# Patient Record
Sex: MALE | Race: WHITE | Employment: PART TIME
[De-identification: names, ages, dates, MRNs, and addresses within clinical notes are randomized per-mention and may not be internally consistent; named-entity substitution may affect disease eponyms.]

---

## 2017-01-09 RX ORDER — METOPROLOL SUCCINATE 50 MG/1
TABLET, EXTENDED RELEASE ORAL
Qty: 30 TABLET | Refills: 3 | Status: SHIPPED | OUTPATIENT
Start: 2017-01-09 | End: 2017-05-10 | Stop reason: SDUPTHER

## 2017-02-21 ENCOUNTER — OFFICE VISIT (OUTPATIENT)
Dept: FAMILY MEDICINE CLINIC | Facility: CLINIC | Age: 67
End: 2017-02-21

## 2017-02-21 VITALS
HEIGHT: 74 IN | SYSTOLIC BLOOD PRESSURE: 135 MMHG | DIASTOLIC BLOOD PRESSURE: 84 MMHG | HEART RATE: 57 BPM | BODY MASS INDEX: 31.32 KG/M2 | WEIGHT: 244 LBS

## 2017-02-21 DIAGNOSIS — I10 ESSENTIAL HYPERTENSION: Primary | ICD-10-CM

## 2017-02-21 DIAGNOSIS — Z00.00 WELL ADULT EXAM: ICD-10-CM

## 2017-02-21 DIAGNOSIS — E78.00 HYPERCHOLESTEREMIA: ICD-10-CM

## 2017-02-21 PROCEDURE — G8484 FLU IMMUNIZE NO ADMIN: HCPCS | Performed by: FAMILY MEDICINE

## 2017-02-21 PROCEDURE — G8419 CALC BMI OUT NRM PARAM NOF/U: HCPCS | Performed by: FAMILY MEDICINE

## 2017-02-21 PROCEDURE — 99213 OFFICE O/P EST LOW 20 MIN: CPT | Performed by: FAMILY MEDICINE

## 2017-02-21 PROCEDURE — G8427 DOCREV CUR MEDS BY ELIG CLIN: HCPCS | Performed by: FAMILY MEDICINE

## 2017-02-21 PROCEDURE — 1123F ACP DISCUSS/DSCN MKR DOCD: CPT | Performed by: FAMILY MEDICINE

## 2017-02-21 PROCEDURE — 1036F TOBACCO NON-USER: CPT | Performed by: FAMILY MEDICINE

## 2017-02-21 PROCEDURE — 4040F PNEUMOC VAC/ADMIN/RCVD: CPT | Performed by: FAMILY MEDICINE

## 2017-02-21 PROCEDURE — G8599 NO ASA/ANTIPLAT THER USE RNG: HCPCS | Performed by: FAMILY MEDICINE

## 2017-02-21 PROCEDURE — 3017F COLORECTAL CA SCREEN DOC REV: CPT | Performed by: FAMILY MEDICINE

## 2017-02-21 ASSESSMENT — ENCOUNTER SYMPTOMS
ABDOMINAL PAIN: 0
COUGH: 1
VOMITING: 0
SHORTNESS OF BREATH: 0

## 2017-02-22 ASSESSMENT — ENCOUNTER SYMPTOMS
TROUBLE SWALLOWING: 0
RHINORRHEA: 0
DIARRHEA: 0
WHEEZING: 0
BLOOD IN STOOL: 0
SORE THROAT: 0

## 2017-03-27 ENCOUNTER — HOSPITAL ENCOUNTER (OUTPATIENT)
Age: 67
Setting detail: SPECIMEN
Discharge: HOME | End: 2017-03-27
Payer: MEDICARE

## 2017-03-27 DIAGNOSIS — E78.00 HYPERCHOLESTEREMIA: ICD-10-CM

## 2017-03-27 DIAGNOSIS — I10 ESSENTIAL HYPERTENSION: ICD-10-CM

## 2017-03-27 DIAGNOSIS — Z00.00 WELL ADULT EXAM: ICD-10-CM

## 2017-03-27 LAB
ABSOLUTE EOS #: 0.1 K/UL (ref 0–0.4)
ABSOLUTE LYMPH #: 3.1 K/UL (ref 1–4.8)
ABSOLUTE MONO #: 0.5 K/UL (ref 0.1–1.2)
ALBUMIN SERPL-MCNC: 4 G/DL (ref 3.5–5.2)
ALBUMIN/GLOBULIN RATIO: 1.3 (ref 1–2.5)
ALP BLD-CCNC: 121 U/L (ref 40–129)
ALT SERPL-CCNC: 55 U/L (ref 5–41)
ANION GAP SERPL CALCULATED.3IONS-SCNC: 11 MMOL/L (ref 9–17)
AST SERPL-CCNC: 37 U/L
BASOPHILS # BLD: 0 % (ref 0–2)
BASOPHILS ABSOLUTE: 0 K/UL (ref 0–0.2)
BILIRUB SERPL-MCNC: 0.41 MG/DL (ref 0.3–1.2)
BILIRUBIN URINE: NEGATIVE
BUN BLDV-MCNC: 18 MG/DL (ref 8–23)
BUN/CREAT BLD: ABNORMAL (ref 9–20)
CALCIUM SERPL-MCNC: 8.8 MG/DL (ref 8.6–10.4)
CHLORIDE BLD-SCNC: 104 MMOL/L (ref 98–107)
CHOLESTEROL/HDL RATIO: 2.6
CHOLESTEROL: 128 MG/DL
CO2: 26 MMOL/L (ref 20–31)
COLOR: YELLOW
COMMENT UA: NORMAL
CREAT SERPL-MCNC: 1.08 MG/DL (ref 0.7–1.2)
DIFFERENTIAL TYPE: ABNORMAL
EOSINOPHILS RELATIVE PERCENT: 1 % (ref 1–4)
GFR AFRICAN AMERICAN: >60 ML/MIN
GFR NON-AFRICAN AMERICAN: >60 ML/MIN
GFR SERPL CREATININE-BSD FRML MDRD: ABNORMAL ML/MIN/{1.73_M2}
GFR SERPL CREATININE-BSD FRML MDRD: ABNORMAL ML/MIN/{1.73_M2}
GLUCOSE BLD-MCNC: 123 MG/DL (ref 70–99)
GLUCOSE URINE: NEGATIVE
HCT VFR BLD CALC: 42.3 % (ref 41–53)
HDLC SERPL-MCNC: 50 MG/DL
HEMOGLOBIN: 14.6 G/DL (ref 13.5–17.5)
KETONES, URINE: NEGATIVE
LDL CHOLESTEROL: 55 MG/DL (ref 0–130)
LEUKOCYTE ESTERASE, URINE: NEGATIVE
LYMPHOCYTES # BLD: 34 % (ref 24–44)
MCH RBC QN AUTO: 33.1 PG (ref 26–34)
MCHC RBC AUTO-ENTMCNC: 34.4 G/DL (ref 31–37)
MCV RBC AUTO: 96.2 FL (ref 80–100)
MONOCYTES # BLD: 6 % (ref 2–11)
NITRITE, URINE: NEGATIVE
PDW BLD-RTO: 14.5 % (ref 12.5–15.4)
PH UA: 6 (ref 5–8)
PLATELET # BLD: 135 K/UL (ref 140–450)
PLATELET ESTIMATE: ABNORMAL
PMV BLD AUTO: 7.9 FL (ref 6–12)
POTASSIUM SERPL-SCNC: 4.8 MMOL/L (ref 3.7–5.3)
PROSTATE SPECIFIC ANTIGEN: 1.61 UG/L
PROTEIN UA: NEGATIVE
RBC # BLD: 4.4 M/UL (ref 4.5–5.9)
RBC # BLD: ABNORMAL 10*6/UL
SEG NEUTROPHILS: 59 % (ref 36–66)
SEGMENTED NEUTROPHILS ABSOLUTE COUNT: 5.3 K/UL (ref 1.8–7.7)
SODIUM BLD-SCNC: 141 MMOL/L (ref 135–144)
SPECIFIC GRAVITY UA: 1.03 (ref 1–1.03)
TOTAL PROTEIN: 7.1 G/DL (ref 6.4–8.3)
TRIGL SERPL-MCNC: 113 MG/DL
TURBIDITY: CLEAR
URINE HGB: NEGATIVE
UROBILINOGEN, URINE: NORMAL
VLDLC SERPL CALC-MCNC: NORMAL MG/DL (ref 1–30)
WBC # BLD: 9 K/UL (ref 3.5–11)
WBC # BLD: ABNORMAL 10*3/UL

## 2017-05-01 RX ORDER — AMLODIPINE BESYLATE 5 MG/1
TABLET ORAL
Qty: 30 TABLET | Refills: 4 | Status: SHIPPED | OUTPATIENT
Start: 2017-05-01 | End: 2017-06-09 | Stop reason: SDUPTHER

## 2017-05-11 RX ORDER — METOPROLOL SUCCINATE 50 MG/1
TABLET, EXTENDED RELEASE ORAL
Qty: 30 TABLET | Refills: 5 | Status: SHIPPED | OUTPATIENT
Start: 2017-05-11 | End: 2017-06-09 | Stop reason: SDUPTHER

## 2017-06-09 ENCOUNTER — OFFICE VISIT (OUTPATIENT)
Dept: FAMILY MEDICINE CLINIC | Age: 67
End: 2017-06-09
Payer: MEDICARE

## 2017-06-09 VITALS
TEMPERATURE: 98 F | BODY MASS INDEX: 31.52 KG/M2 | OXYGEN SATURATION: 95 % | HEART RATE: 65 BPM | DIASTOLIC BLOOD PRESSURE: 64 MMHG | HEIGHT: 74 IN | SYSTOLIC BLOOD PRESSURE: 128 MMHG | WEIGHT: 245.6 LBS

## 2017-06-09 DIAGNOSIS — I10 ESSENTIAL HYPERTENSION: ICD-10-CM

## 2017-06-09 DIAGNOSIS — E78.00 HYPERCHOLESTEREMIA: ICD-10-CM

## 2017-06-09 PROCEDURE — 4040F PNEUMOC VAC/ADMIN/RCVD: CPT | Performed by: FAMILY MEDICINE

## 2017-06-09 PROCEDURE — 99213 OFFICE O/P EST LOW 20 MIN: CPT | Performed by: FAMILY MEDICINE

## 2017-06-09 PROCEDURE — 1036F TOBACCO NON-USER: CPT | Performed by: FAMILY MEDICINE

## 2017-06-09 PROCEDURE — 3017F COLORECTAL CA SCREEN DOC REV: CPT | Performed by: FAMILY MEDICINE

## 2017-06-09 PROCEDURE — G8419 CALC BMI OUT NRM PARAM NOF/U: HCPCS | Performed by: FAMILY MEDICINE

## 2017-06-09 PROCEDURE — 1123F ACP DISCUSS/DSCN MKR DOCD: CPT | Performed by: FAMILY MEDICINE

## 2017-06-09 PROCEDURE — G8427 DOCREV CUR MEDS BY ELIG CLIN: HCPCS | Performed by: FAMILY MEDICINE

## 2017-06-09 PROCEDURE — G8598 ASA/ANTIPLAT THER USED: HCPCS | Performed by: FAMILY MEDICINE

## 2017-06-09 RX ORDER — ATORVASTATIN CALCIUM 10 MG/1
10 TABLET, FILM COATED ORAL DAILY
Qty: 90 TABLET | Refills: 3
Start: 2017-06-09 | End: 2017-06-26 | Stop reason: SDUPTHER

## 2017-06-09 RX ORDER — METOPROLOL SUCCINATE 50 MG/1
50 TABLET, EXTENDED RELEASE ORAL DAILY
Qty: 90 TABLET | Refills: 3
Start: 2017-06-09 | End: 2017-11-13 | Stop reason: SDUPTHER

## 2017-06-09 RX ORDER — AMLODIPINE BESYLATE 5 MG/1
5 TABLET ORAL DAILY
Qty: 90 TABLET | Refills: 3
Start: 2017-06-09 | End: 2017-10-02 | Stop reason: SDUPTHER

## 2017-06-09 RX ORDER — OMEPRAZOLE 10 MG/1
10 CAPSULE, DELAYED RELEASE ORAL DAILY PRN
Qty: 90 CAPSULE | Refills: 3
Start: 2017-06-09 | End: 2022-02-07 | Stop reason: ALTCHOICE

## 2017-06-09 ASSESSMENT — ENCOUNTER SYMPTOMS
RHINORRHEA: 0
EYE ITCHING: 0
EYE REDNESS: 0
ABDOMINAL PAIN: 0
ABDOMINAL DISTENTION: 0
CHOKING: 0
COUGH: 0
STRIDOR: 0
BACK PAIN: 0
NAUSEA: 0
SHORTNESS OF BREATH: 0
VOMITING: 0
COLOR CHANGE: 0
WHEEZING: 0
BLOOD IN STOOL: 0
APNEA: 0
PHOTOPHOBIA: 0
EYE DISCHARGE: 0
EYE PAIN: 0
CHEST TIGHTNESS: 0
DIARRHEA: 0
CONSTIPATION: 0
SINUS PRESSURE: 0
SORE THROAT: 0

## 2017-06-09 ASSESSMENT — PATIENT HEALTH QUESTIONNAIRE - PHQ9
SUM OF ALL RESPONSES TO PHQ9 QUESTIONS 1 & 2: 0
2. FEELING DOWN, DEPRESSED OR HOPELESS: 0
SUM OF ALL RESPONSES TO PHQ QUESTIONS 1-9: 0
1. LITTLE INTEREST OR PLEASURE IN DOING THINGS: 0

## 2017-06-26 RX ORDER — ATORVASTATIN CALCIUM 10 MG/1
10 TABLET, FILM COATED ORAL DAILY
Qty: 90 TABLET | Refills: 1 | Status: SHIPPED | OUTPATIENT
Start: 2017-06-26 | End: 2017-12-29 | Stop reason: SDUPTHER

## 2017-10-02 RX ORDER — AMLODIPINE BESYLATE 5 MG/1
5 TABLET ORAL DAILY
Qty: 90 TABLET | Refills: 3 | Status: SHIPPED | OUTPATIENT
Start: 2017-10-02 | End: 2018-10-14 | Stop reason: SDUPTHER

## 2017-10-02 NOTE — TELEPHONE ENCOUNTER
Health Maintenance   Topic Date Due    Flu vaccine (1) 09/01/2017    Diabetes screen  03/22/2019    Lipid screen  03/27/2022    Colon cancer screen colonoscopy  01/19/2025    DTaP/Tdap/Td vaccine (2 - Td) 07/11/2026    Zostavax vaccine  Addressed    Pneumococcal low/med risk  Completed    AAA screen  Completed    Hepatitis C screen  Completed       No results found for: LABA1C          ( goal A1C is < 7)   No results found for: LABMICR  LDL Cholesterol (mg/dL)   Date Value   03/27/2017 55       (goal LDL is <100)   AST (U/L)   Date Value   03/27/2017 37     ALT (U/L)   Date Value   03/27/2017 55 (H)     BUN (mg/dL)   Date Value   03/27/2017 18     BP Readings from Last 3 Encounters:   06/09/17 128/64   02/21/17 135/84   10/18/16 120/75          (goal 120/80)    All Future Testing planned in [facility]  Lab Frequency Next Occurrence   Nasal cannula oxygen         Next Visit Date:  No future appointments.          Patient Active Problem List:     Cerebral infarction [facility])     Abnormal LFTs     Carotid arterial disease (HCC)     Hypercholesteremia     Colon polyps     Colon tumor     Colonic mass     Cholelithiasis with acute cholecystitis     HTN (hypertension)

## 2017-11-13 RX ORDER — METOPROLOL SUCCINATE 50 MG/1
50 TABLET, EXTENDED RELEASE ORAL DAILY
Qty: 90 TABLET | Refills: 3 | Status: SHIPPED | OUTPATIENT
Start: 2017-11-13 | End: 2018-11-24 | Stop reason: SDUPTHER

## 2017-11-13 NOTE — TELEPHONE ENCOUNTER
Next Visit Date:  No future appointments.     Health Maintenance   Topic Date Due    Flu vaccine (1) 09/01/2017    Diabetes screen  03/22/2019    Lipid screen  03/27/2022    Colon cancer screen colonoscopy  01/19/2025    DTaP/Tdap/Td vaccine (2 - Td) 07/11/2026    Zostavax vaccine  Addressed    Pneumococcal low/med risk  Completed    AAA screen  Completed    Hepatitis C screen  Completed       No results found for: LABA1C          ( goal A1C is < 7)   No results found for: LABMICR  LDL Cholesterol (mg/dL)   Date Value   03/27/2017 55       (goal LDL is <100)   AST (U/L)   Date Value   03/27/2017 37     ALT (U/L)   Date Value   03/27/2017 55 (H)     BUN (mg/dL)   Date Value   03/27/2017 18     BP Readings from Last 3 Encounters:   06/09/17 128/64   02/21/17 135/84   10/18/16 120/75          (goal 120/80)    All Future Testing planned in [facility]  Lab Frequency Next Occurrence   Nasal cannula oxygen                 Patient Active Problem List:     Cerebral infarction (HCC)     Abnormal LFTs     Carotid arterial disease (HCC)     Hypercholesteremia     Colon polyps     Colon tumor     Colonic mass     Cholelithiasis with acute cholecystitis     HTN (hypertension)

## 2017-12-29 RX ORDER — ATORVASTATIN CALCIUM 10 MG/1
TABLET, FILM COATED ORAL
Qty: 90 TABLET | Refills: 0 | Status: SHIPPED | OUTPATIENT
Start: 2017-12-29 | End: 2018-04-06 | Stop reason: SDUPTHER

## 2017-12-29 NOTE — TELEPHONE ENCOUNTER
Next Visit Date:  No future appointments.     Health Maintenance   Topic Date Due    Smoker: low dose lung CT screening  03/23/2016    Flu vaccine (1) 09/01/2017    Diabetes screen  03/22/2019    Lipid screen  03/27/2022    Colon cancer screen colonoscopy  01/19/2025    DTaP/Tdap/Td vaccine (2 - Td) 07/11/2026    Zostavax vaccine  Addressed    Pneumococcal low/med risk  Completed    AAA screen  Completed    Hepatitis C screen  Completed       No results found for: LABA1C          ( goal A1C is < 7)   No results found for: LABMICR  LDL Cholesterol (mg/dL)   Date Value   03/27/2017 55       (goal LDL is <100)   AST (U/L)   Date Value   03/27/2017 37     ALT (U/L)   Date Value   03/27/2017 55 (H)     BUN (mg/dL)   Date Value   03/27/2017 18     BP Readings from Last 3 Encounters:   06/09/17 128/64   02/21/17 135/84   10/18/16 120/75          (goal 120/80)    All Future Testing planned in [facility]  Lab Frequency Next Occurrence   Nasal cannula oxygen                 Patient Active Problem List:     Cerebral infarction (HCC)     Abnormal LFTs     Carotid arterial disease (HCC)     Hypercholesteremia     Colon polyps     Colon tumor     Colonic mass     Cholelithiasis with acute cholecystitis     HTN (hypertension)

## 2018-04-06 RX ORDER — ATORVASTATIN CALCIUM 10 MG/1
TABLET, FILM COATED ORAL
Qty: 90 TABLET | Refills: 3 | Status: SHIPPED | OUTPATIENT
Start: 2018-04-06 | End: 2019-04-16 | Stop reason: SDUPTHER

## 2018-06-25 ENCOUNTER — OFFICE VISIT (OUTPATIENT)
Dept: FAMILY MEDICINE CLINIC | Age: 68
End: 2018-06-25
Payer: MEDICARE

## 2018-06-25 VITALS
HEIGHT: 74 IN | HEART RATE: 52 BPM | WEIGHT: 229.2 LBS | OXYGEN SATURATION: 98 % | DIASTOLIC BLOOD PRESSURE: 64 MMHG | SYSTOLIC BLOOD PRESSURE: 120 MMHG | BODY MASS INDEX: 29.41 KG/M2

## 2018-06-25 DIAGNOSIS — I71.40 ABDOMINAL AORTIC ANEURYSM (AAA) WITHOUT RUPTURE: ICD-10-CM

## 2018-06-25 DIAGNOSIS — Z00.00 ROUTINE GENERAL MEDICAL EXAMINATION AT A HEALTH CARE FACILITY: ICD-10-CM

## 2018-06-25 DIAGNOSIS — Z51.81 MEDICATION MONITORING ENCOUNTER: ICD-10-CM

## 2018-06-25 DIAGNOSIS — I77.9 BILATERAL CAROTID ARTERY DISEASE (HCC): ICD-10-CM

## 2018-06-25 DIAGNOSIS — Z00.00 MEDICARE ANNUAL WELLNESS VISIT, SUBSEQUENT: Primary | ICD-10-CM

## 2018-06-25 DIAGNOSIS — E78.2 MIXED HYPERLIPIDEMIA: ICD-10-CM

## 2018-06-25 DIAGNOSIS — Z12.5 SCREENING FOR PROSTATE CANCER: ICD-10-CM

## 2018-06-25 PROCEDURE — 4040F PNEUMOC VAC/ADMIN/RCVD: CPT | Performed by: FAMILY MEDICINE

## 2018-06-25 PROCEDURE — G8598 ASA/ANTIPLAT THER USED: HCPCS | Performed by: FAMILY MEDICINE

## 2018-06-25 PROCEDURE — G0438 PPPS, INITIAL VISIT: HCPCS | Performed by: FAMILY MEDICINE

## 2018-06-25 ASSESSMENT — ENCOUNTER SYMPTOMS
EYE PAIN: 0
EYE REDNESS: 0
CONSTIPATION: 0
COUGH: 0
CHEST TIGHTNESS: 0
SORE THROAT: 0
SHORTNESS OF BREATH: 0
COLOR CHANGE: 0
EYE DISCHARGE: 0
VOMITING: 0
PHOTOPHOBIA: 0
ABDOMINAL DISTENTION: 0
SINUS PRESSURE: 0
DIARRHEA: 0
NAUSEA: 0
BACK PAIN: 0
RHINORRHEA: 0
EYE ITCHING: 0
WHEEZING: 0
APNEA: 0
STRIDOR: 0
CHOKING: 0
BLOOD IN STOOL: 0
ABDOMINAL PAIN: 0

## 2018-06-25 ASSESSMENT — LIFESTYLE VARIABLES
HOW OFTEN DURING THE LAST YEAR HAVE YOU FAILED TO DO WHAT WAS NORMALLY EXPECTED FROM YOU BECAUSE OF DRINKING: 0
AUDIT-C TOTAL SCORE: 3
HOW OFTEN DURING THE LAST YEAR HAVE YOU NEEDED AN ALCOHOLIC DRINK FIRST THING IN THE MORNING TO GET YOURSELF GOING AFTER A NIGHT OF HEAVY DRINKING: 0
HOW OFTEN DO YOU HAVE SIX OR MORE DRINKS ON ONE OCCASION: 0
HOW OFTEN DURING THE LAST YEAR HAVE YOU BEEN UNABLE TO REMEMBER WHAT HAPPENED THE NIGHT BEFORE BECAUSE YOU HAD BEEN DRINKING: 0
HOW OFTEN DURING THE LAST YEAR HAVE YOU HAD A FEELING OF GUILT OR REMORSE AFTER DRINKING: 0
HOW MANY STANDARD DRINKS CONTAINING ALCOHOL DO YOU HAVE ON A TYPICAL DAY: 0
HOW OFTEN DURING THE LAST YEAR HAVE YOU FOUND THAT YOU WERE NOT ABLE TO STOP DRINKING ONCE YOU HAD STARTED: 0
HOW OFTEN DO YOU HAVE A DRINK CONTAINING ALCOHOL: 3

## 2018-06-25 ASSESSMENT — ANXIETY QUESTIONNAIRES: GAD7 TOTAL SCORE: 0

## 2018-06-25 ASSESSMENT — PATIENT HEALTH QUESTIONNAIRE - PHQ9: SUM OF ALL RESPONSES TO PHQ QUESTIONS 1-9: 0

## 2018-10-15 RX ORDER — AMLODIPINE BESYLATE 5 MG/1
TABLET ORAL
Qty: 90 TABLET | Refills: 3 | Status: SHIPPED | OUTPATIENT
Start: 2018-10-15 | End: 2020-02-19 | Stop reason: SDUPTHER

## 2018-10-15 NOTE — TELEPHONE ENCOUNTER
Health Maintenance   Topic Date Due    Shingles Vaccine (1 of 2 - 2 Dose Series) 06/08/1999    Flu vaccine (1) 09/01/2018    Diabetes screen  03/22/2019    Lipid screen  03/27/2022    Colon cancer screen colonoscopy  01/19/2025    DTaP/Tdap/Td vaccine (2 - Td) 07/11/2026    Pneumococcal low/med risk  Completed    AAA screen  Completed    Hepatitis C screen  Completed       No results found for: LABA1C          ( goal A1C is < 7)   No results found for: LABMICR  LDL Cholesterol (mg/dL)   Date Value   03/27/2017 55       (goal LDL is <100)   AST (U/L)   Date Value   03/27/2017 37     ALT (U/L)   Date Value   03/27/2017 55 (H)     BUN (mg/dL)   Date Value   03/27/2017 18     BP Readings from Last 3 Encounters:   06/25/18 120/64   06/09/17 128/64   02/21/17 135/84          (goal 120/80)    All Future Testing planned in [facility]  Lab Frequency Next Occurrence   PSA Screening Once 06/25/2018   Lipid Panel Once 06/25/2018   Comprehensive Metabolic Panel Once 64/20/2223   US Abdominal Aorta Limited Once 07/25/2018   Nasal cannula oxygen         Next Visit Date:  No future appointments.          Patient Active Problem List:     Cerebral infarction [facility])     Abnormal LFTs     Carotid arterial disease (HCC)     Hypercholesteremia     Colon polyps     Colon tumor     Colonic mass     Cholelithiasis with acute cholecystitis     HTN (hypertension)

## 2018-11-26 RX ORDER — METOPROLOL SUCCINATE 50 MG/1
TABLET, EXTENDED RELEASE ORAL
Qty: 90 TABLET | Refills: 3 | Status: SHIPPED | OUTPATIENT
Start: 2018-11-26 | End: 2020-02-19 | Stop reason: SDUPTHER

## 2019-03-12 ENCOUNTER — OFFICE VISIT (OUTPATIENT)
Dept: FAMILY MEDICINE CLINIC | Age: 69
End: 2019-03-12
Payer: MEDICARE

## 2019-03-12 VITALS
WEIGHT: 239 LBS | SYSTOLIC BLOOD PRESSURE: 138 MMHG | BODY MASS INDEX: 30.69 KG/M2 | HEART RATE: 62 BPM | DIASTOLIC BLOOD PRESSURE: 70 MMHG | OXYGEN SATURATION: 97 %

## 2019-03-12 DIAGNOSIS — H00.14 CHALAZION OF LEFT UPPER EYELID: Primary | ICD-10-CM

## 2019-03-12 PROCEDURE — G8417 CALC BMI ABV UP PARAM F/U: HCPCS | Performed by: NURSE PRACTITIONER

## 2019-03-12 PROCEDURE — 1123F ACP DISCUSS/DSCN MKR DOCD: CPT | Performed by: NURSE PRACTITIONER

## 2019-03-12 PROCEDURE — G8510 SCR DEP NEG, NO PLAN REQD: HCPCS | Performed by: NURSE PRACTITIONER

## 2019-03-12 PROCEDURE — G8484 FLU IMMUNIZE NO ADMIN: HCPCS | Performed by: NURSE PRACTITIONER

## 2019-03-12 PROCEDURE — 99212 OFFICE O/P EST SF 10 MIN: CPT | Performed by: NURSE PRACTITIONER

## 2019-03-12 PROCEDURE — G8599 NO ASA/ANTIPLAT THER USE RNG: HCPCS | Performed by: NURSE PRACTITIONER

## 2019-03-12 PROCEDURE — 1036F TOBACCO NON-USER: CPT | Performed by: NURSE PRACTITIONER

## 2019-03-12 PROCEDURE — 3017F COLORECTAL CA SCREEN DOC REV: CPT | Performed by: NURSE PRACTITIONER

## 2019-03-12 PROCEDURE — G8427 DOCREV CUR MEDS BY ELIG CLIN: HCPCS | Performed by: NURSE PRACTITIONER

## 2019-03-12 PROCEDURE — 1101F PT FALLS ASSESS-DOCD LE1/YR: CPT | Performed by: NURSE PRACTITIONER

## 2019-03-12 PROCEDURE — 4040F PNEUMOC VAC/ADMIN/RCVD: CPT | Performed by: NURSE PRACTITIONER

## 2019-03-12 ASSESSMENT — PATIENT HEALTH QUESTIONNAIRE - PHQ9
SUM OF ALL RESPONSES TO PHQ QUESTIONS 1-9: 1
SUM OF ALL RESPONSES TO PHQ9 QUESTIONS 1 & 2: 1
SUM OF ALL RESPONSES TO PHQ QUESTIONS 1-9: 1
2. FEELING DOWN, DEPRESSED OR HOPELESS: 1
1. LITTLE INTEREST OR PLEASURE IN DOING THINGS: 0

## 2019-03-12 ASSESSMENT — ENCOUNTER SYMPTOMS
EYE PAIN: 0
RESPIRATORY NEGATIVE: 1
EYE ITCHING: 0
PHOTOPHOBIA: 0
EYE REDNESS: 1
EYE DISCHARGE: 0
ALLERGIC/IMMUNOLOGIC NEGATIVE: 1
COLOR CHANGE: 0
GASTROINTESTINAL NEGATIVE: 1

## 2019-04-03 ENCOUNTER — OFFICE VISIT (OUTPATIENT)
Dept: FAMILY MEDICINE CLINIC | Age: 69
End: 2019-04-03
Payer: MEDICARE

## 2019-04-03 VITALS
OXYGEN SATURATION: 95 % | WEIGHT: 227 LBS | BODY MASS INDEX: 29.15 KG/M2 | SYSTOLIC BLOOD PRESSURE: 120 MMHG | HEART RATE: 75 BPM | DIASTOLIC BLOOD PRESSURE: 60 MMHG

## 2019-04-03 DIAGNOSIS — R06.00 DYSPNEA, UNSPECIFIED TYPE: ICD-10-CM

## 2019-04-03 DIAGNOSIS — I65.23 BILATERAL CAROTID ARTERY STENOSIS: ICD-10-CM

## 2019-04-03 DIAGNOSIS — Z23 IMMUNIZATION DUE: ICD-10-CM

## 2019-04-03 DIAGNOSIS — J40 BRONCHITIS: Primary | ICD-10-CM

## 2019-04-03 PROCEDURE — 3017F COLORECTAL CA SCREEN DOC REV: CPT | Performed by: FAMILY MEDICINE

## 2019-04-03 PROCEDURE — 99213 OFFICE O/P EST LOW 20 MIN: CPT | Performed by: FAMILY MEDICINE

## 2019-04-03 PROCEDURE — G8599 NO ASA/ANTIPLAT THER USE RNG: HCPCS | Performed by: FAMILY MEDICINE

## 2019-04-03 PROCEDURE — 1036F TOBACCO NON-USER: CPT | Performed by: FAMILY MEDICINE

## 2019-04-03 PROCEDURE — G8417 CALC BMI ABV UP PARAM F/U: HCPCS | Performed by: FAMILY MEDICINE

## 2019-04-03 PROCEDURE — 1123F ACP DISCUSS/DSCN MKR DOCD: CPT | Performed by: FAMILY MEDICINE

## 2019-04-03 PROCEDURE — 4040F PNEUMOC VAC/ADMIN/RCVD: CPT | Performed by: FAMILY MEDICINE

## 2019-04-03 PROCEDURE — G8427 DOCREV CUR MEDS BY ELIG CLIN: HCPCS | Performed by: FAMILY MEDICINE

## 2019-04-03 ASSESSMENT — ENCOUNTER SYMPTOMS
CHEST TIGHTNESS: 0
CONSTIPATION: 0
SHORTNESS OF BREATH: 0
EYE ITCHING: 0
PHOTOPHOBIA: 0
COUGH: 1
ABDOMINAL PAIN: 0
STRIDOR: 0
EYE PAIN: 0
DIARRHEA: 0
NAUSEA: 1
CHOKING: 0
BLOOD IN STOOL: 0
BACK PAIN: 0
WHEEZING: 0
SORE THROAT: 0
COLOR CHANGE: 0
SINUS PRESSURE: 1
EYE REDNESS: 0
ABDOMINAL DISTENTION: 0
RHINORRHEA: 1
VOMITING: 1
EYE DISCHARGE: 0
APNEA: 0

## 2019-04-03 NOTE — PROGRESS NOTES
Subjective:      Patient ID: Ashlie Gonzalez is a 70 y.o. male. Visit Information    Have you changed or started any medications since your last visit including any over-the-counter medicines, vitamins, or herbal medicines? no   Are you having any side effects from any of your medications? -  no  Have you stopped taking any of your medications? Is so, why? -  no    Have you seen any other physician or provider since your last visit? Yes - Records Obtained  Have you had any other diagnostic tests since your last visit? No  Have you been seen in the emergency room and/or had an admission to a hospital since we last saw you? No  Have you had your routine dental cleaning in the past 6 months? yes -     Have you activated your [employer] account? If not, what are your barriers?  Yes     Patient Care Team:  Yamila Chopra MD as PCP - General (Family Medicine)  Yamila Chopra MD as PCP - [facility] Attributed Provider  Jen Corea MD as Consulting Physician (Gastroenterology)  Rhonda Cedeño MD as Surgeon (General Surgery)    Medical History Review  Past Medical, Family, and Social History reviewed and  contribute to the patient presenting condition    Health Maintenance   Topic Date Due    Low dose CT lung screening  06/08/2004    Shingles Vaccine (2 of 3) 09/22/2016    Diabetes screen  03/22/2019    Flu vaccine (Season Ended) 09/01/2019    Lipid screen  03/27/2022    Colon cancer screen colonoscopy  01/19/2025    DTaP/Tdap/Td vaccine (2 - Td) 07/11/2026    Pneumococcal 65+ years Vaccine  Completed    AAA screen  Completed    Hepatitis C screen  Completed       HPI    Review of Systems    Objective:   Physical Exam    Assessment / Plan:

## 2019-04-03 NOTE — PROGRESS NOTES
Subjective:      Patient ID: Charley Bloch is a 70 y.o. male. HPI  Here for evaluation of cough which started about 2 weeks ago and thinks that there may have been a flu infection. There had been some productivity to the cough and had been febrile as well as body aches. Low appetite, positive nausea and vomiting. Currently is still feeling very fatigued and the cough has improved but is persistent. There is still shortness of breath as well. Review of Systems   Constitutional: Positive for fatigue and fever. Negative for activity change, appetite change, chills, diaphoresis and unexpected weight change. HENT: Positive for congestion, nosebleeds, postnasal drip, rhinorrhea and sinus pressure. Negative for dental problem, ear pain, hearing loss, mouth sores and sore throat. Eyes: Negative for photophobia, pain, discharge, redness, itching and visual disturbance. Respiratory: Positive for cough. Negative for apnea, choking, chest tightness, shortness of breath, wheezing and stridor. Cardiovascular: Negative for chest pain, palpitations and leg swelling. Gastrointestinal: Positive for nausea and vomiting. Negative for abdominal distention, abdominal pain, blood in stool, constipation and diarrhea. Genitourinary: Negative for decreased urine volume, difficulty urinating, dysuria, flank pain, frequency, scrotal swelling, testicular pain and urgency. Musculoskeletal: Positive for myalgias. Negative for back pain, gait problem, joint swelling, neck pain and neck stiffness. Skin: Negative for color change, pallor and rash. Neurological: Positive for headaches. Negative for dizziness, tremors, seizures, syncope, facial asymmetry, speech difficulty, weakness, light-headedness and numbness. Psychiatric/Behavioral: Negative for agitation, behavioral problems, decreased concentration, sleep disturbance and suicidal ideas. The patient is not nervous/anxious.         Objective:   Physical Exam

## 2019-04-04 PROCEDURE — G0009 ADMIN PNEUMOCOCCAL VACCINE: HCPCS | Performed by: FAMILY MEDICINE

## 2019-04-04 PROCEDURE — 90732 PPSV23 VACC 2 YRS+ SUBQ/IM: CPT | Performed by: FAMILY MEDICINE

## 2019-04-17 RX ORDER — ATORVASTATIN CALCIUM 10 MG/1
TABLET, FILM COATED ORAL
Qty: 90 TABLET | Refills: 3 | Status: SHIPPED | OUTPATIENT
Start: 2019-04-17 | End: 2020-05-06 | Stop reason: SDUPTHER

## 2019-06-27 ENCOUNTER — OFFICE VISIT (OUTPATIENT)
Dept: FAMILY MEDICINE CLINIC | Age: 69
End: 2019-06-27
Payer: MEDICARE

## 2019-06-27 VITALS
DIASTOLIC BLOOD PRESSURE: 66 MMHG | BODY MASS INDEX: 30.56 KG/M2 | HEART RATE: 77 BPM | OXYGEN SATURATION: 97 % | SYSTOLIC BLOOD PRESSURE: 112 MMHG | WEIGHT: 238 LBS

## 2019-06-27 DIAGNOSIS — I73.9 PAD (PERIPHERAL ARTERY DISEASE) (HCC): ICD-10-CM

## 2019-06-27 DIAGNOSIS — I71.40 ABDOMINAL AORTIC ANEURYSM (AAA) WITHOUT RUPTURE: ICD-10-CM

## 2019-06-27 DIAGNOSIS — K21.9 GASTROESOPHAGEAL REFLUX DISEASE WITHOUT ESOPHAGITIS: ICD-10-CM

## 2019-06-27 DIAGNOSIS — R06.00 DYSPNEA, UNSPECIFIED TYPE: ICD-10-CM

## 2019-06-27 DIAGNOSIS — I77.9 ARTERIAL DISEASE (HCC): ICD-10-CM

## 2019-06-27 DIAGNOSIS — R07.2 PRECORDIAL PAIN: Primary | ICD-10-CM

## 2019-06-27 PROCEDURE — G8417 CALC BMI ABV UP PARAM F/U: HCPCS | Performed by: FAMILY MEDICINE

## 2019-06-27 PROCEDURE — G8427 DOCREV CUR MEDS BY ELIG CLIN: HCPCS | Performed by: FAMILY MEDICINE

## 2019-06-27 PROCEDURE — 99214 OFFICE O/P EST MOD 30 MIN: CPT | Performed by: FAMILY MEDICINE

## 2019-06-27 PROCEDURE — 93000 ELECTROCARDIOGRAM COMPLETE: CPT | Performed by: FAMILY MEDICINE

## 2019-06-27 PROCEDURE — 4040F PNEUMOC VAC/ADMIN/RCVD: CPT | Performed by: FAMILY MEDICINE

## 2019-06-27 PROCEDURE — 3017F COLORECTAL CA SCREEN DOC REV: CPT | Performed by: FAMILY MEDICINE

## 2019-06-27 PROCEDURE — 1036F TOBACCO NON-USER: CPT | Performed by: FAMILY MEDICINE

## 2019-06-27 PROCEDURE — G8599 NO ASA/ANTIPLAT THER USE RNG: HCPCS | Performed by: FAMILY MEDICINE

## 2019-06-27 PROCEDURE — 1123F ACP DISCUSS/DSCN MKR DOCD: CPT | Performed by: FAMILY MEDICINE

## 2019-06-27 ASSESSMENT — ENCOUNTER SYMPTOMS
EYE REDNESS: 0
COLOR CHANGE: 0
RHINORRHEA: 0
COUGH: 0
BLOOD IN STOOL: 0
ABDOMINAL PAIN: 0
ABDOMINAL DISTENTION: 0
EYE DISCHARGE: 0
SHORTNESS OF BREATH: 0
DIARRHEA: 0
APNEA: 0
EYE ITCHING: 0
STRIDOR: 0
SORE THROAT: 0
SINUS PRESSURE: 0
WHEEZING: 0
CONSTIPATION: 0
CHOKING: 0
CHEST TIGHTNESS: 0
EYE PAIN: 0
PHOTOPHOBIA: 0
BACK PAIN: 0
VOMITING: 0
NAUSEA: 0

## 2019-06-27 NOTE — PROGRESS NOTES
Rossy Nicole is a 78 y.o. male who presents todayfor his medical conditions/complaints as noted below. Rossy Nicole is here today c/oHypertension      HPI:      HPI    Here for follow up of hypertension and has been having some continued trouble with fatigue and chest pressure associated with dyspnea. He has known history of PAD and stroke in the past which places him at high risk for cardiovascular disease. His physical activity has been limited by chest pain, dyspnea and palpitations which come on with physical activity and then marin with rest within a few minutes only to come back on within minutes of exerting himself again. He also notes that there has been some shooting pain from the buttock to the left foot and is associated with cramping in the muscles of the legs. Subjective:   Review of Systems   Constitutional: Negative for activity change, appetite change, chills, diaphoresis, fatigue, fever and unexpected weight change. HENT: Negative for congestion, dental problem, ear pain, hearing loss, mouth sores, nosebleeds, postnasal drip, rhinorrhea, sinus pressure and sore throat. Eyes: Negative for photophobia, pain, discharge, redness, itching and visual disturbance. Respiratory: Negative for apnea, cough, choking, chest tightness, shortness of breath, wheezing and stridor. Cardiovascular: Negative for chest pain, palpitations and leg swelling. Gastrointestinal: Negative for abdominal distention, abdominal pain, blood in stool, constipation, diarrhea, nausea and vomiting. Genitourinary: Negative for decreased urine volume, difficulty urinating, dysuria, flank pain, frequency, scrotal swelling, testicular pain and urgency. Musculoskeletal: Negative for back pain, gait problem, joint swelling, myalgias, neck pain and neck stiffness. Skin: Negative for color change, pallor and rash.    Neurological: Negative for dizziness, tremors, seizures, syncope, facial asymmetry, speech difficulty, weakness, light-headedness, numbness and headaches. Psychiatric/Behavioral: Negative for agitation, behavioral problems, decreased concentration, sleep disturbance and suicidal ideas. The patient is not nervous/anxious. Objective:    /66   Pulse 77   Wt 238 lb (108 kg)   SpO2 97%   BMI 30.56 kg/m²     Physical Exam   Constitutional: He appears well-developed and well-nourished. HENT:   Head: Normocephalic. Right Ear: Tympanic membrane is not erythematous and not bulging. Left Ear: Tympanic membrane is not erythematous and not bulging. Nose: No mucosal edema or rhinorrhea. Mouth/Throat: Uvula is midline, oropharynx is clear and moist and mucous membranes are normal.   Eyes: Pupils are equal, round, and reactive to light. Conjunctivae and EOM are normal.   Neck: Trachea normal, normal range of motion and full passive range of motion without pain. Neck supple. No JVD present. Carotid bruit is not present. Cardiovascular: Normal rate, regular rhythm, S1 normal, S2 normal, normal heart sounds and normal pulses. Exam reveals no gallop, no S3, no S4, no distant heart sounds and no friction rub. No murmur heard. No systolic murmur is present. Pulmonary/Chest: Effort normal and breath sounds normal.   Abdominal: Normal appearance and bowel sounds are normal. There is no tenderness. Lymphadenopathy:     He has no cervical adenopathy. Right cervical: No superficial cervical and no deep cervical adenopathy present. Left cervical: No superficial cervical and no deep cervical adenopathy present. Neurological: He is alert. He has normal strength. No cranial nerve deficit or sensory deficit. He displays a negative Romberg sign. Reflex Scores:       Brachioradialis reflexes are 2+ on the right side and 2+ on the left side. Patellar reflexes are 2+ on the right side and 2+ on the left side.        Achilles reflexes are 2+ on the right side and 2+ on the left side. Skin: Skin is warm, dry and intact. He is not diaphoretic. No pallor. Psychiatric: He has a normal mood and affect. His speech is normal and behavior is normal. Judgment and thought content normal. Cognition and memory are normal.       Assessment & Plan:     1. Precordial pain  Given his history he should be considered high risk for coronary disease so I would like to get an EKG today and stress test within the next week. - Stress Test W Pharm  - EKG 12 Lead  - AFL - Ce Laura MD, Cardiology, [place]    2. PAD (peripheral artery disease) ([facility] [address]  Has follow up scheduled with vascular next week. He has no palpable pulse in the feet and the cramping in his legs seems more likely to be lumbar radiculitis but could be vascular in nature. 3. Abdominal aortic aneurysm (AAA) without rupture ([facility] [address]  Has shown no increase in the size of AAA over the past 3 years but it is greater than 4cm and should be followed regularly by Dr. Tri Obrien which was encouraged. 4. Gastroesophageal reflux disease without esophagitis  Has been well controlled with the current use of omeprazole so the likelihood of the chest symptoms being GERD related seems to be low. 5. Dyspnea, unspecified type  Unclear etiology, started prior to illness in the winter and has been building so my concern is that it is related to cardiovascular disease.

## 2020-02-19 ENCOUNTER — HOSPITAL ENCOUNTER (OUTPATIENT)
Age: 70
Setting detail: SPECIMEN
Discharge: HOME | End: 2020-02-19
Payer: MEDICARE

## 2020-02-19 ENCOUNTER — OFFICE VISIT (OUTPATIENT)
Dept: FAMILY MEDICINE CLINIC | Age: 70
End: 2020-02-19
Payer: MEDICARE

## 2020-02-19 VITALS
WEIGHT: 232 LBS | BODY MASS INDEX: 29.77 KG/M2 | HEIGHT: 74 IN | SYSTOLIC BLOOD PRESSURE: 138 MMHG | RESPIRATION RATE: 16 BRPM | TEMPERATURE: 97.9 F | DIASTOLIC BLOOD PRESSURE: 62 MMHG | HEART RATE: 94 BPM

## 2020-02-19 PROBLEM — Z90.81 HX OF SPLENECTOMY: Status: ACTIVE | Noted: 2020-02-19

## 2020-02-19 PROBLEM — Z90.49 HISTORY OF COLON RESECTION: Status: ACTIVE | Noted: 2020-02-19

## 2020-02-19 PROBLEM — Z86.73 HISTORY OF CVA (CEREBROVASCULAR ACCIDENT): Status: ACTIVE | Noted: 2020-02-19

## 2020-02-19 PROBLEM — Z90.49 HX OF CHOLECYSTECTOMY: Status: ACTIVE | Noted: 2020-02-19

## 2020-02-19 LAB
ABSOLUTE EOS #: 0.32 K/UL (ref 0–0.4)
ABSOLUTE IMMATURE GRANULOCYTE: 0 K/UL (ref 0–0.3)
ABSOLUTE LYMPH #: 11.2 K/UL (ref 1–4.8)
ABSOLUTE MONO #: 0.8 K/UL (ref 0.1–0.8)
ALBUMIN SERPL-MCNC: 4.2 G/DL (ref 3.5–5.2)
ALBUMIN/GLOBULIN RATIO: 1.2 (ref 1–2.5)
ALP BLD-CCNC: 157 U/L (ref 40–129)
ALT SERPL-CCNC: 50 U/L (ref 5–41)
ANION GAP SERPL CALCULATED.3IONS-SCNC: 14 MMOL/L (ref 9–17)
AST SERPL-CCNC: 50 U/L
ATYPICAL LYMPHOCYTE ABSOLUTE COUNT: 0.32 K/UL
ATYPICAL LYMPHOCYTES: 2 %
BASOPHILS # BLD: 0 % (ref 0–2)
BASOPHILS ABSOLUTE: 0 K/UL (ref 0–0.2)
BILIRUB SERPL-MCNC: 0.72 MG/DL (ref 0.3–1.2)
BUN BLDV-MCNC: 11 MG/DL (ref 8–23)
BUN/CREAT BLD: ABNORMAL (ref 9–20)
CALCIUM SERPL-MCNC: 9 MG/DL (ref 8.6–10.4)
CHLORIDE BLD-SCNC: 103 MMOL/L (ref 98–107)
CHOLESTEROL/HDL RATIO: 2.5
CHOLESTEROL: 93 MG/DL
CO2: 21 MMOL/L (ref 20–31)
CREAT SERPL-MCNC: 0.86 MG/DL (ref 0.7–1.2)
DIFFERENTIAL TYPE: ABNORMAL
EOSINOPHILS RELATIVE PERCENT: 2 % (ref 1–4)
GFR AFRICAN AMERICAN: >60 ML/MIN
GFR NON-AFRICAN AMERICAN: >60 ML/MIN
GFR SERPL CREATININE-BSD FRML MDRD: ABNORMAL ML/MIN/{1.73_M2}
GFR SERPL CREATININE-BSD FRML MDRD: ABNORMAL ML/MIN/{1.73_M2}
GLUCOSE BLD-MCNC: 105 MG/DL (ref 70–99)
HCT VFR BLD CALC: 48.3 % (ref 40.7–50.3)
HDLC SERPL-MCNC: 37 MG/DL
HEMOGLOBIN: 15.7 G/DL (ref 13–17)
IMMATURE GRANULOCYTES: 0 %
LDL CHOLESTEROL: 39 MG/DL (ref 0–130)
LYMPHOCYTES # BLD: 70 % (ref 24–44)
MAGNESIUM: 2 MG/DL (ref 1.6–2.6)
MCH RBC QN AUTO: 32.9 PG (ref 25.2–33.5)
MCHC RBC AUTO-ENTMCNC: 32.5 G/DL (ref 28.4–34.8)
MCV RBC AUTO: 101.3 FL (ref 82.6–102.9)
MONOCYTES # BLD: 5 % (ref 1–7)
MORPHOLOGY: ABNORMAL
MORPHOLOGY: ABNORMAL
NRBC AUTOMATED: 0 PER 100 WBC
PDW BLD-RTO: 14.7 % (ref 11.8–14.4)
PLATELET # BLD: 89 K/UL (ref 138–453)
PLATELET ESTIMATE: ABNORMAL
PMV BLD AUTO: 9.8 FL (ref 8.1–13.5)
POTASSIUM SERPL-SCNC: 4 MMOL/L (ref 3.7–5.3)
PROSTATE SPECIFIC ANTIGEN: 1.73 UG/L
RBC # BLD: 4.77 M/UL (ref 4.21–5.77)
RBC # BLD: ABNORMAL 10*6/UL
SEG NEUTROPHILS: 21 % (ref 36–66)
SEGMENTED NEUTROPHILS ABSOLUTE COUNT: 3.36 K/UL (ref 1.8–7.7)
SODIUM BLD-SCNC: 138 MMOL/L (ref 135–144)
TOTAL PROTEIN: 7.7 G/DL (ref 6.4–8.3)
TRIGL SERPL-MCNC: 84 MG/DL
VLDLC SERPL CALC-MCNC: ABNORMAL MG/DL (ref 1–30)
WBC # BLD: 16 K/UL (ref 3.5–11.3)
WBC # BLD: ABNORMAL 10*3/UL

## 2020-02-19 PROCEDURE — 3017F COLORECTAL CA SCREEN DOC REV: CPT | Performed by: PHYSICIAN ASSISTANT

## 2020-02-19 PROCEDURE — 99214 OFFICE O/P EST MOD 30 MIN: CPT | Performed by: PHYSICIAN ASSISTANT

## 2020-02-19 PROCEDURE — 1036F TOBACCO NON-USER: CPT | Performed by: PHYSICIAN ASSISTANT

## 2020-02-19 PROCEDURE — G8427 DOCREV CUR MEDS BY ELIG CLIN: HCPCS | Performed by: PHYSICIAN ASSISTANT

## 2020-02-19 PROCEDURE — G0444 DEPRESSION SCREEN ANNUAL: HCPCS | Performed by: PHYSICIAN ASSISTANT

## 2020-02-19 PROCEDURE — 1123F ACP DISCUSS/DSCN MKR DOCD: CPT | Performed by: PHYSICIAN ASSISTANT

## 2020-02-19 PROCEDURE — 4040F PNEUMOC VAC/ADMIN/RCVD: CPT | Performed by: PHYSICIAN ASSISTANT

## 2020-02-19 PROCEDURE — G8484 FLU IMMUNIZE NO ADMIN: HCPCS | Performed by: PHYSICIAN ASSISTANT

## 2020-02-19 PROCEDURE — G8417 CALC BMI ABV UP PARAM F/U: HCPCS | Performed by: PHYSICIAN ASSISTANT

## 2020-02-19 RX ORDER — METOPROLOL SUCCINATE 50 MG/1
TABLET, EXTENDED RELEASE ORAL
Qty: 90 TABLET | Refills: 3 | Status: SHIPPED | OUTPATIENT
Start: 2020-02-19 | End: 2021-02-24 | Stop reason: SDUPTHER

## 2020-02-19 RX ORDER — AMLODIPINE BESYLATE 5 MG/1
TABLET ORAL
Qty: 90 TABLET | Refills: 3 | Status: SHIPPED
Start: 2020-02-19 | End: 2021-01-28 | Stop reason: ALTCHOICE

## 2020-02-19 SDOH — ECONOMIC STABILITY: TRANSPORTATION INSECURITY
IN THE PAST 12 MONTHS, HAS LACK OF TRANSPORTATION KEPT YOU FROM MEETINGS, WORK, OR FROM GETTING THINGS NEEDED FOR DAILY LIVING?: NO

## 2020-02-19 SDOH — ECONOMIC STABILITY: FOOD INSECURITY: WITHIN THE PAST 12 MONTHS, THE FOOD YOU BOUGHT JUST DIDN'T LAST AND YOU DIDN'T HAVE MONEY TO GET MORE.: NEVER TRUE

## 2020-02-19 SDOH — ECONOMIC STABILITY: FOOD INSECURITY: WITHIN THE PAST 12 MONTHS, YOU WORRIED THAT YOUR FOOD WOULD RUN OUT BEFORE YOU GOT MONEY TO BUY MORE.: NEVER TRUE

## 2020-02-19 SDOH — ECONOMIC STABILITY: INCOME INSECURITY: HOW HARD IS IT FOR YOU TO PAY FOR THE VERY BASICS LIKE FOOD, HOUSING, MEDICAL CARE, AND HEATING?: NOT HARD AT ALL

## 2020-02-19 SDOH — ECONOMIC STABILITY: TRANSPORTATION INSECURITY
IN THE PAST 12 MONTHS, HAS THE LACK OF TRANSPORTATION KEPT YOU FROM MEDICAL APPOINTMENTS OR FROM GETTING MEDICATIONS?: NO

## 2020-02-19 ASSESSMENT — PATIENT HEALTH QUESTIONNAIRE - PHQ9
SUM OF ALL RESPONSES TO PHQ QUESTIONS 1-9: 4
10. IF YOU CHECKED OFF ANY PROBLEMS, HOW DIFFICULT HAVE THESE PROBLEMS MADE IT FOR YOU TO DO YOUR WORK, TAKE CARE OF THINGS AT HOME, OR GET ALONG WITH OTHER PEOPLE: 0
1. LITTLE INTEREST OR PLEASURE IN DOING THINGS: 0
3. TROUBLE FALLING OR STAYING ASLEEP: 2
2. FEELING DOWN, DEPRESSED OR HOPELESS: 1
4. FEELING TIRED OR HAVING LITTLE ENERGY: 1
8. MOVING OR SPEAKING SO SLOWLY THAT OTHER PEOPLE COULD HAVE NOTICED. OR THE OPPOSITE, BEING SO FIGETY OR RESTLESS THAT YOU HAVE BEEN MOVING AROUND A LOT MORE THAN USUAL: 0
5. POOR APPETITE OR OVEREATING: 0
SUM OF ALL RESPONSES TO PHQ9 QUESTIONS 1 & 2: 1
SUM OF ALL RESPONSES TO PHQ QUESTIONS 1-9: 4
7. TROUBLE CONCENTRATING ON THINGS, SUCH AS READING THE NEWSPAPER OR WATCHING TELEVISION: 0
6. FEELING BAD ABOUT YOURSELF - OR THAT YOU ARE A FAILURE OR HAVE LET YOURSELF OR YOUR FAMILY DOWN: 0
9. THOUGHTS THAT YOU WOULD BE BETTER OFF DEAD, OR OF HURTING YOURSELF: 0

## 2020-02-19 ASSESSMENT — ENCOUNTER SYMPTOMS
ABDOMINAL PAIN: 0
NAUSEA: 0
CONSTIPATION: 0
COUGH: 0
CHEST TIGHTNESS: 0
RHINORRHEA: 0
PHOTOPHOBIA: 0
SINUS PAIN: 0
DIARRHEA: 0
SHORTNESS OF BREATH: 0
BLOOD IN STOOL: 0
BACK PAIN: 0
SINUS PRESSURE: 0
ABDOMINAL DISTENTION: 0
SORE THROAT: 0
WHEEZING: 0
COLOR CHANGE: 0
TROUBLE SWALLOWING: 0
EYE REDNESS: 0
VOMITING: 0

## 2020-02-19 NOTE — PROGRESS NOTES
[address] PRIMARY CARE  [address] [facility] [address]  Dept: [phone]  Dept Fax: [phone]    New Patient Visit Note  Date of patient's visit: 2/19/2020  Patient's Name:  Josafat Cruz YOB: 1950            [facility]  ______________________________________________________________________    Reason for visit: Establish care/Preventative care  ______________________________________________________________________  Chief Compliant   Establish Care and Hypertension      ______________________________________________________________________  History of Presenting Illness:  History was obtained from the patient. Yoseph Kelsey is a 78 y.o. is here to establish care. Patient has past medical history of hypertension, hypercholesterolemia, CVA. Patient has been taking his blood pressure medications as prescribed until the last couple of days when he ran out. Patient denies any chest pain, shortness of breath, headaches or dizziness. Patient states that he had a CVA approximately 5 years ago without any residual.  He takes a full aspirin daily. Patient also had abnormal colonoscopy in 2015 and subsequent right hemicolectomy and cholecystectomy. Patient has not had a screening colonoscopy since then. He does have significant family history for colon cancer. Patient denies any blood in the stools, weight change or abdominal cramping. Patient complains of intermittent diffuse muscle cramping. He denies any redness or warmth to the joints.   He denies any muscle fatigue.      ______________________________________________________________________  Past Medical/Surgical History:        Diagnosis Date    AAA (abdominal aortic aneurysm) ([facility] [address] 3/23/2015    3.8cm     Colon polyps     Colon tumor     GERD (gastroesophageal reflux disease)     prilosec as needed    Hyperlipidemia     Pneumonia 1970's    patient had 3 chest tubes and in Abuse Father       ______________________________________________________________________  Review of Systems   Constitutional: Negative for appetite change, chills, diaphoresis, fatigue, fever and unexpected weight change. HENT: Negative for congestion, ear discharge, ear pain, postnasal drip, rhinorrhea, sinus pressure, sinus pain, sore throat, tinnitus and trouble swallowing. Eyes: Negative for photophobia, redness and visual disturbance. Respiratory: Negative for cough, chest tightness, shortness of breath and wheezing. Cardiovascular: Negative for chest pain, palpitations and leg swelling. Gastrointestinal: Negative for abdominal distention, abdominal pain, blood in stool, constipation, diarrhea, nausea and vomiting. Endocrine: Negative. Genitourinary: Negative for decreased urine volume, difficulty urinating, dysuria, frequency, hematuria and urgency. Musculoskeletal: Negative for arthralgias, back pain, gait problem, joint swelling, myalgias, neck pain and neck stiffness. Skin: Negative for color change, pallor and rash. Neurological: Negative for dizziness, syncope, weakness, light-headedness, numbness and headaches. Hematological: Negative for adenopathy. Does not bruise/bleed easily. Psychiatric/Behavioral: Negative for agitation, behavioral problems, confusion, decreased concentration, dysphoric mood, hallucinations, self-injury, sleep disturbance and suicidal ideas. The patient is not nervous/anxious and is not hyperactive.        ______________________________________________________________________  Physical Exam  Vitals signs and nursing note reviewed. Constitutional:       General: He is not in acute distress. Appearance: Normal appearance. He is well-developed and well-groomed. He is not ill-appearing, toxic-appearing or diaphoretic. HENT:      Head: Normocephalic and atraumatic.       Right Ear: Tympanic membrane, ear canal and external ear normal.      Left Ear: Tympanic membrane, ear canal and external ear normal.      Nose: Nose normal.      Mouth/Throat:      Lips: Pink. Mouth: Mucous membranes are moist.      Pharynx: Oropharynx is clear. Uvula midline. No oropharyngeal exudate or posterior oropharyngeal erythema. Tonsils: No tonsillar exudate or tonsillar abscesses. Eyes:      General: Lids are normal. No scleral icterus. Right eye: No discharge. Left eye: No discharge. Extraocular Movements: Extraocular movements intact. Conjunctiva/sclera: Conjunctivae normal.      Pupils: Pupils are equal, round, and reactive to light. Neck:      Musculoskeletal: Full passive range of motion without pain, normal range of motion and neck supple. Thyroid: No thyroid mass, thyromegaly or thyroid tenderness. Vascular: No JVD. Trachea: No tracheal deviation. Cardiovascular:      Rate and Rhythm: Normal rate and regular rhythm. Heart sounds: Normal heart sounds, S1 normal and S2 normal. No murmur. No friction rub. No gallop. Pulmonary:      Effort: Pulmonary effort is normal. No respiratory distress. Breath sounds: Normal breath sounds and air entry. No stridor, decreased air movement or transmitted upper airway sounds. No decreased breath sounds, wheezing, rhonchi or rales. Chest:      Chest wall: No tenderness. Abdominal:      General: Abdomen is flat. Bowel sounds are normal. There is no distension. Palpations: Abdomen is soft. There is no mass. Tenderness: There is no abdominal tenderness. There is no right CVA tenderness, left CVA tenderness, guarding or rebound. Musculoskeletal: Normal range of motion. General: No tenderness. Lymphadenopathy:      Head:      Right side of head: No submental, submandibular, tonsillar, preauricular, posterior auricular or occipital adenopathy. Left side of head: No submental, submandibular, tonsillar, preauricular or posterior auricular adenopathy. GLUCOSE 123 03/27/2017       HEMOGLOBIN A1C: No results found for: LABA1C    FASTING LIPID PANEL:  Lab Results   Component Value Date    CHOL 128 03/27/2017    HDL 50 03/27/2017    TRIG 113 03/27/2017       No results found for this visit on 02/19/20.    ______________________________________________________________________  Assessment and Plan:  Mike Ac was seen today for establish care and hypertension. Diagnoses and all orders for this visit:    Hypercholesteremia  -     Lipid Panel; Future    Encounter for medical examination to establish care    History of colon resection  -     CBC Auto Differential; Future  -     Comprehensive Metabolic Panel; Future    Hx of splenectomy    Hx of cholecystectomy    History of CVA (cerebrovascular accident)    History of partial colectomy  -     Grace Jules MD, Gastroenterology, [place]    Lipid screening  -     Lipid Panel; Future    Prostate cancer screening  -     PSA Screening; Future    Essential hypertension  -     metoprolol succinate (TOPROL XL) 50 MG extended release tablet; TAKE ONE TABLET BY MOUTH DAILY  -     amLODIPine (NORVASC) 5 MG tablet; TAKE ONE TABLET BY MOUTH DAILY    Muscle cramps  -     Comprehensive Metabolic Panel; Future  -     Magnesium; Future        ______________________________________________________________________  Follow up and instructions:  · Return in about 6 months (around 8/19/2020), or if symptoms worsen or fail to improve. · Discussed use, benefit, and side effects of prescribed medications. Barriers to medication compliance addressed. All patient questions answered. Pt voiced understanding. · Patient given educational materials - see patient instructions    · Patient instructed to call the office or go directly to the ER for any worsening problems or concerns. Patient voiced understanding    Irasema Renee.  [address]  2/19/2020, 11:43 AM    This note is created with the

## 2020-02-20 ENCOUNTER — TELEPHONE (OUTPATIENT)
Dept: FAMILY MEDICINE CLINIC | Age: 70
End: 2020-02-20

## 2020-02-20 ENCOUNTER — HOSPITAL ENCOUNTER (OUTPATIENT)
Age: 70
Setting detail: SPECIMEN
Discharge: HOME | End: 2020-02-20
Payer: MEDICARE

## 2020-02-20 PROBLEM — D69.6 THROMBOCYTOPENIA (HCC): Status: ACTIVE | Noted: 2020-02-20

## 2020-02-20 PROBLEM — Z87.898: Status: ACTIVE | Noted: 2020-02-20

## 2020-02-20 PROBLEM — D72.829 LEUKOCYTOSIS: Status: ACTIVE | Noted: 2020-02-20

## 2020-02-20 LAB
ABSOLUTE RETIC #: 0.07 M/UL (ref 0.03–0.08)
HAV IGM SER IA-ACNC: NONREACTIVE
HEPATITIS B CORE IGM ANTIBODY: NONREACTIVE
HEPATITIS B SURFACE ANTIGEN: NONREACTIVE
HEPATITIS C ANTIBODY: NONREACTIVE
HIV AG/AB: NONREACTIVE
IMMATURE RETIC FRACT: 10.3 % (ref 2.7–18.3)
LACTATE DEHYDROGENASE: 173 U/L (ref 135–225)
RETIC %: 1.5 % (ref 0.5–1.9)
RETIC HEMOGLOBIN: 36.5 PG (ref 28.2–35.7)

## 2020-02-21 ENCOUNTER — TELEPHONE (OUTPATIENT)
Dept: ONCOLOGY | Age: 70
End: 2020-02-21

## 2020-02-23 LAB
CMV DNA QUANTATATIVE INTERPRETATION: NOT DETECTED
CMV QUANT IU/ML: <227 IU/ML
CMV QUANT LOG IU/ML: <2.4 LOG IU/ML
CMV SOURCE: NORMAL
CMVQ COPY/ML: <390 CPY/ML
CYTOMEGALOVIRUS QUANT. PCR: <2.6 LOG CPY/ML

## 2020-02-28 ENCOUNTER — HOSPITAL ENCOUNTER (OUTPATIENT)
Dept: ULTRASOUND IMAGING | Age: 70
Discharge: HOME | End: 2020-03-01
Payer: MEDICARE

## 2020-02-28 PROCEDURE — 76705 ECHO EXAM OF ABDOMEN: CPT

## 2020-03-03 ENCOUNTER — TELEPHONE (OUTPATIENT)
Dept: ONCOLOGY | Age: 70
End: 2020-03-03

## 2020-03-03 ENCOUNTER — HOSPITAL ENCOUNTER (OUTPATIENT)
Facility: MEDICAL CENTER | Age: 70
Discharge: HOME | End: 2020-03-03
Payer: MEDICARE

## 2020-03-03 ENCOUNTER — INITIAL CONSULT (OUTPATIENT)
Dept: ONCOLOGY | Age: 70
End: 2020-03-03
Payer: MEDICARE

## 2020-03-03 VITALS
RESPIRATION RATE: 18 BRPM | HEIGHT: 74 IN | SYSTOLIC BLOOD PRESSURE: 145 MMHG | WEIGHT: 238.2 LBS | HEART RATE: 62 BPM | TEMPERATURE: 97.2 F | DIASTOLIC BLOOD PRESSURE: 72 MMHG | BODY MASS INDEX: 30.57 KG/M2

## 2020-03-03 DIAGNOSIS — D72.820 LYMPHOCYTOSIS: ICD-10-CM

## 2020-03-03 LAB
ABSOLUTE EOS #: 0 K/UL (ref 0–0.4)
ABSOLUTE IMMATURE GRANULOCYTE: 0 K/UL (ref 0–0.3)
ABSOLUTE LYMPH #: 7.38 K/UL (ref 1–4.8)
ABSOLUTE MONO #: 1.89 K/UL (ref 0.2–0.8)
ATYPICAL LYMPHOCYTE ABSOLUTE COUNT: 1.6 K/UL
ATYPICAL LYMPHOCYTES: 11 %
BASOPHILS # BLD: 0 %
BASOPHILS ABSOLUTE: 0 K/UL (ref 0–0.2)
DIFFERENTIAL TYPE: ABNORMAL
EOSINOPHILS RELATIVE PERCENT: 0 % (ref 1–4)
HCT VFR BLD CALC: 44.5 % (ref 40.7–50.3)
HEMOGLOBIN: 14.2 G/DL (ref 13–17)
IMMATURE GRANULOCYTES: 0 %
LYMPHOCYTES # BLD: 51 % (ref 24–44)
MCH RBC QN AUTO: 32.6 PG (ref 25.2–33.5)
MCHC RBC AUTO-ENTMCNC: 31.9 G/DL (ref 28–38)
MCV RBC AUTO: 102.3 FL (ref 82.6–102.9)
MONOCYTES # BLD: 13 % (ref 1–7)
MORPHOLOGY: ABNORMAL
NRBC AUTOMATED: ABNORMAL PER 100 WBC
PDW BLD-RTO: 14.6 % (ref 11.8–14.4)
PLATELET # BLD: 85 K/UL (ref 138–453)
PLATELET ESTIMATE: ABNORMAL
PMV BLD AUTO: 8.8 FL (ref 8.1–13.5)
RBC # BLD: 4.35 M/UL (ref 4.21–5.77)
RBC # BLD: ABNORMAL 10*6/UL
SEG NEUTROPHILS: 25 % (ref 36–66)
SEGMENTED NEUTROPHILS ABSOLUTE COUNT: 3.63 K/UL (ref 1.8–7.7)
WBC # BLD: 14.5 K/UL (ref 3.5–11.3)
WBC # BLD: ABNORMAL 10*3/UL

## 2020-03-03 PROCEDURE — 36415 COLL VENOUS BLD VENIPUNCTURE: CPT

## 2020-03-03 PROCEDURE — 88185 FLOWCYTOMETRY/TC ADD-ON: CPT

## 2020-03-03 PROCEDURE — G8427 DOCREV CUR MEDS BY ELIG CLIN: HCPCS | Performed by: INTERNAL MEDICINE

## 2020-03-03 PROCEDURE — 99201 HC NEW PT, E/M LEVEL 1: CPT | Performed by: INTERNAL MEDICINE

## 2020-03-03 PROCEDURE — 99205 OFFICE O/P NEW HI 60 MIN: CPT | Performed by: INTERNAL MEDICINE

## 2020-03-03 PROCEDURE — G8417 CALC BMI ABV UP PARAM F/U: HCPCS | Performed by: INTERNAL MEDICINE

## 2020-03-03 PROCEDURE — 88184 FLOWCYTOMETRY/ TC 1 MARKER: CPT

## 2020-03-03 PROCEDURE — G8484 FLU IMMUNIZE NO ADMIN: HCPCS | Performed by: INTERNAL MEDICINE

## 2020-03-03 PROCEDURE — 85025 COMPLETE CBC W/AUTO DIFF WBC: CPT

## 2020-03-03 NOTE — PROGRESS NOTES
temperature 97.2 °F (36.2 °C), temperature source Temporal, resp. rate 18, height 6' 2.02\" (1.88 m), weight 238 lb 3.2 oz (108 kg).      General appearance - well appearing, not in pain or distress  Mental status - good mood, alert and oriented  Eyes - pupils equal and reactive, extraocular eye movements intact  Ears - bilateral TM's and external ear canals normal  Nose - normal and patent, no erythema, discharge or polyps  Mouth - mucous membranes moist, pharynx normal without lesions  Neck - supple, no significant adenopathy  Lymphatics - no palpable lymphadenopathy, no hepatosplenomegaly  Chest - clear to auscultation, no wheezes, rales or rhonchi, symmetric air entry  Heart - normal rate, regular rhythm, normal S1, S2, no murmurs, rubs, clicks or gallops  Abdomen - soft, nontender, nondistended, no masses or organomegaly  Neurological - alert, oriented, normal speech, no focal findings or movement disorder noted  Musculoskeletal - no joint tenderness, deformity or swelling  Extremities - peripheral pulses normal, no pedal edema, no clubbing or cyanosis  Skin - normal coloration and turgor, no rashes, no suspicious skin lesions noted     Review of Diagnostic data:   Lab Results   Component Value Date    WBC 16.0 (H) 02/19/2020    HGB 15.7 02/19/2020    HCT 48.3 02/19/2020    .3 02/19/2020    PLT 89 (L) 02/19/2020       Chemistry        Component Value Date/Time     02/19/2020 1206    K 4.0 02/19/2020 1206     02/19/2020 1206    CO2 21 02/19/2020 1206    BUN 11 02/19/2020 1206    CREATININE 0.86 02/19/2020 1206        Component Value Date/Time    CALCIUM 9.0 02/19/2020 1206    ALKPHOS 157 (H) 02/19/2020 1206    AST 50 (H) 02/19/2020 1206    ALT 50 (H) 02/19/2020 1206    BILITOT 0.72 02/19/2020 1206            IMPRESSION:   Leukocytosis with absolute lymphocytosis  Thrombocytopenia  Elevated liver enzymes  Probable fatty infiltration of the liver  Chronic alcohol abuse      PLAN: I reviewed the

## 2020-03-03 NOTE — LETTER
_    Delfin Hager MD    3/3/2020     [facility] [address]   [address]  [facility] [address]    Dear Mohsen Curtis: Thank you for referring Jono Ko, 1950, to me for evaluation. Below are the relevant portions of my assessment and plan of care. Mr. Jono Ko is a very pleasant 78 y.o. male with history of multiple co morbidities as listed. Patient states that he had stroke about 6 years ago. In addition he had right hemicolectomy in 2015 for tubular adenomatous polyp. Since then he had few GI symptoms. Overall he continued to do just fine. Patient had flulike illness about 2 weeks ago. He had blood work at that time which showed elevated white blood cells with significant lymphocytosis. Patient also had thrombocytopenia. Chemistry test showed elevated transaminases. Patient had liver ultrasound which showed steatosis with possible fatty infiltration of the liver. Patient had mild easy bruising. No fever or night sweats. No weight loss or decreased appetite. No enlarged lymph nodes. No abdominal pain or swelling. No ascites. No chest pain or shortness of breath. The patient drinks beer every day. He quit smoking 13 years ago. He smokes about 1 pack/day for about 40 years. Azeb Sebastian PAST MEDICAL HISTORY: has a past medical history of AAA (abdominal aortic aneurysm) ([facility] [address] Colon polyps, Colon tumor, GERD (gastroesophageal reflux disease), Hyperlipidemia, Pneumonia, and Unspecified cerebral artery occlusion with cerebral infarction. PAST SURGICAL HISTORY: has a past surgical history that includes transesophageal echocardiogram (10/3/14); Colonoscopy; Vasectomy (1986); Cholecystectomy, open (04/21/15); right colectomy (04/21/15); and Appendectomy (4/21/15). CURRENT MEDICATIONS:  has a current medication list which includes the following prescription(s): metoprolol succinate, amlodipine, atorvastatin, omeprazole, and aspirin. behavior. · Allergic/Immunologic: No nasal congestion or hives. No repeated infections. PHYSICAL EXAM:  The patient is not in acute distress. Vital signs: Blood pressure (!) 145/72, pulse 62, temperature 97.2 °F (36.2 °C), temperature source Temporal, resp. rate 18, height 6' 2.02\" (1.88 m), weight 238 lb 3.2 oz (108 kg).      General appearance - well appearing, not in pain or distress  Mental status - good mood, alert and oriented  Eyes - pupils equal and reactive, extraocular eye movements intact  Ears - bilateral TM's and external ear canals normal  Nose - normal and patent, no erythema, discharge or polyps  Mouth - mucous membranes moist, pharynx normal without lesions  Neck - supple, no significant adenopathy  Lymphatics - no palpable lymphadenopathy, no hepatosplenomegaly  Chest - clear to auscultation, no wheezes, rales or rhonchi, symmetric air entry  Heart - normal rate, regular rhythm, normal S1, S2, no murmurs, rubs, clicks or gallops  Abdomen - soft, nontender, nondistended, no masses or organomegaly  Neurological - alert, oriented, normal speech, no focal findings or movement disorder noted  Musculoskeletal - no joint tenderness, deformity or swelling  Extremities - peripheral pulses normal, no pedal edema, no clubbing or cyanosis  Skin - normal coloration and turgor, no rashes, no suspicious skin lesions noted     Review of Diagnostic data:   Lab Results   Component Value Date    WBC 16.0 (H) 02/19/2020    HGB 15.7 02/19/2020    HCT 48.3 02/19/2020    .3 02/19/2020    PLT 89 (L) 02/19/2020       Chemistry        Component Value Date/Time     02/19/2020 1206    K 4.0 02/19/2020 1206     02/19/2020 1206    CO2 21 02/19/2020 1206    BUN 11 02/19/2020 1206    CREATININE 0.86 02/19/2020 1206        Component Value Date/Time    CALCIUM 9.0 02/19/2020 1206    ALKPHOS 157 (H) 02/19/2020 1206    AST 50 (H) 02/19/2020 1206    ALT 50 (H) 02/19/2020 1206

## 2020-03-03 NOTE — TELEPHONE ENCOUNTER
Lillian Garibay MD CONSULTATION  DR HALLMAN IN TO SEE PATIENT  ORDERS RECEIVED  LABS ON EXIT DR Joshua Louise TO CALL RESULTS  MD VISIT TBD  LABS CDP FLOW CYTOMETRY 3/3/20 ON EXIT  AVS PRINTED AND GIVEN TO PATIENT W/ INSTRUCTIONS  PATIENT DISCHARGED AMBULATORY

## 2020-03-04 LAB
FLOW CYTOMETRY BL: NORMAL
SURGICAL PATHOLOGY REPORT: NORMAL

## 2020-03-05 ENCOUNTER — TELEPHONE (OUTPATIENT)
Dept: ONCOLOGY | Age: 70
End: 2020-03-05

## 2020-03-24 ENCOUNTER — HOSPITAL ENCOUNTER (OUTPATIENT)
Facility: MEDICAL CENTER | Age: 70
End: 2020-03-24
Payer: MEDICARE

## 2020-03-26 ENCOUNTER — TELEPHONE (OUTPATIENT)
Dept: ONCOLOGY | Age: 70
End: 2020-03-26

## 2020-03-26 ENCOUNTER — OFFICE VISIT (OUTPATIENT)
Dept: ONCOLOGY | Age: 70
End: 2020-03-26
Payer: MEDICARE

## 2020-03-26 VITALS
SYSTOLIC BLOOD PRESSURE: 149 MMHG | BODY MASS INDEX: 30.52 KG/M2 | WEIGHT: 237.8 LBS | TEMPERATURE: 98.4 F | DIASTOLIC BLOOD PRESSURE: 82 MMHG | HEART RATE: 59 BPM

## 2020-03-26 PROCEDURE — G8484 FLU IMMUNIZE NO ADMIN: HCPCS | Performed by: INTERNAL MEDICINE

## 2020-03-26 PROCEDURE — G8417 CALC BMI ABV UP PARAM F/U: HCPCS | Performed by: INTERNAL MEDICINE

## 2020-03-26 PROCEDURE — 99211 OFF/OP EST MAY X REQ PHY/QHP: CPT

## 2020-03-26 PROCEDURE — 1036F TOBACCO NON-USER: CPT | Performed by: INTERNAL MEDICINE

## 2020-03-26 PROCEDURE — 3017F COLORECTAL CA SCREEN DOC REV: CPT | Performed by: INTERNAL MEDICINE

## 2020-03-26 PROCEDURE — 4040F PNEUMOC VAC/ADMIN/RCVD: CPT | Performed by: INTERNAL MEDICINE

## 2020-03-26 PROCEDURE — 99214 OFFICE O/P EST MOD 30 MIN: CPT | Performed by: INTERNAL MEDICINE

## 2020-03-26 PROCEDURE — G8427 DOCREV CUR MEDS BY ELIG CLIN: HCPCS | Performed by: INTERNAL MEDICINE

## 2020-03-26 PROCEDURE — 1123F ACP DISCUSS/DSCN MKR DOCD: CPT | Performed by: INTERNAL MEDICINE

## 2020-03-26 NOTE — PROGRESS NOTES
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 Pita Messer is a very pleasant 78 y.o. male with history of multiple co morbidities as listed. Patient states that he had stroke about 6 years ago. In addition he had right hemicolectomy in 2015 for tubular adenomatous polyp. Since then he had few GI symptoms. Overall he continued to do just fine. Patient had flulike illness about 2 weeks ago. He had blood work at that time which showed elevated white blood cells with significant lymphocytosis. Patient also had thrombocytopenia. Chemistry test showed elevated transaminases. Patient had liver ultrasound which showed steatosis with possible fatty infiltration of the liver. Patient had mild easy bruising. No fever or night sweats. No weight loss or decreased appetite. No enlarged lymph nodes. No abdominal pain or swelling. No ascites. No chest pain or shortness of breath. The patient drinks beer every day. He quit smoking 13 years ago. He smokes about 1 pack/day for about 40 years. .     INTERIM HISTORY:   Patient seen for follow-up leukocytosis and lymphocytosis. Lab work-up was done. Clinically he is stable. No fever or night sweats. No enlarged lymph nodes. No weight loss or decreased appetite. No repeated infections. No other complaints.       PAST MEDICAL HISTORY: has a past medical history of AAA (abdominal aortic aneurysm) ([address] Colon polyps, Colon tumor, GERD (gastroesophageal reflux disease), Hyperlipidemia, Pneumonia, and Unspecified cerebral artery occlusion with cerebral infarction. PAST SURGICAL HISTORY: has a past surgical history that includes transesophageal echocardiogram (10/3/14); Colonoscopy; Vasectomy (1986); Cholecystectomy, open (04/21/15); right colectomy (04/21/15); and Appendectomy (4/21/15). CURRENT MEDICATIONS:  has a current medication list which includes the following prescription(s): aspirin, metoprolol succinate, amlodipine, atorvastatin, and omeprazole. ALLERGIES:  is allergic to iv dye [iodides] and iodinated diagnostic agents. FAMILY HISTORY: Father and brother had colon cancer at late age. Otherwise negative for any hematological or oncological conditions. SOCIAL HISTORY:  reports that he quit smoking about 11 years ago. His smoking use included cigarettes. He has a 30.00 pack-year smoking history. He has never used smokeless tobacco. He reports current alcohol use of about 14.0 standard drinks of alcohol per week. He reports that he does not use drugs. REVIEW OF SYSTEMS:     · General: No weakness or fatigue. No unanticipated weight loss or decreased appetite. No fever or chills. · Eyes: No blurred vision, eye pain or double vision. · Ears: No hearing problems or drainage. No tinnitus. · Throat: No sore throat, problems with swallowing or dysphagia. · Respiratory: No cough, sputum or hemoptysis. No shortness of breath. No pleuritic chest pain. · Cardiovascular: No chest pain, orthopnea or PND. No lower extremity edema. No palpitation. · Gastrointestinal: No problems with swallowing. No abdominal pain or bloating. No nausea or vomiting. No diarrhea or constipation. No GI bleeding. · Genitourinary: No dysuria, hematuria, frequency or urgency. · Musculoskeletal: No muscle aches or pains. No limitation of movement. No back pain. No gait disturbance, No joint complaints.   · Dermatologic: No skin rashes or pruritus. No skin lesions or discolorations. · Psychiatric: No depression, anxiety, or stress or signs of schizophrenia. No change in mood or affect. · Hematologic: No history of bleeding tendency. No bruises or ecchymosis. No history of clotting problems. · Infectious disease: No fever, chills or frequent infections. · Endocrine: No polydipsia or polyuria. No temperature intolerance. · Neurologic: No headaches or dizziness. No weakness or numbness of the extremities. No changes in balance, coordination,  memory, mentation, behavior. · Allergic/Immunologic: No nasal congestion or hives. No repeated infections. PHYSICAL EXAM:  The patient is not in acute distress. Vital signs: Blood pressure (!) 149/82, pulse 59, temperature 98.4 °F (36.9 °C), temperature source Oral, weight 237 lb 12.8 oz (107.9 kg).      General appearance - well appearing, not in pain or distress  Mental status - good mood, alert and oriented  Eyes - pupils equal and reactive, extraocular eye movements intact  Ears - bilateral TM's and external ear canals normal  Nose - normal and patent, no erythema, discharge or polyps  Mouth - mucous membranes moist, pharynx normal without lesions  Neck - supple, no significant adenopathy  Lymphatics - no palpable lymphadenopathy, no hepatosplenomegaly  Chest - clear to auscultation, no wheezes, rales or rhonchi, symmetric air entry  Heart - normal rate, regular rhythm, normal S1, S2, no murmurs, rubs, clicks or gallops  Abdomen - soft, nontender, nondistended, no masses or organomegaly  Neurological - alert, oriented, normal speech, no focal findings or movement disorder noted  Musculoskeletal - no joint tenderness, deformity or swelling  Extremities - peripheral pulses normal, no pedal edema, no clubbing or cyanosis  Skin - normal coloration and turgor, no rashes, no suspicious skin lesions noted     Review of Diagnostic data:   Lab Results   Component Value Date    WBC 14.5 (H) 03/03/2020    HGB 14.2 03/03/2020    HCT 44.5 03/03/2020    .3 03/03/2020    PLT 85 (L) 03/03/2020       Chemistry        Component Value Date/Time     02/19/2020 1206    K 4.0 02/19/2020 1206     02/19/2020 1206    CO2 21 02/19/2020 1206    BUN 11 02/19/2020 1206    CREATININE 0.86 02/19/2020 1206        Component Value Date/Time    CALCIUM 9.0 02/19/2020 1206    ALKPHOS 157 (H) 02/19/2020 1206    AST 50 (H) 02/19/2020 1206    ALT 50 (H) 02/19/2020 1206    BILITOT 0.72 02/19/2020 1206            IMPRESSION:   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 I explained to the patient the nature of this hematologic problem. I explained the significance of these abnormalities in layman language. For evaluation of persistent lymphocytosis patient had flow cytometry done. Results were positive for low-grade marginal zone lymphoma. I explained to the patient the nature of this lymphoma, staging, prognosis and treatment. I explained that this is low-grade lymphoma and recommendation at this point is observation and monitoring with no need for active treatment. We will consider active treatment for enlarging lymph nodes or splenomegaly or for unexplained fever or weight loss. I will see the patient again in 3 months with repeated labs. He will be seen sooner if he develops any problems. Counseled about importance of alcohol cessation. Patient's questions were answered to the best of his satisfaction and he verbalized full understanding and agreement.

## 2020-05-05 ENCOUNTER — TELEPHONE (OUTPATIENT)
Dept: GASTROENTEROLOGY | Age: 70
End: 2020-05-05

## 2020-05-06 RX ORDER — ATORVASTATIN CALCIUM 10 MG/1
TABLET, FILM COATED ORAL
Qty: 90 TABLET | Refills: 3 | Status: ON HOLD
Start: 2020-05-06 | End: 2021-03-08 | Stop reason: HOSPADM

## 2020-05-06 NOTE — TELEPHONE ENCOUNTER
Patient called in and left a message stating that the appointment scheduled for May 13 th at 1:45 pm is a good time and he has written it in his calendar.

## 2020-05-06 NOTE — TELEPHONE ENCOUNTER
Faxed medication request pended medication please advise thank you!         Next Visit Date:  Future Appointments   Date Time Provider Sage Chari   5/13/2020  1:45 PM Enid Marshall MD [facility]   6/22/2020 10:00 AM SCHEDULE, [facility]   6/25/2020 11:45 AM Faviola Downing MD [facility]   8/19/2020  8:30 AM [facility] [address] Maintenance   Topic Date Due    Meningococcal (ACWY) vaccine (1 - Risk start before 7 months 4-dose series) 1950    Hib vaccine (1 of 1 - Risk 1-dose series) 09/08/1951    Meningococcal B vaccine (1 of 2 - Increased Risk Bexsero 2-dose series) 06/08/1959    Low dose CT lung screening  06/08/2004    Shingles Vaccine (2 of 3) 09/22/2016    Diabetes screen  03/22/2019    Annual Wellness Visit (AWV)  05/29/2019    Flu vaccine (Season Ended) 02/19/2021 (Originally 9/1/2020)    Lipid screen  02/19/2021    Colon cancer screen colonoscopy  01/19/2025    DTaP/Tdap/Td vaccine (2 - Td) 07/11/2026    Pneumococcal 65+ yrs at Risk Vaccine  Completed    AAA screen  Completed    Hepatitis C screen  Completed    Hepatitis A vaccine  Aged Out    Hepatitis B vaccine  Aged Out       No results found for: LABA1C          ( goal A1C is < 7)   No results found for: LABMICR  LDL Cholesterol (mg/dL)   Date Value   02/19/2020 39   03/27/2017 55       (goal LDL is <100)   AST (U/L)   Date Value   02/19/2020 50 (H)     ALT (U/L)   Date Value   02/19/2020 50 (H)     BUN (mg/dL)   Date Value   02/19/2020 11     BP Readings from Last 3 Encounters:   03/26/20 (!) 149/82   03/03/20 (!) 145/72   02/19/20 138/62          (goal 120/80)    All Future Testing planned in [facility]  Lab Frequency Next Occurrence   Nasal cannula oxygen PRN    CBC Auto Differential q 3 months    Lactate Dehydrogenase q 3 months                Patient Active Problem List:     Cerebral infarction ([address]     Abnormal LFTs     Carotid arterial disease [facility])     Hypercholesteremia     Colon polyps     Colon tumor     Colonic mass     Cholelithiasis with acute cholecystitis     HTN (hypertension)     Hx of cholecystectomy     Hx of splenectomy     History of colon resection     History of CVA (cerebrovascular accident)     Hx of splenomegaly     Thrombocytopenia (HCC)     Leukocytosis

## 2020-05-13 ENCOUNTER — VIRTUAL VISIT (OUTPATIENT)
Dept: GASTROENTEROLOGY | Age: 70
End: 2020-05-13
Payer: MEDICARE

## 2020-05-13 PROCEDURE — 1123F ACP DISCUSS/DSCN MKR DOCD: CPT | Performed by: INTERNAL MEDICINE

## 2020-05-13 PROCEDURE — 4040F PNEUMOC VAC/ADMIN/RCVD: CPT | Performed by: INTERNAL MEDICINE

## 2020-05-13 PROCEDURE — G8427 DOCREV CUR MEDS BY ELIG CLIN: HCPCS | Performed by: INTERNAL MEDICINE

## 2020-05-13 PROCEDURE — 3017F COLORECTAL CA SCREEN DOC REV: CPT | Performed by: INTERNAL MEDICINE

## 2020-05-13 PROCEDURE — 99204 OFFICE O/P NEW MOD 45 MIN: CPT | Performed by: INTERNAL MEDICINE

## 2020-05-13 ASSESSMENT — ENCOUNTER SYMPTOMS
DIARRHEA: 1
ABDOMINAL DISTENTION: 0
WHEEZING: 0
CONSTIPATION: 0
ABDOMINAL PAIN: 0
TROUBLE SWALLOWING: 0
CHOKING: 0
COUGH: 0
RECTAL PAIN: 0
BLOOD IN STOOL: 0
VOMITING: 0
ANAL BLEEDING: 0
NAUSEA: 0

## 2020-05-13 NOTE — PROGRESS NOTES
Abigail Ly is a 78 y.o. male evaluated via telephone on 5/13/2020. Consent:  He and/or health care decision maker is aware that that he may receive a bill for this telephone service, depending on his insurance coverage, and has provided verbal consent to proceed: Yes      GI  FOLLOW UP    INTERVAL HISTORY:   No referring provider defined for this encounter. Chief Complaint   Patient presents with    New Patient     Patient is new, last seen in 2015. He was referred to us this time because of partial colectomy. He is overdue for colonoscopy. He has a new dx of NHL           HISTORY OF PRESENT ILLNESS: Alysia Stauffer is a 78 y.o. male , referred for evaluation of  Patient is here referred back because of elevated liver enzymes  We have seen him in 2015 colonoscopy showed large tubulovillous lesion in the right colon 4 cm in size removed please refer to the biopsy with clean margins  CEA was checked at that time was negative  Patient was supposed to have a repeat colonoscopy within a year and he lost follow-up for some reason? ??    Not until now he was referred back because of elevated liver enzymes right now which is new  In the interim also has been following with oncology for non-Hodgkin's lymphoma  In his chest  He had ultrasound of the liver which showed no mass, but showed probably fatty liver  No other imaging studies because he is allergic to iodine they did not order a CAT scan on him he said. But he is not allergic to MRI dye he said  Denied any weight loss nausea vomiting abdominal pain black stool blood in the stool      . Past Medical,Family, and Social History reviewed and does contribute to the patient presentingcondition. Patient's PMH/PSH,SH,PSYCH Hx, MEDs, ALLERGIES, and ROS were all reviewed and updated in the appropriate sections.     PAST MEDICAL HISTORY:  Past Medical History:   Diagnosis Date    AAA (abdominal aortic aneurysm) ([facility] [address] 3/23/2015    3.8cm     Colon polyps     Colon tumor     GERD (gastroesophageal reflux disease)     prilosec as needed    Hyperlipidemia     Non Hodgkin's lymphoma ([facility] [address]     Pneumonia 1970's    patient had 3 chest tubes and in hospital for 13 days    Unspecified cerebral artery occlusion with cerebral infarction 10/1/2015    stroke , numbness in the left arm from the elbow down       Past Surgical History:   Procedure Laterality Date    APPENDECTOMY  4/21/15    CHOLECYSTECTOMY, OPEN  04/21/15    COLONOSCOPY      RIGHT COLECTOMY  04/21/15    TRANSESOPHAGEAL ECHOCARDIOGRAM  10/3/14    VASECTOMY  1986       CURRENT MEDICATIONS:    Current Outpatient Medications:     atorvastatin (LIPITOR) 10 MG tablet, Take 1 tab po daily, Disp: 90 tablet, Rfl: 3    aspirin 81 MG tablet, Take 81 mg by mouth daily, Disp: , Rfl:     metoprolol succinate (TOPROL XL) 50 MG extended release tablet, TAKE ONE TABLET BY MOUTH DAILY, Disp: 90 tablet, Rfl: 3    amLODIPine (NORVASC) 5 MG tablet, TAKE ONE TABLET BY MOUTH DAILY, Disp: 90 tablet, Rfl: 3    omeprazole (PRILOSEC) 10 MG delayed release capsule, Take 1 capsule by mouth daily as needed (GERD), Disp: 90 capsule, Rfl: 3    ALLERGIES:   Allergies   Allergen Reactions    Iv Dye [Iodides] Hives    Iodinated Diagnostic Agents Hives     Patient developed hives even with steroid prep. FAMILY HISTORY:       Problem Relation Age of Onset    Heart Disease Mother     Stroke Mother     Alcohol Abuse Father          SOCIAL HISTORY:   Social History     Socioeconomic History    Marital status:       Spouse name: Not on file    Number of children: Not on file    Years of education: Not on file    Highest education level: Not on file   Occupational History    Not on file   Social Needs    Financial resource strain: Not hard at all    Food insecurity     Worry: Never true     Inability: Never true   [employer] needs     Medical: No     Non-medical: No   Tobacco Use    Smoking status: Former Smoker     Packs/day: 1.00     Years: 30.00     Pack years: 30.00     Types: Cigarettes     Last attempt to quit: 4/10/2008     Years since quittin.0    Smokeless tobacco: Never Used   Substance and Sexual Activity    Alcohol use: Yes     Alcohol/week: 14.0 standard drinks     Types: 14 Cans of beer per week     Comment: 2 beers/day    Drug use: No    Sexual activity: Not on file   Lifestyle    Physical activity     Days per week: Not on file     Minutes per session: Not on file    Stress: Not on file   Relationships    Social connections     Talks on phone: Not on file     Gets together: Not on file     Attends [employer] service: Not on file     Active member of club or organization: Not on file     Attends meetings of clubs or organizations: Not on file     Relationship status: Not on file    Intimate partner violence     Fear of current or ex partner: Not on file     Emotionally abused: Not on file     Physically abused: Not on file     Forced sexual activity: Not on file   Other Topics Concern    Not on file   Social History Narrative    Not on file       REVIEW OF SYSTEMS: A 12-point review of systemswas obtained and pertinent positives and negatives were enumerated above in the history of present illness. All other reviewed systems / symptoms were negative. Review of Systems   Constitutional: Positive for fatigue. Negative for appetite change and unexpected weight change. HENT: Negative for trouble swallowing. Respiratory: Negative for cough, choking and wheezing. Cardiovascular: Negative for chest pain, palpitations and leg swelling. Gastrointestinal: Positive for diarrhea (loose stool). Negative for abdominal distention, abdominal pain, anal bleeding, blood in stool, constipation, nausea, rectal pain and vomiting. Genitourinary: Negative for difficulty urinating. Allergic/Immunologic: Negative for environmental allergies and food allergies.    Neurological: Positive for dizziness and numbness (left arm). Negative for weakness, light-headedness and headaches. Hematological: Does not bruise/bleed easily. Psychiatric/Behavioral: Positive for sleep disturbance. The patient is not nervous/anxious. LABORATORY DATA: Reviewed  Lab Results   Component Value Date    WBC 14.5 (H) 03/03/2020    HGB 14.2 03/03/2020    HCT 44.5 03/03/2020    .3 03/03/2020    PLT 85 (L) 03/03/2020     02/19/2020    K 4.0 02/19/2020     02/19/2020    CO2 21 02/19/2020    BUN 11 02/19/2020    CREATININE 0.86 02/19/2020    LABALBU 4.2 02/19/2020    BILITOT 0.72 02/19/2020    ALKPHOS 157 (H) 02/19/2020    AST 50 (H) 02/19/2020    ALT 50 (H) 02/19/2020    INR 1.0 10/01/2014         Lab Results   Component Value Date    RBC 4.35 03/03/2020    HGB 14.2 03/03/2020    .3 03/03/2020    MCH 32.6 03/03/2020    MCHC 31.9 03/03/2020    RDW 14.6 (H) 03/03/2020    MPV 8.8 03/03/2020    BASOPCT 0 03/03/2020    LYMPHSABS 7.38 (H) 03/03/2020    MONOSABS 1.89 (H) 03/03/2020    NEUTROABS 3.63 03/03/2020    EOSABS 0.00 03/03/2020    BASOSABS 0.00 03/03/2020         DIAGNOSTIC TESTING:     No results found. PHYSICAL EXAMINATION:     [ INSTRUCTIONS:  \"[x]\" Indicates a positive item  \"[]\" Indicates a negative item  -- DELETE ALL ITEMS NOT EXAMINED]  Vital Signs: (As obtained by patient/caregiver or practitioner observation)    Blood pressure-  Heart rate-    Respiratory rate-    Temperature-  Pulse oximetry-     Constitutional: [x] Appears well-developed and well-nourished [x] No apparent distress      [] Abnormal-   Mental status  [] Alert and awake  [] Oriented to person/place/time []Able to follow commands      Eyes:  EOM    [x]  Normal  [] Abnormal-  Sclera  [x]  Normal  [] Abnormal -         Discharge [x]  None visible  [] Abnormal -    HENT:   [x] Normocephalic, atraumatic.   [] Abnormal   [x] Mouth/Throat: Mucous membranes are moist.     External Ears [x] Normal  [] Abnormal-     Neck:

## 2020-05-18 ENCOUNTER — TELEPHONE (OUTPATIENT)
Dept: GASTROENTEROLOGY | Age: 70
End: 2020-05-18

## 2020-05-18 RX ORDER — SODIUM, POTASSIUM,MAG SULFATES 17.5-3.13G
1 SOLUTION, RECONSTITUTED, ORAL ORAL ONCE
Qty: 1 BOTTLE | Refills: 0 | Status: SHIPPED | OUTPATIENT
Start: 2020-05-18 | End: 2020-05-18

## 2020-06-10 ENCOUNTER — HOSPITAL ENCOUNTER (OUTPATIENT)
Dept: MRI IMAGING | Age: 70
Discharge: HOME | End: 2020-06-12
Payer: MEDICARE

## 2020-06-10 LAB
CREAT SERPL-MCNC: 0.98 MG/DL (ref 0.7–1.2)
GFR AFRICAN AMERICAN: >60 ML/MIN
GFR NON-AFRICAN AMERICAN: >60 ML/MIN
GFR SERPL CREATININE-BSD FRML MDRD: NORMAL ML/MIN/{1.73_M2}
GFR SERPL CREATININE-BSD FRML MDRD: NORMAL ML/MIN/{1.73_M2}

## 2020-06-10 PROCEDURE — 74183 MRI ABD W/O CNTR FLWD CNTR: CPT

## 2020-06-10 PROCEDURE — A9579 GAD-BASE MR CONTRAST NOS,1ML: HCPCS | Performed by: INTERNAL MEDICINE

## 2020-06-10 PROCEDURE — 6360000004 HC RX CONTRAST MEDICATION: Performed by: INTERNAL MEDICINE

## 2020-06-10 PROCEDURE — 82565 ASSAY OF CREATININE: CPT

## 2020-06-10 PROCEDURE — 36415 COLL VENOUS BLD VENIPUNCTURE: CPT

## 2020-06-10 PROCEDURE — 2580000003 HC RX 258: Performed by: INTERNAL MEDICINE

## 2020-06-10 RX ORDER — SODIUM CHLORIDE 0.9 % (FLUSH) 0.9 %
10 SYRINGE (ML) INJECTION
Status: COMPLETED | OUTPATIENT
Start: 2020-06-10 | End: 2020-06-10

## 2020-06-10 RX ORDER — 0.9 % SODIUM CHLORIDE 0.9 %
20 INTRAVENOUS SOLUTION INTRAVENOUS
Status: DISCONTINUED | OUTPATIENT
Start: 2020-06-10 | End: 2020-06-13 | Stop reason: HOSPADM

## 2020-06-10 RX ADMIN — Medication 10 ML: at 09:39

## 2020-06-10 RX ADMIN — GADOTERIDOL 20 ML: 279.3 INJECTION, SOLUTION INTRAVENOUS at 09:38

## 2020-06-17 ENCOUNTER — TELEPHONE (OUTPATIENT)
Dept: FAMILY MEDICINE CLINIC | Age: 70
End: 2020-06-17

## 2020-06-17 NOTE — TELEPHONE ENCOUNTER
Rosie Hooker was contacted to set up an annual wellness visit    Spoke with: Patient    Patient educated on purpose of AWV    Patient was agreeable to schedule AWV    APPT: 7/22/20 @ [address]

## 2020-06-22 ENCOUNTER — HOSPITAL ENCOUNTER (OUTPATIENT)
Facility: MEDICAL CENTER | Age: 70
End: 2020-06-22
Payer: MEDICARE

## 2020-06-22 ENCOUNTER — HOSPITAL ENCOUNTER (OUTPATIENT)
Facility: MEDICAL CENTER | Age: 70
Discharge: HOME | End: 2020-06-22
Payer: MEDICARE

## 2020-06-22 DIAGNOSIS — C85.80 MARGINAL ZONE LYMPHOMA (HCC): ICD-10-CM

## 2020-06-22 LAB
ABSOLUTE EOS #: 0 K/UL (ref 0–0.4)
ABSOLUTE IMMATURE GRANULOCYTE: 0 K/UL (ref 0–0.3)
ABSOLUTE LYMPH #: 9.48 K/UL (ref 1–4.8)
ABSOLUTE MONO #: 1.26 K/UL (ref 0.2–0.8)
ATYPICAL LYMPHOCYTE ABSOLUTE COUNT: 1.58 K/UL
ATYPICAL LYMPHOCYTES: 10 %
BASOPHILS # BLD: 0 %
BASOPHILS ABSOLUTE: 0 K/UL (ref 0–0.2)
DIFFERENTIAL TYPE: ABNORMAL
EOSINOPHILS RELATIVE PERCENT: 0 % (ref 1–4)
HCT VFR BLD CALC: 43.9 % (ref 40.7–50.3)
HEMOGLOBIN: 14.3 G/DL (ref 13–17)
IMMATURE GRANULOCYTES: 0 %
LACTATE DEHYDROGENASE: 168 U/L (ref 135–225)
LYMPHOCYTES # BLD: 60 % (ref 24–44)
MCH RBC QN AUTO: 32.5 PG (ref 25.2–33.5)
MCHC RBC AUTO-ENTMCNC: 32.6 G/DL (ref 28.4–34.8)
MCV RBC AUTO: 99.8 FL (ref 82.6–102.9)
MONOCYTES # BLD: 8 % (ref 1–7)
NRBC AUTOMATED: 0 PER 100 WBC
PDW BLD-RTO: 14.2 % (ref 11.8–14.4)
PLATELET # BLD: 68 K/UL (ref 138–453)
PLATELET ESTIMATE: ABNORMAL
PMV BLD AUTO: 8.9 FL (ref 8.1–13.5)
RBC # BLD: 4.4 M/UL (ref 4.21–5.77)
RBC # BLD: ABNORMAL 10*6/UL
SEG NEUTROPHILS: 22 % (ref 36–66)
SEGMENTED NEUTROPHILS ABSOLUTE COUNT: 3.48 K/UL (ref 1.8–7.7)
WBC # BLD: 15.8 K/UL (ref 3.5–11.3)
WBC # BLD: ABNORMAL 10*3/UL

## 2020-06-22 PROCEDURE — 36415 COLL VENOUS BLD VENIPUNCTURE: CPT

## 2020-06-22 PROCEDURE — 83615 LACTATE (LD) (LDH) ENZYME: CPT

## 2020-06-22 PROCEDURE — 85025 COMPLETE CBC W/AUTO DIFF WBC: CPT

## 2020-06-25 ENCOUNTER — OFFICE VISIT (OUTPATIENT)
Dept: ONCOLOGY | Age: 70
End: 2020-06-25
Payer: MEDICARE

## 2020-06-25 ENCOUNTER — TELEPHONE (OUTPATIENT)
Dept: ONCOLOGY | Age: 70
End: 2020-06-25

## 2020-06-25 VITALS
SYSTOLIC BLOOD PRESSURE: 147 MMHG | HEART RATE: 75 BPM | DIASTOLIC BLOOD PRESSURE: 82 MMHG | WEIGHT: 233 LBS | BODY MASS INDEX: 29.9 KG/M2 | TEMPERATURE: 97.8 F

## 2020-06-25 PROCEDURE — G8427 DOCREV CUR MEDS BY ELIG CLIN: HCPCS | Performed by: INTERNAL MEDICINE

## 2020-06-25 PROCEDURE — 4040F PNEUMOC VAC/ADMIN/RCVD: CPT | Performed by: INTERNAL MEDICINE

## 2020-06-25 PROCEDURE — G8417 CALC BMI ABV UP PARAM F/U: HCPCS | Performed by: INTERNAL MEDICINE

## 2020-06-25 PROCEDURE — 3017F COLORECTAL CA SCREEN DOC REV: CPT | Performed by: INTERNAL MEDICINE

## 2020-06-25 PROCEDURE — 99214 OFFICE O/P EST MOD 30 MIN: CPT | Performed by: INTERNAL MEDICINE

## 2020-06-25 PROCEDURE — 1036F TOBACCO NON-USER: CPT | Performed by: INTERNAL MEDICINE

## 2020-06-25 PROCEDURE — 1123F ACP DISCUSS/DSCN MKR DOCD: CPT | Performed by: INTERNAL MEDICINE

## 2020-06-25 PROCEDURE — 99211 OFF/OP EST MAY X REQ PHY/QHP: CPT | Performed by: INTERNAL MEDICINE

## 2020-06-28 NOTE — PROGRESS NOTES
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 BRIEF CASE HISTORY:      Mr. Nu Bowden is a very pleasant 69 y.o. male with history of multiple co morbidities as listed. Patient states that he had stroke about 6 years ago. In addition he had right hemicolectomy in 2015 for tubular adenomatous polyp. Since then he had few GI symptoms. Overall he continued to do just fine. Patient had flulike illness about 2 weeks ago. He had blood work at that time which showed elevated white blood cells with significant lymphocytosis. Patient also had thrombocytopenia. Chemistry test showed elevated transaminases. Patient had liver ultrasound which showed steatosis with possible fatty infiltration of the liver. Patient had mild easy bruising. No fever or night sweats. No weight loss or decreased appetite. No enlarged lymph nodes. No abdominal pain or swelling. No ascites. No chest pain or shortness of breath. The patient drinks beer every day. He quit smoking 13 years ago. He smokes about 1 pack/day for about 40 years. .     INTERIM HISTORY:   Patient seen for follow-up marginal zone lymphoma. He has leukocytosis and lymphocytosis. Lab work-up was done. No changes. Clinically he is stable. No fever or night sweats. No enlarged lymph nodes. No weight loss or decreased appetite. No repeated infections. No other complaints.       PAST MEDICAL HISTORY: has a past medical history of AAA (abdominal aortic aneurysm) ([address] Colon polyps, Colon tumor,

## 2020-07-06 ENCOUNTER — HOSPITAL ENCOUNTER (OUTPATIENT)
Dept: PREADMISSION TESTING | Age: 70
Setting detail: SPECIMEN
Discharge: HOME | End: 2020-07-10
Payer: MEDICARE

## 2020-07-06 LAB
SARS-COV-2, PCR: NORMAL
SARS-COV-2, RAPID: NORMAL
SARS-COV-2: NOT DETECTED
SOURCE: NORMAL

## 2020-07-06 PROCEDURE — U0003 INFECTIOUS AGENT DETECTION BY NUCLEIC ACID (DNA OR RNA); SEVERE ACUTE RESPIRATORY SYNDROME CORONAVIRUS 2 (SARS-COV-2) (CORONAVIRUS DISEASE [COVID-19]), AMPLIFIED PROBE TECHNIQUE, MAKING USE OF HIGH THROUGHPUT TECHNOLOGIES AS DESCRIBED BY CMS-2020-01-R: HCPCS

## 2020-07-07 ENCOUNTER — TELEPHONE (OUTPATIENT)
Dept: PRIMARY CARE CLINIC | Age: 70
End: 2020-07-07

## 2020-07-08 ENCOUNTER — TELEPHONE (OUTPATIENT)
Dept: GASTROENTEROLOGY | Age: 70
End: 2020-07-08

## 2020-07-08 NOTE — TELEPHONE ENCOUNTER
Pt called back & left msg on ofc vm 7/8/20 @ 10:46am. Pt confirms he will be at proc 7/10/20 @ 7:15am.

## 2020-07-10 ENCOUNTER — ANESTHESIA (OUTPATIENT)
Dept: OPERATING ROOM | Age: 70
End: 2020-07-10
Payer: MEDICARE

## 2020-07-10 ENCOUNTER — ANESTHESIA EVENT (OUTPATIENT)
Dept: OPERATING ROOM | Age: 70
End: 2020-07-10
Payer: MEDICARE

## 2020-07-10 ENCOUNTER — HOSPITAL ENCOUNTER (OUTPATIENT)
Age: 70
Setting detail: OUTPATIENT SURGERY
Discharge: HOME | End: 2020-07-10
Attending: INTERNAL MEDICINE | Admitting: INTERNAL MEDICINE
Payer: MEDICARE

## 2020-07-10 VITALS
DIASTOLIC BLOOD PRESSURE: 83 MMHG | RESPIRATION RATE: 16 BRPM | SYSTOLIC BLOOD PRESSURE: 143 MMHG | BODY MASS INDEX: 29.39 KG/M2 | TEMPERATURE: 97.9 F | HEART RATE: 69 BPM | OXYGEN SATURATION: 97 % | WEIGHT: 229 LBS | HEIGHT: 74 IN

## 2020-07-10 VITALS — DIASTOLIC BLOOD PRESSURE: 60 MMHG | SYSTOLIC BLOOD PRESSURE: 113 MMHG | OXYGEN SATURATION: 96 %

## 2020-07-10 PROCEDURE — 7100000011 HC PHASE II RECOVERY - ADDTL 15 MIN: Performed by: INTERNAL MEDICINE

## 2020-07-10 PROCEDURE — 3700000000 HC ANESTHESIA ATTENDED CARE: Performed by: INTERNAL MEDICINE

## 2020-07-10 PROCEDURE — 2720000010 HC SURG SUPPLY STERILE: Performed by: INTERNAL MEDICINE

## 2020-07-10 PROCEDURE — 2709999900 HC NON-CHARGEABLE SUPPLY: Performed by: INTERNAL MEDICINE

## 2020-07-10 PROCEDURE — 3609010400 HC COLONOSCOPY POLYPECTOMY HOT BIOPSY: Performed by: INTERNAL MEDICINE

## 2020-07-10 PROCEDURE — 45385 COLONOSCOPY W/LESION REMOVAL: CPT | Performed by: INTERNAL MEDICINE

## 2020-07-10 PROCEDURE — 2580000003 HC RX 258: Performed by: ANESTHESIOLOGY

## 2020-07-10 PROCEDURE — 7100000010 HC PHASE II RECOVERY - FIRST 15 MIN: Performed by: INTERNAL MEDICINE

## 2020-07-10 PROCEDURE — 6360000002 HC RX W HCPCS: Performed by: NURSE ANESTHETIST, CERTIFIED REGISTERED

## 2020-07-10 PROCEDURE — 88305 TISSUE EXAM BY PATHOLOGIST: CPT

## 2020-07-10 PROCEDURE — 2500000003 HC RX 250 WO HCPCS: Performed by: NURSE ANESTHETIST, CERTIFIED REGISTERED

## 2020-07-10 PROCEDURE — 3700000001 HC ADD 15 MINUTES (ANESTHESIA): Performed by: INTERNAL MEDICINE

## 2020-07-10 RX ORDER — SODIUM CHLORIDE 0.9 % (FLUSH) 0.9 %
10 SYRINGE (ML) INJECTION EVERY 12 HOURS SCHEDULED
Status: DISCONTINUED | OUTPATIENT
Start: 2020-07-10 | End: 2020-07-10 | Stop reason: HOSPADM

## 2020-07-10 RX ORDER — SODIUM CHLORIDE 9 MG/ML
INJECTION, SOLUTION INTRAVENOUS CONTINUOUS
Status: DISCONTINUED | OUTPATIENT
Start: 2020-07-10 | End: 2020-07-10

## 2020-07-10 RX ORDER — LIDOCAINE HYDROCHLORIDE 10 MG/ML
1 INJECTION, SOLUTION EPIDURAL; INFILTRATION; INTRACAUDAL; PERINEURAL
Status: DISCONTINUED | OUTPATIENT
Start: 2020-07-10 | End: 2020-07-10 | Stop reason: HOSPADM

## 2020-07-10 RX ORDER — LIDOCAINE HYDROCHLORIDE 20 MG/ML
INJECTION, SOLUTION EPIDURAL; INFILTRATION; INTRACAUDAL; PERINEURAL PRN
Status: DISCONTINUED | OUTPATIENT
Start: 2020-07-10 | End: 2020-07-10 | Stop reason: SDUPTHER

## 2020-07-10 RX ORDER — SODIUM CHLORIDE 0.9 % (FLUSH) 0.9 %
10 SYRINGE (ML) INJECTION PRN
Status: DISCONTINUED | OUTPATIENT
Start: 2020-07-10 | End: 2020-07-10 | Stop reason: HOSPADM

## 2020-07-10 RX ORDER — SODIUM CHLORIDE, SODIUM LACTATE, POTASSIUM CHLORIDE, CALCIUM CHLORIDE 600; 310; 30; 20 MG/100ML; MG/100ML; MG/100ML; MG/100ML
INJECTION, SOLUTION INTRAVENOUS CONTINUOUS
Status: DISCONTINUED | OUTPATIENT
Start: 2020-07-10 | End: 2020-07-10 | Stop reason: HOSPADM

## 2020-07-10 RX ORDER — PROPOFOL 10 MG/ML
INJECTION, EMULSION INTRAVENOUS PRN
Status: DISCONTINUED | OUTPATIENT
Start: 2020-07-10 | End: 2020-07-10 | Stop reason: SDUPTHER

## 2020-07-10 RX ADMIN — PROPOFOL 20 MG: 10 INJECTION, EMULSION INTRAVENOUS at 08:34

## 2020-07-10 RX ADMIN — PROPOFOL 50 MG: 10 INJECTION, EMULSION INTRAVENOUS at 08:15

## 2020-07-10 RX ADMIN — PROPOFOL 20 MG: 10 INJECTION, EMULSION INTRAVENOUS at 08:38

## 2020-07-10 RX ADMIN — LIDOCAINE HYDROCHLORIDE 100 MG: 20 INJECTION, SOLUTION EPIDURAL; INFILTRATION; INTRACAUDAL; PERINEURAL at 08:15

## 2020-07-10 RX ADMIN — PROPOFOL 20 MG: 10 INJECTION, EMULSION INTRAVENOUS at 08:24

## 2020-07-10 RX ADMIN — PROPOFOL 50 MG: 10 INJECTION, EMULSION INTRAVENOUS at 08:16

## 2020-07-10 RX ADMIN — PROPOFOL 20 MG: 10 INJECTION, EMULSION INTRAVENOUS at 08:40

## 2020-07-10 RX ADMIN — PROPOFOL 30 MG: 10 INJECTION, EMULSION INTRAVENOUS at 08:17

## 2020-07-10 RX ADMIN — PROPOFOL 20 MG: 10 INJECTION, EMULSION INTRAVENOUS at 08:29

## 2020-07-10 RX ADMIN — PROPOFOL 20 MG: 10 INJECTION, EMULSION INTRAVENOUS at 08:36

## 2020-07-10 RX ADMIN — PROPOFOL 20 MG: 10 INJECTION, EMULSION INTRAVENOUS at 08:31

## 2020-07-10 RX ADMIN — PROPOFOL 20 MG: 10 INJECTION, EMULSION INTRAVENOUS at 08:32

## 2020-07-10 RX ADMIN — SODIUM CHLORIDE, POTASSIUM CHLORIDE, SODIUM LACTATE AND CALCIUM CHLORIDE: 600; 310; 30; 20 INJECTION, SOLUTION INTRAVENOUS at 07:51

## 2020-07-10 RX ADMIN — PROPOFOL 20 MG: 10 INJECTION, EMULSION INTRAVENOUS at 08:21

## 2020-07-10 RX ADMIN — PROPOFOL 20 MG: 10 INJECTION, EMULSION INTRAVENOUS at 08:20

## 2020-07-10 RX ADMIN — PROPOFOL 20 MG: 10 INJECTION, EMULSION INTRAVENOUS at 08:27

## 2020-07-10 RX ADMIN — SODIUM CHLORIDE, POTASSIUM CHLORIDE, SODIUM LACTATE AND CALCIUM CHLORIDE: 600; 310; 30; 20 INJECTION, SOLUTION INTRAVENOUS at 08:11

## 2020-07-10 ASSESSMENT — PULMONARY FUNCTION TESTS
PIF_VALUE: 1
PIF_VALUE: 0
PIF_VALUE: 1

## 2020-07-10 ASSESSMENT — PAIN SCALES - GENERAL
PAINLEVEL_OUTOF10: 0

## 2020-07-10 ASSESSMENT — PAIN - FUNCTIONAL ASSESSMENT: PAIN_FUNCTIONAL_ASSESSMENT: 0-10

## 2020-07-10 NOTE — ANESTHESIA POSTPROCEDURE EVALUATION
Department of Anesthesiology  Postprocedure Note    Patient: Rosi Morton  MRN: [id number]  YOB: 1950  Date of evaluation: 7/10/2020  Time:  11:58 AM     Procedure Summary     Date:  07/10/20 Room / Location:  [place] [address]    Anesthesia Start:  1111 Anesthesia Stop:  9832    Procedure:  COLONOSCOPY POLYPECTOMY HOT BIOPSY (N/A ) Diagnosis:  (DX TV ADENOMA)    Surgeon:  Marty Landin MD Responsible Provider:  Lenny Obando DO    Anesthesia Type:  MAC, general ASA Status:  3          Anesthesia Type: No value filed. Lavern Phase I:      [place] Phase II:      Last vitals: Reviewed and per EMR flowsheets.        Anesthesia Post Evaluation    Patient location during evaluation: PACU  Patient participation: complete - patient participated  Level of consciousness: awake and alert  Airway patency: patent  Nausea & Vomiting: no nausea and no vomiting  Complications: no  Cardiovascular status: hemodynamically stable  Respiratory status: acceptable  Hydration status: stable

## 2020-07-10 NOTE — ANESTHESIA PRE PROCEDURE
Department of Anesthesiology  Preprocedure Note       Name:  Stephany Farrah   Age:  69 y.o.  :  1950                                          MRN:  [id number]         Date:  7/10/2020      Surgeon: Steve Swann):  Evon Rausch MD    Procedure: Procedure(s):  COLONOSCOPY POLYPECTOMY HOT BIOPSY    Medications prior to admission:   Prior to Admission medications    Medication Sig Start Date End Date Taking? Authorizing Provider   atorvastatin (LIPITOR) 10 MG tablet Take 1 tab po daily 20   Christie Church PA-C   aspirin 81 MG tablet Take 81 mg by mouth daily    Historical Provider, MD   metoprolol succinate (TOPROL XL) 50 MG extended release tablet TAKE ONE TABLET BY MOUTH DAILY 20   Christie ALEX Church   amLODIPine (NORVASC) 5 MG tablet TAKE ONE TABLET BY MOUTH DAILY 20   Christie Church PA-C   omeprazole (PRILOSEC) 10 MG delayed release capsule Take 1 capsule by mouth daily as needed (GERD) 17   Kandi Sal MD       Current medications:    Current Facility-Administered Medications   Medication Dose Route Frequency Provider Last Rate Last Dose    lactated ringers infusion   Intravenous Continuous Nathan Johnson  mL/hr at 07/10/20 0751      sodium chloride flush 0.9 % injection 10 mL  10 mL Intravenous 2 times per day Nathan Johnson MD        sodium chloride flush 0.9 % injection 10 mL  10 mL Intravenous PRN Nathan Johnson MD        lidocaine PF 1 % injection 1 mL  1 mL Intradermal Once PRN Nathan Johnson MD         Facility-Administered Medications Ordered in Other Encounters   Medication Dose Route Frequency Provider Last Rate Last Dose    propofol injection    PRN Nel Pyo, APRN - CRNA   20 mg at 07/10/20 0821    lidocaine PF 2 % injection    PRN Nel Pyo, APRN - CRNA   100 mg at 07/10/20 0815       Allergies: Allergies   Allergen Reactions    Iv Dye [Iodides] Hives    Iodinated Diagnostic Agents Hives     Patient developed hives even with steroid prep. lb (103.9 kg)   Height: 6' 2\" (1.88 m)                                              BP Readings from Last 3 Encounters:   07/10/20 (!) 152/82   07/10/20 114/65   06/25/20 (!) 147/82       NPO Status: Time of last liquid consumption: 2100                        Time of last solid consumption: 1900                        Date of last liquid consumption: 07/09/20                        Date of last solid food consumption: 07/08/20    BMI:   Wt Readings from Last 3 Encounters:   07/10/20 229 lb (103.9 kg)   06/25/20 233 lb (105.7 kg)   03/26/20 237 lb 12.8 oz (107.9 kg)     Body mass index is 29.4 kg/m². CBC:   Lab Results   Component Value Date    WBC 15.8 06/22/2020    RBC 4.40 06/22/2020    HGB 14.3 06/22/2020    HCT 43.9 06/22/2020    MCV 99.8 06/22/2020    RDW 14.2 06/22/2020    PLT 68 06/22/2020       CMP:   Lab Results   Component Value Date     02/19/2020    K 4.0 02/19/2020     02/19/2020    CO2 21 02/19/2020    BUN 11 02/19/2020    CREATININE 0.98 06/10/2020    GFRAA >60 06/10/2020    LABGLOM >60 06/10/2020    GLUCOSE 105 02/19/2020    PROT 7.7 02/19/2020    CALCIUM 9.0 02/19/2020    BILITOT 0.72 02/19/2020    ALKPHOS 157 02/19/2020    AST 50 02/19/2020    ALT 50 02/19/2020       POC Tests: No results for input(s): POCGLU, POCNA, POCK, POCCL, POCBUN, POCHEMO, POCHCT in the last 72 hours.     Coags:   Lab Results   Component Value Date    PROTIME 10.5 10/01/2014    INR 1.0 10/01/2014    APTT 26.1 10/01/2014       HCG (If Applicable): No results found for: PREGTESTUR, PREGSERUM, HCG, HCGQUANT     ABGs: No results found for: PHART, PO2ART, CPA2WWP, XMK3QSA, BEART, [id number]     Type & Screen (If Applicable):  No results found for: LABABO, LABRH    Drug/Infectious Status (If Applicable):  Lab Results   Component Value Date    HEPCAB NONREACTIVE 02/20/2020       COVID-19 Screening (If Applicable):   Lab Results   Component Value Date    COVID19 Not Detected 07/06/2020         Anesthesia Evaluation  Patient summary reviewed and Nursing notes reviewed no history of anesthetic complications:   Airway: Mallampati: II  TM distance: >3 FB   Neck ROM: full  Mouth opening: > = 3 FB Dental:    (+) partials      Pulmonary:normal exam        (-) COPD                           Cardiovascular:  Exercise tolerance: no interval change,   (+) hypertension:,     (-) CAD and CABG/stent        Rate: normal                    Neuro/Psych:   (+) CVA: residual symptoms,              ROS comment: Left arm numbness GI/Hepatic/Renal:   (+) GERD:,           Endo/Other:        (-) diabetes mellitus               Abdominal:           Vascular:                                        Anesthesia Plan      MAC and general     ASA 3       Induction: intravenous. Anesthetic plan and risks discussed with patient. Plan discussed with CRNA.     Attending anesthesiologist reviewed and agrees with Pre Eval content              Joel Coelho DO   7/10/2020

## 2020-07-10 NOTE — OP NOTE
Operative Note  PROCEDURE NOTE    DATE OF PROCEDURE: 7/10/2020    SURGEON: Jen Hansen MD  Facility : [facility] [address] [facility]  ASSISTANT: None  Anesthesia: MAC  PREOPERATIVE DIAGNOSIS:     Hx of TV large polyp     POSTOPERATIVE DIAGNOSIS: as described below    OPERATION: Total colonoscopy     ANESTHESIA: Moderate Sedation    ESTIMATED BLOOD LOSS: less than 50     COMPLICATIONS: None. SPECIMENS:  Was Obtained:    1 cm polypoidal lesion at the surgical anastomosis between the colon and the ileum, went ahead and snared it, close the ulcer with 2 clips was just sitting right on the border        HISTORY: The patient is a 69y.o. year old male with history of above preop diagnosis. I recommended colonoscopy with possible biopsy or polypectomy and I explained the risk, benefits, expected outcome, and alternatives to the procedure. Risks included but are not limited to bleeding, infection, respiratory distress, hypotension, and perforation of the colon and possibility of missing a lesion. The patient understands and is in agreement. The patient was counseled at length about the risks of jaime Covid-19 during their perioperative period and any recovery window from their procedure. The patient was made aware that jaime Covid-19  may worsen their prognosis for recovering from their procedure  and lend to a higher morbidity and/or mortality risk. All material risks, benefits, and reasonable alternatives including postponing the procedure were discussed. The patient does wish to proceed with the procedure at this time. PROCEDURE: The patient was given IV conscious sedation. The patient's SPO2 remained above 90% throughout the procedure. The colonoscope was inserted per rectum and advanced under direct vision to the cecum without difficulty. Post sedation note : The patient's SPO2 remained above 90% throughout the procedure. the vital signs remained stable , and no immediate complication form the procedure noted, patient will be ready for d/c when criteria is met . The prep was fair. Findings:      Ileum/Cecum/Ascending colon: abnormal:  1 cm polypoidal lesion at the surgical anastomosis between the colon and the ileum, went ahead and snared it, close the ulcer with 2 clips was just sitting right on the border    Transverse colon: normal    Descending/Sigmoid colon: Few diverticuli    Rectum/Anus: examined in normal and retroflexed positions and was abnormal: Hemorrhoids    Withdrawal Time was (minutes): 10    The colon was decompressed and the scope was removed. The patient tolerated the procedure well. Recommendations/Plan:   1. Lifestyle and dietary modifications as discussed  2. F/U Biopsies  3. F/U In OfficeYes  4. Discussed with the family  5.  Repeat colonoscopy [id number]    Electronically signed by Marty Landin MD  on 7/10/2020 at 8:44 AM

## 2020-07-10 NOTE — H&P
History and Physical Update    Pt Name: Ki Feliciano  MRN: [id number]  YOB: 1950  Date of evaluation: 7/10/2020      [x] I have reviewed the [address]  OF 6/27/2020   which meets the criteria for an Interval History and Physical note COPIED BELOW. .  HE WAS RECENTLY DIAGNOSED WITH NON-HODGKIN'S LYMPHOMA. [x] I have examined  Shawna Loges Links  There are no changes to the patient who is scheduled for a COLONOSCOPY BY . The patient denies health changes, fever, chills, productive cough, SOB, chest pain, open sores or wounds. HE STOPPED TAKING ASA 81 MG 8 DAYS AGO. HE HAD ONE PREVIOUS COLONOSCOPY FOLLOWED BY HEMICOLECTOMY FOR ADENOMATOUS POLYP ABOUT 6 YEARS AGO. HE HAS NOT HAD FOLLOW-UP COLONOSCOPY UNTIL TODAY. HE DENIES ANY RECTAL BLEEDING. HE COMPLETED THE PREP. HE DOES NOT HAVE DIABETES. HIS FATHER AND BROTHER BOTH HAD COLON CANCER. Vital signs: BP (!) 152/82   Pulse 81   Temp 96.7 °F (35.9 °C) (Temporal)   Resp 14   Ht 6' 2\" (1.88 m)   Wt 229 lb (103.9 kg)   SpO2 94%   BMI 29.40 kg/m²     Allergies: Iv dye [iodides] and Iodinated diagnostic agents    Medications:    Prior to Admission medications    Medication Sig Start Date End Date Taking? Authorizing Provider   atorvastatin (LIPITOR) 10 MG tablet Take 1 tab po daily 5/6/20   Maribell Painting PA-C   aspirin 81 MG tablet Take 81 mg by mouth daily    Historical Provider, MD   metoprolol succinate (TOPROL XL) 50 MG extended release tablet TAKE ONE TABLET BY MOUTH DAILY 2/19/20   Maribell Painting PA-C   amLODIPine (NORVASC) 5 MG tablet TAKE ONE TABLET BY MOUTH DAILY 2/19/20   Maribell Painting PA-C   omeprazole (PRILOSEC) 10 MG delayed release capsule Take 1 capsule by mouth daily as needed (GERD) 6/9/17   Sekou Biswas MD         This is a 69 y.o. male who is pleasant, cooperative, alert and oriented x3, in no acute distress.     Heart: Heart sounds are normal.  HR regular rate and rhythm without murmur, gallop or rub. Lungs: Normal respiratory effort with equal expansion, good air exchange, unlabored and clear to auscultation without wheezes or rales bilaterally   Abdomen: soft, nontender, nondistended with bowel sounds AUDIBLE X 4   PEDAL PULSES + 1 BILATERALLY NO CALF TENDERNESS NO EDEMA. .       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 BRIEF CASE HISTORY:      Mr. Tania Peace is a very pleasant 69 y.o. male with history of multiple co morbidities as listed. Patient states that he had stroke about 6 years ago. In addition he had right hemicolectomy in 2015 for tubular adenomatous polyp. Since then he had few GI symptoms. Overall he continued to do just fine. Patient had flulike illness about 2 weeks ago. He had blood work at that time which showed elevated white blood cells with significant lymphocytosis. Patient also had thrombocytopenia. Chemistry test showed elevated transaminases. Patient had liver ultrasound which showed steatosis with possible fatty infiltration of the liver. Patient had mild easy bruising. No fever or night sweats. No weight loss or decreased appetite. No enlarged lymph nodes. No abdominal pain or swelling. No ascites. No chest pain or shortness of breath.   The patient drinks beer every day. He quit smoking 13 years ago. He smokes about 1 pack/day for about 40 years. .      INTERIM HISTORY:   Patient seen for follow-up marginal zone lymphoma. He has leukocytosis and lymphocytosis. Lab work-up was done. No changes. Clinically he is stable. No fever or night sweats. No enlarged lymph nodes. No weight loss or decreased appetite. No repeated infections. No other complaints. PAST MEDICAL HISTORY: has a past medical history of AAA (abdominal aortic aneurysm) ([facility] [address] Colon polyps, Colon tumor, GERD (gastroesophageal reflux disease), Hyperlipidemia, Non Hodgkin's lymphoma ([facility] [address] Pneumonia, and Unspecified cerebral artery occlusion with cerebral infarction. PAST SURGICAL HISTORY: has a past surgical history that includes transesophageal echocardiogram (10/3/14); Colonoscopy; Vasectomy (1986); Cholecystectomy, open (04/21/15); right colectomy (04/21/15); and Appendectomy (4/21/15). CURRENT MEDICATIONS:  has a current medication list which includes the following prescription(s): atorvastatin, aspirin, metoprolol succinate, amlodipine, and omeprazole. ALLERGIES:  is allergic to iv dye [iodides] and iodinated diagnostic agents. FAMILY HISTORY: Father and brother had colon cancer at late age. Otherwise negative for any hematological or oncological conditions. SOCIAL HISTORY:  reports that he quit smoking about 12 years ago. His smoking use included cigarettes. He has a 30.00 pack-year smoking history. He has never used smokeless tobacco. He reports current alcohol use of about 14.0 standard drinks of alcohol per week. He reports that he does not use drugs. REVIEW OF SYSTEMS:     · General: No weakness or fatigue. No unanticipated weight loss or decreased appetite. No fever or chills. · Eyes: No blurred vision, eye pain or double vision. · Ears: No hearing problems or drainage. No tinnitus. · Throat: No sore throat, problems with swallowing or dysphagia. · Respiratory: No cough, sputum or hemoptysis. No shortness of breath. No pleuritic chest pain. · Cardiovascular: No chest pain, orthopnea or PND. No lower extremity edema. No palpitation. · Gastrointestinal: No problems with swallowing. No abdominal pain or bloating. No nausea or vomiting. No diarrhea or constipation. No GI bleeding. · Genitourinary: No dysuria, hematuria, frequency or urgency. · Musculoskeletal: No muscle aches or pains. No limitation of movement. No back pain. No gait disturbance, No joint complaints. · Dermatologic: No skin rashes or pruritus. No skin lesions or discolorations. · Psychiatric: No depression, anxiety, or stress or signs of schizophrenia. No change in mood or affect. · Hematologic: No history of bleeding tendency. No bruises or ecchymosis. No history of clotting problems. · Infectious disease: No fever, chills or frequent infections. · Endocrine: No polydipsia or polyuria. No temperature intolerance. · Neurologic: No headaches or dizziness. No weakness or numbness of the extremities. No changes in balance, coordination,  memory, mentation, behavior. · Allergic/Immunologic: No nasal congestion or hives. No repeated infections. PHYSICAL EXAM:  The patient is not in acute distress. Vital signs: Blood pressure (!) 147/82, pulse 75, temperature 97.8 °F (36.6 °C), temperature source Temporal, weight 233 lb (105.7 kg).       General appearance - well appearing, not in pain or distress  Mental status - good mood, alert and oriented  Eyes - pupils equal and reactive, extraocular eye movements intact  Ears - bilateral TM's and external ear canals normal  Nose - normal and patent, no erythema, discharge or polyps  Mouth - mucous membranes moist, pharynx normal without lesions  Neck - supple, no significant adenopathy  Lymphatics - no palpable lymphadenopathy, no hepatosplenomegaly  Chest - clear to auscultation, no wheezes, rales or rhonchi, symmetric air entry  Heart - normal rate, regular rhythm, normal S1, S2, no murmurs, rubs, clicks or gallops  Abdomen - soft, nontender, nondistended, no masses or organomegaly  Neurological - alert, oriented, normal speech, no focal findings or movement disorder noted  Musculoskeletal - no joint tenderness, deformity or swelling  Extremities - peripheral pulses normal, no pedal edema, no clubbing or cyanosis  Skin - normal coloration and turgor, no rashes, no suspicious skin lesions noted      Review of Diagnostic data:         Lab Results   Component Value Date     WBC 15.8 (H) 06/22/2020     HGB 14.3 06/22/2020     HCT 43.9 06/22/2020     MCV 99.8 06/22/2020     PLT 68 (L) 06/22/2020        Chemistry               Component Value Date/Time      02/19/2020 1206     K 4.0 02/19/2020 1206      02/19/2020 1206     CO2 21 02/19/2020 1206     BUN 11 02/19/2020 1206     CREATININE 0.98 06/10/2020 0814               Component Value Date/Time     CALCIUM 9.0 02/19/2020 1206     ALKPHOS 157 (H) 02/19/2020 1206     AST 50 (H) 02/19/2020 1206     ALT 50 (H) 02/19/2020 1206     BILITOT 0.72 02/19/2020 1206                IMPRESSION:   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 I explained to the patient the nature of this hematologic problem. I explained the significance of these abnormalities in layman language. For evaluation of persistent lymphocytosis patient had flow cytometry done. Results were positive for low-grade marginal zone lymphoma. I explained to the patient the nature of this lymphoma, staging, prognosis and treatment. I explained that this is low-grade lymphoma and recommendation at this point is observation and monitoring with no need for active treatment.   We will

## 2020-07-13 LAB — SURGICAL PATHOLOGY REPORT: NORMAL

## 2020-07-22 ENCOUNTER — OFFICE VISIT (OUTPATIENT)
Dept: FAMILY MEDICINE CLINIC | Age: 70
End: 2020-07-22
Payer: MEDICARE

## 2020-07-22 VITALS
WEIGHT: 233 LBS | SYSTOLIC BLOOD PRESSURE: 137 MMHG | HEART RATE: 66 BPM | BODY MASS INDEX: 29.9 KG/M2 | HEIGHT: 74 IN | TEMPERATURE: 98.2 F | RESPIRATION RATE: 16 BRPM | DIASTOLIC BLOOD PRESSURE: 72 MMHG

## 2020-07-22 PROCEDURE — 1123F ACP DISCUSS/DSCN MKR DOCD: CPT | Performed by: PHYSICIAN ASSISTANT

## 2020-07-22 PROCEDURE — 3017F COLORECTAL CA SCREEN DOC REV: CPT | Performed by: PHYSICIAN ASSISTANT

## 2020-07-22 PROCEDURE — G0444 DEPRESSION SCREEN ANNUAL: HCPCS | Performed by: PHYSICIAN ASSISTANT

## 2020-07-22 PROCEDURE — G0439 PPPS, SUBSEQ VISIT: HCPCS | Performed by: PHYSICIAN ASSISTANT

## 2020-07-22 PROCEDURE — 4040F PNEUMOC VAC/ADMIN/RCVD: CPT | Performed by: PHYSICIAN ASSISTANT

## 2020-07-22 ASSESSMENT — PATIENT HEALTH QUESTIONNAIRE - PHQ9: SUM OF ALL RESPONSES TO PHQ QUESTIONS 1-9: 6

## 2020-07-22 ASSESSMENT — LIFESTYLE VARIABLES
HOW OFTEN DURING THE LAST YEAR HAVE YOU NEEDED AN ALCOHOLIC DRINK FIRST THING IN THE MORNING TO GET YOURSELF GOING AFTER A NIGHT OF HEAVY DRINKING: 0
AUDIT TOTAL SCORE: 6
HOW OFTEN DURING THE LAST YEAR HAVE YOU FAILED TO DO WHAT WAS NORMALLY EXPECTED FROM YOU BECAUSE OF DRINKING: 0
HOW OFTEN DURING THE LAST YEAR HAVE YOU HAD A FEELING OF GUILT OR REMORSE AFTER DRINKING: 0
AUDIT-C TOTAL SCORE: 6
HOW OFTEN DURING THE LAST YEAR HAVE YOU BEEN UNABLE TO REMEMBER WHAT HAPPENED THE NIGHT BEFORE BECAUSE YOU HAD BEEN DRINKING: 0
HAS A RELATIVE, FRIEND, DOCTOR, OR ANOTHER HEALTH PROFESSIONAL EXPRESSED CONCERN ABOUT YOUR DRINKING OR SUGGESTED YOU CUT DOWN: 0
HOW OFTEN DO YOU HAVE A DRINK CONTAINING ALCOHOL: 4
HAVE YOU OR SOMEONE ELSE BEEN INJURED AS A RESULT OF YOUR DRINKING: 0
HOW OFTEN DO YOU HAVE SIX OR MORE DRINKS ON ONE OCCASION: 1
HOW MANY STANDARD DRINKS CONTAINING ALCOHOL DO YOU HAVE ON A TYPICAL DAY: 1
HOW OFTEN DURING THE LAST YEAR HAVE YOU FOUND THAT YOU WERE NOT ABLE TO STOP DRINKING ONCE YOU HAD STARTED: 0

## 2020-07-22 NOTE — PATIENT INSTRUCTIONS
Advance Directives: Care Instructions  Overview  An advance directive is a legal way to state your wishes at the end of your life. It tells your family and your doctor what to do if you can't say what you want. There are two main types of advance directives. You can change them any time your wishes change. Living will. This form tells your family and your doctor your wishes about life support and other treatment. The form is also called a declaration. Medical power of . This form lets you name a person to make treatment decisions for you when you can't speak for yourself. This person is called a health care agent (health care proxy, health care surrogate). The form is also called a durable power of  for health care. If you do not have an advance directive, decisions about your medical care may be made by a family member, or by a doctor or a  who doesn't know you. It may help to think of an advance directive as a gift to the people who care for you. If you have one, they won't have to make tough decisions by themselves. Follow-up care is a key part of your treatment and safety. Be sure to make and go to all appointments, and call your doctor if you are having problems. It's also a good idea to know your test results and keep a list of the medicines you take. What should you include in an advance directive? Many states have a unique advance directive form. (It may ask you to address specific issues.) Or you might use a universal form that's approved by many states. If your form doesn't tell you what to address, it may be hard to know what to include in your advance directive. Use the questions below to help you get started. · Who do you want to make decisions about your medical care if you are not able to? · What life-support measures do you want if you have a serious illness that gets worse over time or can't be cured? · What are you most afraid of that might happen? (Maybe you're afraid of having pain, losing your independence, or being kept alive by machines.)  · Where would you prefer to die? (Your home? A hospital? A nursing home?)  · Do you want to donate your organs when you die? · Do you want certain Jew practices performed before you die? When should you call for help? Be sure to contact your doctor if you have any questions. Where can you learn more? Go to [email or web address] and sign in to your [employer] account. Enter R264 in the Compare And Share box to learn more about \"Advance Directives: Care Instructions. \"     If you do not have an account, please click on the \"Sign Up Now\" link. Current as of: December 9, 2019               Content Version: 12.5  © 8012-2529 Healthwise, Incorporated. Care instructions adapted under license by [facility] ([facility]). If you have questions about a medical condition or this instruction, always ask your healthcare professional. [place] [address] any warranty or liability for your use of this information. Deciding About Using Medicines To Quit Smoking  How can you decide about using medicines to quit smoking? What are the medicines you can use? Your doctor may prescribe varenicline (Chantix) or bupropion (Zyban). These medicines can help you cope with cravings for tobacco. They are pills that don't contain nicotine. You also can use nicotine replacement products. These do contain nicotine. There are many types. · Gum and lozenges slowly release nicotine into your mouth. · Patches stick to your skin. They slowly release nicotine into your bloodstream.  · An inhaler has a hicks that contains nicotine. You breathe in a puff of nicotine vapor through your mouth and throat. · Nasal spray releases a mist that contains nicotine. What are key points about this decision? · Using medicines can double your chances of quitting smoking.  They can ease cravings and withdrawal symptoms. · Getting counseling along with using medicine can raise your chances of quitting even more. · If you smoke fewer than 5 cigarettes a day, you may not need medicines to help you quit smoking. · These medicines have less nicotine than cigarettes. And by itself, nicotine is not nearly as harmful as smoking. The tars, carbon monoxide, and other toxic chemicals in tobacco cause the harmful effects. · The side effects of nicotine replacement products depend on the type of product. For example, a patch can make your skin red and itchy. Medicines in pill form can make you sick to your stomach. They can also cause dry mouth and trouble sleeping. For most people, the side effects are not bad enough to make them stop using the products. Why might you choose to use medicines to quit smoking? · You have tried on your own to stop smoking, but you were not able to stop. · You smoke more than 5 cigarettes a day. · You want to increase your chances of quitting smoking. · You want to reduce your cravings and withdrawal symptoms. · You feel the benefits of medicine outweigh the side effects. Why might you choose not to use medicine? · You want to try quitting on your own by stopping all at once (\"cold turkey\"). · You want to cut back slowly on the number of cigarettes you smoke. · You smoke fewer than 5 cigarettes a day. · You do not like using medicine. · You feel the side effects of medicines outweigh the benefits. · You are worried about the cost of medicines. Your decision  Thinking about the facts and your feelings can help you make a decision that is right for you. Be sure you understand the benefits and risks of your options, and think about what else you need to do before you make the decision. Where can you learn more? Go to https://florin.[employer]. org and sign in to your [employer] account.  Enter V339 in the [employer] box to learn more about \"Deciding About Using Medicines To Quit Smoking. \"     If you do not have an account, please click on the \"Sign Up Now\" link. Current as of: March 12, 2020               Content Version: 12.5  © 2006-2020 Healthwise, Incorporated. Care instructions adapted under license by [facility] [place] ([facility]). If you have questions about a medical condition or this instruction, always ask your healthcare professional. [facility] any warranty or liability for your use of this information. Personalized Preventive Plan for Junious Hazelton Links - 7/22/2020  Medicare offers a range of preventive health benefits. Some of the tests and screenings are paid in full while other may be subject to a deductible, co-insurance, and/or copay. Some of these benefits include a comprehensive review of your medical history including lifestyle, illnesses that may run in your family, and various assessments and screenings as appropriate. After reviewing your medical record and screening and assessments performed today your provider may have ordered immunizations, labs, imaging, and/or referrals for you. A list of these orders (if applicable) as well as your Preventive Care list are included within your After Visit Summary for your review. Other Preventive Recommendations:    · A preventive eye exam performed by an eye specialist is recommended every 1-2 years to screen for glaucoma; cataracts, macular degeneration, and other eye disorders. · A preventive dental visit is recommended every 6 months. · Try to get at least 150 minutes of exercise per week or 10,000 steps per day on a pedometer . · Order or download the FREE \"Exercise & Physical Activity: Your Everyday Guide\" from [employer]. Call 7-439.936.1083 or search [employer] online. · You need 1197-3590 mg of calcium and 6930-9896 IU of vitamin D per day.  It is possible to meet your calcium requirement with diet alone, but a vitamin D supplement is usually necessary to meet this goal.  · When exposed to the sun, use a sunscreen that protects against both UVA and UVB radiation with an SPF of 30 or greater. Reapply every 2 to 3 hours or after sweating, drying off with a towel, or swimming. · Always wear a seat belt when traveling in a car. Always wear a helmet when riding a bicycle or motorcycle.

## 2020-07-22 NOTE — PROGRESS NOTES
Medicare Annual Wellness Visit  Name: Sanjay Eagle Date: 2020   MRN: [id number] Sex: Male   Age: 69 y.o. Ethnicity: Non-/Non    : 1950 Race: Jose Martin Cruz is here for Medicare AWV    Screenings for behavioral, psychosocial and functional/safety risks, and cognitive dysfunction are all negative except as indicated below. These results, as well as other patient data from the [address] form, are documented in Flowsheets linked to this Encounter. Allergies   Allergen Reactions    Iv Dye [Iodides] Hives    Iodinated Diagnostic Agents Hives     Patient developed hives even with steroid prep. Prior to Visit Medications    Medication Sig Taking?  Authorizing Provider   Aspirin-Calcium Carbonate  MG TABS Take 81 mg by mouth Yes Historical Provider, MD   atorvastatin (LIPITOR) 10 MG tablet Take 1 tab po daily Yes Kael Maldonado PA-C   aspirin 81 MG tablet Take 81 mg by mouth daily Yes Historical Provider, MD   metoprolol succinate (TOPROL XL) 50 MG extended release tablet TAKE ONE TABLET BY MOUTH DAILY Yes Kael Maldonado PA-C   amLODIPine (NORVASC) 5 MG tablet TAKE ONE TABLET BY MOUTH DAILY Yes Kael Maldonado PA-C   omeprazole (PRILOSEC) 10 MG delayed release capsule Take 1 capsule by mouth daily as needed (GERD) Yes Melisa Oropeza MD         Past Medical History:   Diagnosis Date    AAA (abdominal aortic aneurysm) ([facility] [address] 3/23/2015    3.8cm     Colon polyps     Colon tumor     GERD (gastroesophageal reflux disease)     prilosec as needed    Hyperlipidemia     Non Hodgkin's lymphoma ([facility] [address]     Pneumonia 's    patient had 3 chest tubes and in hospital for 13 days    Unspecified cerebral artery occlusion with cerebral infarction 10/1/2015    stroke , numbness in the left arm from the elbow down       Past Surgical History:   Procedure Laterality Date    APPENDECTOMY  4/21/15    CHOLECYSTECTOMY, OPEN  04/21/15    COLONOSCOPY  COLONOSCOPY  07/10/2020    COLONOSCOPY POLYPECTOMY HOT BIOPSY (N/A )    COLONOSCOPY N/A 7/10/2020    COLONOSCOPY POLYPECTOMY HOT BIOPSY performed by Aristides Montejo MD at [facility]  04/21/15    TRANSESOPHAGEAL ECHOCARDIOGRAM  10/3/14    VASECTOMY  1986         Family History   Problem Relation Age of Onset    Heart Disease Mother     Stroke Mother     Alcohol Abuse Father        CareTeam (Including outside providers/suppliers regularly involved in providing care):   Patient Care Team:  Robe Ding PA-C as PCP - General (Physician Assistant)  Robe Ding PA-C as PCP - [facility]  Aristides Montejo MD as Consulting Physician (Gastroenterology)  Leena Carson MD as Surgeon (General Surgery)    Wt Readings from Last 3 Encounters:   07/22/20 233 lb (105.7 kg)   07/10/20 229 lb (103.9 kg)   06/25/20 233 lb (105.7 kg)     Vitals:    07/22/20 0936   BP: 137/72   Site: Left Upper Arm   Position: Sitting   Cuff Size: Medium Adult   Pulse: 66   Resp: 16   Temp: 98.2 °F (36.8 °C)   TempSrc: Temporal   Weight: 233 lb (105.7 kg)   Height: 6' 2\" (1.88 m)     Body mass index is 29.92 kg/m². Based upon direct observation of the patient, evaluation of cognition reveals recent and remote memory intact.     General Appearance: alert and oriented to person, place and time, well developed and well- nourished, in no acute distress  Skin: warm and dry, no rash or erythema  Head: normocephalic and atraumatic  Eyes: pupils equal, round, and reactive to light, extraocular eye movements intact, conjunctivae normal  ENT: tympanic membrane, external ear and ear canal normal bilaterally, nose without deformity, nasal mucosa and turbinates normal without polyps  Neck: supple and non-tender without mass, no thyromegaly or thyroid nodules, no cervical lymphadenopathy  Pulmonary/Chest: clear to auscultation bilaterally- no wheezes, rales or rhonchi, normal air movement, no respiratory distress  Cardiovascular: normal rate, regular rhythm, normal S1 and S2, no murmurs, rubs, clicks, or gallops, distal pulses intact, no carotid bruits  Abdomen: soft, non-tender, non-distended, normal bowel sounds, no masses or organomegaly  Extremities: no cyanosis, clubbing or edema  Musculoskeletal: normal range of motion, no joint swelling, deformity or tenderness  Neurologic: reflexes normal and symmetric, no cranial nerve deficit, gait, coordination and speech normal    Patient's complete Health Risk Assessment and screening values have been reviewed and are found in Flowsheets. The following problems were reviewed today and where indicated follow up appointments were made and/or referrals ordered. Positive Risk Factor Screenings with Interventions:     General Health:  General  In general, how would you say your health is?: Fair  In the past 7 days, have you experienced any of the following? New or Increased Pain, New or Increased Fatigue, Loneliness, Social Isolation, Stress or Anger?: (!) New or Increased Pain, New or Increased Fatigue, Loneliness, Social Isolation, Stress, Anger  Do you get the social and emotional support that you need?: (!) No  Do you have a Living Will?: (!) No  General Health Risk Interventions:  · No Living Will: provided the state-specific advance directive document to the patient    Health Habits/Nutrition:  Health Habits/Nutrition  Do you exercise for at least 20 minutes 2-3 times per week?: (!) No  Have you lost any weight without trying in the past 3 months?: No  Do you eat fewer than 2 meals per day?: No  Have you seen a dentist within the past year?: Yes  Body mass index is 29.92 kg/m².   Health Habits/Nutrition Interventions:  · Inadequate physical activity:  patient is not ready to increase his/her physical activity level at this time    Safety:  Safety  Do you have working smoke detectors?: Yes  Have all throw rugs been removed or fastened?: (!) No  Do you have non-slip mats or surfaces in all bathtubs/showers?: (!) No  Do all of your stairways have a railing or banister?: Yes  Are your doorways, halls and stairs free of clutter?: Yes  Do you always fasten your seatbelt when you are in a car?: Yes  Safety Interventions:  · Home safety tips provided    Personalized Preventive Plan   Current Health Maintenance Status  Immunization History   Administered Date(s) Administered    Influenza, High Dose (Fluzone 65 yrs and older) 10/17/2014, 10/18/2016    Pneumococcal Conjugate 13-valent (Rhbvcmp06) 03/22/2016    Pneumococcal Polysaccharide ([id number]) 10/17/2014, 04/04/2019    Tdap (Boostrix, Adacel) 07/11/2016    Zoster Live (Zostavax) 07/28/2016        Health Maintenance   Topic Date Due    Meningococcal (ACWY) vaccine (1 - Risk start before 7 months 4-dose series) 1950    Hib vaccine (1 of 1 - Risk 1-dose series) 09[birth year]08/1950    Meningococcal B vaccine (1 of 4 - Increased Risk Bexsero 2-dose series) 06/08/1959    Low dose CT lung screening  06/08/2004    Shingles Vaccine (2 of 3) 09/22/2016    Annual Wellness Visit (AWV)  05/29/2019    Flu vaccine (1) 09/01/2020    Lipid screen  02/19/2021    Colon cancer screen colonoscopy  07/10/2023    DTaP/Tdap/Td vaccine (2 - Td) 07/11/2026    Pneumococcal 65+ yrs at Risk Vaccine  Completed    AAA screen  Completed    Hepatitis C screen  Completed    Hepatitis A vaccine  Aged Out    Hepatitis B vaccine  Aged Out     Recommendations for [employer] Due: see orders and patient instructions/AVS.  . Recommended screening schedule for the next 5-10 years is provided to the patient in written form: see Patient Instructions/AVS.    Mabel Greenwood was seen today for medicare awv.     Diagnoses and all orders for this visit:    Need for prophylactic vaccination and inoculation against varicella    Routine general medical examination at a health care facility                  Advance Care Planning   Advanced Care Planning: Discussed the patients choices for care and treatment in case of a health event that adversely affects decision-making abilities. Also discussed the patients long-term treatment options. Reviewed with the patient the [address] [place] [address] & [facility] Declaration forms  Reviewed the process of designating a competent adult as an Agent (or -in-fact) that could take make health care decisions for the patient if incompetent. Patient was asked to complete the declaration forms, either acknowledge the forms by a public notary or an eligible witness and provide a signed copy to the practice office. Time spent (minutes): 151     LDCT Screening: Discussed with patient the benefits and harms of screening, follow-up diagnostic testing, over-diagnosis, false positive rate, and total radiation exposure. Counseled on the importance of adherence to annual lung cancer LDCT screening, impact of comorbidities, ability and willingness to undergo diagnosis and treatment. Counseled on the importance of maintaining cigarette smoking abstinence and cessation. Patient has a history of heavy tobacco use of over 30 pack years. Patient does not present signs or symptoms of lung cancer.

## 2020-09-24 ENCOUNTER — HOSPITAL ENCOUNTER (OUTPATIENT)
Facility: MEDICAL CENTER | Age: 70
Discharge: HOME | End: 2020-09-24
Payer: MEDICARE

## 2020-09-24 DIAGNOSIS — C85.80 MARGINAL ZONE LYMPHOMA (HCC): ICD-10-CM

## 2020-09-24 LAB
ABSOLUTE EOS #: 0.13 K/UL (ref 0–0.4)
ABSOLUTE IMMATURE GRANULOCYTE: 0 K/UL (ref 0–0.3)
ABSOLUTE LYMPH #: 10.17 K/UL (ref 1–4.8)
ABSOLUTE MONO #: 0.79 K/UL (ref 0.1–0.8)
BASOPHILS # BLD: 0 % (ref 0–2)
BASOPHILS ABSOLUTE: 0 K/UL (ref 0–0.2)
DIFFERENTIAL TYPE: ABNORMAL
EOSINOPHILS RELATIVE PERCENT: 1 % (ref 1–4)
HCT VFR BLD CALC: 41.7 % (ref 40.7–50.3)
HEMOGLOBIN: 13.4 G/DL (ref 13–17)
IMMATURE GRANULOCYTES: 0 %
LACTATE DEHYDROGENASE: 157 U/L (ref 135–225)
LYMPHOCYTES # BLD: 77 % (ref 24–44)
MCH RBC QN AUTO: 32.4 PG (ref 25.2–33.5)
MCHC RBC AUTO-ENTMCNC: 32.1 G/DL (ref 28.4–34.8)
MCV RBC AUTO: 101 FL (ref 82.6–102.9)
MONOCYTES # BLD: 6 % (ref 1–7)
MORPHOLOGY: ABNORMAL
NRBC AUTOMATED: 0 PER 100 WBC
PDW BLD-RTO: 15.1 % (ref 11.8–14.4)
PLATELET # BLD: 65 K/UL (ref 138–453)
PLATELET ESTIMATE: ABNORMAL
PMV BLD AUTO: 8.9 FL (ref 8.1–13.5)
RBC # BLD: 4.13 M/UL (ref 4.21–5.77)
RBC # BLD: ABNORMAL 10*6/UL
SEG NEUTROPHILS: 16 % (ref 36–66)
SEGMENTED NEUTROPHILS ABSOLUTE COUNT: 2.11 K/UL (ref 1.8–7.7)
WBC # BLD: 13.2 K/UL (ref 3.5–11.3)
WBC # BLD: ABNORMAL 10*3/UL

## 2020-09-24 PROCEDURE — 83615 LACTATE (LD) (LDH) ENZYME: CPT

## 2020-09-24 PROCEDURE — 85025 COMPLETE CBC W/AUTO DIFF WBC: CPT

## 2020-09-24 PROCEDURE — 36415 COLL VENOUS BLD VENIPUNCTURE: CPT

## 2020-09-25 ENCOUNTER — HOSPITAL ENCOUNTER (OUTPATIENT)
Facility: MEDICAL CENTER | Age: 70
End: 2020-09-25
Payer: MEDICARE

## 2020-09-29 ENCOUNTER — OFFICE VISIT (OUTPATIENT)
Dept: FAMILY MEDICINE CLINIC | Age: 70
End: 2020-09-29
Payer: MEDICARE

## 2020-09-29 VITALS
WEIGHT: 233 LBS | HEIGHT: 74 IN | BODY MASS INDEX: 29.9 KG/M2 | TEMPERATURE: 98.2 F | SYSTOLIC BLOOD PRESSURE: 130 MMHG | HEART RATE: 98 BPM | DIASTOLIC BLOOD PRESSURE: 75 MMHG | RESPIRATION RATE: 16 BRPM

## 2020-09-29 PROCEDURE — 99213 OFFICE O/P EST LOW 20 MIN: CPT | Performed by: PHYSICIAN ASSISTANT

## 2020-09-29 PROCEDURE — G8427 DOCREV CUR MEDS BY ELIG CLIN: HCPCS | Performed by: PHYSICIAN ASSISTANT

## 2020-09-29 PROCEDURE — 4040F PNEUMOC VAC/ADMIN/RCVD: CPT | Performed by: PHYSICIAN ASSISTANT

## 2020-09-29 PROCEDURE — 90694 VACC AIIV4 NO PRSRV 0.5ML IM: CPT | Performed by: PHYSICIAN ASSISTANT

## 2020-09-29 PROCEDURE — 3017F COLORECTAL CA SCREEN DOC REV: CPT | Performed by: PHYSICIAN ASSISTANT

## 2020-09-29 PROCEDURE — G8417 CALC BMI ABV UP PARAM F/U: HCPCS | Performed by: PHYSICIAN ASSISTANT

## 2020-09-29 PROCEDURE — 1123F ACP DISCUSS/DSCN MKR DOCD: CPT | Performed by: PHYSICIAN ASSISTANT

## 2020-09-29 PROCEDURE — 1036F TOBACCO NON-USER: CPT | Performed by: PHYSICIAN ASSISTANT

## 2020-09-29 PROCEDURE — G0008 ADMIN INFLUENZA VIRUS VAC: HCPCS | Performed by: PHYSICIAN ASSISTANT

## 2020-10-01 ENCOUNTER — TELEPHONE (OUTPATIENT)
Dept: ONCOLOGY | Age: 70
End: 2020-10-01

## 2020-10-01 ENCOUNTER — OFFICE VISIT (OUTPATIENT)
Dept: ONCOLOGY | Age: 70
End: 2020-10-01
Payer: MEDICARE

## 2020-10-01 VITALS
DIASTOLIC BLOOD PRESSURE: 74 MMHG | BODY MASS INDEX: 29.47 KG/M2 | TEMPERATURE: 97.5 F | HEART RATE: 62 BPM | SYSTOLIC BLOOD PRESSURE: 148 MMHG | WEIGHT: 229.5 LBS

## 2020-10-01 PROCEDURE — G8484 FLU IMMUNIZE NO ADMIN: HCPCS | Performed by: INTERNAL MEDICINE

## 2020-10-01 PROCEDURE — 3017F COLORECTAL CA SCREEN DOC REV: CPT | Performed by: INTERNAL MEDICINE

## 2020-10-01 PROCEDURE — G8427 DOCREV CUR MEDS BY ELIG CLIN: HCPCS | Performed by: INTERNAL MEDICINE

## 2020-10-01 PROCEDURE — 1036F TOBACCO NON-USER: CPT | Performed by: INTERNAL MEDICINE

## 2020-10-01 PROCEDURE — 99214 OFFICE O/P EST MOD 30 MIN: CPT | Performed by: INTERNAL MEDICINE

## 2020-10-01 PROCEDURE — 99211 OFF/OP EST MAY X REQ PHY/QHP: CPT | Performed by: INTERNAL MEDICINE

## 2020-10-01 PROCEDURE — G8417 CALC BMI ABV UP PARAM F/U: HCPCS | Performed by: INTERNAL MEDICINE

## 2020-10-01 PROCEDURE — 1123F ACP DISCUSS/DSCN MKR DOCD: CPT | Performed by: INTERNAL MEDICINE

## 2020-10-01 PROCEDURE — 4040F PNEUMOC VAC/ADMIN/RCVD: CPT | Performed by: INTERNAL MEDICINE

## 2020-10-01 ASSESSMENT — ENCOUNTER SYMPTOMS
CONSTIPATION: 0
WHEEZING: 0
CHEST TIGHTNESS: 0
ANAL BLEEDING: 0
DIARRHEA: 0
ABDOMINAL DISTENTION: 0
RECTAL PAIN: 0
VOMITING: 0
NAUSEA: 0
COUGH: 0
ABDOMINAL PAIN: 0
BACK PAIN: 0
BLOOD IN STOOL: 0
SHORTNESS OF BREATH: 0

## 2020-10-01 NOTE — TELEPHONE ENCOUNTER
TEGAN ARRIVES AMBULATORY FOR MD VISIT  DR Barnett Ast IN TO SEE PATIENT  ORDERS RECEIVED  RV 3 MONTHS W/LABS PRIOR  LABS CDP LD 12/30/20  MD VISIT 01/05/21 @10AM  AVS PRINTED AND GIVEN TO PATIENT WITH INSTRUCTIONS  PATIENT DISCHARGED AMBULATORY

## 2020-10-01 NOTE — PROGRESS NOTES
Medication Sig Dispense Refill    atorvastatin (LIPITOR) 10 MG tablet Take 1 tab po daily 90 tablet 3    aspirin 81 MG tablet Take 81 mg by mouth daily      metoprolol succinate (TOPROL XL) 50 MG extended release tablet TAKE ONE TABLET BY MOUTH DAILY 90 tablet 3    amLODIPine (NORVASC) 5 MG tablet TAKE ONE TABLET BY MOUTH DAILY 90 tablet 3    omeprazole (PRILOSEC) 10 MG delayed release capsule Take 1 capsule by mouth daily as needed (GERD) 90 capsule 3     No current facility-administered medications for this visit. Social History     Tobacco Use    Smoking status: Former Smoker     Packs/day: 1.00     Years: 30.00     Pack years: 30.00     Types: Cigarettes     Last attempt to quit: 4/10/2008     Years since quittin.4    Smokeless tobacco: Never Used   Substance Use Topics    Alcohol use: Yes     Alcohol/week: 14.0 standard drinks     Types: 14 Cans of beer per week     Comment: 2 beers/day    Drug use: No       Family History   Problem Relation Age of Onset    Heart Disease Mother     Stroke Mother     Alcohol Abuse Father         Review of Systems   Constitutional: Positive for fatigue. Negative for appetite change, chills, diaphoresis, fever and unexpected weight change. Respiratory: Negative for cough, chest tightness, shortness of breath and wheezing. Cardiovascular: Negative for chest pain, palpitations and leg swelling. Gastrointestinal: Negative for abdominal distention, abdominal pain, anal bleeding, blood in stool, constipation, diarrhea, nausea, rectal pain and vomiting. Genitourinary: Negative for dysuria, frequency and urgency. Musculoskeletal: Negative for back pain and myalgias. Neurological: Negative for dizziness, syncope, weakness, light-headedness and headaches. Physical Exam  Vitals signs and nursing note reviewed. Constitutional:       General: He is not in acute distress. Appearance: Normal appearance.  He is well-developed and well-groomed. He is not ill-appearing, toxic-appearing or diaphoretic. HENT:      Head: Normocephalic and atraumatic. Right Ear: Tympanic membrane, ear canal and external ear normal.      Left Ear: Tympanic membrane, ear canal and external ear normal.      Nose: Nose normal.      Mouth/Throat:      Lips: Pink. Mouth: Mucous membranes are moist.      Pharynx: Oropharynx is clear. Uvula midline. No oropharyngeal exudate or posterior oropharyngeal erythema. Tonsils: No tonsillar exudate or tonsillar abscesses. Eyes:      General: Lids are normal. No scleral icterus. Right eye: No discharge. Left eye: No discharge. Extraocular Movements: Extraocular movements intact. Conjunctiva/sclera: Conjunctivae normal.      Pupils: Pupils are equal, round, and reactive to light. Neck:      Musculoskeletal: Full passive range of motion without pain, normal range of motion and neck supple. Thyroid: No thyroid mass, thyromegaly or thyroid tenderness. Vascular: No JVD. Trachea: No tracheal deviation. Cardiovascular:      Rate and Rhythm: Normal rate and regular rhythm. Heart sounds: Normal heart sounds, S1 normal and S2 normal. No murmur. No friction rub. No gallop. Pulmonary:      Effort: Pulmonary effort is normal. No respiratory distress. Breath sounds: Normal breath sounds and air entry. No stridor, decreased air movement or transmitted upper airway sounds. No decreased breath sounds, wheezing, rhonchi or rales. Chest:      Chest wall: No tenderness. Abdominal:      General: Abdomen is flat. Bowel sounds are normal. There is no distension. Palpations: Abdomen is soft. There is no mass. Tenderness: There is no abdominal tenderness. There is no right CVA tenderness, left CVA tenderness, guarding or rebound. Musculoskeletal: Normal range of motion. General: No tenderness.    Lymphadenopathy:      Head:      Right side of head: No PLT 65 09/24/2020       BMP:    Lab Results   Component Value Date     02/19/2020    K 4.0 02/19/2020     02/19/2020    CO2 21 02/19/2020    BUN 11 02/19/2020    CREATININE 0.98 06/10/2020    GLUCOSE 105 02/19/2020         FASTING LIPID PANEL:  Lab Results   Component Value Date    CHOL 93 02/19/2020    HDL 37 (L) 02/19/2020    TRIG 84 02/19/2020       No results found for this visit on 09/29/20. ASSESSMENT AND PLAN:   Diagnosis Orders   1. Non Hodgkin's lymphoma, stage IA ([facility] [address]  F/u with oncology as scheduled   2. Need for influenza vaccination  INFLUENZA, QUADV, ADJUVANTED, 65 YRS =, IM, PF, PREFILL SYR, 0.5ML (FLUAD)   3. Hypercholesteremia  continue Lipitor as ordered   4. Essential hypertension  Continue meds as ordered       FOLLOW UP AND INSTRUCTIONS:  Return in about 3 months (around 12/29/2020), or if symptoms worsen or fail to improve. · Discussed use, benefit, and side effects of prescribed medications. Barriers to medication compliance addressed. All patient questions answered. Pt voiced understanding. · Patient instructed to return to the office if symptoms do not resolve or go directly to the ER if the symptoms worsen - patient voiced understanding. · Patient given educational materials - see patient instructions    Landon Proc. Mahmood Nile [address]  [facility]  10/1/2020, 10:57 AM    This note is created with the assistance of a speech-recognition program. While intending to generate a document that actually reflects the content of the visit, the document can still have some mistakes which may not have been identified and corrected by editing.

## 2020-10-07 PROBLEM — C85.80 MARGINAL ZONE LYMPHOMA (HCC): Status: ACTIVE | Noted: 2020-10-07

## 2020-10-07 NOTE — PROGRESS NOTES
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 BRIEF CASE HISTORY:      Mr. David Guerrier is a very pleasant 69 y.o. male with history of multiple co morbidities as listed. Patient states that he had stroke about 6 years ago. In addition he had right hemicolectomy in 2015 for tubular adenomatous polyp. Since then he had few GI symptoms. Overall he continued to do just fine. Patient had flulike illness about 2 weeks ago. He had blood work at that time which showed elevated white blood cells with significant lymphocytosis. Patient also had thrombocytopenia. Chemistry test showed elevated transaminases. Patient had liver ultrasound which showed steatosis with possible fatty infiltration of the liver. Patient had mild easy bruising. No fever or night sweats. No weight loss or decreased appetite. No enlarged lymph nodes. No abdominal pain or swelling. No ascites. No chest pain or shortness of breath. The patient drinks beer every day. He quit smoking 13 years ago. He smokes about 1 pack/day for about 40 years. .     INTERIM HISTORY:   Patient seen for follow-up marginal zone lymphoma. He has leukocytosis and lymphocytosis. Lab work-up was done. No changes. Clinically he is stable. No fever or night sweats. No enlarged lymph nodes. No weight loss or decreased appetite. No repeated infections. No other complaints.       PAST MEDICAL HISTORY: has a past medical history of AAA (abdominal aortic aneurysm) ([facility] [address] Colon polyps, Colon tumor, GERD (gastroesophageal reflux disease), Hyperlipidemia, Non Hodgkin's lymphoma ([facility] [address] Pneumonia, and Unspecified cerebral artery occlusion with cerebral infarction. PAST SURGICAL HISTORY: has a past surgical history that includes transesophageal echocardiogram (10/3/14); Colonoscopy; Vasectomy (1986); Cholecystectomy, open (04/21/15); right colectomy (04/21/15); Appendectomy (4/21/15); Colonoscopy (07/10/2020); and Colonoscopy (N/A, 7/10/2020). CURRENT MEDICATIONS:  has a current medication list which includes the following prescription(s): atorvastatin, aspirin, metoprolol succinate, amlodipine, and omeprazole. ALLERGIES:  is allergic to iv dye [iodides] and iodinated diagnostic agents. FAMILY HISTORY: Father and brother had colon cancer at late age. Otherwise negative for any hematological or oncological conditions. SOCIAL HISTORY:  reports that he quit smoking about 12 years ago. His smoking use included cigarettes. He has a 30.00 pack-year smoking history. He has never used smokeless tobacco. He reports current alcohol use of about 14.0 standard drinks of alcohol per week. He reports that he does not use drugs. REVIEW OF SYSTEMS:     · General: No weakness or fatigue. No unanticipated weight loss or decreased appetite. No fever or chills. · Eyes: No blurred vision, eye pain or double vision. · Ears: No hearing problems or drainage. No tinnitus. · Throat: No sore throat, problems with swallowing or dysphagia. · Respiratory: No cough, sputum or hemoptysis. No shortness of breath. No pleuritic chest pain. · Cardiovascular: No chest pain, orthopnea or PND. No lower extremity edema. No palpitation. · Gastrointestinal: No problems with swallowing. No abdominal pain or bloating. No nausea or vomiting. No diarrhea or constipation. No GI bleeding. · Genitourinary: No dysuria, hematuria, frequency or urgency. · Musculoskeletal: No muscle aches or pains. No limitation of movement. No back pain.  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 I explained to the patient the nature of this hematologic problem. I explained the significance of these abnormalities in layman language. For evaluation of persistent lymphocytosis patient had flow cytometry done. Results were positive for low-grade marginal zone lymphoma. I explained to the patient the nature of this lymphoma, staging, prognosis and treatment. I explained that this is low-grade lymphoma and recommendation at this point is observation and monitoring with no need for active treatment. We will consider active treatment for enlarging lymph nodes or splenomegaly or for unexplained fever or weight loss. I will see the patient again in 3 months with repeated labs. He will be seen sooner if he develops any problems. Counseled about importance of alcohol cessation. Patient's questions were answered to the best of his satisfaction and he verbalized full understanding and agreement.

## 2020-11-09 ENCOUNTER — TELEPHONE (OUTPATIENT)
Dept: FAMILY MEDICINE CLINIC | Age: 70
End: 2020-11-09

## 2020-11-10 ENCOUNTER — HOSPITAL ENCOUNTER (OUTPATIENT)
Dept: ULTRASOUND IMAGING | Facility: CLINIC | Age: 70
Discharge: HOME | End: 2020-11-12
Payer: MEDICARE

## 2020-11-10 ENCOUNTER — HOSPITAL ENCOUNTER (OUTPATIENT)
Age: 70
Setting detail: SPECIMEN
Discharge: HOME | End: 2020-11-10
Payer: MEDICARE

## 2020-11-10 ENCOUNTER — OFFICE VISIT (OUTPATIENT)
Dept: FAMILY MEDICINE CLINIC | Age: 70
End: 2020-11-10
Payer: MEDICARE

## 2020-11-10 ENCOUNTER — HOSPITAL ENCOUNTER (OUTPATIENT)
Dept: VASCULAR LAB | Facility: CLINIC | Age: 70
Discharge: HOME | End: 2020-11-10
Payer: MEDICARE

## 2020-11-10 VITALS
TEMPERATURE: 98.4 F | OXYGEN SATURATION: 98 % | WEIGHT: 232 LBS | SYSTOLIC BLOOD PRESSURE: 134 MMHG | BODY MASS INDEX: 29.79 KG/M2 | RESPIRATION RATE: 16 BRPM | DIASTOLIC BLOOD PRESSURE: 70 MMHG | HEART RATE: 76 BPM

## 2020-11-10 LAB
ALBUMIN SERPL-MCNC: 4 G/DL (ref 3.5–5.2)
ALBUMIN/GLOBULIN RATIO: 1.2 (ref 1–2.5)
ALP BLD-CCNC: 141 U/L (ref 40–129)
ALT SERPL-CCNC: 36 U/L (ref 5–41)
ANION GAP SERPL CALCULATED.3IONS-SCNC: 12 MMOL/L (ref 9–17)
AST SERPL-CCNC: 45 U/L
BILIRUB SERPL-MCNC: 0.9 MG/DL (ref 0.3–1.2)
BNP INTERPRETATION: NORMAL
BUN BLDV-MCNC: 13 MG/DL (ref 8–23)
BUN/CREAT BLD: ABNORMAL (ref 9–20)
CALCIUM SERPL-MCNC: 9.1 MG/DL (ref 8.6–10.4)
CHLORIDE BLD-SCNC: 106 MMOL/L (ref 98–107)
CO2: 21 MMOL/L (ref 20–31)
CREAT SERPL-MCNC: 0.87 MG/DL (ref 0.7–1.2)
GFR AFRICAN AMERICAN: >60 ML/MIN
GFR NON-AFRICAN AMERICAN: >60 ML/MIN
GFR SERPL CREATININE-BSD FRML MDRD: ABNORMAL ML/MIN/{1.73_M2}
GFR SERPL CREATININE-BSD FRML MDRD: ABNORMAL ML/MIN/{1.73_M2}
GLUCOSE BLD-MCNC: 93 MG/DL (ref 70–99)
POTASSIUM SERPL-SCNC: 4.3 MMOL/L (ref 3.7–5.3)
PRO-BNP: 275 PG/ML
SODIUM BLD-SCNC: 139 MMOL/L (ref 135–144)
TOTAL PROTEIN: 7.4 G/DL (ref 6.4–8.3)

## 2020-11-10 PROCEDURE — G8417 CALC BMI ABV UP PARAM F/U: HCPCS | Performed by: PHYSICIAN ASSISTANT

## 2020-11-10 PROCEDURE — 1123F ACP DISCUSS/DSCN MKR DOCD: CPT | Performed by: PHYSICIAN ASSISTANT

## 2020-11-10 PROCEDURE — 3017F COLORECTAL CA SCREEN DOC REV: CPT | Performed by: PHYSICIAN ASSISTANT

## 2020-11-10 PROCEDURE — 93971 EXTREMITY STUDY: CPT

## 2020-11-10 PROCEDURE — 4040F PNEUMOC VAC/ADMIN/RCVD: CPT | Performed by: PHYSICIAN ASSISTANT

## 2020-11-10 PROCEDURE — 1036F TOBACCO NON-USER: CPT | Performed by: PHYSICIAN ASSISTANT

## 2020-11-10 PROCEDURE — G8427 DOCREV CUR MEDS BY ELIG CLIN: HCPCS | Performed by: PHYSICIAN ASSISTANT

## 2020-11-10 PROCEDURE — 99214 OFFICE O/P EST MOD 30 MIN: CPT | Performed by: PHYSICIAN ASSISTANT

## 2020-11-10 PROCEDURE — G8484 FLU IMMUNIZE NO ADMIN: HCPCS | Performed by: PHYSICIAN ASSISTANT

## 2020-11-10 RX ORDER — CEPHALEXIN 500 MG/1
500 CAPSULE ORAL 3 TIMES DAILY
Qty: 21 CAPSULE | Refills: 0 | Status: SHIPPED | OUTPATIENT
Start: 2020-11-10 | End: 2020-11-17

## 2020-11-10 ASSESSMENT — ENCOUNTER SYMPTOMS
BACK PAIN: 0
COUGH: 0
ABDOMINAL PAIN: 0
WHEEZING: 0
BLOOD IN STOOL: 0
CHEST TIGHTNESS: 0
VOMITING: 0
NAUSEA: 0
SHORTNESS OF BREATH: 1
CONSTIPATION: 0
DIARRHEA: 0

## 2020-11-10 NOTE — PROGRESS NOTES
[address] PRIMARY CARE  [address] [place] [address] [facility]  Dept: [phone]  Dept Fax: [phone]    Office Progress Note  Date of patient's visit: 11/10/2020  Patient's Name:  Dacia Will YOB: 1950            KIM BRAND  ================================================================    REASON FOR VISIT/CHIEF COMPLAINT:  Foot Problem (bilateral ) and Other (he would like to talk about taking advil prn for generalized pain )    HISTORY OF PRESENTING ILLNESS:  History was obtained from: patient. Dacia Will is a 69 y.o. male who presents with c/o bilateral lower extremity edema. He states that the left leg has been more swollen than the right. He has noticed the swelling for the last few days. He states that the left leg has been mildly red and warm. He denies any difficulty bearing weight or numbness tingling or weakness in lower extremities. Patient has had some generalized shortness of breath with exertion but states this has been going on for several months and has not worsened. He denies any chest pain, heart palpitations, dizziness or headaches. Patient is being observed for lymphoma but no current treatment.       Patient Active Problem List   Diagnosis    Cerebral infarction ([facility] [address]    Abnormal LFTs    Carotid arterial disease (HCC)    Hypercholesteremia    Colon polyps    Colon tumor    Colonic mass    Cholelithiasis with acute cholecystitis    HTN (hypertension)    Hx of cholecystectomy    Hx of splenectomy    History of colon resection    History of CVA (cerebrovascular accident)    Hx of splenomegaly    Thrombocytopenia (HCC)    Leukocytosis    Marginal zone lymphoma ([facility] [address]       Health Maintenance Due   Topic Date Due    Meningococcal (ACWY) vaccine (1 - Risk start before 7 months 4-dose series) 1950    Hib vaccine (1 of 1 - Risk 1-dose series) 09/08/1951    Meningococcal B vaccine (1 of 4 - Increased Risk Bexsero 2-dose series) 1959    Low dose CT lung screening  2004    Shingles Vaccine (2 of 3) 2016       Allergies   Allergen Reactions    Iv Dye [Iodides] Hives    Iodinated Diagnostic Agents Hives     Patient developed hives even with steroid prep. Current Outpatient Medications   Medication Sig Dispense Refill    Handicap Placard MISC by Does not apply route  2025 1 each 0    cephALEXin (KEFLEX) 500 MG capsule Take 1 capsule by mouth 3 times daily for 7 days 21 capsule 0    atorvastatin (LIPITOR) 10 MG tablet Take 1 tab po daily 90 tablet 3    aspirin 81 MG tablet Take 81 mg by mouth daily      metoprolol succinate (TOPROL XL) 50 MG extended release tablet TAKE ONE TABLET BY MOUTH DAILY 90 tablet 3    amLODIPine (NORVASC) 5 MG tablet TAKE ONE TABLET BY MOUTH DAILY 90 tablet 3    omeprazole (PRILOSEC) 10 MG delayed release capsule Take 1 capsule by mouth daily as needed (GERD) 90 capsule 3     No current facility-administered medications for this visit. Social History     Tobacco Use    Smoking status: Former Smoker     Packs/day: 1.00     Years: 30.00     Pack years: 30.00     Types: Cigarettes     Last attempt to quit: 4/10/2008     Years since quittin.5    Smokeless tobacco: Never Used   Substance Use Topics    Alcohol use: Yes     Alcohol/week: 14.0 standard drinks     Types: 14 Cans of beer per week     Comment: 2 beers/day    Drug use: No       Family History   Problem Relation Age of Onset    Heart Disease Mother     Stroke Mother     Alcohol Abuse Father         Review of Systems   Constitutional: Negative for appetite change, chills, diaphoresis, fatigue, fever and unexpected weight change. Respiratory: Positive for shortness of breath ( with exertion). Negative for cough, chest tightness and wheezing. Cardiovascular: Positive for leg swelling. Negative for chest pain and palpitations.    Gastrointestinal: Negative for abdominal pain, blood in stool, constipation, diarrhea, nausea and vomiting. Genitourinary: Negative for decreased urine volume, difficulty urinating, dysuria, frequency and urgency. Musculoskeletal: Negative for back pain and myalgias. Neurological: Negative for dizziness, syncope, weakness, light-headedness and headaches. Physical Exam  Vitals signs and nursing note reviewed. Constitutional:       General: He is not in acute distress. Appearance: Normal appearance. He is not ill-appearing, toxic-appearing or diaphoretic. Cardiovascular:      Rate and Rhythm: Normal rate and regular rhythm. Pulses: Normal pulses. Heart sounds: Normal heart sounds. Pulmonary:      Effort: Pulmonary effort is normal. No respiratory distress. Breath sounds: Normal breath sounds. No stridor. No wheezing, rhonchi or rales. Chest:      Chest wall: No tenderness. Abdominal:      General: Abdomen is flat. Bowel sounds are normal.      Palpations: Abdomen is soft. Musculoskeletal:      Right lower leg: Edema present. Left lower leg: Edema present. Comments: 2+ pitting edema left greater than right. Neurovascularly intact   Neurological:      General: No focal deficit present. Mental Status: He is alert and oriented to person, place, and time. Psychiatric:         Mood and Affect: Mood normal.         Behavior: Behavior normal.         Thought Content:  Thought content normal.         Judgment: Judgment normal.         Vitals:    11/10/20 1225   BP: 134/70   Pulse: 76   Resp: 16   Temp: 98.4 °F (36.9 °C)   TempSrc: Skin   SpO2: 98%   Weight: 232 lb (105.2 kg)     BP Readings from Last 3 Encounters:   11/10/20 134/70   10/01/20 (!) 148/74   09/29/20 130/75              DIAGNOSTIC FINDINGS:  CBC:  Lab Results   Component Value Date    WBC 13.2 09/24/2020    HGB 13.4 09/24/2020    PLT 65 09/24/2020       BMP:    Lab Results   Component Value Date     02/19/2020    K 4.0 02/19/2020     02/19/2020    CO2 21 02/19/2020    BUN 11 02/19/2020    CREATININE 0.98 06/10/2020    GLUCOSE 105 02/19/2020         FASTING LIPID PANEL:  Lab Results   Component Value Date    CHOL 93 02/19/2020    HDL 37 (L) 02/19/2020    TRIG 84 02/19/2020       No results found for this visit on 11/10/20. ASSESSMENT AND PLAN:   Diagnosis Orders   1. Bilateral lower extremity edema  Comprehensive Metabolic Panel    Brain Natriuretic Peptide    Echocardiogram complete    VL Extremity Venous Bilateral    XR CHEST (2 VW)    cephALEXin (KEFLEX) 500 MG capsule    CBC Auto Differential       FOLLOW UP AND INSTRUCTIONS:  · No follow-ups on file. · Discussed use, benefit, and side effects of prescribed medications. Barriers to medication compliance addressed. All patient questions answered. Pt voiced understanding. · Patient instructed to return to the office if symptoms do not resolve or go directly to the ER if the symptoms worsen - patient voiced understanding. · Patient given educational materials - see patient instructions    [address] [facility]  11/10/2020, 12:49 PM    This note is created with the assistance of a speech-recognition program. While intending to generate a document that actually reflects the content of the visit, the document can still have some mistakes which may not have been identified and corrected by editing.

## 2020-11-11 LAB
ABSOLUTE EOS #: 0 K/UL (ref 0–0.4)
ABSOLUTE IMMATURE GRANULOCYTE: 0 K/UL (ref 0–0.3)
ABSOLUTE LYMPH #: 12.22 K/UL (ref 1–4.8)
ABSOLUTE MONO #: 0.82 K/UL (ref 0.1–0.8)
BASOPHILS # BLD: 0 % (ref 0–2)
BASOPHILS ABSOLUTE: 0 K/UL (ref 0–0.2)
DIFFERENTIAL TYPE: ABNORMAL
EOSINOPHILS RELATIVE PERCENT: 0 % (ref 1–4)
HCT VFR BLD CALC: 42.4 % (ref 40.7–50.3)
HEMOGLOBIN: 13.5 G/DL (ref 13–17)
IMMATURE GRANULOCYTES: 0 %
LYMPHOCYTES # BLD: 75 % (ref 24–44)
MCH RBC QN AUTO: 32.5 PG (ref 25.2–33.5)
MCHC RBC AUTO-ENTMCNC: 31.8 G/DL (ref 28.4–34.8)
MCV RBC AUTO: 102.2 FL (ref 82.6–102.9)
MONOCYTES # BLD: 5 % (ref 1–7)
MORPHOLOGY: ABNORMAL
NRBC AUTOMATED: 0 PER 100 WBC
PDW BLD-RTO: 15.4 % (ref 11.8–14.4)
PLATELET # BLD: 81 K/UL (ref 138–453)
PLATELET ESTIMATE: ABNORMAL
PMV BLD AUTO: 9 FL (ref 8.1–13.5)
RBC # BLD: 4.15 M/UL (ref 4.21–5.77)
RBC # BLD: ABNORMAL 10*6/UL
SEG NEUTROPHILS: 20 % (ref 36–66)
SEGMENTED NEUTROPHILS ABSOLUTE COUNT: 3.26 K/UL (ref 1.8–7.7)
WBC # BLD: 16.3 K/UL (ref 3.5–11.3)
WBC # BLD: ABNORMAL 10*3/UL

## 2020-11-16 ENCOUNTER — HOSPITAL ENCOUNTER (OUTPATIENT)
Dept: GENERAL RADIOLOGY | Age: 70
Discharge: HOME | End: 2020-11-18
Payer: MEDICARE

## 2020-11-16 ENCOUNTER — HOSPITAL ENCOUNTER (OUTPATIENT)
Age: 70
Discharge: HOME | End: 2020-11-18
Payer: MEDICARE

## 2020-11-16 ENCOUNTER — HOSPITAL ENCOUNTER (OUTPATIENT)
Dept: NON INVASIVE DIAGNOSTICS | Age: 70
Discharge: HOME | End: 2020-11-18
Payer: MEDICARE

## 2020-11-16 LAB
LV EF: 55 %
LVEF MODALITY: NORMAL

## 2020-11-16 PROCEDURE — 93306 TTE W/DOPPLER COMPLETE: CPT

## 2020-11-16 PROCEDURE — 71046 X-RAY EXAM CHEST 2 VIEWS: CPT

## 2020-12-08 ENCOUNTER — TELEPHONE (OUTPATIENT)
Dept: FAMILY MEDICINE CLINIC | Age: 70
End: 2020-12-08

## 2020-12-08 NOTE — TELEPHONE ENCOUNTER
Please tell pt that he should quarantine for 10 days.  If pt develops sxs please have him call to schedule a VV

## 2020-12-08 NOTE — TELEPHONE ENCOUNTER
Patient had a friend who was over his house about 5 days ago and he just now tested positive for covid the patient is not having any symptoms and wondering if he should be tested please advise

## 2020-12-29 ENCOUNTER — TELEPHONE (OUTPATIENT)
Dept: ONCOLOGY | Age: 70
End: 2020-12-29

## 2020-12-30 ENCOUNTER — HOSPITAL ENCOUNTER (OUTPATIENT)
Facility: MEDICAL CENTER | Age: 70
Discharge: HOME | End: 2020-12-30
Payer: MEDICARE

## 2020-12-30 DIAGNOSIS — C85.80 MARGINAL ZONE LYMPHOMA (HCC): ICD-10-CM

## 2020-12-30 LAB
ABSOLUTE EOS #: 0.19 K/UL (ref 0–0.44)
ABSOLUTE IMMATURE GRANULOCYTE: 0 K/UL (ref 0–0.3)
ABSOLUTE LYMPH #: 13.72 K/UL (ref 1.1–3.7)
ABSOLUTE MONO #: 1.88 K/UL (ref 0.1–1.2)
BASOPHILS # BLD: 0 % (ref 0–2)
BASOPHILS ABSOLUTE: 0 K/UL (ref 0–0.2)
DIFFERENTIAL TYPE: ABNORMAL
EOSINOPHILS RELATIVE PERCENT: 1 % (ref 1–4)
HCT VFR BLD CALC: 43.4 % (ref 40.7–50.3)
HEMOGLOBIN: 14.2 G/DL (ref 13–17)
IMMATURE GRANULOCYTES: 0 %
LACTATE DEHYDROGENASE: 162 U/L (ref 135–225)
LYMPHOCYTES # BLD: 73 % (ref 24–43)
MCH RBC QN AUTO: 32.6 PG (ref 25.2–33.5)
MCHC RBC AUTO-ENTMCNC: 32.7 G/DL (ref 28.4–34.8)
MCV RBC AUTO: 99.8 FL (ref 82.6–102.9)
MONOCYTES # BLD: 10 % (ref 3–12)
MORPHOLOGY: ABNORMAL
NRBC AUTOMATED: 0 PER 100 WBC
PDW BLD-RTO: 14.5 % (ref 11.8–14.4)
PLATELET # BLD: 72 K/UL (ref 138–453)
PLATELET ESTIMATE: ABNORMAL
PMV BLD AUTO: 8.5 FL (ref 8.1–13.5)
RBC # BLD: 4.35 M/UL (ref 4.21–5.77)
RBC # BLD: ABNORMAL 10*6/UL
SEG NEUTROPHILS: 16 % (ref 36–65)
SEGMENTED NEUTROPHILS ABSOLUTE COUNT: 3.01 K/UL (ref 1.5–8.1)
WBC # BLD: 18.8 K/UL (ref 3.5–11.3)
WBC # BLD: ABNORMAL 10*3/UL

## 2020-12-30 PROCEDURE — 83615 LACTATE (LD) (LDH) ENZYME: CPT

## 2020-12-30 PROCEDURE — 85025 COMPLETE CBC W/AUTO DIFF WBC: CPT

## 2020-12-30 PROCEDURE — 36415 COLL VENOUS BLD VENIPUNCTURE: CPT

## 2021-01-22 ENCOUNTER — HOSPITAL ENCOUNTER (OUTPATIENT)
Facility: MEDICAL CENTER | Age: 71
End: 2021-01-22

## 2021-01-28 ENCOUNTER — TELEPHONE (OUTPATIENT)
Dept: ONCOLOGY | Age: 71
End: 2021-01-28

## 2021-01-28 ENCOUNTER — OFFICE VISIT (OUTPATIENT)
Dept: ONCOLOGY | Age: 71
End: 2021-01-28
Payer: MEDICARE

## 2021-01-28 VITALS
WEIGHT: 234.2 LBS | DIASTOLIC BLOOD PRESSURE: 69 MMHG | BODY MASS INDEX: 30.07 KG/M2 | HEART RATE: 55 BPM | RESPIRATION RATE: 16 BRPM | SYSTOLIC BLOOD PRESSURE: 145 MMHG | TEMPERATURE: 97.7 F

## 2021-01-28 DIAGNOSIS — C85.80 MARGINAL ZONE LYMPHOMA (HCC): Primary | ICD-10-CM

## 2021-01-28 PROCEDURE — 3017F COLORECTAL CA SCREEN DOC REV: CPT | Performed by: INTERNAL MEDICINE

## 2021-01-28 PROCEDURE — G8427 DOCREV CUR MEDS BY ELIG CLIN: HCPCS | Performed by: INTERNAL MEDICINE

## 2021-01-28 PROCEDURE — G8484 FLU IMMUNIZE NO ADMIN: HCPCS | Performed by: INTERNAL MEDICINE

## 2021-01-28 PROCEDURE — 99211 OFF/OP EST MAY X REQ PHY/QHP: CPT | Performed by: INTERNAL MEDICINE

## 2021-01-28 PROCEDURE — G8417 CALC BMI ABV UP PARAM F/U: HCPCS | Performed by: INTERNAL MEDICINE

## 2021-01-28 PROCEDURE — 4040F PNEUMOC VAC/ADMIN/RCVD: CPT | Performed by: INTERNAL MEDICINE

## 2021-01-28 PROCEDURE — 1123F ACP DISCUSS/DSCN MKR DOCD: CPT | Performed by: INTERNAL MEDICINE

## 2021-01-28 PROCEDURE — 99214 OFFICE O/P EST MOD 30 MIN: CPT | Performed by: INTERNAL MEDICINE

## 2021-01-28 PROCEDURE — 1036F TOBACCO NON-USER: CPT | Performed by: INTERNAL MEDICINE

## 2021-01-28 RX ORDER — HYDROCHLOROTHIAZIDE 25 MG/1
25 TABLET ORAL DAILY
Status: ON HOLD | COMMUNITY
Start: 2020-12-15 | End: 2021-03-08 | Stop reason: HOSPADM

## 2021-02-19 ENCOUNTER — HOSPITAL ENCOUNTER (OUTPATIENT)
Dept: CARDIAC CATH/INVASIVE PROCEDURES | Age: 71
Discharge: HOME | End: 2021-02-19
Attending: INTERNAL MEDICINE | Admitting: INTERNAL MEDICINE
Payer: MEDICARE

## 2021-02-19 VITALS
BODY MASS INDEX: 29.52 KG/M2 | OXYGEN SATURATION: 97 % | TEMPERATURE: 98.4 F | DIASTOLIC BLOOD PRESSURE: 81 MMHG | HEART RATE: 78 BPM | SYSTOLIC BLOOD PRESSURE: 155 MMHG | RESPIRATION RATE: 17 BRPM | HEIGHT: 74 IN | WEIGHT: 230 LBS

## 2021-02-19 DIAGNOSIS — I25.10 CAD IN NATIVE ARTERY: ICD-10-CM

## 2021-02-19 LAB
ABSOLUTE EOS #: 0 K/UL (ref 0–0.4)
ABSOLUTE IMMATURE GRANULOCYTE: 0 K/UL (ref 0–0.3)
ABSOLUTE LYMPH #: 6.81 K/UL (ref 1–4.8)
ABSOLUTE MONO #: 0.32 K/UL (ref 0.1–0.8)
ALBUMIN SERPL-MCNC: 3.9 G/DL (ref 3.5–5.2)
ALBUMIN/GLOBULIN RATIO: 1.1 (ref 1–2.5)
ALP BLD-CCNC: 133 U/L (ref 40–129)
ALT SERPL-CCNC: 37 U/L (ref 5–41)
ANION GAP SERPL CALCULATED.3IONS-SCNC: 10 MMOL/L (ref 9–17)
AST SERPL-CCNC: 39 U/L
BASOPHILS # BLD: 0 % (ref 0–2)
BASOPHILS ABSOLUTE: 0 K/UL (ref 0–0.2)
BILIRUB SERPL-MCNC: 0.94 MG/DL (ref 0.3–1.2)
BUN BLDV-MCNC: 14 MG/DL (ref 8–23)
BUN/CREAT BLD: ABNORMAL (ref 9–20)
CALCIUM SERPL-MCNC: 9 MG/DL (ref 8.6–10.4)
CHLORIDE BLD-SCNC: 99 MMOL/L (ref 98–107)
CO2: 22 MMOL/L (ref 20–31)
CREAT SERPL-MCNC: 0.85 MG/DL (ref 0.7–1.2)
DIFFERENTIAL TYPE: ABNORMAL
EOSINOPHILS RELATIVE PERCENT: 0 % (ref 1–4)
ESTIMATED AVERAGE GLUCOSE: 123 MG/DL
GFR AFRICAN AMERICAN: >60 ML/MIN
GFR NON-AFRICAN AMERICAN: >60 ML/MIN
GFR NON-AFRICAN AMERICAN: >60 ML/MIN
GFR SERPL CREATININE-BSD FRML MDRD: >60 ML/MIN
GFR SERPL CREATININE-BSD FRML MDRD: ABNORMAL ML/MIN/{1.73_M2}
GFR SERPL CREATININE-BSD FRML MDRD: ABNORMAL ML/MIN/{1.73_M2}
GFR SERPL CREATININE-BSD FRML MDRD: NORMAL ML/MIN/{1.73_M2}
GLUCOSE BLD-MCNC: 116 MG/DL (ref 74–100)
GLUCOSE BLD-MCNC: 208 MG/DL (ref 70–99)
HBA1C MFR BLD: 5.9 % (ref 4–6)
HCT VFR BLD CALC: 45.1 % (ref 40.7–50.3)
HEMOGLOBIN: 14.6 G/DL (ref 13–17)
IMMATURE GRANULOCYTES: 0 %
INR BLD: 1.1
LV EF: 54 %
LVEF MODALITY: NORMAL
LYMPHOCYTES # BLD: 63 % (ref 24–44)
MAGNESIUM: 1.9 MG/DL (ref 1.6–2.6)
MCH RBC QN AUTO: 32.2 PG (ref 25.2–33.5)
MCHC RBC AUTO-ENTMCNC: 32.4 G/DL (ref 28.4–34.8)
MCV RBC AUTO: 99.6 FL (ref 82.6–102.9)
MONOCYTES # BLD: 3 % (ref 1–7)
MORPHOLOGY: ABNORMAL
NRBC AUTOMATED: 0 PER 100 WBC
PARTIAL THROMBOPLASTIN TIME: 25.3 SEC (ref 20.5–30.5)
PDW BLD-RTO: 15.3 % (ref 11.8–14.4)
PLATELET # BLD: 72 K/UL (ref 138–453)
PLATELET # BLD: 76 K/UL (ref 138–453)
PLATELET ESTIMATE: ABNORMAL
PMV BLD AUTO: 9 FL (ref 8.1–13.5)
POC CHLORIDE: 105 MMOL/L (ref 98–107)
POC CREATININE: 0.96 MG/DL (ref 0.51–1.19)
POC HEMATOCRIT: 48 % (ref 41–53)
POC HEMOGLOBIN: 16.4 G/DL (ref 13.5–17.5)
POC POTASSIUM: 4 MMOL/L (ref 3.5–4.5)
POC SODIUM: 139 MMOL/L (ref 138–146)
POTASSIUM SERPL-SCNC: 4 MMOL/L (ref 3.7–5.3)
PREALBUMIN: 21.3 MG/DL (ref 20–40)
PROTHROMBIN TIME: 11.9 SEC (ref 9.1–12.3)
RBC # BLD: 4.53 M/UL (ref 4.21–5.77)
RBC # BLD: ABNORMAL 10*6/UL
SEG NEUTROPHILS: 34 % (ref 36–66)
SEGMENTED NEUTROPHILS ABSOLUTE COUNT: 3.67 K/UL (ref 1.8–7.7)
SODIUM BLD-SCNC: 131 MMOL/L (ref 135–144)
TOTAL PROTEIN: 7.5 G/DL (ref 6.4–8.3)
WBC # BLD: 10.8 K/UL (ref 3.5–11.3)
WBC # BLD: ABNORMAL 10*3/UL

## 2021-02-19 PROCEDURE — 93306 TTE W/DOPPLER COMPLETE: CPT

## 2021-02-19 PROCEDURE — C1769 GUIDE WIRE: HCPCS

## 2021-02-19 PROCEDURE — 85610 PROTHROMBIN TIME: CPT

## 2021-02-19 PROCEDURE — 93458 L HRT ARTERY/VENTRICLE ANGIO: CPT | Performed by: INTERNAL MEDICINE

## 2021-02-19 PROCEDURE — 85025 COMPLETE CBC W/AUTO DIFF WBC: CPT

## 2021-02-19 PROCEDURE — 85014 HEMATOCRIT: CPT

## 2021-02-19 PROCEDURE — 83036 HEMOGLOBIN GLYCOSYLATED A1C: CPT

## 2021-02-19 PROCEDURE — 82435 ASSAY OF BLOOD CHLORIDE: CPT

## 2021-02-19 PROCEDURE — 6360000004 HC RX CONTRAST MEDICATION

## 2021-02-19 PROCEDURE — 85730 THROMBOPLASTIN TIME PARTIAL: CPT

## 2021-02-19 PROCEDURE — 2709999900 HC NON-CHARGEABLE SUPPLY

## 2021-02-19 PROCEDURE — 2500000003 HC RX 250 WO HCPCS

## 2021-02-19 PROCEDURE — 84134 ASSAY OF PREALBUMIN: CPT

## 2021-02-19 PROCEDURE — 93970 EXTREMITY STUDY: CPT

## 2021-02-19 PROCEDURE — 80053 COMPREHEN METABOLIC PANEL: CPT

## 2021-02-19 PROCEDURE — 7100000001 HC PACU RECOVERY - ADDTL 15 MIN

## 2021-02-19 PROCEDURE — 99204 OFFICE O/P NEW MOD 45 MIN: CPT | Performed by: NURSE PRACTITIONER

## 2021-02-19 PROCEDURE — 93880 EXTRACRANIAL BILAT STUDY: CPT

## 2021-02-19 PROCEDURE — 83735 ASSAY OF MAGNESIUM: CPT

## 2021-02-19 PROCEDURE — C1887 CATHETER, GUIDING: HCPCS

## 2021-02-19 PROCEDURE — 82565 ASSAY OF CREATININE: CPT

## 2021-02-19 PROCEDURE — 82947 ASSAY GLUCOSE BLOOD QUANT: CPT

## 2021-02-19 PROCEDURE — 85049 AUTOMATED PLATELET COUNT: CPT

## 2021-02-19 PROCEDURE — 84132 ASSAY OF SERUM POTASSIUM: CPT

## 2021-02-19 PROCEDURE — 36415 COLL VENOUS BLD VENIPUNCTURE: CPT

## 2021-02-19 PROCEDURE — C1894 INTRO/SHEATH, NON-LASER: HCPCS

## 2021-02-19 PROCEDURE — 7100000000 HC PACU RECOVERY - FIRST 15 MIN

## 2021-02-19 PROCEDURE — 84295 ASSAY OF SERUM SODIUM: CPT

## 2021-02-19 PROCEDURE — 6360000002 HC RX W HCPCS

## 2021-02-19 RX ORDER — SODIUM CHLORIDE 9 MG/ML
INJECTION, SOLUTION INTRAVENOUS CONTINUOUS
Status: DISCONTINUED | OUTPATIENT
Start: 2021-02-19 | End: 2021-02-20 | Stop reason: HOSPADM

## 2021-02-19 RX ORDER — ACETAMINOPHEN 325 MG/1
650 TABLET ORAL EVERY 4 HOURS PRN
Status: CANCELLED | OUTPATIENT
Start: 2021-02-19

## 2021-02-19 RX ORDER — SODIUM CHLORIDE 0.9 % (FLUSH) 0.9 %
10 SYRINGE (ML) INJECTION EVERY 12 HOURS SCHEDULED
Status: CANCELLED | OUTPATIENT
Start: 2021-02-19

## 2021-02-19 RX ORDER — CHLORHEXIDINE GLUCONATE 0.12 MG/ML
15 RINSE ORAL ONCE
Status: CANCELLED | OUTPATIENT
Start: 2021-02-19 | End: 2021-02-19

## 2021-02-19 RX ORDER — ATORVASTATIN CALCIUM 40 MG/1
40 TABLET, FILM COATED ORAL NIGHTLY
Status: CANCELLED | OUTPATIENT
Start: 2021-02-19

## 2021-02-19 RX ORDER — SODIUM CHLORIDE 0.9 % (FLUSH) 0.9 %
10 SYRINGE (ML) INJECTION PRN
Status: CANCELLED | OUTPATIENT
Start: 2021-02-19

## 2021-02-19 RX ORDER — ASPIRIN 81 MG/1
81 TABLET ORAL DAILY
Status: CANCELLED | OUTPATIENT
Start: 2021-02-19

## 2021-02-19 RX ORDER — CHLORHEXIDINE GLUCONATE 4 G/100ML
SOLUTION TOPICAL SEE ADMIN INSTRUCTIONS
Status: CANCELLED | OUTPATIENT
Start: 2021-02-19

## 2021-02-19 RX ORDER — DIPHENHYDRAMINE HYDROCHLORIDE 50 MG/ML
50 INJECTION INTRAMUSCULAR; INTRAVENOUS ONCE
Status: COMPLETED | OUTPATIENT
Start: 2021-02-19 | End: 2021-02-19

## 2021-02-19 RX ORDER — METHYLPREDNISOLONE SODIUM SUCCINATE 125 MG/2ML
125 INJECTION, POWDER, LYOPHILIZED, FOR SOLUTION INTRAMUSCULAR; INTRAVENOUS ONCE
Status: COMPLETED | OUTPATIENT
Start: 2021-02-19 | End: 2021-02-19

## 2021-02-19 RX ORDER — METOPROLOL SUCCINATE 25 MG/1
25 TABLET, EXTENDED RELEASE ORAL DAILY
Status: CANCELLED | OUTPATIENT
Start: 2021-02-19

## 2021-02-19 RX ORDER — ATORVASTATIN CALCIUM 80 MG/1
80 TABLET, FILM COATED ORAL NIGHTLY
Status: CANCELLED | OUTPATIENT
Start: 2021-02-19

## 2021-02-19 RX ADMIN — SODIUM CHLORIDE: 9 INJECTION, SOLUTION INTRAVENOUS at 08:56

## 2021-02-19 RX ADMIN — METHYLPREDNISOLONE SODIUM SUCCINATE 125 MG: 125 INJECTION, POWDER, LYOPHILIZED, FOR SOLUTION INTRAMUSCULAR; INTRAVENOUS at 09:03

## 2021-02-19 RX ADMIN — DIPHENHYDRAMINE HYDROCHLORIDE 50 MG: 50 INJECTION INTRAMUSCULAR; INTRAVENOUS at 09:03

## 2021-02-19 ASSESSMENT — ENCOUNTER SYMPTOMS
SHORTNESS OF BREATH: 1
ABDOMINAL PAIN: 0
COLOR CHANGE: 0

## 2021-02-19 NOTE — PROGRESS NOTES
Patient returned to room. Post cath recovery initiated. Patient awake and alert, denies any pain at this time. cardiac monitor shows NSR without ectopy. Right wrist with vasband intact. No hematoma or drainage noted. Side rails up times 2, call light with in reach.

## 2021-02-19 NOTE — PROGRESS NOTES
Patient admitted, consent signed and questions answered. Patient ready for procedure. Call light to reach with side rails up 2 of 2. Bilateral groins clipped. family at bedside with patient. History and physical updated.

## 2021-02-19 NOTE — H&P
[facility]  Pre- Procedure History and Physical/Update          Patient Name:  Rosslyn Merlin  MRN:    [id number]  YOB: 1950  Date of evaluation:  2/19/2021       Please refer to the consult note / H&P completed by Dr. Joey Encinas on 2/9/21 in the medical record and note that:       [x] I have examined the patient and reviewed the H&P/Consult and there are no changes to be made to the assessment or plan. [] I have examined the patient and reviewed the H&P/Consult and have noted the following changes:        Past Medical History:   Diagnosis Date    AAA (abdominal aortic aneurysm) ([facility] [address] 3/23/2015    3.8cm     Colon polyps     Colon tumor     Congestive heart failure (CHF) (HCC) 01/2021    GERD (gastroesophageal reflux disease)     prilosec as needed    Hyperlipidemia     Non Hodgkin's lymphoma ([facility] [address]     Pneumonia 1970's    patient had 3 chest tubes and in hospital for 13 days    Unspecified cerebral artery occlusion with cerebral infarction 10/1/2015    stroke , numbness in the left arm from the elbow down       Past Surgical History:   Procedure Laterality Date    APPENDECTOMY  4/21/15    CHOLECYSTECTOMY, OPEN  04/21/15    COLONOSCOPY      COLONOSCOPY  07/10/2020    COLONOSCOPY POLYPECTOMY HOT BIOPSY (N/A )    COLONOSCOPY N/A 7/10/2020    COLONOSCOPY POLYPECTOMY HOT BIOPSY performed by Ermias Shaffer MD at [address] [place] [address]  04/21/15    TRANSESOPHAGEAL ECHOCARDIOGRAM  10/3/14   [address]        reports that he quit smoking about 12 years ago. His smoking use included cigarettes. He has a 30.00 pack-year smoking history. He has never used smokeless tobacco. He reports current alcohol use of about 14.0 standard drinks of alcohol per week. He reports that he does not use drugs. Prior to Admission medications    Medication Sig Start Date End Date Taking?  Authorizing Provider   hydroCHLOROthiazide (HYDRODIURIL) 25 MG tablet  12/15/20   Historical Provider, MD Handicap Placard MISC by Does not apply route  2025 11/10/20   Sultana ALEX Mcleod   atorvastatin (LIPITOR) 10 MG tablet Take 1 tab po daily 20   Sultana ALEX Mcleod   aspirin 81 MG tablet Take 81 mg by mouth daily    Historical Provider, MD   metoprolol succinate (TOPROL XL) 50 MG extended release tablet TAKE ONE TABLET BY MOUTH DAILY 20   Paljanny Mcleod PA-C   omeprazole (PRILOSEC) 10 MG delayed release capsule Take 1 capsule by mouth daily as needed (GERD) 17   Corey Alexander MD       Allergies   Allergen Reactions    Iv Dye [Iodides] Hives    Iodinated Diagnostic Agents Hives     Patient developed hives even with steroid prep. REVIEW OF SYSTEMS:     A detailed review of system was performed as already noted and is otherwise as above. PHYSICAL EXAM:     There were no vitals filed for this visit. Constitutional and General Appearance: Alert. Not in acute stress. Respiratory:  · Clear to auscultation b/l. No wheeze or crackles. Cardiovascular:  · Regular rate and rhythm. No murmurs. · Jugular venous pulsation is normal  Abdomen:  · No masses or tenderness  Extremities:  · Lower extremity edema - No  · Skin: Warm and dry  Neurological:  · Alert and oriented. · Moves all extremities well      Active Problems:    * No active hospital problems. *  Resolved Problems:    * No resolved hospital problems. *    2D echocardiogram 2020, normal LVEF 55%, no significant valve abnormalities    Persantine Cardiolite stress test 2021, Inferior moderate infarction with surrounding ischemia (Mixed defect). Preserved LV function (EF 55%). Inferior hypokinesia    Assessment:  1. AGOSTO  2. Abnormal stress test - Inferior moderate infarction with surrounding ischemia  3. Preserved EF   4. HTN  5. CVA  6. Smoker      Plan:  1. Proceed with planned cardiac cath +/- PCI. 2. Further orders to follow.      Pre Procedure Conscious Sedation Data:  ASA Class: [] I [x] II [] III [] IV     Mallampati Class:       [] I [x] II [] III [] IV      The risks, benefits, and alternatives of the prcoedure were discussed in detail with the patient. The patient agrees to proceed with the procedure, verbalizes understanding and signed consent.        Imelda Hong MD  Fellow, [address] [facility]

## 2021-02-19 NOTE — PROGRESS NOTES
HPI:    Patient ID: Gail Zimmerman is a 53year VA#[id number]

## 2021-02-19 NOTE — OP NOTE
[facility]  Cardiac catheterization note        Date:   2/19/2021  Patient name: Basia Gilliam  Date of admission:  No admission date for patient encounter. MRN:   [id number]  YOB: 1950    Operators:  Maverick Kelley MD (Attending Physician)      Shawna Saleh  (CV Fellow)      Pre Procedure Conscious Sedation Data:    ASA Class:    [] I [x] II [] III [] IV    Mallampati Class:  [] I [x] II [] III [] IV      Indication:  Abnormal stress test     Procedure:   1. Left heart catheterization   2. Selective left and right coronary angiography   3. Left ventriculography       Access:  [] Femoral  [x] Radial  artery       [x] Right  [] Left    Procedure: After informed consent was obtained with explanation of the risks and benefits, patient was brought to the cath lab. The access area was prepped and draped in sterile fashion. 1% lidocaine was used for local block. The artery was cannulated with 6  Fr sheath with brisk arterial blood return. The side port was frequently flushed and aspirated with normal saline. Findings:    Cardiac Arteries and Lesion Findings       LMCA: Distal 30% stenosis     LAD: Proximal discrete 70% stenosis, mid segment has long diffuse 60-70%   stenosis   D1 and D2 are small with vveepo89% stenosis     LCx: Mild irregularities 10-20%. OM is large with ostial 70% stenosis     RCA: Large, dominant, mid discrete 70% stenosis   RPDA/RPL are patent with mild disease     Coronary Tree       Dominance: Right         The LV gram was performed in the SHARP 30 position. LVEF: 60%. LV Wall Motion: Normal     Estimated blood loss: 5 ml       Procedure Summary        1. Multivessel CAD    2. Normal LV systolic function.        Recommendations        Routine Post Diagnostic Cath orders.     CTS consult for evaluation for CABG          Electronically signed by Owen Seay MD on 2/19/2021 at 11:29 AM  Cardiovascular fellow  [address]          Attending Physician  I was present during the entire procedure and performed all the critical elements of it.      Kem Ron MD, [facility]

## 2021-02-19 NOTE — CONSULTS
Mouth: Mucous membranes are moist.      Pharynx: Oropharynx is clear. Eyes:      Conjunctiva/sclera: Conjunctivae normal.      Pupils: Pupils are equal, round, and reactive to light. Neck:      Musculoskeletal: Normal range of motion. Cardiovascular:      Rate and Rhythm: Normal rate and regular rhythm. Pulses: Normal pulses. Heart sounds: Normal heart sounds. No murmur. Pulmonary:      Effort: Pulmonary effort is normal. No respiratory distress. Breath sounds: Normal breath sounds. Abdominal:      General: Bowel sounds are normal.      Palpations: Abdomen is soft. Tenderness: There is no abdominal tenderness. Musculoskeletal: Normal range of motion. Skin:     General: Skin is warm and dry. Comments: TR band to right wrist   Neurological:      General: No focal deficit present. Mental Status: He is alert and oriented to person, place, and time. Psychiatric:         Mood and Affect: Mood normal.         Behavior: Behavior normal.         Labs:   CBC:   Recent Labs     02/19/21  0855   PLT 72*     BMP:  Recent Labs     02/19/21  0852   CREATININE 0.96     Accucheck Glucoses:  Recent Labs     02/19/21  0852   POCGLU 116*     Cardiac Enzymes: No results for input(s): CKTOTAL, CKMB, CKMBINDEX, TROPONINI in the last 72 hours. PT/INR: No results for input(s): PROTIME, INR in the last 72 hours. APTT: No results for input(s): APTT in the last 72 hours.   Liver Profile:  Lab Results   Component Value Date    AST 45 11/10/2020    ALT 36 11/10/2020    BILIDIR 0.15 10/02/2014    BILITOT 0.90 11/10/2020    ALKPHOS 141 11/10/2020     Lab Results   Component Value Date    CHOL 93 02/19/2020    HDL 37 02/19/2020    TRIG 84 02/19/2020     TSH:   Lab Results   Component Value Date    TSH 1.84 03/22/2016     UA:   Lab Results   Component Value Date    COLORU YELLOW 03/27/2017    PHUR 6.0 03/27/2017    SPECGRAV 1.028 03/27/2017    LEUKOCYTESUR NEGATIVE 03/27/2017    UROBILINOGEN Normal 03/27/2017    BILIRUBINUR NEGATIVE 03/27/2017    GLUCOSEU NEGATIVE 03/27/2017         Testing:  Cardiac cath:   1. Multivessel CAD  2. Normal LV systolic function. Recommendations:    Routine Post Diagnostic Cath orders. CTS consult for evaluation for CABG      Signature    ----------------------------------------------------------------   Electronically signed by Archana Ceron(Performing   Physician) on 02/19/2021 11:17   ----------------------------------------------------------------      Angiographic Findings: Cardiac Arteries and Lesion Findings     LMCA: Distal 30% stenosis     LAD: Proximal discrete 70% stenosis, mid segment has long diffuse 60-70% stenosis. D1 and D2 are small with [id number]% stenosis     LCx: Mild irregularities 10-20%. OM is large with ostial 70% stenosis     RCA: Large, dominant, mid discrete 70% stenosis  RPDA/RPL are patent with mild disease      Coronary Tree Dominance: Right     LV Analysis  LV function assessed as:Normal.  Ejection Fraction  +----------------------------------------------------------------------+---+  ! Method                                                                ! EF%! +----------------------------------------------------------------------+---+  ! LV gram                                                               !60 !  +----------------------------------------------------------------------+---+    Echo:   Normal left ventricular size with normal function. Left ventricular ejection  fraction 55 %. Mild concentric left ventricular hypertrophy. Left atrium is mildly dilated. Right atrium is mildly dilated . No significant valvular regurgitation or stenosis seen.      Signature  ----------------------------------------------------------------------------   Electronically signed by Beatriz Padron RDCS(Sonographer) on 11/16/2020   09:32 AM  ---------------------------------------------------------------------------- ----------------------------------------------------------------------------   Electronically signed by Bradley Flores(Interpreting physician) on 11/16/2020   09:46 AM  ----------------------------------------------------------------------------  FINDINGS  Left Atrium  Left atrium is mildly dilated. Left Ventricle  Normal left ventricular size with normal hyperdynamic function. Left ventricular ejection fraction 55 %. Mild concentric left ventricular hypertrophy. Right Atrium  Right atrium is mildly dilated . Right Ventricle  Normal right ventricular size and function. Mitral Valve  Normal mitral valve structure and function. Aortic Valve  Normal aortic valve structure and function without stenosis or  regurgitation. Tricuspid Valve  Normal tricuspid valve structure and function. Pulmonic Valve  The pulmonic valve is normal in structure. Trivial pulmonic insufficiency      CT scan: Ordered as outpatient upon discharge    Imaging Studies:  I have personally reviewed the testing/imaging, and agree with the findings listed above. In summary, Mr. Jerrell Galeazzi is a 69y.o. year-old male with multivessel CAD. Problem List:    Multivessel CAD s/p cardiac catheterization   Marginal zone lymphoma  o Follows with Dr. Bria Potter, oncology   Thrombocytopenia, chronic   Hypertension   Hypercholesterolemia   History of splenectomy   History of cholecystectomy   History of CVA with no residual effects   History of colon cancer s/p colon resection  o 18\" of large intestine removed and some small intestine   History of abnormal LFTs/fatty liver   Overweight, BMI 29.53 kg/m²    Recommendation:  I believe Mr. Jerrell Galeazzi would benefit optimally from CABG. Patient was provided opportunity to be admitted to cardiology team and undergo preoperative testing as inpatient with surgery on Monday, 2/22/2021. 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   He has an appointment with Dr. Rigoberto Casas on Thursday, 2/25/2021 at 2:30 PM.    On this date 02/19/21 I have spent 28 minutes reviewing previous notes, test results and face to face with the patient discussing the diagnosis and importance of compliance with the treatment plan as well as documenting on the day of the visit.     Agree with the above  Patient with multivessel coronary disease  Vital signs stable  Heart regular rate and rhythm  Lungs are clear  Abdomen benign  Neuro intact  Endocrine normal    Plan for CABG next week  Preop vascular studies and labs  I do appreciate the consult from Dr. Saleem Velásquez, APRN - CNP

## 2021-02-22 ENCOUNTER — TELEPHONE (OUTPATIENT)
Dept: FAMILY MEDICINE CLINIC | Age: 71
End: 2021-02-22

## 2021-02-22 NOTE — TELEPHONE ENCOUNTER
Patient called to inform our office that he will be scheduling open heart surgery on this Thursday, 2/25/21, with Dr. Saadia More.

## 2021-02-24 DIAGNOSIS — I10 ESSENTIAL HYPERTENSION: ICD-10-CM

## 2021-02-24 RX ORDER — METOPROLOL SUCCINATE 50 MG/1
TABLET, EXTENDED RELEASE ORAL
Qty: 90 TABLET | Refills: 3 | Status: ON HOLD
Start: 2021-02-24 | End: 2021-03-08 | Stop reason: HOSPADM

## 2021-02-24 ASSESSMENT — ENCOUNTER SYMPTOMS
COLOR CHANGE: 0
SHORTNESS OF BREATH: 1
ABDOMINAL PAIN: 0

## 2021-02-24 NOTE — TELEPHONE ENCOUNTER
Next Visit Date:  Future Appointments   Date Time Provider Sage Chari   2/25/2021 10:30 AM Moon Eli MD [facility]   4/22/2021  8:30 AM SCHEDULE, P [facility]   4/29/2021  8:45 AM Moshe Vidal MD [facility]       Health Maintenance   Topic Date Due    Meningococcal (ACWY) vaccine (1 - Risk start before 7 months 4-dose series) 1950    Hib vaccine (1 of 1 - Risk 1-dose series) 09[birth year]08/1950    Meningococcal B vaccine (1 of 4 - Increased Risk Bexsero 2-dose series) 06/08/1959    COVID-19 Vaccine (1 of 2) 06/08/1965    Low dose CT lung screening  06/08/2004    Shingles Vaccine (2 of 3) 09/22/2016    Lipid screen  02/19/2021    Annual Wellness Visit (AWV)  07/23/2021    A1C test (Diabetic or Prediabetic)  02/19/2022    Potassium monitoring  02/19/2022    Creatinine monitoring  02/19/2022    Colon cancer screen colonoscopy  07/10/2023    DTaP/Tdap/Td vaccine (2 - Td) 07/11/2026    Flu vaccine  Completed    Pneumococcal 65+ yrs at Risk Vaccine  Completed    AAA screen  Completed    Hepatitis C screen  Completed    Hepatitis A vaccine  Aged Out    Hepatitis B vaccine  Aged Out       Hemoglobin A1C (%)   Date Value   02/19/2021 5.9             ( goal A1C is < 7)   No results found for: LABMICR  LDL Cholesterol (mg/dL)   Date Value   02/19/2020 39   03/27/2017 55       (goal LDL is <100)   AST (U/L)   Date Value   02/19/2021 39     ALT (U/L)   Date Value   02/19/2021 37     BUN (mg/dL)   Date Value   02/19/2021 14     BP Readings from Last 3 Encounters:   02/19/21 (!) 155/81   01/28/21 (!) 145/69   11/10/20 134/70          (goal 120/80)    All Future Testing planned in [facility]  Lab Frequency Next Occurrence   Hepatitis A Antibody, Total Once 08/13/2020   Hepatitis C Antibody Once 08/13/2020   HAYDEN Once 08/13/2020   Anti-smooth muscle antibody Once 08/13/2020   Antimitochondrial antibody Once 08/13/2020   Hepatitis B Surface Antibody Once 05/18/2020   Hepatitis B Surface Antigen Once 05/18/2020   Hepatitis B Core Antibody, Total Once 05/18/2020   CEA Once 05/18/2020   AFP Tumor Marker Once 05/18/2020   Nasal cannula oxygen PRN    CBC Auto Differential q 3 months    Lactate Dehydrogenase q 3 months                Patient Active Problem List:     Cerebral infarction ([facility] [address]     Abnormal LFTs     Carotid arterial disease ([facility] [address]     Hypercholesteremia     Colon polyps     Colon tumor     Colonic mass     Cholelithiasis with acute cholecystitis     HTN (hypertension)     Hx of cholecystectomy     Hx of splenectomy     History of colon resection     History of CVA (cerebrovascular accident)     Hx of splenomegaly     Thrombocytopenia (HCC)     Leukocytosis     Marginal zone lymphoma (HCC)     CAD in native artery

## 2021-02-24 NOTE — PROGRESS NOTES
Outpatient Medications:     metoprolol succinate (TOPROL XL) 50 MG extended release tablet, TAKE ONE TABLET BY MOUTH DAILY, Disp: 90 tablet, Rfl: 3    hydroCHLOROthiazide (HYDRODIURIL) 25 MG tablet, , Disp: , Rfl:     Handicap Placard MISC, by Does not apply route  2025, Disp: 1 each, Rfl: 0    atorvastatin (LIPITOR) 10 MG tablet, Take 1 tab po daily, Disp: 90 tablet, Rfl: 3    aspirin 81 MG tablet, Take 81 mg by mouth daily, Disp: , Rfl:     omeprazole (PRILOSEC) 10 MG delayed release capsule, Take 1 capsule by mouth daily as needed (GERD), Disp: 90 capsule, Rfl: 3    Social Hx:    reports that he quit smoking about 12 years ago. His smoking use included cigarettes. He has a 30.00 pack-year smoking history. He has never used smokeless tobacco.    Family Hx:  family history includes Alcohol Abuse in his father; Heart Disease in his mother; Stroke in his mother. ROS:    Review of Systems   Constitutional: Positive for fatigue. Negative for chills and fever. HENT: Negative for congestion. Eyes: Negative for visual disturbance. Respiratory: Positive for shortness of breath. Dyspnea on exertion intermittently, resolves with rest   Cardiovascular: Positive for leg swelling. Negative for chest pain. Intermittent edema to bilateral lower extremities, mostly feet   Gastrointestinal: Negative for abdominal pain. Genitourinary: Negative for dysuria. Musculoskeletal: Negative for gait problem. Skin: Negative for color change. Neurological: Negative for dizziness and weakness. Psychiatric/Behavioral: Negative for self-injury. The patient is not nervous/anxious. Physical Examination    Vitals: There were no vitals filed for this visit. Physical Exam  Vitals signs reviewed. Constitutional:       General: He is not in acute distress. Appearance: Normal appearance. He is not ill-appearing.    HENT:      Nose: Nose normal.      Mouth/Throat:      Mouth: Mucous membranes are moist.      Pharynx: Oropharynx is clear. Eyes:      Conjunctiva/sclera: Conjunctivae normal.      Pupils: Pupils are equal, round, and reactive to light. Neck:      Musculoskeletal: Normal range of motion. Cardiovascular:      Rate and Rhythm: Normal rate and regular rhythm. Pulses: Normal pulses. Heart sounds: Normal heart sounds. No murmur. Pulmonary:      Effort: Pulmonary effort is normal. No respiratory distress. Breath sounds: Normal breath sounds. Abdominal:      General: Bowel sounds are normal.      Palpations: Abdomen is soft. Tenderness: There is no abdominal tenderness. Musculoskeletal: Normal range of motion. Skin:     General: Skin is warm and dry. Comments: TR band to right wrist   Neurological:      General: No focal deficit present. Mental Status: He is alert and oriented to person, place, and time. Psychiatric:         Mood and Affect: Mood normal.         Behavior: Behavior normal.         Labs:   CBC:   No results for input(s): WBC, HGB, HCT, MCV, PLT in the last 72 hours. BMP:  No results for input(s): NA, K, CL, CO2, PHOS, BUN, CREATININE, MG in the last 72 hours. Invalid input(s): CA  Accucheck Glucoses:  No results for input(s): POCGLU in the last 72 hours. Cardiac Enzymes: No results for input(s): CKTOTAL, CKMB, CKMBINDEX, TROPONINI in the last 72 hours. PT/INR: No results for input(s): PROTIME, INR in the last 72 hours. APTT: No results for input(s): APTT in the last 72 hours.   Liver Profile:  Lab Results   Component Value Date    AST 39 02/19/2021    ALT 37 02/19/2021    BILIDIR 0.15 10/02/2014    BILITOT 0.94 02/19/2021    ALKPHOS 133 02/19/2021     Lab Results   Component Value Date    CHOL 93 02/19/2020    HDL 37 02/19/2020    TRIG 84 02/19/2020     TSH:   Lab Results   Component Value Date    TSH 1.84 03/22/2016     UA:   Lab Results   Component Value Date    COLORU YELLOW 03/27/2017    PHUR 6.0 03/27/2017    SPECGRAV 1.028 03/27/2017    LEUKOCYTESUR NEGATIVE 03/27/2017    UROBILINOGEN Normal 03/27/2017    BILIRUBINUR NEGATIVE 03/27/2017    GLUCOSEU NEGATIVE 03/27/2017         Testing:  Cardiac cath:   1. Multivessel CAD  2. Normal LV systolic function. Recommendations:    Routine Post Diagnostic Cath orders. CTS consult for evaluation for CABG      Signature    ----------------------------------------------------------------   Electronically signed by Archana Ceron(Performing   Physician) on 02/19/2021 11:17   ----------------------------------------------------------------      Angiographic Findings: Cardiac Arteries and Lesion Findings     LMCA: Distal 30% stenosis     LAD: Proximal discrete 70% stenosis, mid segment has long diffuse 60-70% stenosis. D1 and D2 are small with oooupy33% stenosis     LCx: Mild irregularities 10-20%. OM is large with ostial 70% stenosis     RCA: Large, dominant, mid discrete 70% stenosis  RPDA/RPL are patent with mild disease      Coronary Tree Dominance: Right     LV Analysis  LV function assessed as:Normal.  Ejection Fraction  +----------------------------------------------------------------------+---+  ! Method                                                                ! EF%! +----------------------------------------------------------------------+---+  ! LV gram                                                               !60 !  +----------------------------------------------------------------------+---+    Echo:   Normal left ventricular size with normal function. Left ventricular ejection  fraction 55 %. Mild concentric left ventricular hypertrophy. Left atrium is mildly dilated. Right atrium is mildly dilated . No significant valvular regurgitation or stenosis seen.      Signature  ----------------------------------------------------------------------------   Electronically signed by Nicky Lechuga RDCS(Sonographer) on 11/16/2020   09:32 AM  ----------------------------------------------------------------------------     ----------------------------------------------------------------------------   Electronically signed by Bradley Flores(Interpreting physician) on 11/16/2020   09:46 AM  ----------------------------------------------------------------------------  FINDINGS  Left Atrium  Left atrium is mildly dilated. Left Ventricle  Normal left ventricular size with normal hyperdynamic function. Left ventricular ejection fraction 55 %. Mild concentric left ventricular hypertrophy. Right Atrium  Right atrium is mildly dilated . Right Ventricle  Normal right ventricular size and function. Mitral Valve  Normal mitral valve structure and function. Aortic Valve  Normal aortic valve structure and function without stenosis or  regurgitation. Tricuspid Valve  Normal tricuspid valve structure and function. Pulmonic Valve  The pulmonic valve is normal in structure. Trivial pulmonic insufficiency      CT scan: Ordered as outpatient upon discharge    Imaging Studies:  I have personally reviewed the testing/imaging, and agree with the findings listed above. In summary, Mr. Kobe Foreman is a 69y.o. year-old male with multivessel CAD. Problem List:    Multivessel CAD s/p cardiac catheterization   Marginal zone lymphoma  o Follows with Dr. Bao Méndez, oncology   Thrombocytopenia, chronic   Hypertension   Hypercholesterolemia   History of splenectomy   History of cholecystectomy   History of CVA with no residual effects  o Left forearm paresthesias, dull ache intermittently   History of colon cancer s/p colon resection  o 18\" of large intestine removed and some small intestine   History of abnormal LFTs/fatty liver   Overweight, BMI 29.53 kg/m²    Recommendation:  I believe Mr. Kobe Foreman would benefit optimally from CABG.     Patient was provided opportunity to be admitted to cardiology team and undergo preoperative testing as inpatient with surgery on Monday, 2/22/2021. 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 Per discussion with Dr Rigoberto Casas, patient will be scheduled for surgery on 3/3/2021. On this date 02/24/21 I have spent 28 minutes reviewing previous notes, test results and face to face with the patient discussing the diagnosis and importance of compliance with the treatment plan as well as documenting on the day of the visit.       Yuko Mitchell, ELISEO - CNP

## 2021-02-25 ENCOUNTER — HOSPITAL ENCOUNTER (OUTPATIENT)
Dept: PREADMISSION TESTING | Age: 71
Discharge: HOME | End: 2021-03-01
Payer: MEDICARE

## 2021-02-25 ENCOUNTER — OFFICE VISIT (OUTPATIENT)
Dept: CARDIOTHORACIC SURGERY | Age: 71
End: 2021-02-25
Payer: MEDICARE

## 2021-02-25 ENCOUNTER — HOSPITAL ENCOUNTER (OUTPATIENT)
Dept: GENERAL RADIOLOGY | Age: 71
Discharge: HOME | End: 2021-02-27
Payer: MEDICARE

## 2021-02-25 VITALS
TEMPERATURE: 97.9 F | DIASTOLIC BLOOD PRESSURE: 79 MMHG | RESPIRATION RATE: 18 BRPM | OXYGEN SATURATION: 98 % | WEIGHT: 234 LBS | SYSTOLIC BLOOD PRESSURE: 137 MMHG | HEIGHT: 74 IN | BODY MASS INDEX: 30.03 KG/M2 | HEART RATE: 64 BPM

## 2021-02-25 VITALS
RESPIRATION RATE: 18 BRPM | OXYGEN SATURATION: 98 % | DIASTOLIC BLOOD PRESSURE: 86 MMHG | HEIGHT: 74 IN | HEART RATE: 81 BPM | BODY MASS INDEX: 30.03 KG/M2 | WEIGHT: 234 LBS | TEMPERATURE: 97.2 F | SYSTOLIC BLOOD PRESSURE: 163 MMHG

## 2021-02-25 DIAGNOSIS — I25.10 CAD, MULTIPLE VESSEL: Primary | ICD-10-CM

## 2021-02-25 DIAGNOSIS — Z01.811 PRE-OP CHEST EXAM: ICD-10-CM

## 2021-02-25 DIAGNOSIS — I25.10 CAD IN NATIVE ARTERY: ICD-10-CM

## 2021-02-25 LAB
ALBUMIN SERPL-MCNC: 4.1 G/DL (ref 3.5–5.2)
ALBUMIN/GLOBULIN RATIO: 1.1 (ref 1–2.5)
ALLEN TEST: POSITIVE
ALP BLD-CCNC: 140 U/L (ref 40–129)
ALT SERPL-CCNC: 57 U/L (ref 5–41)
ANION GAP SERPL CALCULATED.3IONS-SCNC: 7 MMOL/L (ref 9–17)
AST SERPL-CCNC: 55 U/L
BILIRUB SERPL-MCNC: 0.94 MG/DL (ref 0.3–1.2)
BILIRUBIN URINE: NEGATIVE
BUN BLDV-MCNC: 14 MG/DL (ref 8–23)
BUN/CREAT BLD: ABNORMAL (ref 9–20)
CALCIUM SERPL-MCNC: 9.4 MG/DL (ref 8.6–10.4)
CHLORIDE BLD-SCNC: 100 MMOL/L (ref 98–107)
CO2: 27 MMOL/L (ref 20–31)
COLOR: YELLOW
COMMENT UA: NORMAL
CREAT SERPL-MCNC: 0.93 MG/DL (ref 0.7–1.2)
FIO2: ABNORMAL
GFR AFRICAN AMERICAN: >60 ML/MIN
GFR NON-AFRICAN AMERICAN: >60 ML/MIN
GFR SERPL CREATININE-BSD FRML MDRD: ABNORMAL ML/MIN/{1.73_M2}
GFR SERPL CREATININE-BSD FRML MDRD: ABNORMAL ML/MIN/{1.73_M2}
GLUCOSE BLD-MCNC: 124 MG/DL (ref 70–99)
GLUCOSE URINE: NEGATIVE
INR BLD: 1
KETONES, URINE: NEGATIVE
LEUKOCYTE ESTERASE, URINE: NEGATIVE
MODE: ABNORMAL
NEGATIVE BASE EXCESS, ART: ABNORMAL (ref 0–2)
NITRITE, URINE: NEGATIVE
O2 DEVICE/FLOW/%: ABNORMAL
PARTIAL THROMBOPLASTIN TIME: 24.1 SEC (ref 20.5–30.5)
PATIENT TEMP: ABNORMAL
PH UA: 5 (ref 5–8)
POC HCO3: 23.4 MMOL/L (ref 21–28)
POC O2 SATURATION: 82 % (ref 94–98)
POC PCO2 TEMP: ABNORMAL MM HG
POC PCO2: 34.6 MM HG (ref 35–48)
POC PH TEMP: ABNORMAL
POC PH: 7.44 (ref 7.35–7.45)
POC PO2 TEMP: ABNORMAL MM HG
POC PO2: 44.4 MM HG (ref 83–108)
POSITIVE BASE EXCESS, ART: 0 (ref 0–3)
POTASSIUM SERPL-SCNC: 4.1 MMOL/L (ref 3.7–5.3)
PROTEIN UA: NEGATIVE
PROTHROMBIN TIME: 11.1 SEC (ref 9.1–12.3)
SAMPLE SITE: ABNORMAL
SODIUM BLD-SCNC: 134 MMOL/L (ref 135–144)
SPECIFIC GRAVITY UA: 1.02 (ref 1–1.03)
TCO2 (CALC), ART: 25 MMOL/L (ref 22–29)
TOTAL PROTEIN: 7.7 G/DL (ref 6.4–8.3)
TURBIDITY: CLEAR
URINE HGB: NEGATIVE
UROBILINOGEN, URINE: NORMAL

## 2021-02-25 PROCEDURE — 93005 ELECTROCARDIOGRAM TRACING: CPT | Performed by: THORACIC SURGERY (CARDIOTHORACIC VASCULAR SURGERY)

## 2021-02-25 PROCEDURE — 87641 MR-STAPH DNA AMP PROBE: CPT

## 2021-02-25 PROCEDURE — 80053 COMPREHEN METABOLIC PANEL: CPT

## 2021-02-25 PROCEDURE — 71046 X-RAY EXAM CHEST 2 VIEWS: CPT

## 2021-02-25 PROCEDURE — 99213 OFFICE O/P EST LOW 20 MIN: CPT | Performed by: NURSE PRACTITIONER

## 2021-02-25 PROCEDURE — 87086 URINE CULTURE/COLONY COUNT: CPT

## 2021-02-25 PROCEDURE — 36415 COLL VENOUS BLD VENIPUNCTURE: CPT

## 2021-02-25 PROCEDURE — 86901 BLOOD TYPING SEROLOGIC RH(D): CPT

## 2021-02-25 PROCEDURE — 82803 BLOOD GASES ANY COMBINATION: CPT

## 2021-02-25 PROCEDURE — 81001 URINALYSIS AUTO W/SCOPE: CPT

## 2021-02-25 PROCEDURE — 86850 RBC ANTIBODY SCREEN: CPT

## 2021-02-25 PROCEDURE — 37799 UNLISTED PX VASCULAR SURGERY: CPT

## 2021-02-25 PROCEDURE — 85025 COMPLETE CBC W/AUTO DIFF WBC: CPT

## 2021-02-25 PROCEDURE — 85610 PROTHROMBIN TIME: CPT

## 2021-02-25 PROCEDURE — 85730 THROMBOPLASTIN TIME PARTIAL: CPT

## 2021-02-25 PROCEDURE — 81003 URINALYSIS AUTO W/O SCOPE: CPT

## 2021-02-25 PROCEDURE — 86900 BLOOD TYPING SEROLOGIC ABO: CPT

## 2021-02-25 RX ORDER — SODIUM CHLORIDE, SODIUM LACTATE, POTASSIUM CHLORIDE, CALCIUM CHLORIDE 600; 310; 30; 20 MG/100ML; MG/100ML; MG/100ML; MG/100ML
1000 INJECTION, SOLUTION INTRAVENOUS CONTINUOUS
Status: CANCELLED | OUTPATIENT
Start: 2021-02-25

## 2021-02-25 NOTE — PROGRESS NOTES
Anesthesia Focused Assessment    STOP-BANG Sleep Apnea Questionnaire    SNORE loudly (heard through closed doors)? No  TIRED, fatigued, sleepy during daytime? No  OBSERVED stopping breathing during sleep? No  High blood PRESSURE being treated? Yes    BMI over 35? No  AGE over 48? Yes  NECK circumference over 16\"? No  GENDER (male)? Yes             Total 1  High risk 5-8  Intermediate risk 3-4  Low risk 0-2    Obstructive Sleep Apnea: no  If YES, machine used: no     Type 1 DM:   no  T2DM:  no    Coronary Artery Disease:  yes  Hypertension:  yes    Active smoker:  Quit 13.5 years ago  Drinks Alcohol:  2 drinks per day    Dentition: upper and lower partials    Defib / AICD / Pacemaker: no      Renal Failure/dialysis:  no    Patient was evaluated in PAT & anesthesia guidelines were applied. NPO guidelines, medication instructions and scheduled arrival time were reviewed with patient.     Hx of anesthesia complications:  no  Family hx of anesthesia complications:  no                                                                                                                     Anesthesia contacted:   no  Medical or cardiac clearance ordered: ILEANA Meng  2/25/21  1:32 PM

## 2021-02-25 NOTE — PATIENT INSTRUCTIONS
Patient Education         Bypass Surgery for Coronary Artery Disease (02:12)  Your health professional recommends that you watch this short online health video. Learn what bypass surgery does for your heart and what will happen during surgery. How to watch the video    Scan the QR code   OR Visit the website    [email or web address]   Current as of: December 16, 2019               Content Version: 12.6  © 5811-3247 [employer]. Care instructions adapted under license by [facility] ([facility]). If you have questions about a medical condition or this instruction, always ask your healthcare professional. [place] [address] any warranty or liability for your use of this information.

## 2021-02-26 LAB
-: NORMAL
ABSOLUTE EOS #: 0.16 K/UL (ref 0–0.4)
ABSOLUTE IMMATURE GRANULOCYTE: 0 K/UL (ref 0–0.3)
ABSOLUTE LYMPH #: 11.27 K/UL (ref 1–4.8)
ABSOLUTE MONO #: 1.13 K/UL (ref 0.1–0.8)
AMORPHOUS: NORMAL
BACTERIA: NORMAL
BASOPHILS # BLD: 0 % (ref 0–2)
BASOPHILS ABSOLUTE: 0 K/UL (ref 0–0.2)
CASTS UA: NORMAL /LPF (ref 0–8)
CRYSTALS, UA: NORMAL /HPF
DIFFERENTIAL TYPE: ABNORMAL
EKG ATRIAL RATE: 68 BPM
EKG P AXIS: 14 DEGREES
EKG P-R INTERVAL: 170 MS
EKG Q-T INTERVAL: 408 MS
EKG QRS DURATION: 96 MS
EKG QTC CALCULATION (BAZETT): 433 MS
EKG R AXIS: 36 DEGREES
EKG T AXIS: 32 DEGREES
EKG VENTRICULAR RATE: 68 BPM
EOSINOPHILS RELATIVE PERCENT: 1 % (ref 1–4)
EPITHELIAL CELLS UA: NORMAL /HPF (ref 0–5)
HCT VFR BLD CALC: 43.7 % (ref 40.7–50.3)
HEMOGLOBIN: 14.2 G/DL (ref 13–17)
IMMATURE GRANULOCYTES: 0 %
LYMPHOCYTES # BLD: 70 % (ref 24–44)
MCH RBC QN AUTO: 32.5 PG (ref 25.2–33.5)
MCHC RBC AUTO-ENTMCNC: 32.5 G/DL (ref 28.4–34.8)
MCV RBC AUTO: 100 FL (ref 82.6–102.9)
MONOCYTES # BLD: 7 % (ref 1–7)
MORPHOLOGY: ABNORMAL
MRSA, DNA, NASAL: NORMAL
MUCUS: NORMAL
NRBC AUTOMATED: 0 PER 100 WBC
OTHER OBSERVATIONS UA: NORMAL
PDW BLD-RTO: 15.3 % (ref 11.8–14.4)
PLATELET # BLD: 83 K/UL (ref 138–453)
PLATELET ESTIMATE: ABNORMAL
PMV BLD AUTO: 8.7 FL (ref 8.1–13.5)
RBC # BLD: 4.37 M/UL (ref 4.21–5.77)
RBC # BLD: ABNORMAL 10*6/UL
RBC UA: NORMAL /HPF (ref 0–4)
RENAL EPITHELIAL, UA: NORMAL /HPF
SEG NEUTROPHILS: 22 % (ref 36–66)
SEGMENTED NEUTROPHILS ABSOLUTE COUNT: 3.54 K/UL (ref 1.8–7.7)
SPECIMEN DESCRIPTION: NORMAL
TRICHOMONAS: NORMAL
WBC # BLD: 16.1 K/UL (ref 3.5–11.3)
WBC # BLD: ABNORMAL 10*3/UL
WBC UA: NORMAL /HPF (ref 0–5)
YEAST: NORMAL

## 2021-02-26 PROCEDURE — 93010 ELECTROCARDIOGRAM REPORT: CPT | Performed by: INTERNAL MEDICINE

## 2021-02-26 RX ORDER — SODIUM CHLORIDE 9 MG/ML
INJECTION, SOLUTION INTRAVENOUS PRN
Status: CANCELLED | OUTPATIENT
Start: 2021-02-26

## 2021-02-27 ENCOUNTER — HOSPITAL ENCOUNTER (OUTPATIENT)
Dept: LAB | Age: 71
Setting detail: SPECIMEN
Discharge: HOME | End: 2021-02-27
Payer: MEDICARE

## 2021-02-27 DIAGNOSIS — Z01.818 PREOP TESTING: Primary | ICD-10-CM

## 2021-02-27 LAB
CULTURE: NO GROWTH
Lab: NORMAL
SPECIMEN DESCRIPTION: NORMAL

## 2021-03-01 ENCOUNTER — TELEPHONE (OUTPATIENT)
Dept: CARDIOTHORACIC SURGERY | Age: 71
End: 2021-03-01

## 2021-03-01 ENCOUNTER — HOSPITAL ENCOUNTER (OUTPATIENT)
Age: 71
Discharge: HOME | DRG: 236 | End: 2021-03-01
Payer: MEDICARE

## 2021-03-01 LAB
SARS-COV-2: NORMAL
SARS-COV-2: NOT DETECTED
SOURCE: NORMAL

## 2021-03-01 PROCEDURE — U0005 INFEC AGEN DETEC AMPLI PROBE: HCPCS

## 2021-03-01 PROCEDURE — U0003 INFECTIOUS AGENT DETECTION BY NUCLEIC ACID (DNA OR RNA); SEVERE ACUTE RESPIRATORY SYNDROME CORONAVIRUS 2 (SARS-COV-2) (CORONAVIRUS DISEASE [COVID-19]), AMPLIFIED PROBE TECHNIQUE, MAKING USE OF HIGH THROUGHPUT TECHNOLOGIES AS DESCRIBED BY CMS-2020-01-R: HCPCS

## 2021-03-01 NOTE — TELEPHONE ENCOUNTER
Pt calls stating that he did not get his COVID test this Saturday. Called surgery scheduling and spoke to Izzy she states she will try to see if the mobile unit will come out to him today. I explained to patient that I will inform him once I hear back from Surgery Scheduling if we are able to get a mobile unit sent out to him or not.

## 2021-03-02 ENCOUNTER — HOSPITAL ENCOUNTER (OUTPATIENT)
Dept: CT IMAGING | Age: 71
Discharge: HOME | DRG: 236 | End: 2021-03-04
Payer: MEDICARE

## 2021-03-02 ENCOUNTER — TELEPHONE (OUTPATIENT)
Dept: PRIMARY CARE CLINIC | Age: 71
End: 2021-03-02

## 2021-03-02 ENCOUNTER — HOSPITAL ENCOUNTER (OUTPATIENT)
Dept: VASCULAR LAB | Age: 71
Discharge: HOME | DRG: 236 | End: 2021-03-02
Payer: MEDICARE

## 2021-03-02 ENCOUNTER — ANESTHESIA EVENT (OUTPATIENT)
Dept: OPERATING ROOM | Age: 71
DRG: 236 | End: 2021-03-02
Payer: MEDICARE

## 2021-03-02 DIAGNOSIS — I25.10 CAD, MULTIPLE VESSEL: ICD-10-CM

## 2021-03-02 PROCEDURE — 71250 CT THORAX DX C-: CPT

## 2021-03-02 PROCEDURE — 93931 UPPER EXTREMITY STUDY: CPT

## 2021-03-02 ASSESSMENT — ENCOUNTER SYMPTOMS
ABDOMINAL PAIN: 0
COLOR CHANGE: 0
SHORTNESS OF BREATH: 1

## 2021-03-02 NOTE — H&P
[id number] [facility] Cardiothoracic Surgery  History & Physical  (As previously charted in clinic)      CC: Multivessel CAD    HPI:  Mr. Luma Cummins is a 69 y.o.  male who presents s/p elective cardiac catheterization with Dr. Mildred Vu today at [address]. Patient reports increasing shortness of breath on exertion over the last 6 months, intermittent bilateral lower extremity edema and generalized feeling of fatigue/malaise. Pertinent medical history includes marginal zone lymphoma, chronic thrombocytopenia, chronic leukocytosis, hypertension, hypercholesterolemia, history of splenectomy and cholecystectomy, history of CVA with no residual effects, history of colon cancer s/p colon resection and fatty liver. Cardiac workup has revealed multivessel CAD with preserved EF of 60%. Patient admits to one episode of intermittent chest tightness accompanied by dyspnea on exertion that day which resolved spontaneously without intervention. Denies chest tightness/pain and shortness of breath today. He has been referred for consideration of coronary revascularization. He returns to clinic today to discuss scheduling. PMH:   has a past medical history of AAA (abdominal aortic aneurysm) ([facility] [address] (3/23/2015), CAD (coronary artery disease), Colon polyps, Colon tumor, Congestive heart failure (CHF) ([address] (01/2021), GERD (gastroesophageal reflux disease), H/O chest tube placement, History of pneumonia, History of pneumothorax, cardiac cath (02/19/2021), Hyperlipidemia, Hypertension, Non Hodgkin's lymphoma ([facility] [address] Pneumonia (1970's), Unspecified cerebral artery occlusion with cerebral infarction (10/1/2015), Wellness examination (02/25/2021), Wellness examination (02/25/2021), and Wellness examination (02/25/2021). PSH:   has a past surgical history that includes transesophageal echocardiogram (10/3/14); Colonoscopy; Vasectomy (1986); Cholecystectomy, open (04/21/15); right colectomy (04/21/15);  Appendectomy (4/21/15); Colonoscopy (07/10/2020); Colonoscopy (N/A, 7/10/2020); [place] tooth extraction; and other surgical history (2019). Allergies: Allergies   Allergen Reactions    Iv Dye [Iodides] Hives    Iodinated Diagnostic Agents Hives     Patient developed hives even with steroid prep. Medications:No current facility-administered medications for this encounter. Current Outpatient Medications:     metoprolol succinate (TOPROL XL) 50 MG extended release tablet, TAKE ONE TABLET BY MOUTH DAILY, Disp: 90 tablet, Rfl: 3    hydroCHLOROthiazide (HYDRODIURIL) 25 MG tablet, Take 25 mg by mouth daily , Disp: , Rfl:     Handicap Placard MISC, by Does not apply route  2025, Disp: 1 each, Rfl: 0    atorvastatin (LIPITOR) 10 MG tablet, Take 1 tab po daily, Disp: 90 tablet, Rfl: 3    aspirin 81 MG tablet, Take 81 mg by mouth daily, Disp: , Rfl:     omeprazole (PRILOSEC) 10 MG delayed release capsule, Take 1 capsule by mouth daily as needed (GERD), Disp: 90 capsule, Rfl: 3    Social Hx:    reports that he quit smoking about 12 years ago. His smoking use included cigarettes. He has a 30.00 pack-year smoking history. He has never used smokeless tobacco.    Family Hx:  family history includes Alcohol Abuse in his father; Cancer in his father; Heart Disease in his mother; Stroke in his mother. ROS:    Review of Systems   Constitutional: Positive for fatigue. Negative for chills and fever. HENT: Negative for congestion. Eyes: Negative for visual disturbance. Respiratory: Positive for shortness of breath. Dyspnea on exertion intermittently, resolves with rest   Cardiovascular: Positive for leg swelling. Negative for chest pain. Intermittent edema to bilateral lower extremities, mostly feet   Gastrointestinal: Negative for abdominal pain. Genitourinary: Negative for dysuria. Musculoskeletal: Negative for gait problem. Skin: Negative for color change.    Neurological: Negative for dizziness and weakness. Psychiatric/Behavioral: Negative for self-injury. The patient is not nervous/anxious. Physical Examination    Vitals: There were no vitals filed for this visit. Physical Exam  Vitals signs reviewed. Constitutional:       General: He is not in acute distress. Appearance: Normal appearance. He is not ill-appearing. HENT:      Nose: Nose normal.      Mouth/Throat:      Mouth: Mucous membranes are moist.      Pharynx: Oropharynx is clear. Eyes:      Conjunctiva/sclera: Conjunctivae normal.      Pupils: Pupils are equal, round, and reactive to light. Neck:      Musculoskeletal: Normal range of motion. Cardiovascular:      Rate and Rhythm: Normal rate and regular rhythm. Pulses: Normal pulses. Heart sounds: Normal heart sounds. No murmur. Pulmonary:      Effort: Pulmonary effort is normal. No respiratory distress. Breath sounds: Normal breath sounds. Abdominal:      General: Bowel sounds are normal.      Palpations: Abdomen is soft. Tenderness: There is no abdominal tenderness. Musculoskeletal: Normal range of motion. Skin:     General: Skin is warm and dry. Neurological:      General: No focal deficit present. Mental Status: He is alert and oriented to person, place, and time. Psychiatric:         Mood and Affect: Mood normal.         Behavior: Behavior normal.         Labs:   CBC:   No results for input(s): WBC, HGB, HCT, MCV, PLT in the last 72 hours. BMP:  No results for input(s): NA, K, CL, CO2, PHOS, BUN, CREATININE, MG in the last 72 hours. Invalid input(s): CA  Accucheck Glucoses:  No results for input(s): POCGLU in the last 72 hours. Cardiac Enzymes: No results for input(s): CKTOTAL, CKMB, CKMBINDEX, TROPONINI in the last 72 hours. PT/INR: No results for input(s): PROTIME, INR in the last 72 hours. APTT: No results for input(s): APTT in the last 72 hours.   Liver Profile:  Lab Results   Component Value Date    AST 55 02/25/2021    ALT 57 02/25/2021    BILIDIR 0.15 10/02/2014    BILITOT 0.94 02/25/2021    ALKPHOS 140 02/25/2021     Lab Results   Component Value Date    CHOL 93 02/19/2020    HDL 37 02/19/2020    TRIG 84 02/19/2020     TSH:   Lab Results   Component Value Date    TSH 1.84 03/22/2016     UA:   Lab Results   Component Value Date    COLORU YELLOW 02/25/2021    PHUR 5.0 02/25/2021    WBCUA None 02/25/2021    RBCUA None 02/25/2021    MUCUS NOT REPORTED 02/25/2021    TRICHOMONAS NOT REPORTED 02/25/2021    YEAST NOT REPORTED 02/25/2021    BACTERIA NOT REPORTED 02/25/2021    SPECGRAV 1.023 02/25/2021    LEUKOCYTESUR NEGATIVE 02/25/2021    UROBILINOGEN Normal 02/25/2021    BILIRUBINUR NEGATIVE 02/25/2021    GLUCOSEU NEGATIVE 02/25/2021    AMORPHOUS NOT REPORTED 02/25/2021         Testing:  Cardiac cath:   1. Multivessel CAD  2. Normal LV systolic function. Recommendations:    Routine Post Diagnostic Cath orders. CTS consult for evaluation for CABG      Signature    ----------------------------------------------------------------   Electronically signed by Archana Ceron(Performing   Physician) on 02/19/2021 11:17   ----------------------------------------------------------------      Angiographic Findings: Cardiac Arteries and Lesion Findings     LMCA: Distal 30% stenosis     LAD: Proximal discrete 70% stenosis, mid segment has long diffuse 60-70% stenosis. D1 and D2 are small with ctsivp05% stenosis     LCx: Mild irregularities 10-20%. OM is large with ostial 70% stenosis     RCA: Large, dominant, mid discrete 70% stenosis  RPDA/RPL are patent with mild disease      Coronary Tree Dominance: Right     LV Analysis  LV function assessed as:Normal.  Ejection Fraction  +----------------------------------------------------------------------+---+  ! Method                                                                ! EF%! +----------------------------------------------------------------------+---+  ! LV gram !61 !  +----------------------------------------------------------------------+---+    Echo:   Normal left ventricular size with normal function. Left ventricular ejection  fraction 55 %. Mild concentric left ventricular hypertrophy. Left atrium is mildly dilated. Right atrium is mildly dilated . No significant valvular regurgitation or stenosis seen. Signature  ----------------------------------------------------------------------------   Electronically signed by Kalie Kramer RDCS(Sonographer) on 11/16/2020   09:32 AM  ----------------------------------------------------------------------------     ----------------------------------------------------------------------------   Electronically signed by Bradley Flores(Interpreting physician) on 11/16/2020   09:46 AM  ----------------------------------------------------------------------------  FINDINGS  Left Atrium  Left atrium is mildly dilated. Left Ventricle  Normal left ventricular size with normal hyperdynamic function. Left ventricular ejection fraction 55 %. Mild concentric left ventricular hypertrophy. Right Atrium  Right atrium is mildly dilated . Right Ventricle  Normal right ventricular size and function. Mitral Valve  Normal mitral valve structure and function. Aortic Valve  Normal aortic valve structure and function without stenosis or  regurgitation. Tricuspid Valve  Normal tricuspid valve structure and function. Pulmonic Valve  The pulmonic valve is normal in structure. Trivial pulmonic insufficiency      CT scan: Ordered as outpatient upon discharge    Imaging Studies:  I have personally reviewed the testing/imaging, and agree with the findings listed above. In summary, Mr. Rashard Reza is a 69y.o. year-old male with multivessel CAD.     Problem List:    Multivessel CAD s/p cardiac catheterization   Marginal zone lymphoma  o Follows with Dr. Collette Sahu, oncology   Thrombocytopenia, chronic   Hypertension   Hypercholesterolemia   History of splenectomy   History of cholecystectomy   History of CVA with no residual effects  o Left forearm paresthesias, dull ache intermittently   History of colon cancer s/p colon resection  o 18\" of large intestine removed and some small intestine   History of abnormal LFTs/fatty liver   Overweight, BMI 29.53 kg/m²    Recommendation:  I believe Mr. Phoenix Zaman would benefit optimally from CABG. Patient was provided opportunity to be admitted to cardiology team and undergo preoperative testing as inpatient with surgery on Monday, 2/22/2021. 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 Per discussion with Dr Farzad Aponte, patient will be scheduled for surgery on 3/3/2021.        Katie Norwood, ELISEO - CNP

## 2021-03-03 ENCOUNTER — APPOINTMENT (OUTPATIENT)
Dept: GENERAL RADIOLOGY | Age: 71
DRG: 236 | End: 2021-03-03
Attending: THORACIC SURGERY (CARDIOTHORACIC VASCULAR SURGERY)
Payer: MEDICARE

## 2021-03-03 ENCOUNTER — HOSPITAL ENCOUNTER (INPATIENT)
Age: 71
LOS: 6 days | Discharge: HOME | DRG: 236 | End: 2021-03-09
Attending: THORACIC SURGERY (CARDIOTHORACIC VASCULAR SURGERY) | Admitting: THORACIC SURGERY (CARDIOTHORACIC VASCULAR SURGERY)
Payer: MEDICARE

## 2021-03-03 ENCOUNTER — ANESTHESIA (OUTPATIENT)
Dept: OPERATING ROOM | Age: 71
DRG: 236 | End: 2021-03-03
Payer: MEDICARE

## 2021-03-03 VITALS — DIASTOLIC BLOOD PRESSURE: 78 MMHG | SYSTOLIC BLOOD PRESSURE: 131 MMHG | TEMPERATURE: 96.1 F | OXYGEN SATURATION: 100 %

## 2021-03-03 DIAGNOSIS — I10 ESSENTIAL HYPERTENSION: ICD-10-CM

## 2021-03-03 DIAGNOSIS — I25.10 CAD, MULTIPLE VESSEL: Primary | ICD-10-CM

## 2021-03-03 DIAGNOSIS — Z95.1 S/P CABG X 3: ICD-10-CM

## 2021-03-03 LAB
ABSOLUTE EOS #: 0.15 K/UL (ref 0–0.4)
ABSOLUTE IMMATURE GRANULOCYTE: 0 K/UL (ref 0–0.3)
ABSOLUTE LYMPH #: 10.95 K/UL (ref 1–4.8)
ABSOLUTE MONO #: 0.44 K/UL (ref 0.1–0.8)
ALLEN TEST: ABNORMAL
ANION GAP SERPL CALCULATED.3IONS-SCNC: 11 MMOL/L (ref 9–17)
ANION GAP: 10 MMOL/L (ref 7–16)
BASOPHILS # BLD: 0 % (ref 0–2)
BASOPHILS ABSOLUTE: 0 K/UL (ref 0–0.2)
BUN BLDV-MCNC: 15 MG/DL (ref 8–23)
BUN/CREAT BLD: ABNORMAL (ref 9–20)
CALCIUM IONIZED: 1.17 MMOL/L (ref 1.13–1.33)
CALCIUM SERPL-MCNC: 8.4 MG/DL (ref 8.6–10.4)
CHLORIDE BLD-SCNC: 106 MMOL/L (ref 98–107)
CO2: 20 MMOL/L (ref 20–31)
CREAT SERPL-MCNC: 0.81 MG/DL (ref 0.7–1.2)
DIFFERENTIAL TYPE: ABNORMAL
EOSINOPHILS RELATIVE PERCENT: 1 % (ref 1–4)
FIBRINOGEN: 136 MG/DL (ref 140–420)
FIBRINOGEN: 157 MG/DL (ref 140–420)
FIO2: 100
FIO2: 36
FIO2: 80
FIO2: ABNORMAL
GFR AFRICAN AMERICAN: >60 ML/MIN
GFR NON-AFRICAN AMERICAN: >60 ML/MIN
GFR NON-AFRICAN AMERICAN: >60 ML/MIN
GFR SERPL CREATININE-BSD FRML MDRD: >60 ML/MIN
GFR SERPL CREATININE-BSD FRML MDRD: ABNORMAL ML/MIN/{1.73_M2}
GFR SERPL CREATININE-BSD FRML MDRD: ABNORMAL ML/MIN/{1.73_M2}
GFR SERPL CREATININE-BSD FRML MDRD: NORMAL ML/MIN/{1.73_M2}
GLUCOSE BLD-MCNC: 108 MG/DL (ref 74–100)
GLUCOSE BLD-MCNC: 118 MG/DL (ref 74–100)
GLUCOSE BLD-MCNC: 123 MG/DL (ref 74–100)
GLUCOSE BLD-MCNC: 125 MG/DL (ref 74–100)
GLUCOSE BLD-MCNC: 132 MG/DL (ref 75–110)
GLUCOSE BLD-MCNC: 141 MG/DL (ref 74–100)
GLUCOSE BLD-MCNC: 164 MG/DL (ref 74–100)
GLUCOSE BLD-MCNC: 187 MG/DL (ref 74–100)
GLUCOSE BLD-MCNC: 81 MG/DL (ref 74–100)
GLUCOSE BLD-MCNC: 87 MG/DL (ref 70–99)
GLUCOSE BLD-MCNC: 99 MG/DL (ref 74–100)
HCT VFR BLD CALC: 32 % (ref 40.7–50.3)
HCT VFR BLD CALC: 39 % (ref 40.7–50.3)
HCT VFR BLD CALC: 41.3 % (ref 40.7–50.3)
HEMOGLOBIN: 10.4 G/DL (ref 13–17)
HEMOGLOBIN: 12.4 G/DL (ref 13–17)
HEMOGLOBIN: 13.5 G/DL (ref 13–17)
IMMATURE GRANULOCYTES: 0 %
INR BLD: 1.2
INR BLD: 1.3
LYMPHOCYTES # BLD: 74 % (ref 24–44)
MAGNESIUM: 2.8 MG/DL (ref 1.6–2.6)
MCH RBC QN AUTO: 32.2 PG (ref 25.2–33.5)
MCH RBC QN AUTO: 32.7 PG (ref 25.2–33.5)
MCHC RBC AUTO-ENTMCNC: 31.8 G/DL (ref 28.4–34.8)
MCHC RBC AUTO-ENTMCNC: 32.7 G/DL (ref 28.4–34.8)
MCV RBC AUTO: 100 FL (ref 82.6–102.9)
MCV RBC AUTO: 101.3 FL (ref 82.6–102.9)
MODE: ABNORMAL
MONOCYTES # BLD: 3 % (ref 1–7)
MORPHOLOGY: ABNORMAL
NEGATIVE BASE EXCESS, ART: 2 (ref 0–2)
NEGATIVE BASE EXCESS, ART: 3 (ref 0–2)
NEGATIVE BASE EXCESS, ART: ABNORMAL (ref 0–2)
NRBC AUTOMATED: 0 PER 100 WBC
NRBC AUTOMATED: 0.1 PER 100 WBC
O2 DEVICE/FLOW/%: ABNORMAL
PARTIAL THROMBOPLASTIN TIME: 28 SEC (ref 20.5–30.5)
PARTIAL THROMBOPLASTIN TIME: 37.1 SEC (ref 20.5–30.5)
PATIENT TEMP: ABNORMAL
PDW BLD-RTO: 15.5 % (ref 11.8–14.4)
PDW BLD-RTO: 15.6 % (ref 11.8–14.4)
PLATELET # BLD: 67 K/UL (ref 138–453)
PLATELET # BLD: 88 K/UL (ref 138–453)
PLATELET # BLD: 88 K/UL (ref 138–453)
PLATELET # BLD: 90 K/UL (ref 138–453)
PLATELET ESTIMATE: ABNORMAL
PMV BLD AUTO: 8.9 FL (ref 8.1–13.5)
PMV BLD AUTO: 9.1 FL (ref 8.1–13.5)
POC CHLORIDE: 107 MMOL/L (ref 98–107)
POC CREATININE: 0.96 MG/DL (ref 0.51–1.19)
POC HCO3: 22.2 MMOL/L (ref 21–28)
POC HCO3: 22.4 MMOL/L (ref 21–28)
POC HCO3: 22.7 MMOL/L (ref 21–28)
POC HCO3: 24.1 MMOL/L (ref 21–28)
POC HCO3: 24.2 MMOL/L (ref 21–28)
POC HCO3: 24.9 MMOL/L (ref 21–28)
POC HCO3: 26 MMOL/L (ref 21–28)
POC HCO3: 27.7 MMOL/L (ref 21–28)
POC HCO3: 27.8 MMOL/L (ref 21–28)
POC HCO3: 27.9 MMOL/L (ref 21–28)
POC HEMATOCRIT: 32 % (ref 41–53)
POC HEMATOCRIT: 32 % (ref 41–53)
POC HEMATOCRIT: 34 % (ref 41–53)
POC HEMATOCRIT: 34 % (ref 41–53)
POC HEMATOCRIT: 37 % (ref 41–53)
POC HEMATOCRIT: 38 % (ref 41–53)
POC HEMATOCRIT: 42 % (ref 41–53)
POC HEMOGLOBIN: 10.8 G/DL (ref 13.5–17.5)
POC HEMOGLOBIN: 10.9 G/DL (ref 13.5–17.5)
POC HEMOGLOBIN: 11.5 G/DL (ref 13.5–17.5)
POC HEMOGLOBIN: 11.6 G/DL (ref 13.5–17.5)
POC HEMOGLOBIN: 12.6 G/DL (ref 13.5–17.5)
POC HEMOGLOBIN: 13 G/DL (ref 13.5–17.5)
POC HEMOGLOBIN: 14.2 G/DL (ref 13.5–17.5)
POC IONIZED CALCIUM: 1.1 MMOL/L (ref 1.15–1.33)
POC IONIZED CALCIUM: 1.11 MMOL/L (ref 1.15–1.33)
POC IONIZED CALCIUM: 1.13 MMOL/L (ref 1.15–1.33)
POC IONIZED CALCIUM: 1.14 MMOL/L (ref 1.15–1.33)
POC IONIZED CALCIUM: 1.16 MMOL/L (ref 1.15–1.33)
POC IONIZED CALCIUM: 1.18 MMOL/L (ref 1.15–1.33)
POC IONIZED CALCIUM: 1.24 MMOL/L (ref 1.15–1.33)
POC LACTIC ACID: 2.18 MMOL/L (ref 0.56–1.39)
POC O2 SATURATION: 100 % (ref 94–98)
POC O2 SATURATION: 97 % (ref 94–98)
POC O2 SATURATION: 99 % (ref 94–98)
POC O2 SATURATION: 99 % (ref 94–98)
POC PCO2 TEMP: ABNORMAL MM HG
POC PCO2: 38.3 MM HG (ref 35–48)
POC PCO2: 40.5 MM HG (ref 35–48)
POC PCO2: 40.6 MM HG (ref 35–48)
POC PCO2: 41.2 MM HG (ref 35–48)
POC PCO2: 45.2 MM HG (ref 35–48)
POC PCO2: 48.8 MM HG (ref 35–48)
POC PCO2: 49.5 MM HG (ref 35–48)
POC PCO2: 53.5 MM HG (ref 35–48)
POC PCO2: 53.7 MM HG (ref 35–48)
POC PCO2: 58.2 MM HG (ref 35–48)
POC PH TEMP: ABNORMAL
POC PH: 7.26 (ref 7.35–7.45)
POC PH: 7.29 (ref 7.35–7.45)
POC PH: 7.3 (ref 7.35–7.45)
POC PH: 7.32 (ref 7.35–7.45)
POC PH: 7.35 (ref 7.35–7.45)
POC PH: 7.36 (ref 7.35–7.45)
POC PH: 7.36 (ref 7.35–7.45)
POC PH: 7.37 (ref 7.35–7.45)
POC PH: 7.38 (ref 7.35–7.45)
POC PH: 7.39 (ref 7.35–7.45)
POC PO2 TEMP: ABNORMAL MM HG
POC PO2: 136.1 MM HG (ref 83–108)
POC PO2: 145 MM HG (ref 83–108)
POC PO2: 291.1 MM HG (ref 83–108)
POC PO2: 337.4 MM HG (ref 83–108)
POC PO2: 403.9 MM HG (ref 83–108)
POC PO2: 483.8 MM HG (ref 83–108)
POC PO2: 503.3 MM HG (ref 83–108)
POC PO2: 504.8 MM HG (ref 83–108)
POC PO2: 514.6 MM HG (ref 83–108)
POC PO2: 90.8 MM HG (ref 83–108)
POC POTASSIUM: 3.5 MMOL/L (ref 3.5–4.5)
POC POTASSIUM: 3.9 MMOL/L (ref 3.5–4.5)
POC POTASSIUM: 4 MMOL/L (ref 3.5–4.5)
POC POTASSIUM: 4.2 MMOL/L (ref 3.5–4.5)
POC POTASSIUM: 4.7 MMOL/L (ref 3.5–4.5)
POC POTASSIUM: 4.8 MMOL/L (ref 3.5–4.5)
POC POTASSIUM: 5 MMOL/L (ref 3.5–4.5)
POC SODIUM: 140 MMOL/L (ref 138–146)
POC SODIUM: 141 MMOL/L (ref 138–146)
POC SODIUM: 141 MMOL/L (ref 138–146)
POC SODIUM: 142 MMOL/L (ref 138–146)
POSITIVE BASE EXCESS, ART: 0 (ref 0–3)
POSITIVE BASE EXCESS, ART: 1 (ref 0–3)
POSITIVE BASE EXCESS, ART: 2 (ref 0–3)
POSITIVE BASE EXCESS, ART: ABNORMAL (ref 0–3)
POTASSIUM SERPL-SCNC: 4.3 MMOL/L (ref 3.7–5.3)
POTASSIUM SERPL-SCNC: 4.5 MMOL/L (ref 3.7–5.3)
PROTHROMBIN TIME: 12.7 SEC (ref 9.1–12.3)
PROTHROMBIN TIME: 13.2 SEC (ref 9.1–12.3)
RBC # BLD: 3.85 M/UL (ref 4.21–5.77)
RBC # BLD: 4.13 M/UL (ref 4.21–5.77)
RBC # BLD: ABNORMAL 10*6/UL
SAMPLE SITE: ABNORMAL
SEG NEUTROPHILS: 22 % (ref 36–66)
SEGMENTED NEUTROPHILS ABSOLUTE COUNT: 3.26 K/UL (ref 1.8–7.7)
SODIUM BLD-SCNC: 137 MMOL/L (ref 135–144)
TCO2 (CALC), ART: 24 MMOL/L (ref 22–29)
TCO2 (CALC), ART: 26 MMOL/L (ref 22–29)
TCO2 (CALC), ART: 27 MMOL/L (ref 22–29)
TCO2 (CALC), ART: 29 MMOL/L (ref 22–29)
TCO2 (CALC), ART: 29 MMOL/L (ref 22–29)
TCO2 (CALC), ART: 30 MMOL/L (ref 22–29)
WBC # BLD: 14.8 K/UL (ref 3.5–11.3)
WBC # BLD: 19.8 K/UL (ref 3.5–11.3)
WBC # BLD: ABNORMAL 10*3/UL

## 2021-03-03 PROCEDURE — P9037 PLATE PHERES LEUKOREDU IRRAD: HCPCS

## 2021-03-03 PROCEDURE — 37799 UNLISTED PX VASCULAR SURGERY: CPT

## 2021-03-03 PROCEDURE — 5A1221Z PERFORMANCE OF CARDIAC OUTPUT, CONTINUOUS: ICD-10-PCS | Performed by: THORACIC SURGERY (CARDIOTHORACIC VASCULAR SURGERY)

## 2021-03-03 PROCEDURE — 87086 URINE CULTURE/COLONY COUNT: CPT

## 2021-03-03 PROCEDURE — 2500000003 HC RX 250 WO HCPCS: Performed by: NURSE PRACTITIONER

## 2021-03-03 PROCEDURE — 82330 ASSAY OF CALCIUM: CPT

## 2021-03-03 PROCEDURE — 82947 ASSAY GLUCOSE BLOOD QUANT: CPT

## 2021-03-03 PROCEDURE — 85730 THROMBOPLASTIN TIME PARTIAL: CPT

## 2021-03-03 PROCEDURE — 71045 X-RAY EXAM CHEST 1 VIEW: CPT

## 2021-03-03 PROCEDURE — 82435 ASSAY OF BLOOD CHLORIDE: CPT

## 2021-03-03 PROCEDURE — 85018 HEMOGLOBIN: CPT

## 2021-03-03 PROCEDURE — P9041 ALBUMIN (HUMAN),5%, 50ML: HCPCS | Performed by: NURSE PRACTITIONER

## 2021-03-03 PROCEDURE — C1729 CATH, DRAINAGE: HCPCS | Performed by: THORACIC SURGERY (CARDIOTHORACIC VASCULAR SURGERY)

## 2021-03-03 PROCEDURE — 3700000000 HC ANESTHESIA ATTENDED CARE: Performed by: THORACIC SURGERY (CARDIOTHORACIC VASCULAR SURGERY)

## 2021-03-03 PROCEDURE — 2580000003 HC RX 258: Performed by: NURSE PRACTITIONER

## 2021-03-03 PROCEDURE — 85347 COAGULATION TIME ACTIVATED: CPT

## 2021-03-03 PROCEDURE — 2500000003 HC RX 250 WO HCPCS: Performed by: NURSE ANESTHETIST, CERTIFIED REGISTERED

## 2021-03-03 PROCEDURE — 85027 COMPLETE CBC AUTOMATED: CPT

## 2021-03-03 PROCEDURE — 84295 ASSAY OF SERUM SODIUM: CPT

## 2021-03-03 PROCEDURE — 2709999900 HC NON-CHARGEABLE SUPPLY: Performed by: THORACIC SURGERY (CARDIOTHORACIC VASCULAR SURGERY)

## 2021-03-03 PROCEDURE — 2720000010 HC SURG SUPPLY STERILE: Performed by: THORACIC SURGERY (CARDIOTHORACIC VASCULAR SURGERY)

## 2021-03-03 PROCEDURE — 7100000001 HC PACU RECOVERY - ADDTL 15 MIN

## 2021-03-03 PROCEDURE — 85384 FIBRINOGEN ACTIVITY: CPT

## 2021-03-03 PROCEDURE — 7100000000 HC PACU RECOVERY - FIRST 15 MIN

## 2021-03-03 PROCEDURE — 94761 N-INVAS EAR/PLS OXIMETRY MLT: CPT

## 2021-03-03 PROCEDURE — 83605 ASSAY OF LACTIC ACID: CPT

## 2021-03-03 PROCEDURE — 6370000000 HC RX 637 (ALT 250 FOR IP): Performed by: THORACIC SURGERY (CARDIOTHORACIC VASCULAR SURGERY)

## 2021-03-03 PROCEDURE — 3600000018 HC SURGERY OHS ADDTL 15MIN: Performed by: THORACIC SURGERY (CARDIOTHORACIC VASCULAR SURGERY)

## 2021-03-03 PROCEDURE — 85390 FIBRINOLYSINS SCREEN I&R: CPT

## 2021-03-03 PROCEDURE — B24BZZ4 ULTRASONOGRAPHY OF HEART WITH AORTA, TRANSESOPHAGEAL: ICD-10-PCS | Performed by: THORACIC SURGERY (CARDIOTHORACIC VASCULAR SURGERY)

## 2021-03-03 PROCEDURE — 2580000003 HC RX 258: Performed by: PHYSICIAN ASSISTANT

## 2021-03-03 PROCEDURE — 6360000002 HC RX W HCPCS: Performed by: NURSE PRACTITIONER

## 2021-03-03 PROCEDURE — 94002 VENT MGMT INPAT INIT DAY: CPT

## 2021-03-03 PROCEDURE — 36430 TRANSFUSION BLD/BLD COMPNT: CPT

## 2021-03-03 PROCEDURE — 3700000001 HC ADD 15 MINUTES (ANESTHESIA): Performed by: THORACIC SURGERY (CARDIOTHORACIC VASCULAR SURGERY)

## 2021-03-03 PROCEDURE — 06BP4ZZ EXCISION OF RIGHT SAPHENOUS VEIN, PERCUTANEOUS ENDOSCOPIC APPROACH: ICD-10-PCS | Performed by: THORACIC SURGERY (CARDIOTHORACIC VASCULAR SURGERY)

## 2021-03-03 PROCEDURE — 02100Z9 BYPASS CORONARY ARTERY, ONE ARTERY FROM LEFT INTERNAL MAMMARY, OPEN APPROACH: ICD-10-PCS | Performed by: THORACIC SURGERY (CARDIOTHORACIC VASCULAR SURGERY)

## 2021-03-03 PROCEDURE — 2580000003 HC RX 258: Performed by: THORACIC SURGERY (CARDIOTHORACIC VASCULAR SURGERY)

## 2021-03-03 PROCEDURE — 85610 PROTHROMBIN TIME: CPT

## 2021-03-03 PROCEDURE — 6360000002 HC RX W HCPCS: Performed by: THORACIC SURGERY (CARDIOTHORACIC VASCULAR SURGERY)

## 2021-03-03 PROCEDURE — 6360000002 HC RX W HCPCS: Performed by: PHYSICIAN ASSISTANT

## 2021-03-03 PROCEDURE — 021109W BYPASS CORONARY ARTERY, TWO ARTERIES FROM AORTA WITH AUTOLOGOUS VENOUS TISSUE, OPEN APPROACH: ICD-10-PCS | Performed by: THORACIC SURGERY (CARDIOTHORACIC VASCULAR SURGERY)

## 2021-03-03 PROCEDURE — P9012 CRYOPRECIPITATE EACH UNIT: HCPCS

## 2021-03-03 PROCEDURE — 6370000000 HC RX 637 (ALT 250 FOR IP): Performed by: NURSE PRACTITIONER

## 2021-03-03 PROCEDURE — 2100000001 HC CVICU R&B

## 2021-03-03 PROCEDURE — 85049 AUTOMATED PLATELET COUNT: CPT

## 2021-03-03 PROCEDURE — 85576 BLOOD PLATELET AGGREGATION: CPT

## 2021-03-03 PROCEDURE — C9113 INJ PANTOPRAZOLE SODIUM, VIA: HCPCS | Performed by: NURSE PRACTITIONER

## 2021-03-03 PROCEDURE — 83735 ASSAY OF MAGNESIUM: CPT

## 2021-03-03 PROCEDURE — 3600000008 HC SURGERY OHS BASE: Performed by: THORACIC SURGERY (CARDIOTHORACIC VASCULAR SURGERY)

## 2021-03-03 PROCEDURE — 2580000003 HC RX 258: Performed by: NURSE ANESTHETIST, CERTIFIED REGISTERED

## 2021-03-03 PROCEDURE — 36415 COLL VENOUS BLD VENIPUNCTURE: CPT

## 2021-03-03 PROCEDURE — 80048 BASIC METABOLIC PNL TOTAL CA: CPT

## 2021-03-03 PROCEDURE — 86927 PLASMA FRESH FROZEN: CPT

## 2021-03-03 PROCEDURE — 2580000003 HC RX 258: Performed by: ANESTHESIOLOGY

## 2021-03-03 PROCEDURE — 85025 COMPLETE CBC W/AUTO DIFF WBC: CPT

## 2021-03-03 PROCEDURE — 85014 HEMATOCRIT: CPT

## 2021-03-03 PROCEDURE — 82565 ASSAY OF CREATININE: CPT

## 2021-03-03 PROCEDURE — 2700000000 HC OXYGEN THERAPY PER DAY

## 2021-03-03 PROCEDURE — 6370000000 HC RX 637 (ALT 250 FOR IP): Performed by: PHYSICIAN ASSISTANT

## 2021-03-03 PROCEDURE — 6360000002 HC RX W HCPCS: Performed by: NURSE ANESTHETIST, CERTIFIED REGISTERED

## 2021-03-03 PROCEDURE — 84132 ASSAY OF SERUM POTASSIUM: CPT

## 2021-03-03 PROCEDURE — 82803 BLOOD GASES ANY COMBINATION: CPT

## 2021-03-03 RX ORDER — ATORVASTATIN CALCIUM 20 MG/1
20 TABLET, FILM COATED ORAL NIGHTLY
Status: DISCONTINUED | OUTPATIENT
Start: 2021-03-04 | End: 2021-03-09 | Stop reason: HOSPADM

## 2021-03-03 RX ORDER — OXYCODONE HYDROCHLORIDE AND ACETAMINOPHEN 5; 325 MG/1; MG/1
1 TABLET ORAL EVERY 4 HOURS PRN
Status: DISCONTINUED | OUTPATIENT
Start: 2021-03-03 | End: 2021-03-08

## 2021-03-03 RX ORDER — DEXTROSE MONOHYDRATE 25 G/50ML
12.5 INJECTION, SOLUTION INTRAVENOUS PRN
Status: DISCONTINUED | OUTPATIENT
Start: 2021-03-03 | End: 2021-03-09 | Stop reason: HOSPADM

## 2021-03-03 RX ORDER — SODIUM CHLORIDE 0.9 % (FLUSH) 0.9 %
10 SYRINGE (ML) INJECTION PRN
Status: DISCONTINUED | OUTPATIENT
Start: 2021-03-03 | End: 2021-03-05

## 2021-03-03 RX ORDER — PANTOPRAZOLE SODIUM 40 MG/10ML
40 INJECTION, POWDER, LYOPHILIZED, FOR SOLUTION INTRAVENOUS DAILY
Status: DISCONTINUED | OUTPATIENT
Start: 2021-03-03 | End: 2021-03-08 | Stop reason: SDUPTHER

## 2021-03-03 RX ORDER — PROPOFOL 10 MG/ML
5-50 INJECTION, EMULSION INTRAVENOUS
Status: DISCONTINUED | OUTPATIENT
Start: 2021-03-03 | End: 2021-03-05

## 2021-03-03 RX ORDER — PANTOPRAZOLE SODIUM 40 MG/1
40 TABLET, DELAYED RELEASE ORAL DAILY
Status: DISCONTINUED | OUTPATIENT
Start: 2021-03-03 | End: 2021-03-09 | Stop reason: HOSPADM

## 2021-03-03 RX ORDER — IPRATROPIUM BROMIDE AND ALBUTEROL SULFATE 2.5; .5 MG/3ML; MG/3ML
1 SOLUTION RESPIRATORY (INHALATION)
Status: DISCONTINUED | OUTPATIENT
Start: 2021-03-03 | End: 2021-03-09 | Stop reason: HOSPADM

## 2021-03-03 RX ORDER — NOREPINEPHRINE BIT/0.9 % NACL 16MG/250ML
0.2 INFUSION BOTTLE (ML) INTRAVENOUS CONTINUOUS PRN
Status: DISCONTINUED | OUTPATIENT
Start: 2021-03-03 | End: 2021-03-09 | Stop reason: HOSPADM

## 2021-03-03 RX ORDER — SODIUM CHLORIDE 9 MG/ML
INJECTION, SOLUTION INTRAVENOUS PRN
Status: COMPLETED | OUTPATIENT
Start: 2021-03-03 | End: 2021-03-03

## 2021-03-03 RX ORDER — PAPAVERINE HYDROCHLORIDE 30 MG/ML
INJECTION INTRAMUSCULAR; INTRAVENOUS
Status: DISPENSED
Start: 2021-03-03 | End: 2021-03-03

## 2021-03-03 RX ORDER — CHLORHEXIDINE GLUCONATE 0.12 MG/ML
15 RINSE ORAL ONCE
Status: COMPLETED | OUTPATIENT
Start: 2021-03-03 | End: 2021-03-03

## 2021-03-03 RX ORDER — SODIUM CHLORIDE, SODIUM LACTATE, POTASSIUM CHLORIDE, CALCIUM CHLORIDE 600; 310; 30; 20 MG/100ML; MG/100ML; MG/100ML; MG/100ML
1000 INJECTION, SOLUTION INTRAVENOUS CONTINUOUS
Status: DISCONTINUED | OUTPATIENT
Start: 2021-03-03 | End: 2021-03-05

## 2021-03-03 RX ORDER — SODIUM CHLORIDE 0.9 % (FLUSH) 0.9 %
10 SYRINGE (ML) INJECTION PRN
Status: DISCONTINUED | OUTPATIENT
Start: 2021-03-03 | End: 2021-03-09 | Stop reason: HOSPADM

## 2021-03-03 RX ORDER — OXYCODONE HYDROCHLORIDE AND ACETAMINOPHEN 5; 325 MG/1; MG/1
2 TABLET ORAL EVERY 4 HOURS PRN
Status: DISCONTINUED | OUTPATIENT
Start: 2021-03-03 | End: 2021-03-08

## 2021-03-03 RX ORDER — ONDANSETRON 2 MG/ML
4 INJECTION INTRAMUSCULAR; INTRAVENOUS EVERY 8 HOURS PRN
Status: DISCONTINUED | OUTPATIENT
Start: 2021-03-03 | End: 2021-03-09 | Stop reason: HOSPADM

## 2021-03-03 RX ORDER — HYDRALAZINE HYDROCHLORIDE 20 MG/ML
5 INJECTION INTRAMUSCULAR; INTRAVENOUS EVERY 5 MIN PRN
Status: DISCONTINUED | OUTPATIENT
Start: 2021-03-03 | End: 2021-03-09 | Stop reason: HOSPADM

## 2021-03-03 RX ORDER — FENTANYL CITRATE 0.05 MG/ML
INJECTION, SOLUTION INTRAMUSCULAR; INTRAVENOUS PRN
Status: DISCONTINUED | OUTPATIENT
Start: 2021-03-03 | End: 2021-03-03 | Stop reason: SDUPTHER

## 2021-03-03 RX ORDER — ROCURONIUM BROMIDE 10 MG/ML
INJECTION, SOLUTION INTRAVENOUS PRN
Status: DISCONTINUED | OUTPATIENT
Start: 2021-03-03 | End: 2021-03-03 | Stop reason: SDUPTHER

## 2021-03-03 RX ORDER — SODIUM CHLORIDE 0.9 % (FLUSH) 0.9 %
10 SYRINGE (ML) INJECTION EVERY 12 HOURS SCHEDULED
Status: DISCONTINUED | OUTPATIENT
Start: 2021-03-03 | End: 2021-03-05

## 2021-03-03 RX ORDER — NICOTINE POLACRILEX 4 MG
15 LOZENGE BUCCAL PRN
Status: DISCONTINUED | OUTPATIENT
Start: 2021-03-03 | End: 2021-03-09 | Stop reason: HOSPADM

## 2021-03-03 RX ORDER — GLYCOPYRROLATE 1 MG/5 ML
SYRINGE (ML) INTRAVENOUS PRN
Status: DISCONTINUED | OUTPATIENT
Start: 2021-03-03 | End: 2021-03-03 | Stop reason: SDUPTHER

## 2021-03-03 RX ORDER — HEPARIN SODIUM 1000 [USP'U]/ML
INJECTION, SOLUTION INTRAVENOUS; SUBCUTANEOUS
Status: COMPLETED
Start: 2021-03-03 | End: 2021-03-03

## 2021-03-03 RX ORDER — SODIUM CHLORIDE 0.9 % (FLUSH) 0.9 %
10 SYRINGE (ML) INJECTION PRN
Status: DISCONTINUED | OUTPATIENT
Start: 2021-03-03 | End: 2021-03-03 | Stop reason: HOSPADM

## 2021-03-03 RX ORDER — POTASSIUM CHLORIDE 29.8 MG/ML
20 INJECTION INTRAVENOUS PRN
Status: DISCONTINUED | OUTPATIENT
Start: 2021-03-03 | End: 2021-03-09 | Stop reason: HOSPADM

## 2021-03-03 RX ORDER — MAGNESIUM SULFATE 1 G/100ML
1000 INJECTION INTRAVENOUS PRN
Status: DISCONTINUED | OUTPATIENT
Start: 2021-03-03 | End: 2021-03-09 | Stop reason: HOSPADM

## 2021-03-03 RX ORDER — ACETAMINOPHEN 325 MG/1
650 TABLET ORAL EVERY 4 HOURS PRN
Status: DISCONTINUED | OUTPATIENT
Start: 2021-03-03 | End: 2021-03-09 | Stop reason: HOSPADM

## 2021-03-03 RX ORDER — PROTAMINE SULFATE 10 MG/ML
50 INJECTION, SOLUTION INTRAVENOUS
Status: ACTIVE | OUTPATIENT
Start: 2021-03-03 | End: 2021-03-03

## 2021-03-03 RX ORDER — PROPOFOL 10 MG/ML
INJECTION, EMULSION INTRAVENOUS CONTINUOUS PRN
Status: DISCONTINUED | OUTPATIENT
Start: 2021-03-03 | End: 2021-03-03 | Stop reason: SDUPTHER

## 2021-03-03 RX ORDER — POLYETHYLENE GLYCOL 3350 17 G/17G
17 POWDER, FOR SOLUTION ORAL DAILY
Status: DISCONTINUED | OUTPATIENT
Start: 2021-03-03 | End: 2021-03-09 | Stop reason: HOSPADM

## 2021-03-03 RX ORDER — PROPOFOL 10 MG/ML
INJECTION, EMULSION INTRAVENOUS PRN
Status: DISCONTINUED | OUTPATIENT
Start: 2021-03-03 | End: 2021-03-03 | Stop reason: SDUPTHER

## 2021-03-03 RX ORDER — PROTAMINE SULFATE 10 MG/ML
INJECTION, SOLUTION INTRAVENOUS PRN
Status: DISCONTINUED | OUTPATIENT
Start: 2021-03-03 | End: 2021-03-03 | Stop reason: SDUPTHER

## 2021-03-03 RX ORDER — FENTANYL CITRATE 50 UG/ML
25 INJECTION, SOLUTION INTRAMUSCULAR; INTRAVENOUS
Status: DISCONTINUED | OUTPATIENT
Start: 2021-03-03 | End: 2021-03-09 | Stop reason: HOSPADM

## 2021-03-03 RX ORDER — PROPOFOL 10 MG/ML
10 INJECTION, EMULSION INTRAVENOUS CONTINUOUS
Status: DISCONTINUED | OUTPATIENT
Start: 2021-03-03 | End: 2021-03-03

## 2021-03-03 RX ORDER — DIPHENHYDRAMINE HCL 25 MG
25 TABLET ORAL NIGHTLY PRN
Status: DISCONTINUED | OUTPATIENT
Start: 2021-03-04 | End: 2021-03-09 | Stop reason: HOSPADM

## 2021-03-03 RX ORDER — SODIUM CHLORIDE 0.9 % (FLUSH) 0.9 %
10 SYRINGE (ML) INJECTION EVERY 12 HOURS SCHEDULED
Status: DISCONTINUED | OUTPATIENT
Start: 2021-03-03 | End: 2021-03-03 | Stop reason: HOSPADM

## 2021-03-03 RX ORDER — MAGNESIUM HYDROXIDE 1200 MG/15ML
LIQUID ORAL CONTINUOUS PRN
Status: COMPLETED | OUTPATIENT
Start: 2021-03-03 | End: 2021-03-03

## 2021-03-03 RX ORDER — ALBUMIN, HUMAN INJ 5% 5 %
25 SOLUTION INTRAVENOUS PRN
Status: DISCONTINUED | OUTPATIENT
Start: 2021-03-03 | End: 2021-03-09 | Stop reason: HOSPADM

## 2021-03-03 RX ORDER — HEPARIN SODIUM 1000 [USP'U]/ML
INJECTION, SOLUTION INTRAVENOUS; SUBCUTANEOUS PRN
Status: DISCONTINUED | OUTPATIENT
Start: 2021-03-03 | End: 2021-03-03 | Stop reason: SDUPTHER

## 2021-03-03 RX ORDER — SODIUM PHOSPHATE, DIBASIC AND SODIUM PHOSPHATE, MONOBASIC 7; 19 G/133ML; G/133ML
1 ENEMA RECTAL DAILY PRN
Status: DISCONTINUED | OUTPATIENT
Start: 2021-03-03 | End: 2021-03-09 | Stop reason: HOSPADM

## 2021-03-03 RX ORDER — HEPARIN SODIUM 1000 [USP'U]/ML
INJECTION, SOLUTION INTRAVENOUS; SUBCUTANEOUS PRN
Status: DISCONTINUED | OUTPATIENT
Start: 2021-03-03 | End: 2021-03-03 | Stop reason: ALTCHOICE

## 2021-03-03 RX ORDER — SODIUM CHLORIDE 9 MG/ML
10 INJECTION INTRAVENOUS DAILY
Status: DISCONTINUED | OUTPATIENT
Start: 2021-03-03 | End: 2021-03-08 | Stop reason: SDUPTHER

## 2021-03-03 RX ORDER — SODIUM CHLORIDE, SODIUM LACTATE, POTASSIUM CHLORIDE, CALCIUM CHLORIDE 600; 310; 30; 20 MG/100ML; MG/100ML; MG/100ML; MG/100ML
INJECTION, SOLUTION INTRAVENOUS CONTINUOUS
Status: DISCONTINUED | OUTPATIENT
Start: 2021-03-03 | End: 2021-03-05

## 2021-03-03 RX ORDER — FENTANYL CITRATE 50 UG/ML
50 INJECTION, SOLUTION INTRAMUSCULAR; INTRAVENOUS
Status: DISCONTINUED | OUTPATIENT
Start: 2021-03-03 | End: 2021-03-09 | Stop reason: HOSPADM

## 2021-03-03 RX ORDER — DEXTROSE MONOHYDRATE 50 MG/ML
100 INJECTION, SOLUTION INTRAVENOUS PRN
Status: DISCONTINUED | OUTPATIENT
Start: 2021-03-03 | End: 2021-03-09 | Stop reason: HOSPADM

## 2021-03-03 RX ORDER — POTASSIUM CHLORIDE 2 MEQ/ML
INJECTION, SOLUTION, CONCENTRATE INTRAVENOUS PRN
Status: DISCONTINUED | OUTPATIENT
Start: 2021-03-03 | End: 2021-03-03 | Stop reason: SDUPTHER

## 2021-03-03 RX ORDER — LIDOCAINE HYDROCHLORIDE 20 MG/ML
JELLY TOPICAL
Status: DISPENSED
Start: 2021-03-03 | End: 2021-03-03

## 2021-03-03 RX ORDER — METOPROLOL TARTRATE 5 MG/5ML
2.5 INJECTION INTRAVENOUS EVERY 10 MIN PRN
Status: DISCONTINUED | OUTPATIENT
Start: 2021-03-03 | End: 2021-03-09 | Stop reason: HOSPADM

## 2021-03-03 RX ORDER — CLOPIDOGREL BISULFATE 75 MG/1
75 TABLET ORAL DAILY
Status: DISCONTINUED | OUTPATIENT
Start: 2021-03-04 | End: 2021-03-03

## 2021-03-03 RX ORDER — SODIUM CHLORIDE 0.9 % (FLUSH) 0.9 %
10 SYRINGE (ML) INJECTION EVERY 12 HOURS SCHEDULED
Status: DISCONTINUED | OUTPATIENT
Start: 2021-03-03 | End: 2021-03-09 | Stop reason: HOSPADM

## 2021-03-03 RX ORDER — MIDAZOLAM HYDROCHLORIDE 1 MG/ML
INJECTION INTRAMUSCULAR; INTRAVENOUS PRN
Status: DISCONTINUED | OUTPATIENT
Start: 2021-03-03 | End: 2021-03-03 | Stop reason: SDUPTHER

## 2021-03-03 RX ADMIN — CEFAZOLIN 2 G: 10 INJECTION, POWDER, FOR SOLUTION INTRAVENOUS at 10:08

## 2021-03-03 RX ADMIN — ALBUMIN (HUMAN) 25 G: 12.5 INJECTION, SOLUTION INTRAVENOUS at 20:23

## 2021-03-03 RX ADMIN — ROCURONIUM BROMIDE 50 MG: 10 INJECTION INTRAVENOUS at 10:34

## 2021-03-03 RX ADMIN — POTASSIUM CHLORIDE 20 MEQ: 149 INJECTION, SOLUTION, CONCENTRATE INTRAVENOUS at 14:56

## 2021-03-03 RX ADMIN — MUPIROCIN: 20 OINTMENT TOPICAL at 07:56

## 2021-03-03 RX ADMIN — OXYCODONE HYDROCHLORIDE AND ACETAMINOPHEN 2 TABLET: 5; 325 TABLET ORAL at 16:08

## 2021-03-03 RX ADMIN — EPINEPHRINE 0.04 MCG/KG/MIN: 1 INJECTION PARENTERAL at 13:30

## 2021-03-03 RX ADMIN — ALBUMIN (HUMAN) 25 G: 12.5 INJECTION, SOLUTION INTRAVENOUS at 15:40

## 2021-03-03 RX ADMIN — FENTANYL CITRATE 250 MCG: 50 INJECTION INTRAVENOUS at 10:28

## 2021-03-03 RX ADMIN — PROPOFOL 50 MCG/KG/MIN: 10 INJECTION, EMULSION INTRAVENOUS at 15:40

## 2021-03-03 RX ADMIN — SODIUM CHLORIDE, POTASSIUM CHLORIDE, SODIUM LACTATE AND CALCIUM CHLORIDE: 600; 310; 30; 20 INJECTION, SOLUTION INTRAVENOUS at 09:00

## 2021-03-03 RX ADMIN — PROPOFOL 200 MG: 10 INJECTION, EMULSION INTRAVENOUS at 09:03

## 2021-03-03 RX ADMIN — SODIUM CHLORIDE, PRESERVATIVE FREE 10 ML: 5 INJECTION INTRAVENOUS at 21:00

## 2021-03-03 RX ADMIN — POTASSIUM CHLORIDE 20 MEQ: 29.8 INJECTION, SOLUTION INTRAVENOUS at 17:15

## 2021-03-03 RX ADMIN — FENTANYL CITRATE 250 MCG: 50 INJECTION INTRAVENOUS at 10:35

## 2021-03-03 RX ADMIN — PROPOFOL 25 MCG/KG/MIN: 10 INJECTION, EMULSION INTRAVENOUS at 18:31

## 2021-03-03 RX ADMIN — CALCIUM CHLORIDE 1000 MG: 100 INJECTION, SOLUTION INTRAVENOUS; INTRAVENTRICULAR at 18:18

## 2021-03-03 RX ADMIN — METOPROLOL TARTRATE 12.5 MG: 25 TABLET ORAL at 07:56

## 2021-03-03 RX ADMIN — FENTANYL CITRATE 100 MCG: 50 INJECTION INTRAVENOUS at 15:44

## 2021-03-03 RX ADMIN — VANCOMYCIN HYDROCHLORIDE 1.5 G: 10 INJECTION, POWDER, LYOPHILIZED, FOR SOLUTION INTRAVENOUS at 09:39

## 2021-03-03 RX ADMIN — FENTANYL CITRATE 100 MCG: 50 INJECTION INTRAVENOUS at 14:30

## 2021-03-03 RX ADMIN — MIDAZOLAM HYDROCHLORIDE 3 MG: 1 INJECTION, SOLUTION INTRAMUSCULAR; INTRAVENOUS at 09:03

## 2021-03-03 RX ADMIN — SODIUM CHLORIDE, PRESERVATIVE FREE 600 ML: 5 INJECTION INTRAVENOUS at 15:46

## 2021-03-03 RX ADMIN — Medication 0.4 MG: at 14:00

## 2021-03-03 RX ADMIN — FENTANYL CITRATE 50 MCG: 50 INJECTION, SOLUTION INTRAMUSCULAR; INTRAVENOUS at 20:03

## 2021-03-03 RX ADMIN — FENTANYL CITRATE 100 MCG: 50 INJECTION INTRAVENOUS at 11:36

## 2021-03-03 RX ADMIN — ROCURONIUM BROMIDE 50 MG: 10 INJECTION INTRAVENOUS at 14:17

## 2021-03-03 RX ADMIN — HEPARIN SODIUM 5000 UNITS: 1000 INJECTION INTRAVENOUS; SUBCUTANEOUS at 11:24

## 2021-03-03 RX ADMIN — MUPIROCIN: 20 OINTMENT TOPICAL at 21:32

## 2021-03-03 RX ADMIN — ROCURONIUM BROMIDE 50 MG: 10 INJECTION INTRAVENOUS at 09:03

## 2021-03-03 RX ADMIN — Medication 1 UNITS/HR: at 13:31

## 2021-03-03 RX ADMIN — HEPARIN SODIUM 32000 UNITS: 1000 INJECTION INTRAVENOUS; SUBCUTANEOUS at 11:37

## 2021-03-03 RX ADMIN — CHLORHEXIDINE GLUCONATE 0.12% ORAL RINSE 15 ML: 1.2 LIQUID ORAL at 07:56

## 2021-03-03 RX ADMIN — SODIUM CHLORIDE: 9 INJECTION, SOLUTION INTRAVENOUS at 09:01

## 2021-03-03 RX ADMIN — PROPOFOL 25 MCG/KG/MIN: 10 INJECTION, EMULSION INTRAVENOUS at 14:47

## 2021-03-03 RX ADMIN — FENTANYL CITRATE 100 MCG: 50 INJECTION INTRAVENOUS at 14:48

## 2021-03-03 RX ADMIN — MIDAZOLAM HYDROCHLORIDE 1 MG: 1 INJECTION, SOLUTION INTRAMUSCULAR; INTRAVENOUS at 09:28

## 2021-03-03 RX ADMIN — SODIUM CHLORIDE: 9 INJECTION, SOLUTION INTRAVENOUS at 09:00

## 2021-03-03 RX ADMIN — AMINOCAPROIC ACID 5 G/HR: 250 INJECTION, SOLUTION INTRAVENOUS at 09:30

## 2021-03-03 RX ADMIN — SODIUM CHLORIDE, PRESERVATIVE FREE 10 ML: 5 INJECTION INTRAVENOUS at 16:59

## 2021-03-03 RX ADMIN — PROTAMINE SULFATE 250 MG: 10 INJECTION, SOLUTION INTRAVENOUS at 14:14

## 2021-03-03 RX ADMIN — FENTANYL CITRATE 50 MCG: 50 INJECTION, SOLUTION INTRAMUSCULAR; INTRAVENOUS at 16:09

## 2021-03-03 RX ADMIN — SODIUM CHLORIDE, POTASSIUM CHLORIDE, SODIUM LACTATE AND CALCIUM CHLORIDE: 600; 310; 30; 20 INJECTION, SOLUTION INTRAVENOUS at 07:57

## 2021-03-03 RX ADMIN — FENTANYL CITRATE 250 MCG: 50 INJECTION INTRAVENOUS at 10:41

## 2021-03-03 RX ADMIN — MIDAZOLAM HYDROCHLORIDE 2 MG: 1 INJECTION, SOLUTION INTRAMUSCULAR; INTRAVENOUS at 14:17

## 2021-03-03 RX ADMIN — DEXTROSE MONOHYDRATE 2000 MG: 50 INJECTION, SOLUTION INTRAVENOUS at 19:45

## 2021-03-03 RX ADMIN — EPINEPHRINE 0.02 MCG/KG/MIN: 1 INJECTION PARENTERAL at 17:09

## 2021-03-03 RX ADMIN — FENTANYL CITRATE 250 MCG: 50 INJECTION INTRAVENOUS at 09:03

## 2021-03-03 RX ADMIN — ALBUMIN (HUMAN) 25 G: 12.5 INJECTION, SOLUTION INTRAVENOUS at 16:45

## 2021-03-03 RX ADMIN — PROTAMINE SULFATE 50 MG: 10 INJECTION, SOLUTION INTRAVENOUS at 14:28

## 2021-03-03 RX ADMIN — Medication 1500 MG: at 21:36

## 2021-03-03 RX ADMIN — PANTOPRAZOLE SODIUM 40 MG: 40 INJECTION, POWDER, FOR SOLUTION INTRAVENOUS at 16:59

## 2021-03-03 RX ADMIN — ROCURONIUM BROMIDE 50 MG: 10 INJECTION INTRAVENOUS at 11:57

## 2021-03-03 ASSESSMENT — PULMONARY FUNCTION TESTS
PIF_VALUE: 16
PIF_VALUE: 24
PIF_VALUE: 20
PIF_VALUE: 23
PIF_VALUE: 2
PIF_VALUE: 1
PIF_VALUE: 15
PIF_VALUE: 1
PIF_VALUE: 15
PIF_VALUE: 18
PIF_VALUE: 14
PIF_VALUE: 1
PIF_VALUE: 15
PIF_VALUE: 18
PIF_VALUE: 15
PIF_VALUE: 1
PIF_VALUE: 15
PIF_VALUE: 18
PIF_VALUE: 14
PIF_VALUE: 16
PIF_VALUE: 1
PIF_VALUE: 18
PIF_VALUE: 14
PIF_VALUE: 14
PIF_VALUE: 18
PIF_VALUE: 15
PIF_VALUE: 18
PIF_VALUE: 15
PIF_VALUE: 1
PIF_VALUE: 18
PIF_VALUE: 1
PIF_VALUE: 1
PIF_VALUE: 14
PIF_VALUE: 1
PIF_VALUE: 15
PIF_VALUE: 24
PIF_VALUE: 16
PIF_VALUE: 1
PIF_VALUE: 11
PIF_VALUE: 15
PIF_VALUE: 1
PIF_VALUE: 2
PIF_VALUE: 1
PIF_VALUE: 16
PIF_VALUE: 1
PIF_VALUE: 18
PIF_VALUE: 12
PIF_VALUE: 1
PIF_VALUE: 14
PIF_VALUE: 23
PIF_VALUE: 13
PIF_VALUE: 13
PIF_VALUE: 22
PIF_VALUE: 1
PIF_VALUE: 15
PIF_VALUE: 19
PIF_VALUE: 1
PIF_VALUE: 13
PIF_VALUE: 17
PIF_VALUE: 15
PIF_VALUE: 1
PIF_VALUE: 15
PIF_VALUE: 1
PIF_VALUE: 16
PIF_VALUE: 16
PIF_VALUE: 18
PIF_VALUE: 17
PIF_VALUE: 22
PIF_VALUE: 16
PIF_VALUE: 22
PIF_VALUE: 18
PIF_VALUE: 15
PIF_VALUE: 15
PIF_VALUE: 20
PIF_VALUE: 14
PIF_VALUE: 1
PIF_VALUE: 24
PIF_VALUE: 14
PIF_VALUE: 15
PIF_VALUE: 16
PIF_VALUE: 16
PIF_VALUE: 1
PIF_VALUE: 15
PIF_VALUE: 15
PIF_VALUE: 17
PIF_VALUE: 1
PIF_VALUE: 23
PIF_VALUE: 14
PIF_VALUE: 1
PIF_VALUE: 1
PIF_VALUE: 15
PIF_VALUE: 1
PIF_VALUE: 1
PIF_VALUE: 16
PIF_VALUE: 16
PIF_VALUE: 1
PIF_VALUE: 1
PIF_VALUE: 20
PIF_VALUE: 12
PIF_VALUE: 16
PIF_VALUE: 17
PIF_VALUE: 1
PIF_VALUE: 1
PIF_VALUE: 14
PIF_VALUE: 23
PIF_VALUE: 18
PIF_VALUE: 15
PIF_VALUE: 17
PIF_VALUE: 16
PIF_VALUE: 14
PIF_VALUE: 1
PIF_VALUE: 15
PIF_VALUE: 20
PIF_VALUE: 1
PIF_VALUE: 18
PIF_VALUE: 23
PIF_VALUE: 3
PIF_VALUE: 23
PIF_VALUE: 17
PIF_VALUE: 1
PIF_VALUE: 11
PIF_VALUE: 23
PIF_VALUE: 15
PIF_VALUE: 23
PIF_VALUE: 23
PIF_VALUE: 1
PIF_VALUE: 15
PIF_VALUE: 16
PIF_VALUE: 20
PIF_VALUE: 23
PIF_VALUE: 1
PIF_VALUE: 14
PIF_VALUE: 20
PIF_VALUE: 21
PIF_VALUE: 14
PIF_VALUE: 15
PIF_VALUE: 3
PIF_VALUE: 1
PIF_VALUE: 15
PIF_VALUE: 1
PIF_VALUE: 13
PIF_VALUE: 1
PIF_VALUE: 15
PIF_VALUE: 1
PIF_VALUE: 14
PIF_VALUE: 17
PIF_VALUE: 1
PIF_VALUE: 23
PIF_VALUE: 21
PIF_VALUE: 1
PIF_VALUE: 11
PIF_VALUE: 16
PIF_VALUE: 23
PIF_VALUE: 20
PIF_VALUE: 16
PIF_VALUE: 0
PIF_VALUE: 15
PIF_VALUE: 1
PIF_VALUE: 15
PIF_VALUE: 1
PIF_VALUE: 24
PIF_VALUE: 16
PIF_VALUE: 16
PIF_VALUE: 14
PIF_VALUE: 1
PIF_VALUE: 15
PIF_VALUE: 1
PIF_VALUE: 16
PIF_VALUE: 14
PIF_VALUE: 16
PIF_VALUE: 16
PIF_VALUE: 15
PIF_VALUE: 23
PIF_VALUE: 9
PIF_VALUE: 17
PIF_VALUE: 1
PIF_VALUE: 1
PIF_VALUE: 15
PIF_VALUE: 15
PIF_VALUE: 1
PIF_VALUE: 16
PIF_VALUE: 13
PIF_VALUE: 1
PIF_VALUE: 2
PIF_VALUE: 11
PIF_VALUE: 15
PIF_VALUE: 15
PIF_VALUE: 14
PIF_VALUE: 17
PIF_VALUE: 1
PIF_VALUE: 15
PIF_VALUE: 1
PIF_VALUE: 23
PIF_VALUE: 16
PIF_VALUE: 14
PIF_VALUE: 1
PIF_VALUE: 7
PIF_VALUE: 1
PIF_VALUE: 14
PIF_VALUE: 1
PIF_VALUE: 16
PIF_VALUE: 1
PIF_VALUE: 18
PIF_VALUE: 24
PIF_VALUE: 15
PIF_VALUE: 1
PIF_VALUE: 17

## 2021-03-03 ASSESSMENT — PAIN SCALES - GENERAL: PAINLEVEL_OUTOF10: 7

## 2021-03-03 ASSESSMENT — PAIN - FUNCTIONAL ASSESSMENT: PAIN_FUNCTIONAL_ASSESSMENT: 0-10

## 2021-03-03 NOTE — ANESTHESIA PROCEDURE NOTES
Central Venous Line:    A central venous line was placed using surface landmarks, in the OR for the following indication(s): central venous access and CVP monitoring. 3/3/2021 9:18 AM3/3/2021 9:26 AM    Sterility preparation included the following: hand hygiene performed prior to procedure, maximum sterile barriers used and sterile technique used to drape from head to toe. The patient was placed in Trendelenburg position. The left subclavian vein was prepped. The site was prepped with Chloraprep. A 9 Fr (size), 10 (length), introducer slick was placed. During the procedure, the following specific steps were taken: target vein identified, needle advanced into vein and blood aspirated and guidewire advanced into vein. Intravenous verification was obtained by venous blood return. Post insertion care included: all ports aspirated, all ports flushed easily, guidewire removed intact, Biopatch applied, line sutured in place and dressing applied. During the procedure the patient experienced: patient tolerated procedure well with no complications.       Insertion site scrubbed per usage guidelines?: Yes  Skin prep agent dried for 3 minutes prior to procedure?:yes  Anesthesia type: general..No  Staffing  Performed: Anesthesiologist   Anesthesiologist: Shikha Bran MD  Preanesthetic Checklist  Completed: patient identified, IV checked, site marked, risks and benefits discussed, surgical consent, monitors and equipment checked, pre-op evaluation, timeout performed, anesthesia consent given, oxygen available and patient being monitored

## 2021-03-03 NOTE — PLAN OF CARE
Problem: Falls - Risk of:  Goal: Will remain free from falls  Description: Will remain free from falls  3/3/2021 1827 by Mik Barth RN  Outcome: Ongoing  3/3/2021 1557 by Sandralee Mcburney, RCP  Outcome: Ongoing  Goal: Absence of physical injury  Description: Absence of physical injury  3/3/2021 1827 by Mik Barth RN  Outcome: Ongoing  3/3/2021 1557 by Sandralee Mcburney, RCP  Outcome: Ongoing     Problem: Non-Violent Restraints  Goal: Removal from restraints as soon as assessed to be safe  3/3/2021 1827 by Mik Barth RN  Outcome: Ongoing  3/3/2021 1557 by Sandralee Mcburney, RCP  Outcome: Ongoing  Goal: No harm/injury to patient while restraints in use  3/3/2021 1827 by Mik Barth RN  Outcome: Ongoing  3/3/2021 1557 by Sandralee Mcburney, RCP  Outcome: Ongoing  Goal: Patient's dignity will be maintained  3/3/2021 1827 by Mik Barth RN  Outcome: Ongoing  3/3/2021 1557 by Sandralee Mcburney, RCP  Outcome: Ongoing     Problem: OXYGENATION/RESPIRATORY FUNCTION  Goal: Patient will maintain patent airway  3/3/2021 1827 by Mik Barth RN  Outcome: Ongoing  3/3/2021 1557 by Sandralee Mcburney, RCP  Outcome: Ongoing  Goal: Patient will achieve/maintain normal respiratory rate/effort  Description: Respiratory rate and effort will be within normal limits for the patient  3/3/2021 1827 by Mik Barth RN  Outcome: Ongoing  3/3/2021 1557 by Sandralee Mcburney, RCP  Outcome: Ongoing     Problem: MECHANICAL VENTILATION  Goal: Patient will maintain patent airway  3/3/2021 1827 by Mik Barth RN  Outcome: Ongoing  3/3/2021 1557 by Sandralee Mcburney, RCP  Outcome: Ongoing  Goal: Oral health is maintained or improved  3/3/2021 1827 by Mik Barth RN  Outcome: Ongoing  3/3/2021 1557 by Sandralee Mcburney, RCP  Outcome: Ongoing  Goal: ET tube will be managed safely  3/3/2021 1827 by Mik Barth RN  Outcome: Ongoing  3/3/2021 1557 by Sandralee Mcburney, RCP  Outcome: Ongoing  Goal: Ability to

## 2021-03-03 NOTE — ANESTHESIA PROCEDURE NOTES
Procedure Performed: MIRANDA      Start Time:  3/3/2021 9:31 AM       End Time:      Preanesthesia Checklist:  Patient identified, IV assessed, risks and benefits discussed, monitors and equipment assessed, procedure being performed at surgeon's request and anesthesia consent obtained. General Procedure Information  Diagnostic Indications for Echo:  hemodynamic monitoring and assessment of valve function  Physician Requesting Echo: Ray Esquivel MD  Location performed:  OR  Intubated  Heart visualized  Probe Type:  3D  Modalities:  2D only, continuous wave Doppler and M-mode                     Name:  Mert Trivedi                                         Age:  69 y.o. MRN:  [id number]           Procedure (Scheduled):  MIRANDA  Requested by Surgeon: Dr. Fabrice Rocha  Performed by Dr. Pattricia Halsted: Transesophageal Echo    Today's Date: 3/3/2021    Patient seen and examined. History and Physical reviewed. Labs reviewed. MIRANDA:    Structures:    LA: Normal  RA: Normal  RV: Normal size and function  LV: Normal size and function. Estimated LVEF 60 %  LV apex: No LV apical thrombus identified  Aorta: Mild atheromatous disease Asc Aorta, arch and descending Aorta  Percardium: No pericardial effusion  CHARISSE: No appendage thrombus identified  Septum: No intracardiac shunt via color Doppler. Valves:    Mitral Valve: Structurally normal. Trace regurgitation is identified. No stenosis. Aortic Valve: The aortic valve is trileaflet and opens adequately. No stenosis is identified. No regurgitation is identified. Tricuspid valve: Structurally normal. No regurgitation is identified. Pulmonary valve: Normal. No significant regurgitation  No valvular vegetations or thrombus identified. Summary:     1. A MIRANDA was performed without complications. 2. LVEF 60 % preop. 3. Pre-op Valvular abnormalities Trace mitral regurgitation  4. No Aortic dissection  5. Significant findings were communicated to CTS.     After separation from CPB the following information was obtained:  1. LV function is preserved. EF 55-60%. 2. No RWMA. 3. Mitral regurgitation is now trivial to mild. 4. No aortic dissection. 5..These findings were shared with CTS.     Electronically signed by Marielle Cortes MD on 3/3/2021 at 10:39 AM

## 2021-03-03 NOTE — ANESTHESIA PRE PROCEDURE
Department of Anesthesiology  Preprocedure Note       Name:  Vernon Crocker   Age:  69 y.o.  :  1950                                          MRN:  [id number]         Date:  3/3/2021      Surgeon: Shorty Vitale):  Lazarus Rhein, MD    Procedure:  CABG CORONARY ARTERY BYPASS X3, ON PUMP, HERI FARMER, MIRANDA (N/A )      Medications prior to admission:   Prior to Admission medications    Medication Sig Start Date End Date Taking?  Authorizing Provider   metoprolol succinate (TOPROL XL) 50 MG extended release tablet TAKE ONE TABLET BY MOUTH DAILY 21   Edilma Holman MD   hydroCHLOROthiazide (HYDRODIURIL) 25 MG tablet Take 25 mg by mouth daily  12/15/20   Historical Provider, MD   Handicap Placard MISC by Does not apply route  2025 11/10/20   Paty Melissa PA-C   atorvastatin (LIPITOR) 10 MG tablet Take 1 tab po daily 20   Paty Melissa PA-C   aspirin 81 MG tablet Take 81 mg by mouth daily    Historical Provider, MD   omeprazole (PRILOSEC) 10 MG delayed release capsule Take 1 capsule by mouth daily as needed (GERD) 17   Jose Luis Velez MD       Current medications:    Current Facility-Administered Medications   Medication Dose Route Frequency Provider Last Rate Last Admin    chlorhexidine gluconate (ANTISEPTIC SKIN CLEANSER) 4 % solution   Topical See Admin Instructions SUNDAY El        chlorhexidine (PERIDEX) 0.12 % solution 15 mL  15 mL Mouth/Throat Once SUNDAY El        metoprolol tartrate (LOPRESSOR) tablet 12.5 mg  12.5 mg Oral Once SUNDAY El        mupirocin (BACTROBAN) 2 % ointment   Nasal BID SUNDAY El        vancomycin (VANCOCIN) 1500 mg in dextrose 5 % 250 mL IVPB  1,500 mg Intravenous On Call to [address] [place]        ceFAZolin (ANCEF) 2000 mg in dextrose 5 % 50 mL IVPB  2,000 mg Intravenous On Call to 61 Ziyad Longo MD        sodium chloride flush 0.9 % injection 10 mL  10 mL Intravenous 2 times per day Tracy Hi MD Maggi        sodium chloride flush 0.9 % injection 10 mL  10 mL Intravenous PRN Grace Paulson MD        lactated ringers infusion 1,000 mL  1,000 mL Intravenous Continuous [facility] James Gamino MD        lactated ringers infusion   Intravenous Continuous SUNDAY [place]        sodium chloride flush 0.9 % injection 10 mL  10 mL Intravenous 2 times per day SUNDAY [place]        sodium chloride flush 0.9 % injection 10 mL  10 mL Intravenous PRN SUNDAY [place]        0.9 % sodium chloride infusion   Intravenous PRN Grace Paulson MD        0.9 % sodium chloride infusion   Intravenous PRN Grace Paulson MD        gelatin adsorbable (GELFOAM) 100 sponge             heparin (porcine) 1000 UNIT/ML injection             papaverine 30 MG/ML injection                Allergies: Allergies   Allergen Reactions    Iv Dye [Iodides] Hives    Iodinated Diagnostic Agents Hives     Patient developed hives even with steroid prep.        Problem List:    Patient Active Problem List   Diagnosis Code    Cerebral infarction ([facility] I63.9    Abnormal LFTs R94.5    Carotid arterial disease (HCC) I77.9    Hypercholesteremia E78.00    Colon polyps K63.5    Colon tumor D49.0    Colonic mass K63.89    Cholelithiasis with acute cholecystitis K80.00    HTN (hypertension) I10    Hx of cholecystectomy Z90.49    Hx of splenectomy Z90.81    History of colon resection Z90.49    History of CVA (cerebrovascular accident) Z80.78    Hx of splenomegaly Z87.898    Thrombocytopenia ([facility] D69.6    Leukocytosis D72.829    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    CAD in native artery I25.10    CAD, multiple vessel I25.10       Past Medical History:        Diagnosis Date    AAA (abdominal aortic aneurysm) ([facility] 3/23/2015    3.8cm     CAD (coronary artery disease)     Colon polyps     Colon tumor     Congestive heart failure (CHF) ([facility] 01/2021    GERD (gastroesophageal reflux disease)     prilosec as needed    H/O chest tube placement     chest tube x4 different times    History of pneumonia     History of pneumothorax     x4    Hx of cardiac cath 2021    Hyperlipidemia     Hypertension     Non Hodgkin's lymphoma ([facility]     NHL    Pneumonia 1970's    patient had 3 chest tubes and in hospital for 13 days    Unspecified cerebral artery occlusion with cerebral infarction 10/01/2014    stroke , numbness in the left arm from the elbow down    Wellness examination 2021    pcp-Pilar Horton-lv 2020    Wellness examination 2021    Cardiology-Dr ohara-elslie ruggiero-lv 2021    Wellness examination 2021    onc-Dr Vanessa Quiroz        Past Surgical History:        Procedure Laterality Date    APPENDECTOMY  4/21/15    CHOLECYSTECTOMY, OPEN  04/21/15    COLONOSCOPY      COLONOSCOPY  07/10/2020    COLONOSCOPY POLYPECTOMY HOT BIOPSY (N/A )    COLONOSCOPY N/A 7/10/2020    COLONOSCOPY POLYPECTOMY HOT BIOPSY performed by Ursula Louis MD at [address] [place] [address]      stent to left leg with tubular graft to left leg artery    RIGHT COLECTOMY  04/21/15    TRANSESOPHAGEAL ECHOCARDIOGRAM  10/3/14    VASECTOMY  1986    WISDOM TOOTH EXTRACTION         Social History:    Social History     Tobacco Use    Smoking status: Former Smoker     Packs/day: 1.00     Years: 30.00     Pack years: 30.00     Types: Cigarettes     Quit date: 2013     Years since quittin.5    Smokeless tobacco: Never Used   Substance Use Topics    Alcohol use:  Yes     Alcohol/week: 14.0 standard drinks     Types: 14 Cans of beer per week     Comment: 1-3 beers/day                                Counseling given: Not Answered      Vital Signs (Current):   Vitals:    21 0613 21 0721   BP: (!) 147/85 139/69   Pulse: 74 68   Resp: 16 16   Temp: 98 °F (36.7 °C) 97.7 °F (36.5 °C)   TempSrc: Oral Oral   SpO2: 97% 98%   Weight: 227 lb 1.2 oz (103 kg)    Height: left ventricular systolic function  is normal.  Calculated EF via heart model is 54 %. Mild left ventricular hypertrophy. Right atrium is mildly dilated . Mildly dilated right ventricular cavity. Right ventricular function appears  normal .  Trivial tricuspid regurgitation. Trivial pulmonic insufficiency. Cardiac cath:  2/19/2021  1. Multivessel CAD  2. Normal LV systolic function.     LMCA: Distal 30% stenosis     LAD: Proximal discrete 70% stenosis, mid segment has long diffuse 60-70% stenosis. D1 and D2 are small with aebmge95% stenosis     LCx: Mild irregularities 10-20%. OM is large with ostial 70% stenosis     RCA: Large, dominant, mid discrete 70% stenosis  RPDA/RPL are patent with mild disease      Coronary Tree Dominance: Right     LV Analysis  LV function assessed as:Normal.  Ejection Fraction 60%     Echo: 11/2020  Normal left ventricular size with normal function  . Left ventricular ejection fraction 55 %. Mild concentric left ventricular hypertrophy. Left atrium is mildly dilated. Right atrium is mildly dilated . No significant valvular regurgitation or stenosis seen.     FINDINGS:  Left Atrium  Left atrium is mildly dilated. Left Ventricle  Normal left ventricular size with normal hyperdynamic function. Left ventricular ejection fraction 55 %. Mild concentric left ventricular hypertrophy. Right Atrium  Right atrium is mildly dilated . Right Ventricle  Normal right ventricular size and function. Mitral Valve  Normal mitral valve structure and function. Aortic Valve  Normal aortic valve structure and function without stenosis or  regurgitation. Tricuspid Valve  Normal tricuspid valve structure and function. Pulmonic Valve  The pulmonic valve is normal in structure.   Trivial pulmonic insufficiency           Anesthesia Evaluation  Patient summary reviewed and Nursing notes reviewed no history of anesthetic complications:   Airway: Mallampati: II  TM distance: >3 FB   Neck ROM: full  Mouth opening: > = 3 FB Dental:    (+) partials      Pulmonary:normal exam    (+) pneumonia:      (-) COPD                           Cardiovascular:  Exercise tolerance: no interval change,   (+) hypertension:, CAD:, CHF:,     (-) CABG/stent        Rate: normal                    Neuro/Psych:   (+) CVA: residual symptoms,              ROS comment: Left arm numbness GI/Hepatic/Renal:   (+) GERD:,           Endo/Other:    (+) malignancy/cancer. (-) diabetes mellitus                ROS comment: History of non-Hodgkin's lymphoma Abdominal:           Vascular:   + PVD, aortic or cerebral, . Anesthesia Plan      general     ASA 4       Induction: intravenous. arterial line, BIS, central line, CVP and MIRANDA  MIPS: Postoperative opioids intended and Postoperative ventilation. Anesthetic plan and risks discussed with patient. Use of blood products discussed with patient whom consented to blood products. Plan discussed with CRNA.                   Marielle Cortes MD   3/3/2021

## 2021-03-03 NOTE — CARE COORDINATION
Reviewed CXR and recent ABG. No concerns noted.  Please begin rewarming and extubating patient per protocol

## 2021-03-03 NOTE — PROGRESS NOTES
[facility] Cardiothoracic Surgery  Pre-op Note    3/3/2021    Mollie Siemens is scheduled for surgery today. All of the pertinent studies have been reviewed and patient is NPO. I have discussed the procedure with the patient and family, and they have been given opportunity to ask questions. The attendant risks, benefits, and possible outcomes have been discussed with them. They understand and have signed informed consent.       Kaleigh An MD

## 2021-03-04 ENCOUNTER — APPOINTMENT (OUTPATIENT)
Dept: GENERAL RADIOLOGY | Age: 71
DRG: 236 | End: 2021-03-04
Attending: THORACIC SURGERY (CARDIOTHORACIC VASCULAR SURGERY)
Payer: MEDICARE

## 2021-03-04 LAB
ABO/RH: NORMAL
ANION GAP SERPL CALCULATED.3IONS-SCNC: 7 MMOL/L (ref 9–17)
ANTIBODY SCREEN: NEGATIVE
ARM BAND NUMBER: NORMAL
BLD PROD TYP BPU: NORMAL
BUN BLDV-MCNC: 15 MG/DL (ref 8–23)
BUN/CREAT BLD: ABNORMAL (ref 9–20)
CALCIUM SERPL-MCNC: 8.1 MG/DL (ref 8.6–10.4)
CHLORIDE BLD-SCNC: 108 MMOL/L (ref 98–107)
CO2: 24 MMOL/L (ref 20–31)
CREAT SERPL-MCNC: 0.81 MG/DL (ref 0.7–1.2)
CROSSMATCH RESULT: NORMAL
CULTURE: NO GROWTH
DISPENSE STATUS BLOOD BANK: NORMAL
EXPIRATION DATE: NORMAL
GFR AFRICAN AMERICAN: >60 ML/MIN
GFR NON-AFRICAN AMERICAN: >60 ML/MIN
GFR SERPL CREATININE-BSD FRML MDRD: ABNORMAL ML/MIN/{1.73_M2}
GFR SERPL CREATININE-BSD FRML MDRD: ABNORMAL ML/MIN/{1.73_M2}
GLUCOSE BLD-MCNC: 100 MG/DL (ref 75–110)
GLUCOSE BLD-MCNC: 107 MG/DL (ref 75–110)
GLUCOSE BLD-MCNC: 107 MG/DL (ref 75–110)
GLUCOSE BLD-MCNC: 110 MG/DL (ref 75–110)
GLUCOSE BLD-MCNC: 110 MG/DL (ref 75–110)
GLUCOSE BLD-MCNC: 113 MG/DL (ref 75–110)
GLUCOSE BLD-MCNC: 114 MG/DL (ref 75–110)
GLUCOSE BLD-MCNC: 114 MG/DL (ref 75–110)
GLUCOSE BLD-MCNC: 117 MG/DL (ref 75–110)
GLUCOSE BLD-MCNC: 118 MG/DL (ref 75–110)
GLUCOSE BLD-MCNC: 119 MG/DL (ref 75–110)
GLUCOSE BLD-MCNC: 121 MG/DL (ref 75–110)
GLUCOSE BLD-MCNC: 121 MG/DL (ref 75–110)
GLUCOSE BLD-MCNC: 122 MG/DL (ref 75–110)
GLUCOSE BLD-MCNC: 126 MG/DL (ref 70–99)
GLUCOSE BLD-MCNC: 132 MG/DL (ref 75–110)
GLUCOSE BLD-MCNC: 134 MG/DL (ref 75–110)
GLUCOSE BLD-MCNC: 135 MG/DL (ref 75–110)
GLUCOSE BLD-MCNC: 142 MG/DL (ref 75–110)
GLUCOSE BLD-MCNC: 150 MG/DL (ref 75–110)
GLUCOSE BLD-MCNC: 150 MG/DL (ref 75–110)
HCT VFR BLD CALC: 29.5 % (ref 40.7–50.3)
HCT VFR BLD CALC: 31.1 % (ref 40.7–50.3)
HEMOGLOBIN: 9.5 G/DL (ref 13–17)
HEMOGLOBIN: 9.8 G/DL (ref 13–17)
INR BLD: 1.2
Lab: NORMAL
MAGNESIUM: 2.2 MG/DL (ref 1.6–2.6)
MCH RBC QN AUTO: 31.9 PG (ref 25.2–33.5)
MCHC RBC AUTO-ENTMCNC: 31.5 G/DL (ref 28.4–34.8)
MCV RBC AUTO: 101.3 FL (ref 82.6–102.9)
NRBC AUTOMATED: 0 PER 100 WBC
PDW BLD-RTO: 16.1 % (ref 11.8–14.4)
PLATELET # BLD: 82 K/UL (ref 138–453)
PMV BLD AUTO: 9.7 FL (ref 8.1–13.5)
POC ANGLE TEG W HEP: 52.6 DEG (ref 59–74)
POC ANGLE TEG W HEP: 59.7 DEG (ref 59–74)
POC ANGLE TEG: 54.3 DEG (ref 59–74)
POC ANGLE TEG: 59.6 DEG (ref 59–74)
POC EPL TEG W/HEP: ABNORMAL % (ref 0–15)
POC EPL TEG W/HEP: NORMAL % (ref 0–15)
POC EPL TEG: ABNORMAL % (ref 0–15)
POC EPL TEG: NORMAL % (ref 0–15)
POC KINETICS TEG W HEP: 2.3 MIN (ref 1–3)
POC KINETICS TEG W HEP: 3.4 MIN (ref 1–3)
POC KINETICS TEG: 2.3 MIN (ref 1–3)
POC KINETICS TEG: 2.9 MIN (ref 1–3)
POC LY30(LYSIS) TEG W HEP: ABNORMAL % (ref 0–8)
POC LY30(LYSIS) TEG W HEP: NORMAL % (ref 0–8)
POC LY30(LYSIS) TEG: ABNORMAL % (ref 0–8)
POC LY30(LYSIS) TEG: NORMAL % (ref 0–8)
POC MA(MAX CLOT) TEG: 52.5 MM (ref 55–74)
POC MA(MAX CLOT) TEG: 58.3 MM (ref 55–74)
POC MAX CLOT TEG W HEP: 47.4 MM (ref 55–74)
POC MAX CLOT TEG W HEP: 56.3 MM (ref 55–74)
POC REACTION TIME TEG W HEP: 5.8 MIN (ref 4–9)
POC REACTION TIME TEG W HEP: 6.1 MIN (ref 4–9)
POC REACTION TIME TEG: 6.2 MIN (ref 4–9)
POC REACTION TIME TEG: 6.5 MIN (ref 4–9)
POTASSIUM SERPL-SCNC: 4.2 MMOL/L (ref 3.7–5.3)
POTASSIUM SERPL-SCNC: 4.7 MMOL/L (ref 3.7–5.3)
PROTHROMBIN TIME: 12.3 SEC (ref 9.1–12.3)
RBC # BLD: 3.07 M/UL (ref 4.21–5.77)
SODIUM BLD-SCNC: 139 MMOL/L (ref 135–144)
SPECIMEN DESCRIPTION: NORMAL
TEG COMMENT: ABNORMAL
TEG COMMENT: ABNORMAL
TEG COMMENT: NORMAL
TEG COMMENT: NORMAL
TRANSFUSION STATUS: NORMAL
UNIT DIVISION: 0
UNIT NUMBER: NORMAL
WBC # BLD: 19.9 K/UL (ref 3.5–11.3)

## 2021-03-04 PROCEDURE — 2700000000 HC OXYGEN THERAPY PER DAY

## 2021-03-04 PROCEDURE — 6370000000 HC RX 637 (ALT 250 FOR IP): Performed by: NURSE PRACTITIONER

## 2021-03-04 PROCEDURE — 97535 SELF CARE MNGMENT TRAINING: CPT

## 2021-03-04 PROCEDURE — 6360000002 HC RX W HCPCS: Performed by: NURSE PRACTITIONER

## 2021-03-04 PROCEDURE — 97162 PT EVAL MOD COMPLEX 30 MIN: CPT

## 2021-03-04 PROCEDURE — 97166 OT EVAL MOD COMPLEX 45 MIN: CPT

## 2021-03-04 PROCEDURE — 33508 ENDOSCOPIC VEIN HARVEST: CPT | Performed by: THORACIC SURGERY (CARDIOTHORACIC VASCULAR SURGERY)

## 2021-03-04 PROCEDURE — APPSS30 APP SPLIT SHARED TIME 16-30 MINUTES: Performed by: NURSE PRACTITIONER

## 2021-03-04 PROCEDURE — 33533 CABG ARTERIAL SINGLE: CPT | Performed by: THORACIC SURGERY (CARDIOTHORACIC VASCULAR SURGERY)

## 2021-03-04 PROCEDURE — 85610 PROTHROMBIN TIME: CPT

## 2021-03-04 PROCEDURE — 82947 ASSAY GLUCOSE BLOOD QUANT: CPT

## 2021-03-04 PROCEDURE — 97116 GAIT TRAINING THERAPY: CPT

## 2021-03-04 PROCEDURE — 2580000003 HC RX 258: Performed by: NURSE PRACTITIONER

## 2021-03-04 PROCEDURE — 71045 X-RAY EXAM CHEST 1 VIEW: CPT

## 2021-03-04 PROCEDURE — 94761 N-INVAS EAR/PLS OXIMETRY MLT: CPT

## 2021-03-04 PROCEDURE — 85027 COMPLETE CBC AUTOMATED: CPT

## 2021-03-04 PROCEDURE — 99024 POSTOP FOLLOW-UP VISIT: CPT | Performed by: NURSE PRACTITIONER

## 2021-03-04 PROCEDURE — 94640 AIRWAY INHALATION TREATMENT: CPT

## 2021-03-04 PROCEDURE — 83735 ASSAY OF MAGNESIUM: CPT

## 2021-03-04 PROCEDURE — 80048 BASIC METABOLIC PNL TOTAL CA: CPT

## 2021-03-04 PROCEDURE — 2100000001 HC CVICU R&B

## 2021-03-04 PROCEDURE — 97110 THERAPEUTIC EXERCISES: CPT

## 2021-03-04 PROCEDURE — 33517 CABG ARTERY-VEIN SINGLE: CPT | Performed by: THORACIC SURGERY (CARDIOTHORACIC VASCULAR SURGERY)

## 2021-03-04 PROCEDURE — 94669 MECHANICAL CHEST WALL OSCILL: CPT

## 2021-03-04 RX ADMIN — OXYCODONE HYDROCHLORIDE AND ACETAMINOPHEN 2 TABLET: 5; 325 TABLET ORAL at 00:40

## 2021-03-04 RX ADMIN — IPRATROPIUM BROMIDE AND ALBUTEROL SULFATE 1 AMPULE: .5; 3 SOLUTION RESPIRATORY (INHALATION) at 20:05

## 2021-03-04 RX ADMIN — METOPROLOL TARTRATE 25 MG: 25 TABLET ORAL at 09:22

## 2021-03-04 RX ADMIN — Medication 1500 MG: at 09:37

## 2021-03-04 RX ADMIN — MUPIROCIN: 20 OINTMENT TOPICAL at 09:22

## 2021-03-04 RX ADMIN — DEXTROSE MONOHYDRATE 2000 MG: 50 INJECTION, SOLUTION INTRAVENOUS at 09:36

## 2021-03-04 RX ADMIN — PANTOPRAZOLE SODIUM 40 MG: 40 TABLET, DELAYED RELEASE ORAL at 09:22

## 2021-03-04 RX ADMIN — POTASSIUM CHLORIDE 20 MEQ: 29.8 INJECTION, SOLUTION INTRAVENOUS at 01:21

## 2021-03-04 RX ADMIN — OXYCODONE HYDROCHLORIDE AND ACETAMINOPHEN 2 TABLET: 5; 325 TABLET ORAL at 05:11

## 2021-03-04 RX ADMIN — DEXTROSE MONOHYDRATE 2000 MG: 50 INJECTION, SOLUTION INTRAVENOUS at 04:55

## 2021-03-04 RX ADMIN — METOPROLOL TARTRATE 25 MG: 25 TABLET ORAL at 21:10

## 2021-03-04 RX ADMIN — FENTANYL CITRATE 50 MCG: 50 INJECTION, SOLUTION INTRAMUSCULAR; INTRAVENOUS at 09:28

## 2021-03-04 RX ADMIN — IPRATROPIUM BROMIDE AND ALBUTEROL SULFATE 1 AMPULE: .5; 3 SOLUTION RESPIRATORY (INHALATION) at 11:55

## 2021-03-04 RX ADMIN — Medication 1500 MG: at 22:13

## 2021-03-04 RX ADMIN — IPRATROPIUM BROMIDE AND ALBUTEROL SULFATE 1 AMPULE: .5; 3 SOLUTION RESPIRATORY (INHALATION) at 08:47

## 2021-03-04 RX ADMIN — ATORVASTATIN CALCIUM 20 MG: 20 TABLET, FILM COATED ORAL at 21:10

## 2021-03-04 RX ADMIN — SODIUM CHLORIDE, PRESERVATIVE FREE 10 ML: 5 INJECTION INTRAVENOUS at 21:10

## 2021-03-04 RX ADMIN — POLYETHYLENE GLYCOL 3350 17 G: 17 POWDER, FOR SOLUTION ORAL at 09:29

## 2021-03-04 RX ADMIN — OXYCODONE HYDROCHLORIDE AND ACETAMINOPHEN 1 TABLET: 5; 325 TABLET ORAL at 22:16

## 2021-03-04 RX ADMIN — SODIUM CHLORIDE, PRESERVATIVE FREE 10 ML: 5 INJECTION INTRAVENOUS at 09:22

## 2021-03-04 RX ADMIN — MUPIROCIN: 20 OINTMENT TOPICAL at 21:10

## 2021-03-04 RX ADMIN — IPRATROPIUM BROMIDE AND ALBUTEROL SULFATE 1 AMPULE: .5; 3 SOLUTION RESPIRATORY (INHALATION) at 16:00

## 2021-03-04 RX ADMIN — DEXTROSE MONOHYDRATE 2000 MG: 50 INJECTION, SOLUTION INTRAVENOUS at 18:04

## 2021-03-04 RX ADMIN — FENTANYL CITRATE 50 MCG: 50 INJECTION, SOLUTION INTRAMUSCULAR; INTRAVENOUS at 18:04

## 2021-03-04 ASSESSMENT — PAIN SCALES - GENERAL
PAINLEVEL_OUTOF10: 5
PAINLEVEL_OUTOF10: 8
PAINLEVEL_OUTOF10: 6
PAINLEVEL_OUTOF10: 5
PAINLEVEL_OUTOF10: 7
PAINLEVEL_OUTOF10: 4
PAINLEVEL_OUTOF10: 6
PAINLEVEL_OUTOF10: 5

## 2021-03-04 ASSESSMENT — PAIN DESCRIPTION - ORIENTATION
ORIENTATION: MID
ORIENTATION: MID

## 2021-03-04 ASSESSMENT — PAIN DESCRIPTION - FREQUENCY
FREQUENCY: CONTINUOUS
FREQUENCY: CONTINUOUS

## 2021-03-04 ASSESSMENT — PAIN DESCRIPTION - PAIN TYPE
TYPE: ACUTE PAIN;SURGICAL PAIN
TYPE: ACUTE PAIN;SURGICAL PAIN

## 2021-03-04 ASSESSMENT — PAIN DESCRIPTION - LOCATION
LOCATION: CHEST

## 2021-03-04 ASSESSMENT — PAIN DESCRIPTION - DESCRIPTORS: DESCRIPTORS: ACHING;SHARP;SORE;TENDER;THROBBING

## 2021-03-04 NOTE — PROGRESS NOTES
ABG reviewed with RT, patient extubated per protocol at 2000 and placed on 4L nasal cannula 02 sat 99% patient able to speak all VSS and A/0 x4 at this time.

## 2021-03-04 NOTE — PROGRESS NOTES
Janette, CT surgery NP at bedside. Updated on chest tube output over night as well as latest hemoglobin. No new orders at this time.

## 2021-03-04 NOTE — PROGRESS NOTES
Occupational Therapy   Occupational Therapy Initial Assessment  Date: 3/4/2021   Patient Name: Meme Munguia  MRN: [id number]     : 1950    Date of Service: 3/4/2021    Discharge Recommendations:    Further therapy recommended at discharge. Assessment   Performance deficits / Impairments: Decreased functional mobility ; Decreased ADL status; Decreased endurance;Decreased high-level IADLs;Decreased strength  Treatment Diagnosis: CABG  Prognosis: Good  Decision Making: Medium Complexity  Patient Education: pt ed on POC, purpose of eval, importance of movement, safety during functional transfers/mobility, breathing out during movement, sternal precautions. good return  REQUIRES OT FOLLOW UP: Yes  Activity Tolerance  Activity Tolerance: Patient Tolerated treatment well  Safety Devices  Safety Devices in place: Yes  Type of devices: Gait belt;Call light within reach; Left in chair;Nurse notified  Restraints  Initially in place: No         Patient Diagnosis(es): The encounter diagnosis was CAD, multiple vessel.     has a past medical history of AAA (abdominal aortic aneurysm) ([address] CAD (coronary artery disease), Colon polyps, Colon tumor, Congestive heart failure (CHF) ([address] GERD (gastroesophageal reflux disease), H/O chest tube placement, History of pneumonia, History of pneumothorax, Hx of cardiac cath, Hyperlipidemia, Hypertension, Non Hodgkin's lymphoma ([address] Pneumonia, Unspecified cerebral artery occlusion with cerebral infarction, Wellness examination, Wellness examination, and Wellness examination. has a past surgical history that includes transesophageal echocardiogram (10/3/14); Colonoscopy; Vasectomy (); Cholecystectomy, open (04/21/15); right colectomy (04/21/15); Appendectomy (4/21/15); Colonoscopy (07/10/2020); Colonoscopy (N/A, 7/10/2020); Meadow tooth extraction; other surgical history (); and Coronary artery bypass graft (N/A, 3/3/2021).     Treatment Diagnosis: CABG      Restrictions Restrictions/Precautions  Restrictions/Precautions: Fall Risk, General Precautions  Required Braces or Orthoses?: Yes  Required Braces or Orthoses  Other: Heart Hugger Brace  Position Activity Restriction  Sternal Precautions: No Pushing, No Pulling, 5# Lifting Restrictions  Other position/activity restrictions: amb pt, up in chair. CABG x3 3/3/2021    Subjective   General  Patient assessed for rehabilitation services?: Yes  Family / Caregiver Present: No  Diagnosis: CABG  General Comment  Comments: RN ok'd for therapy this morning. Pt agreeable to participate in session and cooperative/pleasant throughout  Patient Currently in Pain: Yes  Pain Assessment  Pain Assessment: 0-10  Pain Level: 5  Pain Type: Acute pain;Surgical pain  Pain Location: Chest  Non-Pharmaceutical Pain Intervention(s): Ambulation/Increased Activity; Distraction; Therapeutic presence  Response to Pain Intervention: Patient Satisfied    Social/Functional History  Social/Functional History  Lives With: Alone  Type of Home: House  Home Layout: One level  Home Access: Stairs to enter with rails  Entrance Stairs - Number of Steps: 2  Entrance Stairs - Rails: Left  Bathroom Shower/Tub: Tub/Shower unit  Bathroom Toilet: Handicap height  Bathroom Equipment: Grab bars in shower  Home Equipment: (Pt reports not owning any DME, but states that he has a friend with a walker and a shower chair that he could borrow if need be)  Receives Help From: Family  ADL Assistance: Independent  Homemaking Assistance: Independent  Homemaking Responsibilities: Yes  Meal Prep Responsibility: Primary  Laundry Responsibility: Primary  Cleaning Responsibility: Primary  Ambulation Assistance: Independent  Transfer Assistance: Independent  Active : Yes  Mode of Transportation: Car  Occupation: Part time employment  Type of occupation: commercial realestate  Additional Comments: pt reported son lives 8 blocks away, and pt reported younger son is going to stay with pt for ~1 week.       Objective   Vision: Within Functional Limits  Hearing: Within functional limits    Orientation  Overall Orientation Status: Within Functional Limits  Observation/Palpation  Posture: Fair  Balance  Sitting Balance: Modified independent (~32 minutes in chair)  Standing Balance: Contact guard assistance(with RW)  Standing Balance  Time: ~4 minutes  Activity: pt completed functional mobility in hallway with PT  Comment: pt required mod verbal cues to utilize pursed lip breathing tech throughout, but no LOB  ADL  Feeding: Modified independent   Grooming: Supervision  UE Bathing: Stand by assistance  LE Bathing: Moderate assistance  UE Dressing: Stand by assistance  LE Dressing: Moderate assistance  Toileting: Minimal assistance  Tone RUE  RUE Tone: Normotonic  Tone LUE  LUE Tone: Normotonic  Coordination  Movements Are Fluid And Coordinated: Yes     Bed mobility  Comment: pt in chair upon arrival and retired to chair at end of session  Transfers  Sit to stand: Minimal assistance  Stand to sit: Contact guard assistance  Transfer Comments: with RW     Cognition  Overall Cognitive Status: WFL  Arousal/Alertness: Appropriate responses to stimuli  Following Commands:  Follows multistep commands with repitition  Initiation: Requires cues for some  Sequencing: Requires cues for some        Sensation  Overall Sensation Status: Impaired(pt reported N/T to L UE from elbow distally from prior CVA)        LUE AROM (degrees)  LUE AROM : WFL  Left Hand AROM (degrees)  Left Hand AROM: WFL  RUE AROM (degrees)  RUE AROM : WFL  Right Hand AROM (degrees)  Right Hand AROM: WFL  LUE Strength  Gross LUE Strength: Exceptions to [facility]  L Hand General: 4/5  RUE Strength  Gross RUE Strength: Exceptions to [facility]  R Hand General: 4/5      Plan   Plan  Times per week: 4-6x/wk (CABG)    AM-PAC Score        AM-PAC Inpatient Daily Activity Raw Score: 17 (03/04/21 1502)  AM-PAC Inpatient ADL T-Scale Score : 37.26 (03/04/21 1502)  ADL Inpatient CMS 0-100% Score: 50.11 (03/04/21 1502)  ADL Inpatient CMS G-Code Modifier : CK (03/04/21 1502)    Goals  Short term goals  Time Frame for Short term goals: pt will, by discharge  Short term goal 1: complete LB ADLs with min A, set up and Ae, as needed  Short term goal 2: complete UB ADLs and grooming tasks with mod I  Short term goal 3: increase activity tolerance to 20+ minutes in order to participate in daily tasks  Short term goal 4: dem SBA during functional transfers/functional mobility with LRD  Short term goal 5: ind maintain sternal precautions during functional transfer/tasks       Therapy Time   Individual Concurrent Group Co-treatment   Time In 0945         Time Out 1024         Minutes 39         Timed Code Treatment Minutes: Edward 1765, OTR/L

## 2021-03-04 NOTE — PROGRESS NOTES
RN noted rhythm change under epicardial pacer. Pt previous underlying rhythm was junctional, pt is now in NSR rate 80s.  Temp pacer setting adjusted and set as a back up rate at 50bpm.

## 2021-03-04 NOTE — PROGRESS NOTES
[facility]  Daily Progress Note    Surgeon: Dr. Jeanette Slater  S/P :  CABG x3   POD#: 1   EF: 60%      Subjective:  Mr. Martina Elder feels well today with no acute complaints. Chest tubes to wall suction with moderate output. Pain is controlled on current medication regimen. Encourage patient to remain OOBTC and ambulate. Last BMs Prior to surgery, bowel regimen in place. Decreased appetite. Denies chest pain or shortness of breath. Plan of care reviewed and questions answered. Physical Exam  Vital Signs: BP (!) 146/66   Pulse 90   Temp 98.5 °F (36.9 °C) (Oral)   Resp 16   Ht 6' 2\" (1.88 m)   Wt 227 lb 1.2 oz (103 kg)   SpO2 94%   BMI 29.15 kg/m²  O2 Flow Rate (L/min): 3 L/min   Admit Weight: Weight: 227 lb 1.2 oz (103 kg)   WEIGHTWeight: 227 lb 1.2 oz (103 kg)     General: alert and oriented to person, place and time, well-developed and well-nourished, in no acute distress. Up in chair  Heart:Normal S1 and S2.  Regular rhythm. No murmurs, gallops, or rubs. Pacing Wires Yes   Lungs: clear to auscultation bilaterally  Abdomen: soft, non tender, non distended, BSx4  Extremities: Trace edema  Wounds: clean and dry, healing appropriately.       Scheduled Meds:    [MAR Hold] sodium chloride flush  10 mL Intravenous 2 times per day    sodium chloride flush  10 mL Intravenous 2 times per day    mupirocin   Nasal BID    polyethylene glycol  17 g Oral Daily    metoprolol tartrate  25 mg Oral BID    atorvastatin  20 mg Oral Nightly    pantoprazole  40 mg Oral Daily    pantoprazole  40 mg Intravenous Daily    And    sodium chloride (PF)  10 mL Intravenous Daily    ceFAZolin (ANCEF) IVPB  2,000 mg Intravenous Q8H    vancomycin (VANCOCIN) IV  1,500 mg Intravenous Q12H    ipratropium-albuterol  1 ampule Inhalation Q4H WA     Continuous Infusions:    [MAR Hold] lactated ringers      [MAR Hold] lactated ringers 125 mL/hr at 03/03/21 0757    norepinephrine      insulin 4.44 Units/hr (03/04/21 0600)    dextrose      propofol Stopped (03/03/21 1859)    EPINEPHrine infusion Stopped (03/03/21 2020)       Data:  CBC:   Recent Labs     03/03/21  0736 03/03/21  1603 03/03/21  1908 03/03/21  2359 03/04/21  0343   WBC 14.8* 19.8*  --   --  19.9*   HGB 13.5 12.4* 10.4* 9.5* 9.8*   HCT 41.3 39.0* 32.0* 29.5* 31.1*   .0 101.3  --   --  101.3   PLT 67* 88*  88* 90*  --  82*     BMP:   Recent Labs     03/03/21  1557 03/03/21  1557 03/03/21  1603 03/03/21  1908 03/03/21  2355 03/04/21  0343   NA  --   --  137  --   --  139   K  --    < > 4.3 4.5 4.2 4.7   CL  --   --  106  --   --  108*   CO2  --   --  20  --   --  24   BUN  --   --  15  --   --  15   CREATININE 0.96  --  0.81  --   --  0.81    < > = values in this interval not displayed. PT/INR:   Recent Labs     03/03/21  1603 03/03/21  1908 03/04/21  0343   PROTIME 13.2* 12.7* 12.3   INR 1.3 1.2 1.2     APTT:   Recent Labs     03/03/21  1603 03/03/21  1908   APTT 28.0 37.1*       Chest X-Ray:  Pending    I/O:  I/O last 3 completed shifts: In: 6108 [I.V.:3958; Blood:2150]  Out: 8339 [Urine:1240; Emesis/NG output:150; Blood:825; Chest Tube:800]      Assessment:   Multivessel CAD s/p CABG x3   o POD 1  o Without complication  · Marginal zone lymphoma  ? Follows with Dr. Gayathri Perez, oncology  · Thrombocytopenia, chronic  · Hypertension  · Hypercholesterolemia  · History of splenectomy  · History of cholecystectomy  · History of CVA with no residual effects  ? Left forearm paresthesias, dull ache intermittently  · History of colon cancer s/p colon resection  ?  18\" of large intestine removed and some small intestine  · History of abnormal LFTs/fatty liver  · Overweight, BMI 29.53 kg/m²      Plan:    Remains inpatient on telemetry, level of care is currently Cardiac CCU    Monitor vitals closely including continuous pulse oximetry   Oxygen as needed via nasal cannula to maintain SpO2 > 92%   Chest x-ray daily   Maintain Chest Tubes to low intermittent wall suction   Discontinue Arroyo for strict I&Os   Encourage incentive spirometry    Monitor CBC, BMP, INR and Mag daily   Wean insulin drips   Currently taking metoprolol,    Patient is on BB, Statin therapy per protocol   o No aspirin d/t thrombocytopenia  o No ACE inhibitor indicated with preserved EF   No Amiodarone d/t iodine allergy   Percocet for pain control   Lovenox for DVT prophylaxis, SCDs while stationary    Protonix for GI prophylaxis   Replace electrolytes as needed per sliding scale and recheck per policy   PT/OT evalutation for discharge recommendation and ambulation 3x daily   Case Management consult for discharge planning        The above recommendations including medications and orders were discussed and agreed upon with Dr. Larissa Bailey, the attending on service for the cardiothoracic surgery group today. Electronically signed by ELISEO Snow CNP on 3/4/2021 at 6:40 AM    On this date 3/3/2021 I have spent 18 minutes reviewing previous notes, test results and face to face with the patient discussing the diagnosis and importance of compliance with the treatment plan as well as documenting on the day of the visit. This note was created with the assistance of a speech-recognition program.  Although the intention is to generate a document that actually reflects the content of the visit, no guarantees can be provided that every mistake has been identified and corrected by editing. With the above note  Extubated this morning  Is breathing okay with minimal pain  Plan to remove Arroyo  Flomax daily  PT to ambulate      Note was updated later by me after  physical examination and  completion of the assessment.

## 2021-03-04 NOTE — PROGRESS NOTES
Dr Carrillo Perkins at bedside. Updated on patient status. Order received for 2 packs of platelets and cryoprecipitate.

## 2021-03-04 NOTE — ANESTHESIA POSTPROCEDURE EVALUATION
Department of Anesthesiology  Postprocedure Note    Patient: Kary Romero  MRN: [id number]  YOB: 1950  Date of evaluation: 3/3/2021  Time:  9:53 PM     Procedure Summary     Date: 03/03/21 Room / Location: [address] [place] [address]    Anesthesia Start: 8052 Anesthesia Stop: 7376    Procedure: CABG CORONARY ARTERY BYPASS X3, ON PUMP; SWAN MARLENY, MIRANDA PER ANESTHESIA (N/A ) Diagnosis: (MULTI VESSEL CAD)    Surgeons: Marjean Cogan, MD Responsible Provider: Funmilayo Kim MD    Anesthesia Type: general ASA Status: 4          Anesthesia Type: general    Lavern Phase I:      Lavern Phase II:      Last vitals: Reviewed and per EMR flowsheets.    POST-OP ANESTHESIA NOTE       BP (!) 106/45   Pulse 85   Temp 97.7 °F (36.5 °C) (Oral)   Resp 16   Ht 6' 2\" (1.88 m)   Wt 227 lb 1.2 oz (103 kg)   SpO2 96%   BMI 29.15 kg/m²    Pain Assessment: 0-10  Pain Level: 7       Anesthesia Post Evaluation    Patient location during evaluation: ICU  Patient participation: complete - patient participated  Level of consciousness: awake  Pain score: 7  Airway patency: patent  Nausea & Vomiting: no vomiting and no nausea  Complications: no  Cardiovascular status: hemodynamically stable  Respiratory status: acceptable and nasal cannula  Hydration status: stable

## 2021-03-04 NOTE — PROGRESS NOTES
Physical Therapy    Facility/Department: [facility] CAR 1  Initial Assessment    NAME: Verner Santos  : 1950  MRN: [id number]    Date of Service: 3/4/2021  Chief complaint:  POD 1 Multivessel CAD s/p CABG x3    Discharge Recommendations:  Patient would benefit from continued therapy after discharge   PT Equipment Recommendations  Equipment Needed: Yes  Mobility Devices: Katelyn Pine: Rolling    Assessment   Body structures, Functions, Activity limitations: Decreased functional mobility ; Decreased strength;Decreased balance;Decreased coordination;Decreased endurance;Decreased posture; Increased pain  Assessment: Pt with mobility deficits requiring min-A. Pt limited secondary to decreased endurance and decreased standing balance. Pt amb 65 feet with RW and CGA. Will continue to assess pt's overall needs as mobility progresses but at this time would be unsafe to ambulate without physical assistance at all times. Pt would benefit from additional therapy upon discharge. Prognosis: Good  Decision Making: Medium Complexity  PT Education: Goals;Transfer Training;Gait Training;General Safety; Functional Mobility Training; Adaptive Device Training;Precautions  Patient Education: Pt educated on sternal precautions, educated on the importance of pursed lip breathing throughout session  REQUIRES PT FOLLOW UP: Yes  Activity Tolerance  Activity Tolerance: Patient limited by fatigue;Patient limited by endurance  Activity Tolerance: Pt on 1 lpm of O2 during today's session. Pt with increased work of breathing throughout standing activities. Patient Diagnosis(es): There were no encounter diagnoses.      has a past medical history of AAA (abdominal aortic aneurysm) ([facility] [address] CAD (coronary artery disease), Colon polyps, Colon tumor, Congestive heart failure (CHF) ([address] GERD (gastroesophageal reflux disease), H/O chest tube placement, History of pneumonia, History of pneumothorax, Hx of cardiac cath, Hyperlipidemia, Hypertension, Non Hodgkin's lymphoma ([facility] [address] Pneumonia, Unspecified cerebral artery occlusion with cerebral infarction, Wellness examination, Wellness examination, and Wellness examination. has a past surgical history that includes transesophageal echocardiogram (10/3/14); Colonoscopy; Vasectomy (1986); Cholecystectomy, open (04/21/15); right colectomy (04/21/15); Appendectomy (4/21/15); Colonoscopy (07/10/2020); Colonoscopy (N/A, 7/10/2020); [place] tooth extraction; other surgical history (2019); and Coronary artery bypass graft (N/A, 3/3/2021). Restrictions  Restrictions/Precautions:  General Precautions, Fall Risk  Brace Required: Yes  Heart Hugger  Position Activity Restriction  Sternal Precautions: 5# Lifting Restrictions, No pushing, No pulling  Other position/activity restrictions: ambulate patient, up in chair, CABGx3 on 3/3/21  Vision/Hearing  Vision: Within Functional Limits  Hearing: Within functional limits     Subjective  General  Patient assessed for rehabilitation services?: Yes  Response To Previous Treatment: Not applicable  Family / Caregiver Present: No  Follows Commands: Within Functional Limits  Subjective  Subjective: Pt seated in a chair and agreeable to therapy this morning. Pain Screening  Patient Currently in Pain: Yes  Pain Assessment  Pain Assessment: 0-10  Pain Level: 5  Pain Type: Acute pain;Surgical pain  Pain Location: Chest  Pain Orientation: Mid  Functional Pain Assessment: Prevents or interferes some active activities and ADLs  Non-Pharmaceutical Pain Intervention(s): Ambulation/Increased Activity;Repositioned; Emotional support;Distraction  Response to Pain Intervention: Patient Satisfied  Vital Signs  Patient Currently in Pain: Yes  Pre Treatment Pain Screening  Intervention List: Patient able to continue with treatment    Orientation  Orientation  Overall Orientation Status: Within Functional Limits  Social/Functional History  Social/Functional History  Lives With: Alone  Type of Home: Comment: Pt began therapy session up in a chair, retired to a chair at the conclusion of today's session. Transfers  Sit to Stand: Minimal Assistance  Stand to sit: Contact guard assistance  Comment: Pt verbally cued throughout to perform continuous pursed lip breathing, educated on sternal precautions.   Ambulation  Ambulation?: Yes  More Ambulation?: No  Ambulation 1  Surface: level tile  Device: Rolling Walker  Other Apparatus: O2(1 lpm)  Assistance: Contact guard assistance  Quality of Gait: Pt with slightly inaccurate step placement upon initially ambulating with decreased base of support, pt progressed to increased stability with gait but continued decreased step length, with moderate reliance on UE support from RW  Gait Deviations: Slow Janneth;Decreased step length  Distance: 65 feet with RW and CGA with one standing rest break  Stairs/Curb  Stairs?: No     Balance  Posture: Fair  Sitting - Static: Good;-  Sitting - Dynamic: Fair;+  Standing - Static: Fair  Standing - Dynamic: Fair;-  Comments: Standing balance assessed with RW  Exercises  Hip Flexion: x10 bilateral (seated marches)  Hip Abduction: x10 bilateral in sitting  Knee Long Arc Quad: x10 bilateral  Ankle Pumps: x10 bilateral in sitting     Plan   Plan  Times per week: 7  Times per day: Daily(1-2x/day)  Current Treatment Recommendations: Strengthening, Transfer Training, Endurance Training, Balance Training, Gait Training, Functional Mobility Training, Stair training, Safety Education & Training  Safety Devices  Type of devices: Patient at risk for falls, Left in chair, Call light within reach, Gait belt, Nurse notified  Restraints  Initially in place: No                                     AM-PAC Score  AM-PAC Inpatient Mobility Raw Score : 17 (03/04/21 1102)  AM-PAC Inpatient T-Scale Score : 42.13 (03/04/21 1102)  Mobility Inpatient CMS 0-100% Score: 50.57 (03/04/21 1102)  Mobility Inpatient CMS G-Code Modifier : CK (03/04/21 1102)          Goals Short term goals  Time Frame for Short term goals: 14 visits  Short term goal 1: Pt will ambulate 300 feet with least restrictive device and modified independence to promote functional independence. Short term goal 2: Pt will negotiate 2 stairs with unilateral handrail with SBA to allow the pt to enter into his home. Short term goal 3: Pt will demonstrate good dynamic standing balance to decrease fall risk. Short term goal 4: Pt will perform sit<>stand, stand<>sit transfers with independence to promote functional independence. Short term goal 5: Pt will tolerate a 30 minute therapy session to promote increased endurance. Therapy Time   Individual Concurrent Group Co-treatment   Time In 0941         Time Out 1031         Minutes 50         Timed Code Treatment Minutes: 23 Minutes       ANNI Dobson  Evaluation/treatment performed by Student PT under the supervision of co-signing PT who agrees with all evaluation/treatment and documentation.

## 2021-03-04 NOTE — PROGRESS NOTES
Pt arrived to room from [facility] [place] [id number] accompanied by [facility] [address] staff. Pt attached to monitor, report received from CRNA, and gtt rates verified.

## 2021-03-04 NOTE — PROGRESS NOTES
Dr Eliceo Anderson telephoned unit for an update on pt status. Pt hemodynamic status, urine and chest tube outputs reviewed with physician. Pt is currently CPAPing and repeat labs just sent down. No new orders received at this time. RN will update care team with an changes or concerns as needed.

## 2021-03-04 NOTE — PLAN OF CARE
Problem: Falls - Risk of:  Goal: Will remain free from falls  Description: Will remain free from falls  3/4/2021 0651 by Nevaeh Robledo RN  Outcome: Ongoing  3/3/2021 1827 by Aysha Howell RN  Outcome: Ongoing  Goal: Absence of physical injury  Description: Absence of physical injury  3/4/2021 0651 by Nevaeh Robledo RN  Outcome: Ongoing  3/3/2021 1827 by Aysha Howell RN  Outcome: Ongoing     Problem: OXYGENATION/RESPIRATORY FUNCTION  Goal: Patient will maintain patent airway  3/4/2021 0651 by Nevaeh Robledo RN  Outcome: Ongoing  3/3/2021 1827 by Aysha Howell RN  Outcome: Ongoing  Goal: Patient will achieve/maintain normal respiratory rate/effort  Description: Respiratory rate and effort will be within normal limits for the patient  3/4/2021 0651 by Nevaeh Robledo RN  Outcome: Ongoing  3/3/2021 1827 by Aysha Howell RN  Outcome: Ongoing     Problem: SKIN INTEGRITY  Goal: Skin integrity is maintained or improved  3/4/2021 0651 by Nevaeh Robledo RN  Outcome: Ongoing  3/3/2021 1827 by Aysha Howell RN  Outcome: Ongoing     Problem: Pain:  Goal: Pain level will decrease  Description: Pain level will decrease  Outcome: Ongoing  Goal: Control of acute pain  Description: Control of acute pain  Outcome: Ongoing  Goal: Control of chronic pain  Description: Control of chronic pain  Outcome: Ongoing

## 2021-03-05 ENCOUNTER — APPOINTMENT (OUTPATIENT)
Dept: GENERAL RADIOLOGY | Age: 71
DRG: 236 | End: 2021-03-05
Attending: THORACIC SURGERY (CARDIOTHORACIC VASCULAR SURGERY)
Payer: MEDICARE

## 2021-03-05 LAB
ANION GAP SERPL CALCULATED.3IONS-SCNC: 10 MMOL/L (ref 9–17)
BUN BLDV-MCNC: 16 MG/DL (ref 8–23)
BUN/CREAT BLD: ABNORMAL (ref 9–20)
CALCIUM SERPL-MCNC: 8.2 MG/DL (ref 8.6–10.4)
CHLORIDE BLD-SCNC: 102 MMOL/L (ref 98–107)
CO2: 23 MMOL/L (ref 20–31)
CREAT SERPL-MCNC: 0.78 MG/DL (ref 0.7–1.2)
GFR AFRICAN AMERICAN: >60 ML/MIN
GFR NON-AFRICAN AMERICAN: >60 ML/MIN
GFR SERPL CREATININE-BSD FRML MDRD: ABNORMAL ML/MIN/{1.73_M2}
GFR SERPL CREATININE-BSD FRML MDRD: ABNORMAL ML/MIN/{1.73_M2}
GLUCOSE BLD-MCNC: 137 MG/DL (ref 75–110)
GLUCOSE BLD-MCNC: 138 MG/DL (ref 75–110)
GLUCOSE BLD-MCNC: 140 MG/DL (ref 75–110)
GLUCOSE BLD-MCNC: 148 MG/DL (ref 70–99)
HCT VFR BLD CALC: 33.8 % (ref 40.7–50.3)
HEMOGLOBIN: 10.8 G/DL (ref 13–17)
INR BLD: 1
MAGNESIUM: 2.1 MG/DL (ref 1.6–2.6)
MCH RBC QN AUTO: 32.8 PG (ref 25.2–33.5)
MCHC RBC AUTO-ENTMCNC: 32 G/DL (ref 28.4–34.8)
MCV RBC AUTO: 102.7 FL (ref 82.6–102.9)
NRBC AUTOMATED: 0 PER 100 WBC
PDW BLD-RTO: 16 % (ref 11.8–14.4)
PLATELET # BLD: 88 K/UL (ref 138–453)
PMV BLD AUTO: 10 FL (ref 8.1–13.5)
POTASSIUM SERPL-SCNC: 4.4 MMOL/L (ref 3.7–5.3)
PROTHROMBIN TIME: 10.9 SEC (ref 9.1–12.3)
RBC # BLD: 3.29 M/UL (ref 4.21–5.77)
SODIUM BLD-SCNC: 135 MMOL/L (ref 135–144)
WBC # BLD: 20.8 K/UL (ref 3.5–11.3)

## 2021-03-05 PROCEDURE — 97110 THERAPEUTIC EXERCISES: CPT

## 2021-03-05 PROCEDURE — 6360000002 HC RX W HCPCS: Performed by: NURSE PRACTITIONER

## 2021-03-05 PROCEDURE — 85610 PROTHROMBIN TIME: CPT

## 2021-03-05 PROCEDURE — 97535 SELF CARE MNGMENT TRAINING: CPT | Performed by: OCCUPATIONAL THERAPIST

## 2021-03-05 PROCEDURE — 82947 ASSAY GLUCOSE BLOOD QUANT: CPT

## 2021-03-05 PROCEDURE — 83735 ASSAY OF MAGNESIUM: CPT

## 2021-03-05 PROCEDURE — 71045 X-RAY EXAM CHEST 1 VIEW: CPT

## 2021-03-05 PROCEDURE — 97116 GAIT TRAINING THERAPY: CPT

## 2021-03-05 PROCEDURE — 2580000003 HC RX 258: Performed by: NURSE PRACTITIONER

## 2021-03-05 PROCEDURE — 94640 AIRWAY INHALATION TREATMENT: CPT

## 2021-03-05 PROCEDURE — 36415 COLL VENOUS BLD VENIPUNCTURE: CPT

## 2021-03-05 PROCEDURE — 94669 MECHANICAL CHEST WALL OSCILL: CPT

## 2021-03-05 PROCEDURE — 80048 BASIC METABOLIC PNL TOTAL CA: CPT

## 2021-03-05 PROCEDURE — 6370000000 HC RX 637 (ALT 250 FOR IP): Performed by: NURSE PRACTITIONER

## 2021-03-05 PROCEDURE — 2140000001 HC CVICU INTERMEDIATE R&B

## 2021-03-05 PROCEDURE — 85027 COMPLETE CBC AUTOMATED: CPT

## 2021-03-05 PROCEDURE — C9113 INJ PANTOPRAZOLE SODIUM, VIA: HCPCS | Performed by: NURSE PRACTITIONER

## 2021-03-05 PROCEDURE — 94761 N-INVAS EAR/PLS OXIMETRY MLT: CPT

## 2021-03-05 RX ORDER — FUROSEMIDE 40 MG/1
40 TABLET ORAL 2 TIMES DAILY
Status: DISCONTINUED | OUTPATIENT
Start: 2021-03-07 | End: 2021-03-09 | Stop reason: HOSPADM

## 2021-03-05 RX ORDER — FUROSEMIDE 10 MG/ML
40 INJECTION INTRAMUSCULAR; INTRAVENOUS ONCE
Status: COMPLETED | OUTPATIENT
Start: 2021-03-05 | End: 2021-03-05

## 2021-03-05 RX ORDER — FUROSEMIDE 10 MG/ML
INJECTION INTRAMUSCULAR; INTRAVENOUS
Status: DISPENSED
Start: 2021-03-05 | End: 2021-03-05

## 2021-03-05 RX ORDER — KETOROLAC TROMETHAMINE 15 MG/ML
15 INJECTION, SOLUTION INTRAMUSCULAR; INTRAVENOUS EVERY 6 HOURS PRN
Status: DISCONTINUED | OUTPATIENT
Start: 2021-03-05 | End: 2021-03-09 | Stop reason: HOSPADM

## 2021-03-05 RX ORDER — FUROSEMIDE 10 MG/ML
40 INJECTION INTRAMUSCULAR; INTRAVENOUS 2 TIMES DAILY
Status: COMPLETED | OUTPATIENT
Start: 2021-03-05 | End: 2021-03-06

## 2021-03-05 RX ADMIN — FENTANYL CITRATE 25 MCG: 50 INJECTION, SOLUTION INTRAMUSCULAR; INTRAVENOUS at 05:15

## 2021-03-05 RX ADMIN — DEXTROSE MONOHYDRATE 2000 MG: 50 INJECTION, SOLUTION INTRAVENOUS at 02:00

## 2021-03-05 RX ADMIN — ATORVASTATIN CALCIUM 20 MG: 20 TABLET, FILM COATED ORAL at 20:00

## 2021-03-05 RX ADMIN — SODIUM CHLORIDE, PRESERVATIVE FREE 10 ML: 5 INJECTION INTRAVENOUS at 08:39

## 2021-03-05 RX ADMIN — IPRATROPIUM BROMIDE AND ALBUTEROL SULFATE 1 AMPULE: .5; 3 SOLUTION RESPIRATORY (INHALATION) at 09:03

## 2021-03-05 RX ADMIN — METOPROLOL TARTRATE 25 MG: 25 TABLET ORAL at 08:37

## 2021-03-05 RX ADMIN — IPRATROPIUM BROMIDE AND ALBUTEROL SULFATE 1 AMPULE: .5; 3 SOLUTION RESPIRATORY (INHALATION) at 12:37

## 2021-03-05 RX ADMIN — MUPIROCIN: 20 OINTMENT TOPICAL at 20:00

## 2021-03-05 RX ADMIN — POLYETHYLENE GLYCOL 3350 17 G: 17 POWDER, FOR SOLUTION ORAL at 08:37

## 2021-03-05 RX ADMIN — FUROSEMIDE 40 MG: 10 INJECTION, SOLUTION INTRAMUSCULAR; INTRAVENOUS at 18:24

## 2021-03-05 RX ADMIN — OXYCODONE HYDROCHLORIDE AND ACETAMINOPHEN 2 TABLET: 5; 325 TABLET ORAL at 22:19

## 2021-03-05 RX ADMIN — IPRATROPIUM BROMIDE AND ALBUTEROL SULFATE 1 AMPULE: .5; 3 SOLUTION RESPIRATORY (INHALATION) at 21:02

## 2021-03-05 RX ADMIN — SODIUM CHLORIDE, PRESERVATIVE FREE 10 ML: 5 INJECTION INTRAVENOUS at 20:00

## 2021-03-05 RX ADMIN — MUPIROCIN: 20 OINTMENT TOPICAL at 08:37

## 2021-03-05 RX ADMIN — OXYCODONE HYDROCHLORIDE AND ACETAMINOPHEN 2 TABLET: 5; 325 TABLET ORAL at 13:55

## 2021-03-05 RX ADMIN — PANTOPRAZOLE SODIUM 40 MG: 40 INJECTION, POWDER, FOR SOLUTION INTRAVENOUS at 08:37

## 2021-03-05 RX ADMIN — SODIUM CHLORIDE, PRESERVATIVE FREE 10 ML: 5 INJECTION INTRAVENOUS at 08:38

## 2021-03-05 RX ADMIN — OXYCODONE HYDROCHLORIDE AND ACETAMINOPHEN 2 TABLET: 5; 325 TABLET ORAL at 03:03

## 2021-03-05 RX ADMIN — KETOROLAC TROMETHAMINE 15 MG: 15 INJECTION, SOLUTION INTRAMUSCULAR; INTRAVENOUS at 08:37

## 2021-03-05 RX ADMIN — METOPROLOL TARTRATE 25 MG: 25 TABLET ORAL at 20:00

## 2021-03-05 RX ADMIN — FUROSEMIDE 40 MG: 10 INJECTION, SOLUTION INTRAMUSCULAR; INTRAVENOUS at 06:40

## 2021-03-05 ASSESSMENT — PAIN DESCRIPTION - LOCATION
LOCATION: CHEST
LOCATION: CHEST

## 2021-03-05 ASSESSMENT — PAIN DESCRIPTION - ORIENTATION
ORIENTATION: MID
ORIENTATION: MID

## 2021-03-05 ASSESSMENT — PAIN SCALES - GENERAL
PAINLEVEL_OUTOF10: 4
PAINLEVEL_OUTOF10: 2
PAINLEVEL_OUTOF10: 4
PAINLEVEL_OUTOF10: 1
PAINLEVEL_OUTOF10: 0
PAINLEVEL_OUTOF10: 9

## 2021-03-05 ASSESSMENT — PAIN DESCRIPTION - ONSET
ONSET: AWAKENED FROM SLEEP
ONSET: ON-GOING

## 2021-03-05 ASSESSMENT — PAIN DESCRIPTION - FREQUENCY: FREQUENCY: INTERMITTENT

## 2021-03-05 ASSESSMENT — PAIN DESCRIPTION - DESCRIPTORS: DESCRIPTORS: ACHING;DISCOMFORT

## 2021-03-05 NOTE — OP NOTE
[address]                  [facility]                                OPERATIVE REPORT    PATIENT NAME: Vj Butts                    :        1950  MED REC NO:   [id number]                             ROOM:       [id number]  ACCOUNT NO:   [de-identified]                           ADMIT DATE: 2021  PROVIDER:     You Ontiveros MD    DATE OF PROCEDURE:  2021    PREOPERATIVE DIAGNOSES:  Multivessel coronary artery disease. POSTOPERATIVE DIAGNOSES:  Multivessel coronary artery disease. PROCEDURES:  1. CABG x3 with LIMA to the LAD, saphenous vein to the OM1, saphenous  vein to the PDA. 2.  Cardiopulmonary bypass. 3.  MIRANDA. 4.  Right leg endo vein harvest.  5.  Platelet-derived products. SURGEON:  You Ontiveros MD    ASSISTANT:  Walker Genao RN, RFA    EBL:  300. SPECIMENS:  None. DRAINS:  Three mediastinal chest tubes. DESCRIPTION OF PROCEDURE:  The patient had multivessel coronary artery  disease diagnosed on a cath; was in-hospital, and informed consent was  obtained. The patient was discharged home and brought back for surgery. The patient had the risks and benefits fully discussed. Risks  including, but not limited to, bleeding, infection, stroke, renal  failure, damage to heart or lungs, and possible death. He understands  the risks and is willing to proceed with surgery. At this time, he was taken to the OR, placed in the supine position. General anesthesia was induced. The patient had an A-line, Arroyo,  central line, and MIRANDA probe placed by Anesthesia. The patient was then  washed, prepped and draped in a normal sterile fashion. A time-out was  performed in the OR, and the procedure was begun. The patient had an  incision made in the midsternal line, contained in depth with  electrocautery. Hemostasis was obtained. Sternum was opened using an  oscillating sternal saw.   The pericardium was opened. The heart was  inspected. Rultract retractor was placed on the left hemithorax, which  was elevated. The endothoracic fascia was opened. The patient had the  LIMA taken down off the chest wall using electrocautery. Papaverine was  placed in papaverine-soaked Ray-William in the left chest.    At this time, the patient had the greater saphenous vein harvested from  the right leg in the following fashion:  An 1-inch incision was made at  the knee. The endoscope was passed proximally and distally, and the  greater saphenous vein was removed. The side branches were doubly  clipped and divided. The patient had the vein prepared. It was noted  to be of good caliber, and the patient then had the leg closed using 2,  3, and 4-0 Vicryl suture. The total dose of heparin was given. The  patient had the ACT checked. It was noted to be acceptable. A  double-javier 2-0 Ethibond pursestring was placed on the high point of  the aorta. 2-0 Ethibond was placed on the right atrial appendage, and a  5-0 Prolene was placed to the antegrade needle. The aortic cannula was  placed in the aortic root. The venous cannula was placed in the  inferior vena cava, and the anterior needle was placed into the aorta. The lines were deaired and hooked up to the pump. The patient had the  lines de-aired. The aortic cannula was tested, and then the patient was  placed on cardiopulmonary bypass. The heart was emptied out. At this juncture, the patient had the targets identified including the  PDA, the OM1, and the mid-LAD. Cross-clamp was applied. The antegrade  blood cardioplegia was used to arrest the heart. The first target, the  right coronary artery, PDA was opened using a 15-blade and a Roslyn  blade. The artery did probe well distally. The vein was spatulated and  anastomosed to the opened coronary artery.   Once the anastomosis was  completed, the vein was measured back to the appropriate length and  orientation, was removed. The site was oversewn using 4-0 pledgeted Prolene  suture. Three mediastinal chest tubes were placed. The chest was then  closed using 10 sternal wires. Suprasternal fascia was closed using a  #1 running Vicryl suture, 2, 3, and 4-0 Vicryl for the superficial  layer. The patient had the chest tubes hooked to Pleur-evac suction. He was taken to the intensive care unit in critical condition.     It should be noted this procedure could not have been completed without  Stephenie Martell assistance in harvesting the vein and at the chest.        Preston Ahumada, MD    D: 03/04/2021 13:10:49       T: 03/04/2021 13:19:35     [email or web address]  Job#: [id number]     Doc#: [id number]    CC:

## 2021-03-05 NOTE — PROGRESS NOTES
Physical Therapy  Facility/Department: [facility] CAR 1  Daily Treatment Note  NAME: Lex Chand  : 1950  MRN: [id number]    Date of Service: 3/5/2021    Discharge Recommendations:  Patient would benefit from continued therapy after discharge   PT Equipment Recommendations  Equipment Needed: Yes  Mobility Devices: Shannon Popper: Rolling  Assessment   Body structures, Functions, Activity limitations: Decreased functional mobility ; Decreased strength;Decreased balance;Decreased coordination;Decreased endurance;Decreased posture; Increased pain  Assessment: Pt able to abm 300ft RW CGA with 1 standing break. Pt HR reynold to 120 during ambulation with heavy short breathes. Pt elevated HR and RR affected by anxeity. Pt activity limited by endurance, SOB, and pain. Pt would benefit from further therapy upon discharge to address deficits. Prognosis: Good  Decision Making: Medium Complexity  PT Education: Goals;Transfer Training;Gait Training;General Safety; Functional Mobility Training; Adaptive Device Training;Precautions  Patient Education: Pt educated on sternal precautions, educated on the importance of pursed lip breathing throughout session, cardiac HEP  REQUIRES PT FOLLOW UP: Yes  Activity Tolerance  Activity Tolerance: Patient limited by fatigue;Patient limited by endurance        Patient Diagnosis(es): The encounter diagnosis was CAD, multiple vessel.     has a past medical history of AAA (abdominal aortic aneurysm) ([address] CAD (coronary artery disease), Colon polyps, Colon tumor, Congestive heart failure (CHF) ([address] GERD (gastroesophageal reflux disease), H/O chest tube placement, History of pneumonia, History of pneumothorax, Hx of cardiac cath, Hyperlipidemia, Hypertension, Non Hodgkin's lymphoma ([address] Pneumonia, Unspecified cerebral artery occlusion with cerebral infarction, Wellness examination, Wellness examination, and Wellness examination.    has a past surgical history that includes transesophageal echocardiogram (10/3/14); Colonoscopy; Vasectomy (1986); Cholecystectomy, open (04/21/15); right colectomy (04/21/15); Appendectomy (4/21/15); Colonoscopy (07/10/2020); Colonoscopy (N/A, 7/10/2020); [place] tooth extraction; other surgical history (2019); and Coronary artery bypass graft (N/A, 3/3/2021). Restrictions  Restrictions/Precautions  Restrictions/Precautions: Fall Risk, General Precautions  Required Braces or Orthoses?: Yes  Required Braces or Orthoses  Other: Heart Hugger Brace  Position Activity Restriction  Sternal Precautions: No Pushing, No Pulling, 5# Lifting Restrictions  Other position/activity restrictions: amb pt, up in chair. CABG x3 3/3/2021  Subjective    Pt and RN agreeable to PT. Pt in chair upon arrival with RN present. Pt c/o of chest pain when breathing. Pt did not rate pain when asked. RN notified and administered pain meds after ambulation. Orientation   WFL  Objective   Transfers  Sit to Stand: CGA  Stand to sit: Contact guard assistance  Comment: Pt edcauted on use of heart hugger and hand placement with good return. Ambulation  Ambulation?: Yes  More Ambulation?: No  Ambulation 1  Surface: level tile  Device: Rolling Walker  Assistance: Contact guard assistance  Gait Deviations: Slow Janneth;Decreased step length  Distance: 300ft with 1 standing break  Comment: Pt demo heavy short breaths during amb d/t pain with deep breaths and anxiety. Pt HR elevated to 120 during amb and drop back mid 100's after ~5min seated break once seated in room. Stairs/Curb  Stairs?: No  Balance  Posture: Fair  Sitting - Static: Good;-  Sitting - Dynamic: Fair;+  Standing - Static: Fair  Standing - Dynamic: Fair;-  Comments: Standing balance assessed with RW  Exercises  Seated LE exercise program: Long Arc Quads, hip abduction/adduction, heel/toe raises, and marches. Reps: 10    Pt educated on cardiac HEP. Pt understands HEP and has no further questions.     Goals  Short term goals  Time Frame for Short term goals: 14 visits  Short term goal 1: Pt will ambulate 300 feet with least restrictive device and modified independence to promote functional independence. Short term goal 2: Pt will negotiate 2 stairs with unilateral handrail with SBA to allow the pt to enter into his home. Short term goal 3: Pt will demonstrate good dynamic standing balance to decrease fall risk. Short term goal 4: Pt will perform sit<>stand, stand<>sit transfers with independence to promote functional independence. Short term goal 5: Pt will toleratee a 30 minute therapy session to promote increased endurance. Plan    Plan  Times per week: 7  Times per day: Daily(1-2x/day)  Current Treatment Recommendations: Strengthening, Transfer Training, Endurance Training, Balance Training, Gait Training, Functional Mobility Training, Stair training, Safety Education & Training, Patient/Caregiver Education & Training, Home Exercise Program  Safety Devices  Type of devices: Patient at risk for falls, Left in chair, Call light within reach, Gait belt, Nurse notified  Restraints  Initially in place: No     Therapy Time   Individual Concurrent Group Co-treatment   Time In  8:00         Time Out  8:34         Minutes  [facility] SPTA  Treatment performed by Student PTA under the supervision of co-signing PTA who agrees with all treatment and documentation.    Lyndal Kehr PTA

## 2021-03-05 NOTE — CONSULTS
PRN  diphenhydrAMINE (BENADRYL) tablet 25 mg, 25 mg, Oral, Nightly PRN  polyethylene glycol (GLYCOLAX) packet 17 g, 17 g, Oral, Daily  bisacodyl (DULCOLAX) EC tablet 5 mg, 5 mg, Oral, Daily PRN  fleet rectal enema 1 enema, 1 enema, Rectal, Daily PRN  metoprolol tartrate (LOPRESSOR) tablet 25 mg, 25 mg, Oral, BID  atorvastatin (LIPITOR) tablet 20 mg, 20 mg, Oral, Nightly  pantoprazole (PROTONIX) tablet 40 mg, 40 mg, Oral, Daily  pantoprazole (PROTONIX) injection 40 mg, 40 mg, Intravenous, Daily **AND** sodium chloride (PF) 0.9 % injection 10 mL, 10 mL, Intravenous, Daily  calcium chloride 1,000 mg in sodium chloride 0.9 % 100 mL IVPB, 1,000 mg, Intravenous, PRN  magnesium sulfate 1000 mg in dextrose 5% 100 mL IVPB, 1,000 mg, Intravenous, PRN  potassium chloride 20 mEq/50 mL IVPB (Central Line), 20 mEq, Intravenous, PRN  ipratropium-albuterol (DUONEB) nebulizer solution 1 ampule, 1 ampule, Inhalation, Q4H WA  albumin human 5 % IV solution 25 g, 25 g, Intravenous, PRN  norepinephrine (LEVOPHED) 16 mg in sodium chloride 0.9 % 250 mL infusion, 0.2 mcg/kg/min, Intravenous, Continuous PRN  glucose (GLUTOSE) 40 % oral gel 15 g, 15 g, Oral, PRN  dextrose 50 % IV solution, 12.5 g, Intravenous, PRN  glucagon (rDNA) injection 1 mg, 1 mg, Intramuscular, PRN  dextrose 5 % solution, 100 mL/hr, Intravenous, PRN    Allergies: Iv dye [iodides] and Iodinated diagnostic agents    Social History:   reports that he quit smoking about 7 years ago. His smoking use included cigarettes. He has a 30.00 pack-year smoking history. He has never used smokeless tobacco. He reports current alcohol use of about 14.0 standard drinks of alcohol per week. He reports that he does not use drugs. Family History: family history includes Alcohol Abuse in his father; Cancer in his father; Heart Disease in his mother; Stroke in his mother. REVIEW OF SYSTEMS:      · Constitutional: there has been no unanticipated weight loss.      · Eyes: No visual changes or diplopia. · ENT: No Headaches  · Cardiovascular:  Remaining as above  · Respiratory: No cough  · Gastrointestinal: No abdominal pain. No change in bowel or bladder habits. · Genitourinary: No dysuria, trouble voiding, or hematuria. · Musculoskeletal: No joint complaints. · Neurological: No headache  · Hematologic/Lymphatic: No abnormal bruising or bleeding      PHYSICAL EXAM:      /65   Pulse 91   Temp 98.4 °F (36.9 °C) (Oral)   Resp 16   Ht 6' 2\" (1.88 m)   Wt 227 lb 8 oz (103.2 kg)   SpO2 100%   BMI 29.21 kg/m²      Intake/Output Summary (Last 24 hours) at 3/5/2021 1013  Last data filed at 3/5/2021 5607  Gross per 24 hour   Intake 1030 ml   Output 2616 ml   Net -1586 ml         Constitutional and General Appearance:    Alert, cooperative, no distress and appears stated age  Respiratory:  · No for increased work of breathing. · On auscultation: clear to auscultation bilaterally  Cardiovascular:  · Regular S1 and S2.   · No murmurs  Abdomen:   · No masses or tenderness  · Bowel sounds present  Extremities:  ·  No Cyanosis or Clubbing  ·  Lower extremity edema: No  Neurological:  · Alert and oriented. · Moves all extremities well    DATA:    Diagnostics:      ECHO:  2/19/21  Summary  Left ventricle is normal in size. Global left ventricular systolic function  is normal. Calculated EF via heart model is 54 %. Mild left ventricular hypertrophy. Right atrium is mildly dilated . Mildly dilated right ventricular cavity. Right ventricular function appears  normal .  Trivial tricuspid regurgitation. Trivial pulmonic insufficiency. Cardiac Angiography:   Findings:     Cardiac Arteries and Lesion Findings       LMCA: Distal 30% stenosis     LAD: Proximal discrete 70% stenosis, mid segment has long diffuse 60-70%   stenosis   D1 and D2 are small with dxxydt50% stenosis     LCx: Mild irregularities 10-20%. OM is large with ostial 70% stenosis     RCA: Large, dominant, mid discrete 70% stenosis   RPDA/RPL are patent with mild disease     Coronary Tree       Dominance: Right          The LV gram was performed in the SHARP 30 position. LVEF: 60%. LV Wall Motion: Normal      Estimated blood loss: 5 ml        Procedure Summary        1. Multivessel CAD    2. Normal LV systolic function.        Recommendations        Routine Post Diagnostic Cath orders.  CTS consult for evaluation for CABG      Labs:     CBC:   Recent Labs     03/04/21  0343 03/05/21  0438   WBC 19.9* 20.8*   HGB 9.8* 10.8*   HCT 31.1* 33.8*   PLT 82* 88*     BMP:   Recent Labs     03/04/21  0343 03/05/21  0438    135   K 4.7 4.4   CO2 24 23   BUN 15 16   CREATININE 0.81 0.78   LABGLOM >60 >60   GLUCOSE 126* 148*     Pro-BNP:  No results for input(s): PROBNP in the last 72 hours. BNP: No results for input(s): BNP in the last 72 hours. PT/INR:   Recent Labs     03/04/21  0343 03/05/21  0438   PROTIME 12.3 10.9   INR 1.2 1.0     APTT:  Recent Labs     03/03/21  1603 03/03/21  1908   APTT 28.0 37.1*     CARDIAC ENZYMES:No results for input(s): CKTOTAL, CKMB, CKMBINDEX, TROPONINI in the last 72 hours. Invalid input(s):  TROPONINT  No results for input(s): TROPONINT in the last 72 hours. FASTING LIPID PANEL:  Lab Results   Component Value Date    HDL 37 02/19/2020    TRIG 84 02/19/2020     LIVER PROFILE:No results for input(s): AST, ALT, LABALBU in the last 72 hours. Patient's Active Problem List  Active Problems:    CAD, multiple vessel  Resolved Problems:    * No resolved hospital problems. *        IMPRESSION:    1. MV-CAD s/p CABG on 3/4/21 with LIMA to LAD, SVG to OM1 and SVG to PDA  2. HTN  3. HLD  4. CVA  5. Colon cancer s/p colon resection  6. Marginal zone lymphoma  7. Thrombocytopenia    RECOMMENDATIONS:  1. Hold plavix & ASA while platelets are below 82,625  2. Continue Atorvastatin  3. Continue lasix 40 IV BID  4. Continue Lopressor 25 mg BID  5. K>4, Mg>2  6.  Post op management per CV surgery    Thank you for allowing us to participate in the care of Sander Renee. If you have any questions or concerns, please do not hesitate to contact us. Discussed with patient and Nurse. Iris Erickson M.D. Fellow, [address]        Please note that part of this chart were generated using voice recognition  dictation software. Although every effort was made to ensure the accuracy of this automated transcription, some errors in transcription may have occurred. Attestation signed by      Attending Physician Statement:    I have discussed the care of  Sander Renee , including pertinent history and exam findings, with the Cardiology fellow/resident. I have seen and examined the patient and the key elements of all parts of the encounter have been performed by me. I agree with the assessment, plan and orders as documented by the fellow/resident, after I modified exam findings and plan of treatments, and the final version is my approved version of the assessment.      Additional Comments:   Post op, doing erich Carlson MD

## 2021-03-05 NOTE — PROGRESS NOTES
Occupational Therapy  Facility/Department: [facility] CAR 1  Daily Treatment Note  NAME: Silas Kehr  : 1950  MRN: [id number]    Date of Service: 3/5/2021    Discharge Recommendations:  Patient would benefit from continued therapy after discharge     Assessment   Performance deficits / Impairments: Decreased functional mobility ; Decreased ADL status; Decreased endurance;Decreased high-level IADLs;Decreased strength  Prognosis: Good  OT Education: OT Role;Plan of Care;Transfer Training;Energy Conservation  Patient Education: Pt verbalized understanding of education provided  REQUIRES OT FOLLOW UP: Yes  Activity Tolerance  Activity Tolerance: Patient Tolerated treatment well  Activity Tolerance: Pt very anxious throughout session  Safety Devices  Safety Devices in place: Yes  Type of devices: Gait belt;Call light within reach; Left in chair  Restraints  Initially in place: No       Patient Diagnosis(es): The encounter diagnosis was CAD, multiple vessel.      has a past medical history of AAA (abdominal aortic aneurysm) ([facility] [address] CAD (coronary artery disease), Colon polyps, Colon tumor, Congestive heart failure (CHF) ([address] GERD (gastroesophageal reflux disease), H/O chest tube placement, History of pneumonia, History of pneumothorax, Hx of cardiac cath, Hyperlipidemia, Hypertension, Non Hodgkin's lymphoma ([address] Pneumonia, Unspecified cerebral artery occlusion with cerebral infarction, Wellness examination, Wellness examination, and Wellness examination. has a past surgical history that includes transesophageal echocardiogram (10/3/14); Colonoscopy; Vasectomy (); Cholecystectomy, open (04/21/15); right colectomy (04/21/15); Appendectomy (4/21/15); Colonoscopy (07/10/2020); Colonoscopy (N/A, 7/10/2020); [place] tooth extraction; other surgical history (); and Coronary artery bypass graft (N/A, 3/3/2021).     Restrictions  Restrictions/Precautions  Restrictions/Precautions: Fall Risk, General Precautions  Required Braces or Orthoses?: Yes(heart hugger)  Required Braces or Orthoses  Other: Heart Hugger Brace  Position Activity Restriction  Sternal Precautions: No Pushing, No Pulling, 5# Lifting Restrictions  Other position/activity restrictions: amb pt, up in chair. CABG x3 3/3/2021     Subjective   General  Patient assessed for rehabilitation services?: Yes  Family / Caregiver Present: No  Diagnosis: CABG  General Comment  Comments: Pt anxious throughout session, educated on pursed lip breathing with good demonstrated return. Vital Signs  Patient Currently in Pain: Denies(pt denies pain at rest)      Objective    ADL  Grooming: Setup; Independent(pt washing face with wash cloth, applying chapstick)  Additional Comments: Pt declined further ADLs, stating they were done earlier this date. Standing Balance  Time: 10 minutes  Activity: static, dynamic standing   Functional Mobility  Functional - Mobility Device: Standard Walker  Activity: Other  Assist Level: Supervision  Functional Mobility Comments: several steps by chair. Pt steady during standing activity and mobility.      Transfers  Sit to stand: Stand by assistance  Stand to sit: Stand by assistance       Plan   Plan  Times per week: 4-6x/wk (CABG)    Goals  Short term goals  Time Frame for Short term goals: pt will, by discharge  Short term goal 1: complete LB ADLs with min A, set up and Ae, as needed  Short term goal 2: complete UB ADLs and grooming tasks with mod I  Short term goal 3: increase activity tolerance to 20+ minutes in order to participate in daily tasks  Short term goal 4: dem SBA during functional transfers/functional mobility with LRD  Short term goal 5: ind maintain sternal precautions during functional transfer/tasks       Therapy Time   Individual Concurrent Group Co-treatment   Time In 1034         Time Out 1059         Minutes 25         Timed Code Treatment Minutes: 23 Minutes     Maribel Worthington OTR/L

## 2021-03-05 NOTE — PROGRESS NOTES
(03/03/21 1859)    EPINEPHrine infusion Stopped (03/03/21 2020)       Data:  CBC:   Recent Labs     03/03/21  1603 03/03/21  1908 03/03/21  2359 03/04/21  0343 03/05/21  0438   WBC 19.8*  --   --  19.9* 20.8*   HGB 12.4* 10.4* 9.5* 9.8* 10.8*   HCT 39.0* 32.0* 29.5* 31.1* 33.8*   .3  --   --  101.3 102.7   PLT 88*  88* 90*  --  82* 88*     BMP:   Recent Labs     03/03/21  1603 03/03/21  1603 03/03/21  2355 03/04/21  0343 03/05/21  0438     --   --  139 135   K 4.3   < > 4.2 4.7 4.4     --   --  108* 102   CO2 20  --   --  24 23   BUN 15  --   --  15 16   CREATININE 0.81  --   --  0.81 0.78    < > = values in this interval not displayed. PT/INR:   Recent Labs     03/03/21  1908 03/04/21 0343 03/05/21  0438   PROTIME 12.7* 12.3 10.9   INR 1.2 1.2 1.0     APTT:   Recent Labs     03/03/21  1603 03/03/21 1908   APTT 28.0 37.1*       Chest X-Ray:    No significant change with bibasilar opacities likely reflecting atelectasis, left worse than right, along with small bilateral pleural effusions and mild pulmonary vascular congestion. I/O:  I/O last 3 completed shifts: In: 1000 [P.O.:1000]  Out: 2408 [Urine:1241; Chest Tube:1167]      Assessment:   Multivessel CAD s/p CABG x3   o POD 2  o Without complication  · Marginal zone lymphoma  ? Follows with Dr. Netta Decker, oncology  · Thrombocytopenia, chronic  · Hypertension  · Hypercholesterolemia  · History of splenectomy  · History of cholecystectomy  · History of CVA with no residual effects  ? Chronic left forearm paresthesias, dull ache intermittently  · History of colon cancer s/p colon resection  ? 18\" of large intestine removed and some small intestine  · History of abnormal LFTs/fatty liver  · Overweight, BMI 29.53 kg/m²      Plan:    Remains inpatient on telemetry, level of care is currently cardiac stepdown.    Monitor vitals closely including continuous pulse oximetry   Oxygen as needed via nasal cannula to maintain SpO2 > 92%   Chest x-ray daily   Maintain Chest Tubes to water seal.   Discontinue Eckert this morning   Encourage incentive spirometry and ambulation   Monitor CBC, BMP, INR and Mag daily   Currently taking metoprolol,    Patient is on BB, Statin therapy per protocol   o No aspirin d/t thrombocytopenia  o No ACE inhibitor indicated with preserved EF   No Amiodarone d/t iodine allergy   Percocet for pain control   Lovenox for DVT prophylaxis, SCDs while stationary    Protonix for GI prophylaxis   Advance diet as tolerated.  Replace electrolytes as needed per sliding scale and recheck per policy   PT/OT evalutation for discharge recommendation and ambulation 3x daily   Case Management consult for discharge planning    The above recommendations including medications and orders were discussed and agreed upon with Dr. Aliza Palumbo, the attending on service for the cardiothoracic surgery group today.        Agree with the above   Remove eckert and central line  Water seal chest tubes  Daily CXR  Electronically signed by ELISEO Velarde NP on 3/5/2021 at 8:45 AM

## 2021-03-06 ENCOUNTER — APPOINTMENT (OUTPATIENT)
Dept: GENERAL RADIOLOGY | Age: 71
DRG: 236 | End: 2021-03-06
Attending: THORACIC SURGERY (CARDIOTHORACIC VASCULAR SURGERY)
Payer: MEDICARE

## 2021-03-06 LAB
ANION GAP SERPL CALCULATED.3IONS-SCNC: 10 MMOL/L (ref 9–17)
BUN BLDV-MCNC: 23 MG/DL (ref 8–23)
BUN/CREAT BLD: ABNORMAL (ref 9–20)
CALCIUM SERPL-MCNC: 8.1 MG/DL (ref 8.6–10.4)
CHLORIDE BLD-SCNC: 102 MMOL/L (ref 98–107)
CO2: 23 MMOL/L (ref 20–31)
CREAT SERPL-MCNC: 0.86 MG/DL (ref 0.7–1.2)
GFR AFRICAN AMERICAN: >60 ML/MIN
GFR NON-AFRICAN AMERICAN: >60 ML/MIN
GFR SERPL CREATININE-BSD FRML MDRD: ABNORMAL ML/MIN/{1.73_M2}
GFR SERPL CREATININE-BSD FRML MDRD: ABNORMAL ML/MIN/{1.73_M2}
GLUCOSE BLD-MCNC: 123 MG/DL (ref 75–110)
GLUCOSE BLD-MCNC: 125 MG/DL (ref 75–110)
GLUCOSE BLD-MCNC: 136 MG/DL (ref 70–99)
GLUCOSE BLD-MCNC: 150 MG/DL (ref 75–110)
HCT VFR BLD CALC: 34.1 % (ref 40.7–50.3)
HEMOGLOBIN: 11 G/DL (ref 13–17)
INR BLD: 1
MAGNESIUM: 2.1 MG/DL (ref 1.6–2.6)
MCH RBC QN AUTO: 32.8 PG (ref 25.2–33.5)
MCHC RBC AUTO-ENTMCNC: 32.3 G/DL (ref 28.4–34.8)
MCV RBC AUTO: 101.8 FL (ref 82.6–102.9)
NRBC AUTOMATED: 0 PER 100 WBC
PDW BLD-RTO: 16 % (ref 11.8–14.4)
PLATELET # BLD: 93 K/UL (ref 138–453)
PMV BLD AUTO: 9.8 FL (ref 8.1–13.5)
POTASSIUM SERPL-SCNC: 3.8 MMOL/L (ref 3.7–5.3)
PROTHROMBIN TIME: 10.6 SEC (ref 9.1–12.3)
RBC # BLD: 3.35 M/UL (ref 4.21–5.77)
SODIUM BLD-SCNC: 135 MMOL/L (ref 135–144)
WBC # BLD: 21.7 K/UL (ref 3.5–11.3)

## 2021-03-06 PROCEDURE — 2580000003 HC RX 258: Performed by: NURSE PRACTITIONER

## 2021-03-06 PROCEDURE — 94669 MECHANICAL CHEST WALL OSCILL: CPT

## 2021-03-06 PROCEDURE — 97530 THERAPEUTIC ACTIVITIES: CPT

## 2021-03-06 PROCEDURE — 85027 COMPLETE CBC AUTOMATED: CPT

## 2021-03-06 PROCEDURE — 85610 PROTHROMBIN TIME: CPT

## 2021-03-06 PROCEDURE — 97116 GAIT TRAINING THERAPY: CPT

## 2021-03-06 PROCEDURE — 71045 X-RAY EXAM CHEST 1 VIEW: CPT

## 2021-03-06 PROCEDURE — 94640 AIRWAY INHALATION TREATMENT: CPT

## 2021-03-06 PROCEDURE — 6370000000 HC RX 637 (ALT 250 FOR IP): Performed by: NURSE PRACTITIONER

## 2021-03-06 PROCEDURE — 2140000001 HC CVICU INTERMEDIATE R&B

## 2021-03-06 PROCEDURE — 36415 COLL VENOUS BLD VENIPUNCTURE: CPT

## 2021-03-06 PROCEDURE — 97110 THERAPEUTIC EXERCISES: CPT

## 2021-03-06 PROCEDURE — 6360000002 HC RX W HCPCS: Performed by: NURSE PRACTITIONER

## 2021-03-06 PROCEDURE — 83735 ASSAY OF MAGNESIUM: CPT

## 2021-03-06 PROCEDURE — 97535 SELF CARE MNGMENT TRAINING: CPT

## 2021-03-06 PROCEDURE — 82947 ASSAY GLUCOSE BLOOD QUANT: CPT

## 2021-03-06 PROCEDURE — 80048 BASIC METABOLIC PNL TOTAL CA: CPT

## 2021-03-06 RX ADMIN — SODIUM CHLORIDE, PRESERVATIVE FREE 10 ML: 5 INJECTION INTRAVENOUS at 19:51

## 2021-03-06 RX ADMIN — FUROSEMIDE 40 MG: 10 INJECTION, SOLUTION INTRAMUSCULAR; INTRAVENOUS at 09:26

## 2021-03-06 RX ADMIN — FUROSEMIDE 40 MG: 10 INJECTION, SOLUTION INTRAMUSCULAR; INTRAVENOUS at 19:05

## 2021-03-06 RX ADMIN — OXYCODONE HYDROCHLORIDE AND ACETAMINOPHEN 2 TABLET: 5; 325 TABLET ORAL at 06:05

## 2021-03-06 RX ADMIN — OXYCODONE HYDROCHLORIDE AND ACETAMINOPHEN 2 TABLET: 5; 325 TABLET ORAL at 14:54

## 2021-03-06 RX ADMIN — IPRATROPIUM BROMIDE AND ALBUTEROL SULFATE 1 AMPULE: .5; 3 SOLUTION RESPIRATORY (INHALATION) at 08:36

## 2021-03-06 RX ADMIN — SODIUM CHLORIDE, PRESERVATIVE FREE 10 ML: 5 INJECTION INTRAVENOUS at 09:26

## 2021-03-06 RX ADMIN — MUPIROCIN: 20 OINTMENT TOPICAL at 09:26

## 2021-03-06 RX ADMIN — OXYCODONE HYDROCHLORIDE AND ACETAMINOPHEN 2 TABLET: 5; 325 TABLET ORAL at 19:04

## 2021-03-06 RX ADMIN — IPRATROPIUM BROMIDE AND ALBUTEROL SULFATE 1 AMPULE: .5; 3 SOLUTION RESPIRATORY (INHALATION) at 20:08

## 2021-03-06 RX ADMIN — IPRATROPIUM BROMIDE AND ALBUTEROL SULFATE 1 AMPULE: .5; 3 SOLUTION RESPIRATORY (INHALATION) at 15:52

## 2021-03-06 RX ADMIN — OXYCODONE HYDROCHLORIDE AND ACETAMINOPHEN 2 TABLET: 5; 325 TABLET ORAL at 23:48

## 2021-03-06 RX ADMIN — ATORVASTATIN CALCIUM 20 MG: 20 TABLET, FILM COATED ORAL at 19:51

## 2021-03-06 RX ADMIN — IPRATROPIUM BROMIDE AND ALBUTEROL SULFATE 1 AMPULE: .5; 3 SOLUTION RESPIRATORY (INHALATION) at 12:17

## 2021-03-06 RX ADMIN — POLYETHYLENE GLYCOL 3350 17 G: 17 POWDER, FOR SOLUTION ORAL at 09:26

## 2021-03-06 RX ADMIN — PANTOPRAZOLE SODIUM 40 MG: 40 TABLET, DELAYED RELEASE ORAL at 09:26

## 2021-03-06 RX ADMIN — MUPIROCIN: 20 OINTMENT TOPICAL at 19:52

## 2021-03-06 RX ADMIN — METOPROLOL TARTRATE 25 MG: 25 TABLET ORAL at 19:51

## 2021-03-06 RX ADMIN — METOPROLOL TARTRATE 25 MG: 25 TABLET ORAL at 09:26

## 2021-03-06 RX ADMIN — SODIUM CHLORIDE, PRESERVATIVE FREE 10 ML: 5 INJECTION INTRAVENOUS at 09:27

## 2021-03-06 ASSESSMENT — PAIN SCALES - GENERAL
PAINLEVEL_OUTOF10: 7
PAINLEVEL_OUTOF10: 5
PAINLEVEL_OUTOF10: 8
PAINLEVEL_OUTOF10: 0
PAINLEVEL_OUTOF10: 8
PAINLEVEL_OUTOF10: 8
PAINLEVEL_OUTOF10: 2
PAINLEVEL_OUTOF10: 5

## 2021-03-06 ASSESSMENT — PAIN DESCRIPTION - FREQUENCY: FREQUENCY: CONTINUOUS

## 2021-03-06 ASSESSMENT — PAIN DESCRIPTION - DESCRIPTORS: DESCRIPTORS: ACHING;DISCOMFORT;SORE

## 2021-03-06 ASSESSMENT — PAIN DESCRIPTION - PAIN TYPE: TYPE: ACUTE PAIN;SURGICAL PAIN

## 2021-03-06 ASSESSMENT — PAIN DESCRIPTION - ORIENTATION: ORIENTATION: MID

## 2021-03-06 NOTE — PROGRESS NOTES
Physical Therapy  Daily Treatment Note  NAME: Rosaura Mann  : 1950  MRN: [id number]    Date of Service: 3/6/2021    Discharge Recommendations:  Patient would benefit from continued therapy after discharge   PT Equipment Recommendations  Equipment Needed: Yes  Mobility Devices: Jimmie York: Rolling  Other: Pt currently requires a RW, but may not need it when he leaves    Assessment   Body structures, Functions, Activity limitations: Decreased functional mobility ; Decreased strength;Decreased balance;Decreased coordination;Decreased endurance;Decreased posture; Increased pain  Assessment: Pt AMB with a RW and CGA. Pt limited secondary to pain, decreased endurance, and decreased standing balance. Pt performed CR HEP. Pt would benefit from additional therapy upon discharge. Prognosis: Good  PT Education: Goals;Transfer Training;Gait Training;General Safety; Functional Mobility Training; Adaptive Device Training;Precautions;PT Role;Plan of Care;Home Exercise Program;Energy Conservation; Injury Prevention;Pressure Relief  Patient Education: CR/HEP plan explained and left with pt, pt had no questions at this time; safe use of RW  REQUIRES PT FOLLOW UP: Yes  Activity Tolerance  Activity Tolerance: Patient limited by endurance; Patient limited by fatigue;Patient limited by pain     Patient Diagnosis(es): The encounter diagnosis was CAD, multiple vessel.     has a past medical history of AAA (abdominal aortic aneurysm) ([address] CAD (coronary artery disease), Colon polyps, Colon tumor, Congestive heart failure (CHF) ([address] GERD (gastroesophageal reflux disease), H/O chest tube placement, History of pneumonia, History of pneumothorax, Hx of cardiac cath, Hyperlipidemia, Hypertension, Non Hodgkin's lymphoma ([address] Pneumonia, Unspecified cerebral artery occlusion with cerebral infarction, Wellness examination, Wellness examination, and Wellness examination.    has a past surgical history that includes transesophageal echocardiogram (10/3/14); Colonoscopy; Vasectomy (1986); Cholecystectomy, open (04/21/15); right colectomy (04/21/15); Appendectomy (4/21/15); Colonoscopy (07/10/2020); Colonoscopy (N/A, 7/10/2020); [employer] tooth extraction; other surgical history (2019); and Coronary artery bypass graft (N/A, 3/3/2021). Restrictions  Restrictions/Precautions  Restrictions/Precautions: Fall Risk, General Precautions  Required Braces or Orthoses?: Yes(heart hugger)  Required Braces or Orthoses  Other: Heart Hugger Brace  Position Activity Restriction  Sternal Precautions: No Pushing, No Pulling, 5# Lifting Restrictions  Other position/activity restrictions: amb pt, up in chair. CABG x3 3/3/2021     Subjective   General  Chart Reviewed: Yes  Response To Previous Treatment: Patient with no complaints from previous session. Family / Caregiver Present: No  Subjective  Subjective: Pt seated in a chair and agreeable to therapy this morning. Pain Screening  Patient Currently in Pain: Yes  Pain Assessment  Pain Assessment: 0-10  Pain Level: 5  Pain Type: Acute pain;Surgical pain  Pain Location: Sternum  Pain Orientation: Mid  Non-Pharmaceutical Pain Intervention(s): Repositioned;Rest;Ambulation/Increased Activity; Distraction  Response to Pain Intervention: Patient Satisfied  Vital Signs  Patient Currently in Pain: Yes       Orientation  Orientation  Overall Orientation Status: Within Functional Limits    Objective   Transfers  Sit to Stand: Contact guard assistance  Stand to sit: Contact guard assistance  Comment: Pt verbally cued throughout to perform continuous breathing, educated on sternal precautions.   Ambulation  Ambulation?: Yes  More Ambulation?: No  Ambulation 1  Surface: level tile  Device: Rolling Walker  Assistance: Contact guard assistance  Quality of Gait: slow and cautious; mild labored breathing throughout, pt declined multiple offers to rest  Gait Deviations: Slow Janneth;Decreased step length  Distance: ~300ft Stairs/Curb  Stairs?: No     Balance  Posture: Fair  Sitting - Static: Good  Sitting - Dynamic: Good;-  Standing - Static: Fair  Exercises  Comments: Cardiac Rehab HEP: Upper extremity exercises (within precautions/restrictions): Bicep curl, shoulder flexion/extension, punches, tricep curl, shoulder abduction/adduction. Reps: 10-20 ; Seated LE exercise program: Long Arc Quads, hip abduction/adduction, heel/toe raises, and marches. Reps: 10-20 ; Standing exercise program: Heel/toe raises, hip flexion, hip abduction, mini squats, hip extension, and hamstring curls. Reps: 10-20       Goals  Short term goals  Time Frame for Short term goals: 14 visits  Short term goal 1: Pt will ambulate 300 feet with least restrictive device and modified independence to promote functional independence. Short term goal 2: Pt will negotiate 2 stairs with unilateral handrail with SBA to allow the pt to enter into his home. Short term goal 3: Pt will demonstrate good dynamic standing balance to decrease fall risk. Short term goal 4: Pt will perform sit<>stand, stand<>sit transfers with independence to promote functional independence. Short term goal 5: Pt will toleratee a 30 minute therapy session to promote increased endurance.     Plan    Plan  Times per week: 7  Times per day: Daily(1-2x/day)  Current Treatment Recommendations: Strengthening, Transfer Training, Endurance Training, Balance Training, Gait Training, Functional Mobility Training, Stair training, Safety Education & Training, Patient/Caregiver Education & Training, Home Exercise Program  Safety Devices  Type of devices: Patient at risk for falls, Call light within reach, Gait belt, Left in bed, All fall risk precautions in place(Dr and RN coming to see the pt, when PTA was leaving)  Restraints  Initially in place: No     Therapy Time   Individual Concurrent Group Co-treatment   Time In 1039         Time Out 1122         Minutes 43         Timed Code Treatment Minutes: [address] Eric, [facility]

## 2021-03-06 NOTE — PROGRESS NOTES
[facility]   Progress Note                   Date:   3/6/2021  Patient name: Lata Cruz  Date of admission:  3/3/2021  5:58 AM  MRN:   [id number]  YOB: 1950  PCP: Clare BRAND PA-C    Reason for Admission:      Subjective: There were no acute events overnight, remained hemodynamically stable, denies chest pain, dyspnea, orthopnea or palpitations. Medications:   Scheduled Meds:   furosemide  40 mg Intravenous BID    [START ON 3/7/2021] furosemide  40 mg Oral BID    sodium chloride flush  10 mL Intravenous 2 times per day    mupirocin   Nasal BID    polyethylene glycol  17 g Oral Daily    metoprolol tartrate  25 mg Oral BID    atorvastatin  20 mg Oral Nightly    pantoprazole  40 mg Oral Daily    pantoprazole  40 mg Intravenous Daily    And    sodium chloride (PF)  10 mL Intravenous Daily    ipratropium-albuterol  1 ampule Inhalation Q4H WA       Continuous Infusions:   norepinephrine      dextrose         CBC:   Recent Labs     03/04/21 0343 03/05/21 0438 03/06/21  0629   WBC 19.9* 20.8* 21.7*   HGB 9.8* 10.8* 11.0*   PLT 82* 88* 93*     BMP:    Recent Labs     03/04/21 0343 03/05/21  0438 03/06/21  0629    135 135   K 4.7 4.4 3.8   * 102 102   CO2 24 23 23   BUN 15 16 23   CREATININE 0.81 0.78 0.86   GLUCOSE 126* 148* 136*     Hepatic: No results for input(s): AST, ALT, ALB, BILITOT, ALKPHOS in the last 72 hours. Troponin: No results for input(s): TROPONINI in the last 72 hours. BNP: No results for input(s): BNP in the last 72 hours. Lipids: No results for input(s): CHOL, HDL in the last 72 hours.     Invalid input(s): LDLCALCU  INR:   Recent Labs     03/04/21 0343 03/05/21  0438 03/06/21  0629   INR 1.2 1.0 1.0       Objective:   Vitals: /67   Pulse 91   Temp 98.8 °F (37.1 °C) (Axillary)   Resp 18   Ht 6' 2\" (1.88 m)   Wt 233 lb 11 oz (106 kg)   SpO2 95%   BMI 30.00 kg/m²     General appearance: awake, alert, in no apparent respiratory distress   HEENT: Head: Normocephalic, no lesions, without obvious abnormality  Neck: no JVD  Lungs: clear to auscultation bilaterally, no basilar rales, no wheezing   Heart: regular rate and rhythm, S1, S2 normal, no murmur, click, rub or gallop  Abdomen: soft, non-tender; bowel sounds normal  Extremities: No LE edema  Neurologic: Mental status: Alert, oriented. Motor and sensory not done. Assessment / Acute Cardiac Problems:   1. Patient Active Problem List:     Cerebral infarction ([address]     Abnormal LFTs     Carotid arterial disease (HCC)     Hypercholesteremia     Colon polyps     Colon tumor     Colonic mass     Cholelithiasis with acute cholecystitis     HTN (hypertension)     Hx of cholecystectomy     Hx of splenectomy     History of colon resection     History of CVA (cerebrovascular accident)     Hx of splenomegaly     Thrombocytopenia (HCC)     Leukocytosis     Marginal zone lymphoma (HCC)     CAD in native artery     CAD, multiple vessel              IMPRESSION:    1. MV-CAD s/p CABG on 3/4/21 with LIMA to LAD, SVG to OM1 and SVG to PDA  2. HTN  3. HLD  4. CVA  5. Colon cancer s/p colon resection  6. Marginal zone lymphoma  7. Thrombocytopenia     RECOMMENDATIONS:  1. Start ASA and Plavix once okay with CTS. 2. Continue Atorvastatin  3. Continue lasix 40 IV BID  4. Continue Lopressor 25 mg BID  5. K>4, Mg>2  6. Post op management per CV surgery          Kizzy Gagnon MD  Fellow, [facility]   Pager - [phone]    Attending Physician Statement  I have discussed the care of Lex Chand, including pertinent history and exam findings,  with the cardiology fellow/resident. I have seen and examined the patient and the key elements of all parts of the encounter have been performed by me.         Alivia Pittman MD, [facility] [place]

## 2021-03-06 NOTE — PROGRESS NOTES
Occupational Therapy  Facility/Department: [facility] CAR 1  Daily Treatment Note  NAME: Rosaura Mann  : 1950  MRN: [id number]    Date of Service: 3/6/2021    Discharge Recommendations:  Patient would benefit from continued therapy after discharge       Assessment   Performance deficits / Impairments: Decreased functional mobility ; Decreased ADL status; Decreased endurance;Decreased high-level IADLs;Decreased strength  Treatment Diagnosis: CABG  Prognosis: Good  Patient Education: Ot POC, transfer/walker safety, importance of participation in therapy, use of surgical soap, doff/lonnie heart hugger, incentive spirometer, sternal precautions - pt verbalized understanding. REQUIRES OT FOLLOW UP: Yes  Activity Tolerance  Activity Tolerance: Patient Tolerated treatment well  Safety Devices  Safety Devices in place: Yes  Type of devices: Call light within reach;Gait belt;Left in chair;Nurse notified         Patient Diagnosis(es): The encounter diagnosis was CAD, multiple vessel.      has a past medical history of AAA (abdominal aortic aneurysm) ([address] CAD (coronary artery disease), Colon polyps, Colon tumor, Congestive heart failure (CHF) ([address] GERD (gastroesophageal reflux disease), H/O chest tube placement, History of pneumonia, History of pneumothorax, Hx of cardiac cath, Hyperlipidemia, Hypertension, Non Hodgkin's lymphoma ([address] Pneumonia, Unspecified cerebral artery occlusion with cerebral infarction, Wellness examination, Wellness examination, and Wellness examination. has a past surgical history that includes transesophageal echocardiogram (10/3/14); Colonoscopy; Vasectomy (); Cholecystectomy, open (04/21/15); right colectomy (04/21/15); Appendectomy (4/21/15); Colonoscopy (07/10/2020); Colonoscopy (N/A, 7/10/2020); [place] tooth extraction; other surgical history (); and Coronary artery bypass graft (N/A, 3/3/2021).     Restrictions  Restrictions/Precautions  Restrictions/Precautions: Fall Risk, General Precautions  Required Braces or Orthoses?: Yes  Required Braces or Orthoses  Other: Heart Hugger Brace  Position Activity Restriction  Sternal Precautions: No Pushing, No Pulling, 5# Lifting Restrictions  Other position/activity restrictions: amb pt, up in chair. CABG x3 3/3/2021. Chest tubes  Subjective   General  Patient assessed for rehabilitation services?: Yes  Family / Caregiver Present: No  Diagnosis: CABG  General Comment  Pain Assessment  Pain Assessment: 0-10  Pain Level: 5  Pain Type: Surgical pain;Acute pain  Pain Location: Sternum  Pain Orientation: Mid;Left  Pain Descriptors: Aching;Discomfort;Sore(Occassional sharp pain L of sternum.)  Pain Frequency: Continuous  Non-Pharmaceutical Pain Intervention(s): Relaxation techniques;Repositioned;Rest;Therapeutic presence  Vital Signs  Patient Currently in Pain: Yes   Orientation  Orientation  Overall Orientation Status: Within Functional Limits  Objective    ADL  Grooming: Setup; Independent(Oral care seated in chair.)  UE Bathing: Setup;Stand by assistance; Increased time to complete(Max A for back seated in chair.)  UE Dressing: Minimal assistance(To manage gown.)  LE Dressing: Maximum assistance  Toileting: Setup;Supervision(Pericare seated EOB.)  Additional Comments: Pt completed ADL care seated in chair, surgical soap used appropriately. Pt educated on sternal precautions during functional activity with good return. Pt required mod A with combing hair and pulling back into a pony tail d/t sternal precautions. Balance  Sitting Balance: Modified independent (Seated EOB/in chair.)  Standing Balance: Contact guard assistance  Standing Balance  Time: 1 minute  Activity: Short functional mobility EOB/chair using RW.   Functional Mobility  Functional - Mobility Device: Rolling Walker  Activity: Other  Assist Level: Contact guard assistance  Transfers  Sit to stand: Stand by assistance  Stand to sit: Stand by assistance  Transfer Comments: Verbal cue for hand placement during transfers with goos return. Cognition  [facility]     Plan   Plan  Times per week: 4-6x/wk (CABG)  Goals  Short term goals  Time Frame for Short term goals: pt will, by discharge  Short term goal 1: complete LB ADLs with min A, set up and Ae, as needed  Short term goal 2: complete UB ADLs and grooming tasks with mod I  Short term goal 3: increase activity tolerance to 20+ minutes in order to participate in daily tasks  Short term goal 4: dem SBA during functional transfers/functional mobility with LRD  Short term goal 5: ind maintain sternal precautions during functional transfer/tasks       Therapy Time   Individual Concurrent Group Co-treatment   Time In 1309         Time Out 1403         Minutes 54         Timed Code Treatment Minutes: 47 Minutes     Pt seated EOB upon therapist arrival. Pleasant and agreeable to therapy. See above for LOF for all tasks. Pt retired top seated in chair at end of session with call light within reach.     RAEGAN Ramirez/BUZZ

## 2021-03-07 ENCOUNTER — APPOINTMENT (OUTPATIENT)
Dept: GENERAL RADIOLOGY | Age: 71
DRG: 236 | End: 2021-03-07
Attending: THORACIC SURGERY (CARDIOTHORACIC VASCULAR SURGERY)
Payer: MEDICARE

## 2021-03-07 LAB
ANION GAP SERPL CALCULATED.3IONS-SCNC: 11 MMOL/L (ref 9–17)
BUN BLDV-MCNC: 26 MG/DL (ref 8–23)
BUN/CREAT BLD: ABNORMAL (ref 9–20)
CALCIUM SERPL-MCNC: 8.3 MG/DL (ref 8.6–10.4)
CHLORIDE BLD-SCNC: 97 MMOL/L (ref 98–107)
CO2: 24 MMOL/L (ref 20–31)
CREAT SERPL-MCNC: 0.94 MG/DL (ref 0.7–1.2)
EKG ATRIAL RATE: 78 BPM
EKG Q-T INTERVAL: 424 MS
EKG QRS DURATION: 94 MS
EKG QTC CALCULATION (BAZETT): 419 MS
EKG R AXIS: -43 DEGREES
EKG T AXIS: 30 DEGREES
EKG VENTRICULAR RATE: 59 BPM
GFR AFRICAN AMERICAN: >60 ML/MIN
GFR NON-AFRICAN AMERICAN: >60 ML/MIN
GFR SERPL CREATININE-BSD FRML MDRD: ABNORMAL ML/MIN/{1.73_M2}
GFR SERPL CREATININE-BSD FRML MDRD: ABNORMAL ML/MIN/{1.73_M2}
GLUCOSE BLD-MCNC: 120 MG/DL (ref 75–110)
GLUCOSE BLD-MCNC: 125 MG/DL (ref 75–110)
GLUCOSE BLD-MCNC: 179 MG/DL (ref 70–99)
GLUCOSE BLD-MCNC: 91 MG/DL (ref 75–110)
GLUCOSE BLD-MCNC: 91 MG/DL (ref 75–110)
HCT VFR BLD CALC: 35 % (ref 40.7–50.3)
HEMOGLOBIN: 11.4 G/DL (ref 13–17)
INR BLD: 1
MAGNESIUM: 2.1 MG/DL (ref 1.6–2.6)
MCH RBC QN AUTO: 32.8 PG (ref 25.2–33.5)
MCHC RBC AUTO-ENTMCNC: 32.6 G/DL (ref 28.4–34.8)
MCV RBC AUTO: 100.6 FL (ref 82.6–102.9)
NRBC AUTOMATED: 0 PER 100 WBC
PDW BLD-RTO: 16.1 % (ref 11.8–14.4)
PLATELET # BLD: 102 K/UL (ref 138–453)
PMV BLD AUTO: 9.3 FL (ref 8.1–13.5)
POTASSIUM SERPL-SCNC: 3.8 MMOL/L (ref 3.7–5.3)
PROTHROMBIN TIME: 10.4 SEC (ref 9.1–12.3)
RBC # BLD: 3.48 M/UL (ref 4.21–5.77)
SODIUM BLD-SCNC: 132 MMOL/L (ref 135–144)
WBC # BLD: 31.3 K/UL (ref 3.5–11.3)

## 2021-03-07 PROCEDURE — 85027 COMPLETE CBC AUTOMATED: CPT

## 2021-03-07 PROCEDURE — 85610 PROTHROMBIN TIME: CPT

## 2021-03-07 PROCEDURE — 94669 MECHANICAL CHEST WALL OSCILL: CPT

## 2021-03-07 PROCEDURE — 97110 THERAPEUTIC EXERCISES: CPT

## 2021-03-07 PROCEDURE — 36415 COLL VENOUS BLD VENIPUNCTURE: CPT

## 2021-03-07 PROCEDURE — 71045 X-RAY EXAM CHEST 1 VIEW: CPT

## 2021-03-07 PROCEDURE — 82947 ASSAY GLUCOSE BLOOD QUANT: CPT

## 2021-03-07 PROCEDURE — 6370000000 HC RX 637 (ALT 250 FOR IP): Performed by: NURSE PRACTITIONER

## 2021-03-07 PROCEDURE — 83735 ASSAY OF MAGNESIUM: CPT

## 2021-03-07 PROCEDURE — 97530 THERAPEUTIC ACTIVITIES: CPT

## 2021-03-07 PROCEDURE — 2580000003 HC RX 258: Performed by: NURSE PRACTITIONER

## 2021-03-07 PROCEDURE — 80048 BASIC METABOLIC PNL TOTAL CA: CPT

## 2021-03-07 PROCEDURE — 2140000001 HC CVICU INTERMEDIATE R&B

## 2021-03-07 PROCEDURE — 97116 GAIT TRAINING THERAPY: CPT

## 2021-03-07 PROCEDURE — 6370000000 HC RX 637 (ALT 250 FOR IP): Performed by: STUDENT IN AN ORGANIZED HEALTH CARE EDUCATION/TRAINING PROGRAM

## 2021-03-07 PROCEDURE — 99024 POSTOP FOLLOW-UP VISIT: CPT | Performed by: PHYSICIAN ASSISTANT

## 2021-03-07 PROCEDURE — 94640 AIRWAY INHALATION TREATMENT: CPT

## 2021-03-07 RX ORDER — METOPROLOL TARTRATE 50 MG/1
50 TABLET, FILM COATED ORAL 2 TIMES DAILY
Status: DISCONTINUED | OUTPATIENT
Start: 2021-03-07 | End: 2021-03-09

## 2021-03-07 RX ADMIN — IPRATROPIUM BROMIDE AND ALBUTEROL SULFATE 1 AMPULE: .5; 3 SOLUTION RESPIRATORY (INHALATION) at 17:29

## 2021-03-07 RX ADMIN — IPRATROPIUM BROMIDE AND ALBUTEROL SULFATE 1 AMPULE: .5; 3 SOLUTION RESPIRATORY (INHALATION) at 07:40

## 2021-03-07 RX ADMIN — METOPROLOL TARTRATE 25 MG: 25 TABLET ORAL at 08:10

## 2021-03-07 RX ADMIN — FUROSEMIDE 40 MG: 40 TABLET ORAL at 17:14

## 2021-03-07 RX ADMIN — MUPIROCIN: 20 OINTMENT TOPICAL at 19:57

## 2021-03-07 RX ADMIN — SODIUM CHLORIDE, PRESERVATIVE FREE 10 ML: 5 INJECTION INTRAVENOUS at 19:57

## 2021-03-07 RX ADMIN — FUROSEMIDE 40 MG: 40 TABLET ORAL at 08:10

## 2021-03-07 RX ADMIN — OXYCODONE HYDROCHLORIDE AND ACETAMINOPHEN 2 TABLET: 5; 325 TABLET ORAL at 07:10

## 2021-03-07 RX ADMIN — PANTOPRAZOLE SODIUM 40 MG: 40 TABLET, DELAYED RELEASE ORAL at 08:10

## 2021-03-07 RX ADMIN — ACETAMINOPHEN 650 MG: 325 TABLET ORAL at 14:23

## 2021-03-07 RX ADMIN — ATORVASTATIN CALCIUM 20 MG: 20 TABLET, FILM COATED ORAL at 19:57

## 2021-03-07 RX ADMIN — IPRATROPIUM BROMIDE AND ALBUTEROL SULFATE 1 AMPULE: .5; 3 SOLUTION RESPIRATORY (INHALATION) at 11:34

## 2021-03-07 RX ADMIN — SODIUM CHLORIDE, PRESERVATIVE FREE 10 ML: 5 INJECTION INTRAVENOUS at 08:11

## 2021-03-07 RX ADMIN — MUPIROCIN: 20 OINTMENT TOPICAL at 08:10

## 2021-03-07 RX ADMIN — IPRATROPIUM BROMIDE AND ALBUTEROL SULFATE 1 AMPULE: .5; 3 SOLUTION RESPIRATORY (INHALATION) at 20:25

## 2021-03-07 RX ADMIN — OXYCODONE HYDROCHLORIDE AND ACETAMINOPHEN 1 TABLET: 5; 325 TABLET ORAL at 23:45

## 2021-03-07 RX ADMIN — METOPROLOL TARTRATE 50 MG: 50 TABLET, FILM COATED ORAL at 19:57

## 2021-03-07 RX ADMIN — OXYCODONE HYDROCHLORIDE AND ACETAMINOPHEN 2 TABLET: 5; 325 TABLET ORAL at 18:41

## 2021-03-07 ASSESSMENT — PAIN DESCRIPTION - PAIN TYPE: TYPE: ACUTE PAIN;SURGICAL PAIN

## 2021-03-07 ASSESSMENT — PAIN SCALES - GENERAL
PAINLEVEL_OUTOF10: 4
PAINLEVEL_OUTOF10: 3
PAINLEVEL_OUTOF10: 0
PAINLEVEL_OUTOF10: 3
PAINLEVEL_OUTOF10: 2
PAINLEVEL_OUTOF10: 7
PAINLEVEL_OUTOF10: 4

## 2021-03-07 ASSESSMENT — PAIN DESCRIPTION - LOCATION
LOCATION: CHEST
LOCATION: CHEST

## 2021-03-07 NOTE — PROGRESS NOTES
[facility]  Daily Progress Note    Surgeon: Dr. Liss Waller  S/P :  CABG x3   POD#: 4   EF: 60%      Subjective:  Mr. Jasper Lemos wants tubes out. No other complaints      Physical Exam  Vital Signs: BP (!) 155/83   Pulse 83   Temp 98.7 °F (37.1 °C) (Oral)   Resp 18   Ht 6' 2\" (1.88 m)   Wt 221 lb 5.5 oz (100.4 kg)   SpO2 95%   BMI 28.42 kg/m²  O2 Flow Rate (L/min): 2 L/min   Admit Weight: Weight: 227 lb 1.2 oz (103 kg)   WEIGHTWeight: 221 lb 5.5 oz (100.4 kg)         Scheduled Meds:    metoprolol tartrate  50 mg Oral BID    furosemide  40 mg Oral BID    sodium chloride flush  10 mL Intravenous 2 times per day    mupirocin   Nasal BID    polyethylene glycol  17 g Oral Daily    atorvastatin  20 mg Oral Nightly    pantoprazole  40 mg Oral Daily    pantoprazole  40 mg Intravenous Daily    And    sodium chloride (PF)  10 mL Intravenous Daily    ipratropium-albuterol  1 ampule Inhalation Q4H WA     Continuous Infusions:    norepinephrine      dextrose         Data:  CBC:   Recent Labs     03/05/21 0438 03/06/21  0629 03/07/21  0927   WBC 20.8* 21.7* 31.3*   HGB 10.8* 11.0* 11.4*   HCT 33.8* 34.1* 35.0*   .7 101.8 100.6   PLT 88* 93* 102*     BMP:   Recent Labs     03/05/21 0438 03/06/21  0629    135   K 4.4 3.8    102   CO2 23 23   BUN 16 23   CREATININE 0.78 0.86     PT/INR:   Recent Labs     03/05/21 0438 03/06/21  0629 03/07/21  0927   PROTIME 10.9 10.6 10.4   INR 1.0 1.0 1.0     APTT:   No results for input(s): APTT in the last 72 hours. Chest X-Ray:    No significant change with bibasilar opacities likely reflecting atelectasis, left worse than right, along with small bilateral pleural effusions and mild pulmonary vascular congestion. I/O:  I/O last 3 completed shifts: In: 36 [P.O.:1320]  Out: 2195 [Urine:1625; Chest Tube:570]      Assessment:   Multivessel CAD s/p CABG x3   o POD 4  o Without complication  · Marginal zone lymphoma  ?  Follows with Dr. Fredis Atwood, oncology  · Thrombocytopenia, chronic  · Hypertension  · Hypercholesterolemia  · History of splenectomy  · History of cholecystectomy  · History of CVA with no residual effects  ? Chronic left forearm paresthesias, dull ache intermittently  · History of colon cancer s/p colon resection  ? 18\" of large intestine removed and some small intestine  · History of abnormal LFTs/fatty liver  · Overweight, BMI 29.53 kg/m²      Plan:    Remains inpatient on telemetry, level of care is currently cardiac stepdown.  Monitor vitals closely including continuous pulse oximetry   Oxygen as needed via nasal cannula to maintain SpO2 > 92%   Chest x-ray daily   Maintain Chest Tubes to water seal.   Discontinue [place] this morning   Encourage incentive spirometry and ambulation   Monitor CBC, BMP, INR and Mag daily   Currently taking metoprolol,    Patient is on BB, Statin therapy per protocol   o No aspirin d/t thrombocytopenia  o No ACE inhibitor indicated with preserved EF   No Amiodarone d/t iodine allergy   Percocet for pain control   Lovenox for DVT prophylaxis, SCDs while stationary    Protonix for GI prophylaxis   Advance diet as tolerated.  Replace electrolytes as needed per sliding scale and recheck per policy   PT/OT evalutation for discharge recommendation and ambulation 3x daily   Case Management consult for discharge planning       Elevated WBC afebrile. H/O lymphoma.  watch  D/C chest tubes cxr link today and morning  D/c garcia  D/c planning home tomorrow with [facility]    Electronically signed by SUNDAY Rodriguez on 3/7/2021 at 10:04 AM

## 2021-03-07 NOTE — PROGRESS NOTES
SUNDAY Cespedes with CT surgery informed of critical WBC, no orders received, will continue to monitor.

## 2021-03-07 NOTE — PROGRESS NOTES
Physical Therapy  Daily Treatment Note  NAME: Melanie Watson  : 1950  MRN: [id number]    Date of Service: 3/7/2021    Discharge Recommendations:  Patient would benefit from continued therapy after discharge   PT Equipment Recommendations  Equipment Needed: No    Assessment   Body structures, Functions, Activity limitations: Decreased functional mobility ; Decreased strength;Decreased balance;Decreased coordination;Decreased endurance;Decreased posture; Increased pain  Assessment: Pt AMB with SBA and no AD. Pt limited by endurance and fatigue. Pt performed CR HEP and had no questions at this time. Pt would benefit from additional therapy upon discharge. Prognosis: Good  PT Education: Goals; General Safety; Functional Mobility Training;Precautions;PT Role;Plan of Care;Home Exercise Program;Energy Conservation; Injury Prevention;Pressure Relief;Gait Training;Transfer Training  Patient Education: reviewed CR/HEP plan and precautions, pt had no questions at this time  REQUIRES PT FOLLOW UP: Yes  Activity Tolerance  Activity Tolerance: Patient limited by endurance; Patient limited by fatigue     Patient Diagnosis(es): The encounter diagnosis was CAD, multiple vessel.     has a past medical history of AAA (abdominal aortic aneurysm) ([address] CAD (coronary artery disease), Colon polyps, Colon tumor, Congestive heart failure (CHF) ([address] GERD (gastroesophageal reflux disease), H/O chest tube placement, History of pneumonia, History of pneumothorax, Hx of cardiac cath, Hyperlipidemia, Hypertension, Non Hodgkin's lymphoma ([address] Pneumonia, Unspecified cerebral artery occlusion with cerebral infarction, Wellness examination, Wellness examination, and Wellness examination. has a past surgical history that includes transesophageal echocardiogram (10/3/14); Colonoscopy; Vasectomy (); Cholecystectomy, open (04/21/15); right colectomy (04/21/15); Appendectomy (4/21/15); Colonoscopy (07/10/2020);  Colonoscopy (N/A, 7/10/2020); [place] tooth extraction; other surgical history (2019); and Coronary artery bypass graft (N/A, 3/3/2021). Restrictions  Restrictions/Precautions  Restrictions/Precautions: Fall Risk, General Precautions  Required Braces or Orthoses?: Yes(heart hugger)  Required Braces or Orthoses  Other: Heart Hugger Brace  Position Activity Restriction  Sternal Precautions: No Pushing, No Pulling, 5# Lifting Restrictions  Other position/activity restrictions: amb pt, up in chair. CABG x3 3/3/2021     Subjective   General  Chart Reviewed: Yes  Response To Previous Treatment: Patient with no complaints from previous session. Family / Caregiver Present: No  Subjective  Subjective: Pt seated in a chair and agreeable to therapy this morning. Pain Screening  Patient Currently in Pain: No       Orientation  Orientation  Overall Orientation Status: Within Functional Limits    Objective   Bed mobility  Supine to Sit: Modified independent  Sit to Supine: Modified independent  Scooting: Modified independent  Comment: HOB upright  Transfers  Sit to Stand: Stand by assistance  Stand to sit: Stand by assistance  Comment: PTA helped with lines/tubes  Ambulation  Ambulation?: Yes  More Ambulation?: No  Ambulation 1  Surface: level tile  Assistance: Stand by assistance  Gait Deviations: Slow Janneth;Decreased arm swing;Decreased head and trunk rotation  Distance: ~450ft  Stairs/Curb  Stairs?: No(has 1 step to get inside)     Balance  Posture: Good  Sitting - Static: Good  Sitting - Dynamic: Good  Standing - Static: +;Fair  Standing - Dynamic: Fair  Exercises  Comments: Cardiac Rehab HEP: Upper extremity exercises (within precautions/restrictions): Bicep curl, shoulder flexion/extension, punches, tricep curl, shoulder abduction/adduction. Reps: 10-20 ; Seated LE exercise program: Long Arc Quads, hip abduction/adduction, heel/toe raises, and marches.  Reps: 10-20 ; Standing exercise program: Heel/toe raises, hip flexion, hip abduction, mini squats, hip extension, and hamstring curls. Reps: 10-20       Goals  Short term goals  Time Frame for Short term goals: 14 visits  Short term goal 1: Pt will ambulate 300 feet with least restrictive device and modified independence to promote functional independence. Short term goal 2: Pt will negotiate 2 stairs with unilateral handrail with SBA to allow the pt to enter into his home. Short term goal 3: Pt will demonstrate good dynamic standing balance to decrease fall risk. Short term goal 4: Pt will perform sit<>stand, stand<>sit transfers with independence to promote functional independence. Short term goal 5: Pt will toleratee a 30 minute therapy session to promote increased endurance.     Plan    Plan  Times per week: 7  Times per day: Daily(1-2x/day)  Current Treatment Recommendations: Strengthening, Transfer Training, Endurance Training, Balance Training, Gait Training, Functional Mobility Training, Stair training, Safety Education & Training, Patient/Caregiver Education & Training, Home Exercise Program  Safety Devices  Type of devices: Call light within reach, Gait belt, All fall risk precautions in place, Nurse notified, Left in chair  Restraints  Initially in place: No     Therapy Time   Individual Concurrent Group Co-treatment   Time In 0850         Time Out 0928         Minutes 38         Timed Code Treatment Minutes: [address], PTA

## 2021-03-07 NOTE — PROGRESS NOTES
[facility]   Progress Note                   Date:   3/7/2021  Patient name: Alison Cruz  Date of admission:  3/3/2021  5:58 AM  MRN:   [id number]  YOB: 1950  PCP: Daniela BRAND PA-C    Reason for Admission:      Subjective: There were no acute events overnight, remained hemodynamically stable, denies chest pain, dyspnea, orthopnea or palpitations. Has chest tubes. Plan to remove chest tube later today. Medications:   Scheduled Meds:   metoprolol tartrate  50 mg Oral BID    furosemide  40 mg Oral BID    sodium chloride flush  10 mL Intravenous 2 times per day    mupirocin   Nasal BID    polyethylene glycol  17 g Oral Daily    atorvastatin  20 mg Oral Nightly    pantoprazole  40 mg Oral Daily    pantoprazole  40 mg Intravenous Daily    And    sodium chloride (PF)  10 mL Intravenous Daily    ipratropium-albuterol  1 ampule Inhalation Q4H WA       Continuous Infusions:   norepinephrine      dextrose         CBC:   Recent Labs     03/05/21 0438 03/06/21 0629 03/07/21 0927   WBC 20.8* 21.7* 31.3*   HGB 10.8* 11.0* 11.4*   PLT 88* 93* 102*     BMP:    Recent Labs     03/05/21 0438 03/06/21 0629 03/07/21  0927    135 132*   K 4.4 3.8 3.8    102 97*   CO2 23 23 24   BUN 16 23 26*   CREATININE 0.78 0.86 0.94   GLUCOSE 148* 136* 179*     Hepatic: No results for input(s): AST, ALT, ALB, BILITOT, ALKPHOS in the last 72 hours. Troponin: No results for input(s): TROPONINI in the last 72 hours. BNP: No results for input(s): BNP in the last 72 hours. Lipids: No results for input(s): CHOL, HDL in the last 72 hours.     Invalid input(s): LDLCALCU  INR:   Recent Labs     03/05/21 0438 03/06/21 0629 03/07/21  0927   INR 1.0 1.0 1.0       Objective:   Vitals: /70   Pulse 83   Temp 98.9 °F (37.2 °C) (Oral)   Resp 18   Ht 6' 2\" (1.88 m)   Wt 221 lb 5.5 oz (100.4 kg)   SpO2 95%   BMI 28.42 kg/m²     General appearance: awake, alert, in no apparent respiratory distress   HEENT: Head: Normocephalic, no lesions, without obvious abnormality  Neck: no JVD  Lungs: clear to auscultation bilaterally, no basilar rales, no wheezing   Heart: regular rate and rhythm, S1, S2 normal, no murmur, click, rub or gallop  Abdomen: soft, non-tender; bowel sounds normal  Extremities: No LE edema  Neurologic: Mental status: Alert, oriented. Motor and sensory not done. Assessment / Acute Cardiac Problems:   1. Patient Active Problem List:     Cerebral infarction ([facility]     Abnormal LFTs     Carotid arterial disease (HCC)     Hypercholesteremia     Colon polyps     Colon tumor     Colonic mass     Cholelithiasis with acute cholecystitis     HTN (hypertension)     Hx of cholecystectomy     Hx of splenectomy     History of colon resection     History of CVA (cerebrovascular accident)     Hx of splenomegaly     Thrombocytopenia (HCC)     Leukocytosis     Marginal zone lymphoma (HCC)     CAD in native artery     CAD, multiple vessel              IMPRESSION:    1. MV-CAD s/p CABG on 3/4/21 with LIMA to LAD, SVG to OM1 and SVG to PDA  2. HTN  3. HLD  4. CVA  5. Colon cancer s/p colon resection  6. Marginal zone lymphoma  7. Thrombocytopenia     RECOMMENDATIONS:  1. Start ASA and Plavix once okay with CTS. Pl 102 today. 2. Continue Atorvastatin  3. Continue lasix 40 IV BID  4. Continue Lopressor 25 mg BID  5. K>4, Mg>2  Post op management per CV surgery. Plan to DC chest tubes today. Haydee Cazares MD  Fellow, Cardiovascular Diseases   [facility]   Pager - [phone]    Attending Physician Statement  I have discussed the care of Janette Carrillo, including pertinent history and exam findings,  with the cardiology fellow/resident. I have seen and examined the patient and the key elements of all parts of the encounter have been performed by me.         Fahad Parra MD, [address] [facility]

## 2021-03-07 NOTE — PLAN OF CARE
Problem: Falls - Risk of:  Goal: Will remain free from falls  Description: Will remain free from falls  Outcome: Ongoing  Goal: Absence of physical injury  Description: Absence of physical injury  Outcome: Ongoing     Problem: OXYGENATION/RESPIRATORY FUNCTION  Goal: Patient will maintain patent airway  Outcome: Ongoing  Goal: Patient will achieve/maintain normal respiratory rate/effort  Description: Respiratory rate and effort will be within normal limits for the patient  Outcome: Ongoing     Problem: SKIN INTEGRITY  Goal: Skin integrity is maintained or improved  Outcome: Ongoing     Problem: Pain:  Goal: Pain level will decrease  Description: Pain level will decrease  Outcome: Ongoing  Goal: Control of acute pain  Description: Control of acute pain  Outcome: Ongoing  Goal: Control of chronic pain  Description: Control of chronic pain  Outcome: Ongoing     Problem: Musculor/Skeletal Functional Status  Goal: Highest potential functional level  Outcome: Ongoing  Goal: Absence of falls  Outcome: Ongoing     Problem: Skin Integrity:  Goal: Will show no infection signs and symptoms  Description: Will show no infection signs and symptoms  Outcome: Ongoing  Goal: Absence of new skin breakdown  Description: Absence of new skin breakdown  Outcome: Ongoing

## 2021-03-08 ENCOUNTER — APPOINTMENT (OUTPATIENT)
Dept: GENERAL RADIOLOGY | Age: 71
DRG: 236 | End: 2021-03-08
Attending: THORACIC SURGERY (CARDIOTHORACIC VASCULAR SURGERY)
Payer: MEDICARE

## 2021-03-08 LAB
ANION GAP SERPL CALCULATED.3IONS-SCNC: 6 MMOL/L (ref 9–17)
BUN BLDV-MCNC: 23 MG/DL (ref 8–23)
BUN/CREAT BLD: ABNORMAL (ref 9–20)
CALCIUM SERPL-MCNC: 7.8 MG/DL (ref 8.6–10.4)
CHLORIDE BLD-SCNC: 101 MMOL/L (ref 98–107)
CO2: 30 MMOL/L (ref 20–31)
CREAT SERPL-MCNC: 0.83 MG/DL (ref 0.7–1.2)
GFR AFRICAN AMERICAN: >60 ML/MIN
GFR NON-AFRICAN AMERICAN: >60 ML/MIN
GFR SERPL CREATININE-BSD FRML MDRD: ABNORMAL ML/MIN/{1.73_M2}
GFR SERPL CREATININE-BSD FRML MDRD: ABNORMAL ML/MIN/{1.73_M2}
GLUCOSE BLD-MCNC: 115 MG/DL (ref 70–99)
GLUCOSE BLD-MCNC: 116 MG/DL (ref 75–110)
GLUCOSE BLD-MCNC: 117 MG/DL (ref 75–110)
HCT VFR BLD CALC: 32.6 % (ref 40.7–50.3)
HEMOGLOBIN: 10.4 G/DL (ref 13–17)
INR BLD: 1
MAGNESIUM: 2.3 MG/DL (ref 1.6–2.6)
MCH RBC QN AUTO: 32.4 PG (ref 25.2–33.5)
MCHC RBC AUTO-ENTMCNC: 31.9 G/DL (ref 28.4–34.8)
MCV RBC AUTO: 101.6 FL (ref 82.6–102.9)
NRBC AUTOMATED: 0 PER 100 WBC
PDW BLD-RTO: 16.1 % (ref 11.8–14.4)
PLATELET # BLD: 86 K/UL (ref 138–453)
PMV BLD AUTO: 9.2 FL (ref 8.1–13.5)
POTASSIUM SERPL-SCNC: 4.3 MMOL/L (ref 3.7–5.3)
PROTHROMBIN TIME: 10.7 SEC (ref 9.1–12.3)
RBC # BLD: 3.21 M/UL (ref 4.21–5.77)
SODIUM BLD-SCNC: 137 MMOL/L (ref 135–144)
WBC # BLD: 19.8 K/UL (ref 3.5–11.3)

## 2021-03-08 PROCEDURE — 99024 POSTOP FOLLOW-UP VISIT: CPT | Performed by: PHYSICIAN ASSISTANT

## 2021-03-08 PROCEDURE — 6360000002 HC RX W HCPCS: Performed by: NURSE PRACTITIONER

## 2021-03-08 PROCEDURE — 6370000000 HC RX 637 (ALT 250 FOR IP): Performed by: STUDENT IN AN ORGANIZED HEALTH CARE EDUCATION/TRAINING PROGRAM

## 2021-03-08 PROCEDURE — 71045 X-RAY EXAM CHEST 1 VIEW: CPT

## 2021-03-08 PROCEDURE — P9041 ALBUMIN (HUMAN),5%, 50ML: HCPCS | Performed by: NURSE PRACTITIONER

## 2021-03-08 PROCEDURE — 83735 ASSAY OF MAGNESIUM: CPT

## 2021-03-08 PROCEDURE — 6370000000 HC RX 637 (ALT 250 FOR IP): Performed by: NURSE PRACTITIONER

## 2021-03-08 PROCEDURE — 85610 PROTHROMBIN TIME: CPT

## 2021-03-08 PROCEDURE — 93005 ELECTROCARDIOGRAM TRACING: CPT | Performed by: THORACIC SURGERY (CARDIOTHORACIC VASCULAR SURGERY)

## 2021-03-08 PROCEDURE — 80048 BASIC METABOLIC PNL TOTAL CA: CPT

## 2021-03-08 PROCEDURE — 2580000003 HC RX 258: Performed by: NURSE PRACTITIONER

## 2021-03-08 PROCEDURE — 94640 AIRWAY INHALATION TREATMENT: CPT

## 2021-03-08 PROCEDURE — 2140000001 HC CVICU INTERMEDIATE R&B

## 2021-03-08 PROCEDURE — 36415 COLL VENOUS BLD VENIPUNCTURE: CPT

## 2021-03-08 PROCEDURE — 82947 ASSAY GLUCOSE BLOOD QUANT: CPT

## 2021-03-08 PROCEDURE — 85027 COMPLETE CBC AUTOMATED: CPT

## 2021-03-08 PROCEDURE — 97535 SELF CARE MNGMENT TRAINING: CPT

## 2021-03-08 PROCEDURE — 99024 POSTOP FOLLOW-UP VISIT: CPT | Performed by: NURSE PRACTITIONER

## 2021-03-08 PROCEDURE — APPSS30 APP SPLIT SHARED TIME 16-30 MINUTES: Performed by: NURSE PRACTITIONER

## 2021-03-08 RX ORDER — OXYCODONE HYDROCHLORIDE AND ACETAMINOPHEN 5; 325 MG/1; MG/1
1 TABLET ORAL EVERY 4 HOURS PRN
Qty: 28 TABLET | Refills: 0 | Status: SHIPPED | OUTPATIENT
Start: 2021-03-08 | End: 2021-03-08

## 2021-03-08 RX ORDER — ALBUMIN, HUMAN INJ 5% 5 %
25 SOLUTION INTRAVENOUS ONCE
Status: COMPLETED | OUTPATIENT
Start: 2021-03-08 | End: 2021-03-08

## 2021-03-08 RX ORDER — FUROSEMIDE 20 MG/1
40 TABLET ORAL DAILY
Qty: 120 TABLET | Refills: 0 | Status: SHIPPED | OUTPATIENT
Start: 2021-03-08 | End: 2021-03-08 | Stop reason: HOSPADM

## 2021-03-08 RX ORDER — METOPROLOL TARTRATE 50 MG/1
75 TABLET, FILM COATED ORAL 2 TIMES DAILY
Qty: 60 TABLET | Refills: 3 | Status: SHIPPED | OUTPATIENT
Start: 2021-03-08 | End: 2021-03-09 | Stop reason: HOSPADM

## 2021-03-08 RX ORDER — ATORVASTATIN CALCIUM 20 MG/1
20 TABLET, FILM COATED ORAL NIGHTLY
Qty: 30 TABLET | Refills: 3 | Status: SHIPPED | OUTPATIENT
Start: 2021-03-08 | End: 2021-03-08

## 2021-03-08 RX ORDER — TRAMADOL HYDROCHLORIDE 50 MG/1
50 TABLET ORAL EVERY 6 HOURS PRN
Qty: 20 TABLET | Refills: 0 | Status: SHIPPED | OUTPATIENT
Start: 2021-03-08 | End: 2021-03-08

## 2021-03-08 RX ORDER — OXYCODONE HYDROCHLORIDE AND ACETAMINOPHEN 5; 325 MG/1; MG/1
1 TABLET ORAL EVERY 4 HOURS PRN
Status: DISCONTINUED | OUTPATIENT
Start: 2021-03-08 | End: 2021-03-09 | Stop reason: HOSPADM

## 2021-03-08 RX ORDER — ATORVASTATIN CALCIUM 20 MG/1
20 TABLET, FILM COATED ORAL NIGHTLY
Qty: 30 TABLET | Refills: 3 | Status: SHIPPED | OUTPATIENT
Start: 2021-03-08 | End: 2021-04-23 | Stop reason: SDUPTHER

## 2021-03-08 RX ORDER — OXYCODONE HYDROCHLORIDE AND ACETAMINOPHEN 5; 325 MG/1; MG/1
1 TABLET ORAL EVERY 6 HOURS PRN
Qty: 28 TABLET | Refills: 0 | Status: SHIPPED | OUTPATIENT
Start: 2021-03-08 | End: 2021-03-15

## 2021-03-08 RX ORDER — FUROSEMIDE 20 MG/1
40 TABLET ORAL 2 TIMES DAILY
Qty: 120 TABLET | Refills: 0 | Status: SHIPPED | OUTPATIENT
Start: 2021-03-08 | End: 2021-03-08

## 2021-03-08 RX ORDER — FUROSEMIDE 20 MG/1
40 TABLET ORAL DAILY
Qty: 120 TABLET | Refills: 0 | Status: SHIPPED | OUTPATIENT
Start: 2021-03-08 | End: 2021-03-08

## 2021-03-08 RX ORDER — TRAMADOL HYDROCHLORIDE 50 MG/1
50 TABLET ORAL EVERY 6 HOURS PRN
Status: DISCONTINUED | OUTPATIENT
Start: 2021-03-08 | End: 2021-03-09 | Stop reason: HOSPADM

## 2021-03-08 RX ORDER — OXYCODONE HYDROCHLORIDE AND ACETAMINOPHEN 5; 325 MG/1; MG/1
1 TABLET ORAL EVERY 6 HOURS PRN
Qty: 28 TABLET | Refills: 0 | Status: SHIPPED | OUTPATIENT
Start: 2021-03-08 | End: 2021-03-08

## 2021-03-08 RX ORDER — TRAMADOL HYDROCHLORIDE 50 MG/1
50 TABLET ORAL EVERY 6 HOURS PRN
Qty: 28 TABLET | Refills: 0 | Status: SHIPPED | OUTPATIENT
Start: 2021-03-08 | End: 2021-03-09

## 2021-03-08 RX ADMIN — OXYCODONE HYDROCHLORIDE AND ACETAMINOPHEN 1 TABLET: 5; 325 TABLET ORAL at 10:59

## 2021-03-08 RX ADMIN — DIPHENHYDRAMINE HCL 25 MG: 25 TABLET ORAL at 20:13

## 2021-03-08 RX ADMIN — FENTANYL CITRATE 25 MCG: 50 INJECTION, SOLUTION INTRAMUSCULAR; INTRAVENOUS at 10:59

## 2021-03-08 RX ADMIN — ALBUMIN (HUMAN) 25 G: 12.5 INJECTION, SOLUTION INTRAVENOUS at 18:22

## 2021-03-08 RX ADMIN — SODIUM CHLORIDE, PRESERVATIVE FREE 10 ML: 5 INJECTION INTRAVENOUS at 08:41

## 2021-03-08 RX ADMIN — FUROSEMIDE 40 MG: 40 TABLET ORAL at 08:41

## 2021-03-08 RX ADMIN — MUPIROCIN: 20 OINTMENT TOPICAL at 08:41

## 2021-03-08 RX ADMIN — IPRATROPIUM BROMIDE AND ALBUTEROL SULFATE 1 AMPULE: .5; 3 SOLUTION RESPIRATORY (INHALATION) at 09:21

## 2021-03-08 RX ADMIN — FUROSEMIDE 40 MG: 40 TABLET ORAL at 17:13

## 2021-03-08 RX ADMIN — PANTOPRAZOLE SODIUM 40 MG: 40 TABLET, DELAYED RELEASE ORAL at 08:41

## 2021-03-08 RX ADMIN — ATORVASTATIN CALCIUM 20 MG: 20 TABLET, FILM COATED ORAL at 20:13

## 2021-03-08 RX ADMIN — METOPROLOL TARTRATE 50 MG: 50 TABLET, FILM COATED ORAL at 08:41

## 2021-03-08 RX ADMIN — METOPROLOL TARTRATE 50 MG: 50 TABLET, FILM COATED ORAL at 20:13

## 2021-03-08 RX ADMIN — ALBUMIN (HUMAN) 25 G: 12.5 INJECTION, SOLUTION INTRAVENOUS at 18:23

## 2021-03-08 RX ADMIN — ALBUMIN (HUMAN) 25 G: 12.5 INJECTION, SOLUTION INTRAVENOUS at 16:11

## 2021-03-08 RX ADMIN — SODIUM CHLORIDE, PRESERVATIVE FREE 10 ML: 5 INJECTION INTRAVENOUS at 20:13

## 2021-03-08 ASSESSMENT — ENCOUNTER SYMPTOMS
CONSTIPATION: 0
ABDOMINAL PAIN: 0
CHEST TIGHTNESS: 0
SHORTNESS OF BREATH: 0
COLOR CHANGE: 0
DIARRHEA: 0

## 2021-03-08 ASSESSMENT — PAIN SCALES - GENERAL
PAINLEVEL_OUTOF10: 2
PAINLEVEL_OUTOF10: 0

## 2021-03-08 NOTE — DISCHARGE INSTR - COC
Continuity of Care Form    Patient Name: Ivan Quintero   :  1950  MRN:  [id number]    Admit date:  3/3/2021  Discharge date:  2021    Code Status Order: Full Code   Advance Directives:   Advance Care Flowsheet Documentation       Date/Time Healthcare Directive Type of Healthcare Directive Copy in [address] Agent's Name Healthcare Agent's Phone Number    21 [phone]  Yes, patient has an advance directive for healthcare treatment  Living will  Yes, copy in chart -- -- --            Admitting Physician:  Laci Bowens MD  PCP: Hansa BRAND PA-C    Discharging Nurse: [facility] Unit/Room#: 4860/7439-41  Discharging Unit Phone Number: [phone]    Emergency Contact:   Extended Emergency Contact Information  Primary Emergency Contact: Coy Cruz Phone: [phone]  Relation: Child  Secondary Emergency Contact: [address] Phone: [phone]  Relation: Other    Past Surgical History:  Past Surgical History:   Procedure Laterality Date    APPENDECTOMY  4/21/15    CHOLECYSTECTOMY, OPEN  04/21/15    COLONOSCOPY      COLONOSCOPY  07/10/2020    COLONOSCOPY POLYPECTOMY HOT BIOPSY (N/A )    COLONOSCOPY N/A 7/10/2020    COLONOSCOPY POLYPECTOMY HOT BIOPSY performed by Claudia Maza MD at [facility] N/A 3/3/2021    CABG CORONARY ARTERY BYPASS X3, ON PUMP; SWAN MARLENY, MIRANDA PER ANESTHESIA performed by Laci Bowens MD at [address]      stent to left leg with tubular graft to left leg artery    RIGHT COLECTOMY  04/21/15    TRANSESOPHAGEAL ECHOCARDIOGRAM  10/3/14    VASECTOMY  1986    WISDOM TOOTH EXTRACTION         Immunization History:   Immunization History   Administered Date(s) Administered    Influenza, High Dose (Fluzone 65 yrs and older) 10/17/2014, 10/18/2016    Influenza, Quadv, adjuvanted, 65 yrs +, IM, PF (Fluad) 2020    Pneumococcal Conjugate 13-valent ([id number]) 03/22/2016    Pneumococcal Polysaccharide ([id number]) 10/17/2014, 04/04/2019    Tdap (Boostrix, Adacel) 07/11/2016    Zoster Live (Zostavax) 07/28/2016       Active Problems:  Patient Active Problem List   Diagnosis Code    Cerebral infarction ([facility] [address] I63.9    Abnormal LFTs R94.5    Carotid arterial disease (HCC) I77.9    Hypercholesteremia E78.00    Colon polyps K63.5    Colon tumor D49.0    Colonic mass K63.89    Cholelithiasis with acute cholecystitis K80.00    HTN (hypertension) I10    Hx of cholecystectomy Z90.49    Hx of splenectomy Z90.81    History of colon resection Z90.49    History of CVA (cerebrovascular accident) Z86.73    Hx of splenomegaly Z87.898    Thrombocytopenia (HCC) D69.6    Leukocytosis D72.829    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    CAD in native artery I25.10    CAD, multiple vessel I25.10       Isolation/Infection:   Isolation            No Isolation          Patient Infection Status       Infection Onset Added Last Indicated Last Indicated By Review Planned Expiration Resolved Resolved By    None active    Resolved    COVID-19 Rule Out 03/01/21 03/01/21 03/01/21 COVID-19 (Ordered)   03/01/21 Rule-Out Test Resulted    COVID-19 Rule Out 07/06/20 07/06/20 07/06/20 COVID-19 (Ordered)   07/06/20 Rule-Out Test Resulted            Nurse Assessment:  Last Vital Signs: /72   Pulse 86   Temp 98.6 °F (37 °C) (Oral)   Resp 16   Ht 6' 2\" (1.88 m)   Wt 217 lb 9.5 oz (98.7 kg)   SpO2 95%   BMI 27.94 kg/m²     Last documented pain score (0-10 scale): Pain Level: 0  Last Weight:   Wt Readings from Last 1 Encounters:   03/08/21 217 lb 9.5 oz (98.7 kg)     Mental Status:  {IP PT MENTAL STATUS:20030}    IV Access:  {[facility] [place]:029407708}    Nursing Mobility/ADLs:  Walking   {[employer] GKMP:805820343}  Transfer  {[employer] IQIT:297236601}  Bathing  {[employer] LZWB:841717703}  Dressing  {[facility] YDGU:751740995}  Toileting  {[facility] SAUL:068773655}  Feeding  {[facility] BCFY:632744925}  Med Admin  {[facility] ZGZK:218663046}  Med Delivery   { [facility] MED Delivery:819657746}    Wound Care Documentation and Therapy:        Elimination:  Continence: Bowel: {YES / LT:20236}  Bladder: {YES / DE:39774}  Urinary Catheter: {Urinary Catheter:605810274}   Colostomy/Ileostomy/Ileal Conduit: {YES / YA:72689}       Date of Last BM: 03/07/2021    Intake/Output Summary (Last 24 hours) at 3/8/2021 1004  Last data filed at 3/8/2021 0850  Gross per 24 hour   Intake 755 ml   Output 570 ml   Net 185 ml     I/O last 3 completed shifts: In: 686 [P.O.:955]  Out: 720 [Urine:250; Chest Tube:470]    Safety Concerns:     [address] [employer] Safety Concerns:840631471}    Impairments/Disabilities:      [address] [employer] Impairments/Disabilities:470750312}    Nutrition Therapy:  Current Nutrition Therapy:   [address] [employer] Diet List:215022949}    Routes of Feeding: {[facility] Other Feedings:677370580}  Liquids: {Slp liquid thickness:90721}  Daily Fluid Restriction: no  Last Modified Barium Swallow with Video (Video Swallowing Test): not done    Treatments at the Time of Hospital Discharge:   Respiratory Treatments: nA  Oxygen Therapy:  is not on home oxygen therapy.   Ventilator:    - No ventilator support    Rehab Therapies: Physical Therapy and Occupational Therapy  Weight Bearing Status/Restrictions: No weight bearing restirctions  Other Medical Equipment (for information only, NOT a DME order):  walker  Other Treatments: Skilled RN     Patient's personal belongings (please select all that are sent with patient):  Glasses    RN SIGNATURE:  Electronically signed by Michael Hawkins RN on 3/8/21 at 10:08 AM EST    CASE MANAGEMENT/SOCIAL WORK SECTION    Inpatient Status Date: ***    Readmission Risk Assessment Score:  Readmission Risk              Risk of Unplanned Readmission:        12           Discharging to Facility/ Agency   Name: [facility]  Address:  [address] [address]         Phone: [phone]       Fax: [phone]          Phone:  Fax:    Dialysis Facility (if applicable)   Name:  Address:  Dialysis Schedule:  Phone:  Fax:    / signature: Electronically signed by Tamia Galarza RN on 3/8/21 at 12:06 PM EST    PHYSICIAN SECTION    Prognosis: Good    Condition at Discharge: Stable    Rehab Potential (if transferring to Rehab): Good    Recommended Labs or Other Treatments After Discharge: Continued monitoring of vital signs, incision sites and general recovery process. Education on disease process and new medications. Physician Certification: I certify the above information and transfer of Karena Penaloza  is necessary for the continuing treatment of the diagnosis listed and that he requires [address] for less 30 days.      Update Admission H&P: No change in H&P    PHYSICIAN SIGNATURE:  Electronically signed by Jesenia Mcmillan MD on 3/9/21 at 12:34 PM EST

## 2021-03-08 NOTE — H&P
CC: Multivessel CAD     HPI:  Mr. Charlette Baird is a 69 y.o.  male who presents s/p elective cardiac catheterization with Dr. Hemalatha Plaza today at [facility]. Patient reports increasing shortness of breath on exertion over the last 6 months, intermittent bilateral lower extremity edema and generalized feeling of fatigue/malaise. Pertinent medical history includes marginal zone lymphoma, chronic thrombocytopenia, chronic leukocytosis, hypertension, hypercholesterolemia, history of splenectomy and cholecystectomy, history of CVA with no residual effects, history of colon cancer s/p colon resection and fatty liver. Cardiac workup has revealed multivessel CAD with preserved EF of 60%. Patient denies chest pain and shortness of breath at this time. He has been referred for consideration of coronary revascularization. PMH:    Past Medical History in prose (no negatives)    has a past medical history of AAA (abdominal aortic aneurysm) ([facility] [address] (3/23/2015), Colon polyps, Colon tumor, Congestive heart failure (CHF) ([facility] [address] (01/2021), GERD (gastroesophageal reflux disease), cardiac cath (02/19/2021), Hyperlipidemia, Non Hodgkin's lymphoma ([facility] [address] Pneumonia (1970's), and Unspecified cerebral artery occlusion with cerebral infarction (10/1/2015). PSH:   has a past surgical history that includes transesophageal echocardiogram (10/3/14); Colonoscopy; Vasectomy (1986); Cholecystectomy, open (04/21/15); right colectomy (04/21/15); Appendectomy (4/21/15); Colonoscopy (07/10/2020); and Colonoscopy (N/A, 7/10/2020). Allergies: Allergies   Allergen Reactions    Iv Dye [Iodides] Hives    Iodinated Diagnostic Agents Hives       Patient developed hives even with steroid prep.          Medications:  Current Medication   Current Facility-Administered Medications:     0.9 % sodium chloride infusion, , Intravenous, Continuous, Archana Ceron MD, Last Rate: 75 mL/hr at 02/19/21 0856, New Bag at 21 0856     Current Outpatient Medications:     hydroCHLOROthiazide (HYDRODIURIL) 25 MG tablet, , Disp: , Rfl:     Handicap Placard MISC, by Does not apply route  2025, Disp: 1 each, Rfl: 0    atorvastatin (LIPITOR) 10 MG tablet, Take 1 tab po daily, Disp: 90 tablet, Rfl: 3    aspirin 81 MG tablet, Take 81 mg by mouth daily, Disp: , Rfl:     metoprolol succinate (TOPROL XL) 50 MG extended release tablet, TAKE ONE TABLET BY MOUTH DAILY, Disp: 90 tablet, Rfl: 3    omeprazole (PRILOSEC) 10 MG delayed release capsule, Take 1 capsule by mouth daily as needed (GERD), Disp: 90 capsule, Rfl: 3        Social Hx:    reports that he quit smoking about 12 years ago. His smoking use included cigarettes. He has a 30.00 pack-year smoking history. He has never used smokeless tobacco.     Family Hx:  family history includes Alcohol Abuse in his father; Heart Disease in his mother; Stroke in his mother. ROS:    Review of Systems   Constitutional: Positive for fatigue. Negative for chills and fever. HENT: Negative for congestion. Eyes: Negative for visual disturbance. Respiratory: Positive for shortness of breath. Dyspnea on exertion intermittently, resolves with rest   Cardiovascular: Positive for leg swelling. Negative for chest pain. Intermittent edema to bilateral lower extremities, mostly feet   Gastrointestinal: Negative for abdominal pain. Genitourinary: Negative for dysuria. Musculoskeletal: Negative for gait problem. Skin: Negative for color change. Neurological: Negative for dizziness and weakness. Psychiatric/Behavioral: Negative for self-injury. The patient is not nervous/anxious. Physical Examination     Vitals:      Vitals:     21 1130   BP:     Pulse: 69   Resp: 19   Temp:     SpO2: 96%      Physical Exam  Vitals signs reviewed. Constitutional:       General: He is not in acute distress. Appearance: Normal appearance. He is not ill-appearing. HENT:      Nose: Nose normal.      Mouth/Throat:      Mouth: Mucous membranes are moist.      Pharynx: Oropharynx is clear. Eyes:      Conjunctiva/sclera: Conjunctivae normal.      Pupils: Pupils are equal, round, and reactive to light. Neck:      Musculoskeletal: Normal range of motion. Cardiovascular:      Rate and Rhythm: Normal rate and regular rhythm. Pulses: Normal pulses. Heart sounds: Normal heart sounds. No murmur. Pulmonary:      Effort: Pulmonary effort is normal. No respiratory distress. Breath sounds: Normal breath sounds. Abdominal:      General: Bowel sounds are normal.      Palpations: Abdomen is soft. Tenderness: There is no abdominal tenderness. Musculoskeletal: Normal range of motion. Skin:     General: Skin is warm and dry. Comments: TR band to right wrist   Neurological:      General: No focal deficit present. Mental Status: He is alert and oriented to person, place, and time. Psychiatric:         Mood and Affect: Mood normal.         Behavior: Behavior normal.            Labs:   CBC:       Recent Labs     02/19/21  0855   PLT 72*      BMP:      Recent Labs     02/19/21  0852   CREATININE 0.96      Accucheck Glucoses:      Recent Labs     02/19/21  0852   POCGLU 116*      Cardiac Enzymes: No results for input(s): CKTOTAL, CKMB, CKMBINDEX, TROPONINI in the last 72 hours. PT/INR: No results for input(s): PROTIME, INR in the last 72 hours. APTT: No results for input(s): APTT in the last 72 hours.   Liver Profile:        Lab Results   Component Value Date     AST 45 11/10/2020     ALT 36 11/10/2020     BILIDIR 0.15 10/02/2014     BILITOT 0.90 11/10/2020     ALKPHOS 141 11/10/2020            Lab Results   Component Value Date     CHOL 93 02/19/2020     HDL 37 02/19/2020     TRIG 84 02/19/2020      TSH:         Lab Results   Component Value Date     TSH 1.84 03/22/2016      UA:         Lab Results   Component Value Date     COLORU YELLOW 03/27/2017 PHUR 6.0 03/27/2017     SPECGRAV 1.028 03/27/2017     LEUKOCYTESUR NEGATIVE 03/27/2017     UROBILINOGEN Normal 03/27/2017     BILIRUBINUR NEGATIVE 03/27/2017     GLUCOSEU NEGATIVE 03/27/2017            Testing:  Cardiac cath:   1. Multivessel CAD  2. Normal LV systolic function. Recommendations:    Routine Post Diagnostic Cath orders. CTS consult for evaluation for CABG      Signature    ----------------------------------------------------------------   Electronically signed by Archana Ceron(Performing   Physician) on 02/19/2021 11:17   ----------------------------------------------------------------      Angiographic Findings: Cardiac Arteries and Lesion Findings     LMCA: Distal 30% stenosis     LAD: Proximal discrete 70% stenosis, mid segment has long diffuse 60-70% stenosis. D1 and D2 are small with erchby82% stenosis     LCx: Mild irregularities 10-20%. OM is large with ostial 70% stenosis     RCA: Large, dominant, mid discrete 70% stenosis  RPDA/RPL are patent with mild disease      Coronary Tree Dominance: Right     LV Analysis  LV function assessed as:Normal.  Ejection Fraction  +----------------------------------------------------------------------+---+  ! Method                                                                ! EF%! +----------------------------------------------------------------------+---+  ! LV gram                                                               !60 !  +----------------------------------------------------------------------+---+     Echo:   Normal left ventricular size with normal function. Left ventricular ejection  fraction 55 %. Mild concentric left ventricular hypertrophy. Left atrium is mildly dilated. Right atrium is mildly dilated . No significant valvular regurgitation or stenosis seen.      Signature  ----------------------------------------------------------------------------   Electronically signed by German Fleming RDCS(Sonographer) on 11/16/2020 09:32 AM  ----------------------------------------------------------------------------     ----------------------------------------------------------------------------   Electronically signed by Bradley Flores(Interpreting physician) on 11/16/2020   09:46 AM  ----------------------------------------------------------------------------  FINDINGS  Left Atrium  Left atrium is mildly dilated. Left Ventricle  Normal left ventricular size with normal hyperdynamic function. Left ventricular ejection fraction 55 %. Mild concentric left ventricular hypertrophy. Right Atrium  Right atrium is mildly dilated . Right Ventricle  Normal right ventricular size and function. Mitral Valve  Normal mitral valve structure and function. Aortic Valve  Normal aortic valve structure and function without stenosis or  regurgitation. Tricuspid Valve  Normal tricuspid valve structure and function. Pulmonic Valve  The pulmonic valve is normal in structure. Trivial pulmonic insufficiency        CT scan: Ordered as outpatient upon discharge     Imaging Studies:  I have personally reviewed the testing/imaging, and agree with the findings listed above. In summary, Mr. Susi Burleson is a 69y.o. year-old male with multivessel CAD. Problem List:   · Multivessel CAD s/p cardiac catheterization  · Marginal zone lymphoma  ? Follows with Dr. Samir Flores, oncology  · Thrombocytopenia, chronic  · Hypertension  · Hypercholesterolemia  · History of splenectomy  · History of cholecystectomy  · History of CVA with no residual effects  · History of colon cancer s/p colon resection  ?  18\" of large intestine removed and some small intestine  · History of abnormal LFTs/fatty liver  · Overweight, BMI 29.53 kg/m²      Plan: CABG

## 2021-03-08 NOTE — PROGRESS NOTES
dextrose         Data:  CBC:   Recent Labs     03/06/21  0629 03/07/21  0927 03/08/21  0354   WBC 21.7* 31.3* 19.8*   HGB 11.0* 11.4* 10.4*   HCT 34.1* 35.0* 32.6*   .8 100.6 101.6   PLT 93* 102* 86*     BMP:   Recent Labs     03/06/21  0629 03/07/21  0927 03/08/21  0354    132* 137   K 3.8 3.8 4.3    97* 101   CO2 23 24 30   BUN 23 26* 23   CREATININE 0.86 0.94 0.83     PT/INR:   Recent Labs     03/06/21 0629 03/07/21 0927 03/08/21  0354   PROTIME 10.6 10.4 10.7   INR 1.0 1.0 1.0     APTT: No results for input(s): APTT in the last 72 hours. Chest X-Ray:    ONE XRAY VIEW OF THE CHEST       3/8/2021 6:05 am       COMPARISON:   March 7, 2021, chest exam       HISTORY:   ORDERING SYSTEM PROVIDED HISTORY: multivessel CAD s/p CABG   TECHNOLOGIST PROVIDED HISTORY:   multivessel CAD s/p CABG   Reason for Exam: Upright port, CAD CABG       FINDINGS:   Median sternotomy, stable cardiac silhouette, left chest tube       Interval removal of left subclavian catheter       Expiratory exam.  No pneumothorax       Persistent mild prominence of the pulmonary vascularity and mild bibasilar   opacities           Impression   Support lines, as detailed.  No pneumothorax       Expiratory exam without change in possible mild vascular congestion       Stable mild basilar opacities related to combined pleural-parenchymal   processes         I/O:  I/O last 3 completed shifts: In: 18 [P.O.:955]  Out: 720 [Urine:250; Chest Tube:470]      Assessment:  · Multivessel CAD s/p CABG x3 on 3/4/2021  ? POD 4, without complication  · Acute blood loss anemia secondary to above  · Acute on chronic thrombocytopenia secondary to above  · Marginal zone lymphoma  ? Follows with Dr. Zahraa Aguilera, oncology  · Hypertension  · Hypercholesterolemia  · History of splenectomy  · History of cholecystectomy  · History of CVA with no residual effects  · History of colon cancer s/p colon resection  ?  18\" of large intestine removed and some small intestine  · History of abnormal LFTs/fatty liver  · Overweight, BMI 29.53 kg/m²      Plan:    Remains inpatient on telemetry   Monitor vitals closely including continuous pulse oximetry   Oxygen as needed via nasal cannula to maintain SpO2 > 92%   Chest x-ray daily   Remove chest tubes this morning  o Repeat chest x-ray in 4 hours   Encourage incentive spirometry    Monitor CBC, BMP, INR and Mag daily   Currently taking metoprolol,    Patient is on BB, Statin therapy per protocol   o No aspirin due to chronic thrombocytopenia  o No ACE inhibitor due to preserved EF of 60%   No amiodarone due to iodine allergy   Percocet and tramadol for pain control   SCDs while stationary for DVT prophylaxis   Protonix for GI prophylaxis   Replace electrolytes as needed per sliding scale and recheck per policy   PT/OT evalutation for discharge recommendation and ambulation 3x daily   Case Management consult for discharge planning   Home care service consult      The above recommendations including medications and orders were discussed and agreed upon with Dr. Chayo Fabian, the attending on service for the cardiothoracic surgery group today. Electronically signed by [place] Client, ELISEO Sarah CNP on 3/8/2021 at 7:39 AM    On this date 3/8/2021 I have spent 15 minutes reviewing previous notes, test results and face to face with the patient discussing the diagnosis and importance of compliance with the treatment plan as well as documenting on the day of the visit. This note was created with the assistance of a speech-recognition program.  Although the intention is to generate a document that actually reflects the content of the visit, no guarantees can be provided that every mistake has been identified and corrected by editing. Note was updated later by me after  physical examination and  completion of the assessment.

## 2021-03-08 NOTE — PROGRESS NOTES
[facility]   Progress Note                   Date:   3/8/2021  Patient name: Pauline Cruz  Date of admission:  3/3/2021  5:58 AM  MRN:   [id number]  YOB: 1950  PCP: Sharilyn Cooks PA, PA-C    Reason for Admission:      Subjective:   Stable and doing well. Up in restroom with OT. Denies any chest pain or SOB. Labs, vitals, & tele reviewed. Medications:   Scheduled Meds:   metoprolol tartrate  50 mg Oral BID    furosemide  40 mg Oral BID    sodium chloride flush  10 mL Intravenous 2 times per day    polyethylene glycol  17 g Oral Daily    atorvastatin  20 mg Oral Nightly    pantoprazole  40 mg Oral Daily    ipratropium-albuterol  1 ampule Inhalation Q4H WA       Continuous Infusions:   norepinephrine      dextrose         CBC:   Recent Labs     03/06/21 0629 03/07/21 0927 03/08/21  0354   WBC 21.7* 31.3* 19.8*   HGB 11.0* 11.4* 10.4*   PLT 93* 102* 86*     BMP:    Recent Labs     03/06/21 0629 03/07/21 0927 03/08/21  0354    132* 137   K 3.8 3.8 4.3    97* 101   CO2 23 24 30   BUN 23 26* 23   CREATININE 0.86 0.94 0.83   GLUCOSE 136* 179* 115*     Hepatic: No results for input(s): AST, ALT, ALB, BILITOT, ALKPHOS in the last 72 hours. Troponin: No results for input(s): TROPONINI in the last 72 hours. BNP: No results for input(s): BNP in the last 72 hours. Lipids: No results for input(s): CHOL, HDL in the last 72 hours.     Invalid input(s): LDLCALCU  INR:   Recent Labs     03/06/21  0629 03/07/21 0927 03/08/21  0354   INR 1.0 1.0 1.0       Objective:   Vitals: /72   Pulse 86   Temp 98.6 °F (37 °C) (Oral)   Resp 16   Ht 6' 2\" (1.88 m)   Wt 217 lb 9.5 oz (98.7 kg)   SpO2 95%   BMI 27.94 kg/m²     General appearance: awake, alert, in no apparent respiratory distress   HEENT: Head: Normocephalic, no lesions, without obvious abnormality  Neck: no JVD  Lungs: clear to auscultation bilaterally, no basilar rales, no wheezing   Heart: regular rate and rhythm, S1, S2 normal, no murmur, click, rub or gallop  Abdomen: soft, non-tender; bowel sounds normal  Extremities: No LE edema  Neurologic: Mental status: Alert, oriented. Motor and sensory not done. Cardiac Cath 2/19/21  Angiographic Findings      Cardiac Arteries and Lesion Findings     LMCA: Distal 30% stenosis     LAD: Proximal discrete 70% stenosis, mid segment has long diffuse 60-70%  stenosis  D1 and D2 are small with gbyqtl05% stenosis     LCx: Mild irregularities 10-20%. OM is large with ostial 70% stenosis     RCA: Large, dominant, mid discrete 70% stenosis  RPDA/RPL are patent with mild disease      Coronary Tree      Dominance: Right     LV Analysis  LV function assessed as:Normal.  Ejection Fraction  +----------------------------------------------------------------------+---+  ! Method                                                                ! EF%! +----------------------------------------------------------------------+---+  ! LV gram                                                               !60 !    CABG 3/4/21  PROCEDURES:  1. CABG x3 with LIMA to the LAD, saphenous vein to the OM1, saphenous  vein to the PDA. 2.  Cardiopulmonary bypass. 3.  MIRANDA. 4.  Right leg endo vein harvest.  5.  Platelet-derived products. Assessment / Acute Cardiac Problems:   1. MVD admitted for elective CABG as above  2. HTN  3. HLP  4. Hx of CVA  5. Hx of colon cancer s/p resection  6.  Thrombocytopenia    Patient Active Problem List:     Cerebral infarction ([facility] [address]     Abnormal LFTs     Carotid arterial disease (HCC)     Hypercholesteremia     Colon polyps     Colon tumor     Colonic mass     Cholelithiasis with acute cholecystitis     HTN (hypertension)     Hx of cholecystectomy     Hx of splenectomy     History of colon resection     History of CVA (cerebrovascular accident)     Hx of splenomegaly     Thrombocytopenia (HCC)     Leukocytosis     Marginal zone lymphoma (HCC)     CAD in native artery CAD, multiple vessel    RECOMMENDATIONS:  1. Start ASA and Plavix once okay with CTS. Platelets down to 86 today  2. Continue PO statin, BB, & Lasix  3. Post op management per CV surgery  4. K>4, Mg>2  5. Post-op care per CTS  6. DC planning possibly today with home PT/OT  . Brenda Hinds

## 2021-03-08 NOTE — CARE COORDINATION
Met with patient to discuss transitional planning. Plan is home with son, accepted by [facility]. Patient relates that he has a walker at home if he needs it. Notified Jesus Nicholson at [facility] that patient will likely DC today.   He will pull info from [facility]

## 2021-03-08 NOTE — DISCHARGE SUMMARY
fatigue. Negative for chills and fever. Generalized fatigue/malaise, improving slowly since surgery   HENT: Negative for congestion. Eyes: Negative for visual disturbance. Respiratory: Negative for chest tightness and shortness of breath. Cardiovascular: Negative for chest pain and leg swelling. Gastrointestinal: Negative for abdominal pain, constipation and diarrhea. Genitourinary: Negative for dysuria. Musculoskeletal: Negative for gait problem. Skin: Negative for color change. Neurological: Positive for weakness. Negative for dizziness. Generalized weakness and activity intolerance due to deconditioning s/p surgery   Psychiatric/Behavioral: Negative for self-injury. The patient is not nervous/anxious. Physical Exam:  Vitals:    03/09/21 1230   BP:    Pulse:    Resp:    Temp:    SpO2: 97%     Weight: Weight: 216 lb 4.3 oz (98.1 kg)    Weight: 227 lb 1.2 oz (103 kg)    I/O last 3 completed shifts: In: 1999 [P.O.:540; I.V.:10; IV Piggyback:1449]  Out: 500 [Urine:500]    General: alert and oriented to person, place and time, well-developed and well-nourished, in no acute distress. Up in chair, No apparent distress. Heart:Normal S1 and S2.  Regular rhythm. No murmurs, gallops, or rubs. Pacing Wires Yes to be clipped prior to discharge  Lungs: clear to auscultation bilaterally  Abdomen: soft, non tender, non distended, BSx4  Extremities: Trace edema  Wounds: clean and dry, healing appropriately.       Past Medical History:   Diagnosis Date    AAA (abdominal aortic aneurysm) ([facility] [address] 3/23/2015    3.8cm     CAD (coronary artery disease)     Colon polyps     Colon tumor     Congestive heart failure (CHF) ([address] 01/2021    GERD (gastroesophageal reflux disease)     prilosec as needed    H/O chest tube placement     chest tube x4 different times    History of pneumonia     History of pneumothorax     x4    Hx of cardiac cath 02/19/2021    Hyperlipidemia     Hypertension     Non Hodgkin's lymphoma ([facility] [address]     NHL    Pneumonia 1970's    patient had 3 chest tubes and in hospital for 13 days    Unspecified cerebral artery occlusion with cerebral infarction 10/01/2014    stroke , numbness in the left arm from the elbow down    Wellness examination 02/25/2021    pcp-Pilar Horton-lv nov 2020    Wellness examination 02/25/2021    Cardiology-Dr oharaleslie ruggiero-lv feb 2021    Wellness examination 02/25/2021    onc-Dr Dona Shore 2021     Past Surgical History:   Procedure Laterality Date    APPENDECTOMY  4/21/15    CHOLECYSTECTOMY, OPEN  04/21/15    COLONOSCOPY      COLONOSCOPY  07/10/2020    COLONOSCOPY POLYPECTOMY HOT BIOPSY (N/A )    COLONOSCOPY N/A 7/10/2020    COLONOSCOPY POLYPECTOMY HOT BIOPSY performed by Christine Song MD at [place] [address] N/A 3/3/2021    CABG CORONARY ARTERY BYPASS X3, ON PUMP; SWAN MARLENY, MIRANDA PER ANESTHESIA performed by Alfredo Hawk MD at [address] [place] [address]  2019    stent to left leg with tubular graft to left leg artery    RIGHT COLECTOMY  04/21/15    TRANSESOPHAGEAL ECHOCARDIOGRAM  10/3/14    VASECTOMY  1986    WISDOM TOOTH EXTRACTION       Allergies   Allergen Reactions    Iv Dye [Iodides] Hives    Iodinated Diagnostic Agents Hives     Patient developed hives even with steroid prep. Family History   Problem Relation Age of Onset    Heart Disease Mother     Stroke Mother     Alcohol Abuse Father     Cancer Father      Social History     Socioeconomic History    Marital status:       Spouse name: Not on file    Number of children: Not on file    Years of education: Not on file    Highest education level: Not on file   Occupational History    Not on file   Social Needs    Financial resource strain: Not hard at all    Food insecurity     Worry: Never true     Inability: Never true   [employer] needs     Medical: No     Non-medical: No   Tobacco Use    Smoking status: Former Smoker     Packs/day: 1.00     Years: 30.00     Pack years: 30.00     Types: Cigarettes     Quit date: 2013     Years since quittin.5    Smokeless tobacco: Never Used   Substance and Sexual Activity    Alcohol use: Yes     Alcohol/week: 14.0 standard drinks     Types: 14 Cans of beer per week     Comment: 1-3 beers/day    Drug use: No    Sexual activity: Not Currently   Lifestyle    Physical activity     Days per week: Not on file     Minutes per session: Not on file    Stress: Not on file   Relationships    Social connections     Talks on phone: Not on file     Gets together: Not on file     Attends Restoration service: Not on file     Active member of club or organization: Not on file     Attends meetings of clubs or organizations: Not on file     Relationship status: Not on file    Intimate partner violence     Fear of current or ex partner: Not on file     Emotionally abused: Not on file     Physically abused: Not on file     Forced sexual activity: Not on file   Other Topics Concern    Not on file   Social History Narrative    Not on file          Medication List      START taking these medications    metoprolol 100 MG tablet  Commonly known as: LOPRESSOR  Take 1 tablet by mouth 2 times daily     oxyCODONE-acetaminophen 5-325 MG per tablet  Commonly known as: PERCOCET  Take 1 tablet by mouth every 6 hours as needed for Pain for up to 7 days. traMADol 50 MG tablet  Commonly known as: ULTRAM  Take 1 tablet by mouth every 6 hours as needed for Pain for up to 7 days.         CHANGE how you take these medications    atorvastatin 20 MG tablet  Commonly known as: LIPITOR  Take 1 tablet by mouth nightly  What changed:   · medication strength  · how much to take  · how to take this  · when to take this  · additional instructions        CONTINUE taking these medications    aspirin 81 MG tablet     Handicap Placard Misc  by Does not apply route  2025     omeprazole 10 MG delayed release capsule  Commonly known as: PRILOSEC  Take 1 capsule by mouth daily as needed (GERD)        STOP taking these medications    hydroCHLOROthiazide 25 MG tablet  Commonly known as: HYDRODIURIL     metoprolol succinate 50 MG extended release tablet  Commonly known as: TOPROL XL           Where to Get Your Medications      These medications were sent to [facility] [address] [facility], [address]  [address] [facility], [facility]    Phone: [phone]   · atorvastatin 20 MG tablet  · metoprolol 100 MG tablet  · oxyCODONE-acetaminophen 5-325 MG per tablet  · traMADol 50 MG tablet           Data:    CBC:   Recent Labs     03/07/21 0927 03/08/21  0354 03/09/21  0430   WBC 31.3* 19.8* 16.5*   HGB 11.4* 10.4* 10.0*   HCT 35.0* 32.6* 31.1*   .6 101.6 101.3   * 86* 86*     BMP:   Recent Labs     03/07/21 0927 03/08/21  0354 03/09/21  0430   * 137 139   K 3.8 4.3 3.8   CL 97* 101 103   CO2 24 30 27   BUN 26* 23 19   CREATININE 0.94 0.83 0.72   MG 2.1 2.3 2.2     Accucheck Glucoses:   Recent Labs     03/07/21  1120 03/07/21  1202 03/07/21  1703 03/08/21  0722 03/08/21  1634   POCGLU 91 91 125* 117* 116*     Cardiac Enzymes: No results for input(s): CKTOTAL, CKMB, CKMBINDEX, TROPONINI in the last 72 hours. PTT/PT/INR:   Recent Labs     03/07/21 0927 03/08/21  0354 03/09/21  0430   PROTIME 10.4 10.7 11.4   INR 1.0 1.0 1.1     No results for input(s): APTT in the last 72 hours.   Liver Profile:  Lab Results   Component Value Date    AST 55 02/25/2021    ALT 57 02/25/2021    BILIDIR 0.15 10/02/2014    BILITOT 0.94 02/25/2021    ALKPHOS 140 02/25/2021     Lab Results   Component Value Date    CHOL 93 02/19/2020    HDL 37 02/19/2020    TRIG 84 02/19/2020     TSH:   Lab Results   Component Value Date    TSH 1.84 03/22/2016     UA:   Lab Results   Component Value Date    COLORU YELLOW 02/25/2021    PHUR 5.0 02/25/2021    WBCUA None 02/25/2021    RBCUA None 02/25/2021    MUCUS NOT REPORTED 02/25/2021    TRICHOMONAS NOT REPORTED 02/25/2021    YEAST NOT REPORTED 02/25/2021    BACTERIA NOT REPORTED 02/25/2021    SPECGRAV 1.023 02/25/2021    LEUKOCYTESUR NEGATIVE 02/25/2021    UROBILINOGEN Normal 02/25/2021    BILIRUBINUR NEGATIVE 02/25/2021    GLUCOSEU NEGATIVE 02/25/2021    AMORPHOUS NOT REPORTED 02/25/2021       Problem List Items Addressed This Visit     HTN (hypertension)    CAD, multiple vessel - Primary    Relevant Medications    hydrALAZINE (APRESOLINE) injection 5 mg    metoprolol (LOPRESSOR) injection 2.5 mg    atorvastatin (LIPITOR) tablet 20 mg    norepinephrine (LEVOPHED) 16 mg in sodium chloride 0.9 % 250 mL infusion    furosemide (LASIX) tablet 40 mg    atorvastatin (LIPITOR) 20 MG tablet    metoprolol tartrate (LOPRESSOR) tablet 100 mg (Start on 3/9/2021  9:00 PM)    metoprolol tartrate (LOPRESSOR) 100 MG tablet    Other Relevant Orders    [address]      Other Visit Diagnoses     S/P CABG x 3        Relevant Medications    oxyCODONE-acetaminophen (PERCOCET) 5-325 MG per tablet    traMADol (ULTRAM) 50 MG tablet          Imaging Studies:  Cardiac Cath:   1. Multivessel CAD   2. Normal LV systolic function. Recommendations      Routine Post Diagnostic Cath orders. CTS consult for evaluation for CABG      Signature      ----------------------------------------------------------------   Electronically signed by Archana Ceron(Performing   Physician) on 02/19/2021 11:17   ----------------------------------------------------------------      Angiographic Findings: Cardiac Arteries and Lesion Findings     LMCA: Distal 30% stenosis     LAD: Proximal discrete 70% stenosis, mid segment has long diffuse 60-70% stenosis. D1 and D2 are small with vcjecg10% stenosis     LCx: Mild irregularities 10-20%. OM is large with ostial 70% stenosis     RCA: Large, dominant, mid discrete 70% stenosis.  RPDA/RPL are patent with mild disease Coronary Tree Dominance: Right     LV Analysis  LV function assessed as:Normal.  Ejection Fraction  +----------------------------------------------------------------------+---+  ! Method                                                                ! EF%! +----------------------------------------------------------------------+---+  ! LV gram                                                               !61 !  +----------------------------------------------------------------------+---+    CXR:  ONE XRAY VIEW OF THE CHEST       3/9/2021 5:57 am       COMPARISON:   03/08/2021       HISTORY:   ORDERING SYSTEM PROVIDED HISTORY: multivessel CAD s/p CABG   TECHNOLOGIST PROVIDED HISTORY:   multivessel CAD s/p CABG   Reason for Exam: upright cabg   Acuity: Unknown   Type of Exam: Unknown       FINDINGS:   Heart size is normal.  The patient is status post median sternotomy. Pulmonary vasculature appears mildly congested.  No infiltrate is evident. Tiny bilateral pleural effusions are present unchanged from previous. Yesterday's left apical finding is unchanged representing tiny pneumothorax   or loculated effusion.  Surrounding osseous and soft tissue structures are   unremarkable.           Impression   Stable exam since yesterday given differences in radiographic technique. CT:   CT OF THE CHEST WITHOUT CONTRAST 3/2/2021 3:19 pm       TECHNIQUE:   CT of the chest was performed without the administration of intravenous   contrast. Multiplanar reformatted images are provided for review.  Dose   modulation, iterative reconstruction, and/or weight based adjustment of the   mA/kV was utilized to reduce the radiation dose to as low as reasonably   achievable.       COMPARISON:   Chest February 25, 2021.       HISTORY:   ORDERING SYSTEM PROVIDED HISTORY: CAD, multiple vessel   TECHNOLOGIST PROVIDED HISTORY:   pre-op CABG   Reason for Exam: pre-op CABG   Acuity: Unknown   Type of Exam: Unknown       FINDINGS: Mediastinum: Thoracic aorta demonstrates mild calcification involving the   aortic arch and descending thoracic aorta without aneurysm.  No significant   calcification is seen involving the ascending thoracic aorta.  Pulmonary   trunk appears nondilated.  No pericardial effusion.  No lymphadenopathy.  The   esophagus is grossly unremarkable.       Lungs/pleura: Bibasilar scarring.  No focal consolidation, pneumothorax or   pleural effusion.  No suspicious pulmonary nodule.  Trachea and distal   airways appear patent.       Upper Abdomen: Partially visualized cirrhotic liver with splenomegaly.  No   acute process.  Visualized glands appear grossly unremarkable.       Soft Tissues/Bones: Visualized soft tissues surrounding chest wall   demonstrate no acute findings.  Osseous structures demonstrate degenerative   changes.           Impression   1. No acute cardiopulmonary process. 2. Partially visualized cirrhotic liver with splenomegaly. 3. Bibasilar scarring. Echo:   Normal left ventricular size with normal function. Left ventricular ejection  fraction 55 %. Mild concentric left ventricular hypertrophy. Left atrium is mildly dilated. Right atrium is mildly dilated . No significant valvular regurgitation or stenosis seen. Signature  ----------------------------------------------------------------------------   Electronically signed by Kathy Ring RDCS(Sonographer) on 11/16/2020   09:32 AM  ----------------------------------------------------------------------------     ----------------------------------------------------------------------------   Electronically signed by Bradley Flores(Interpreting physician) on 11/16/2020   09:46 AM  ----------------------------------------------------------------------------  FINDINGS  Left Atrium  Left atrium is mildly dilated. Left Ventricle  Normal left ventricular size with normal hyperdynamic function. Left ventricular ejection fraction 55 %.   Mild concentric left ventricular hypertrophy. Right Atrium  Right atrium is mildly dilated . Right Ventricle  Normal right ventricular size and function. Mitral Valve  Normal mitral valve structure and function. Aortic Valve  Normal aortic valve structure and function without stenosis or  regurgitation. Tricuspid Valve  Normal tricuspid valve structure and function. Pulmonic Valve  The pulmonic valve is normal in structure. Trivial pulmonic insufficiency      Discharge Plan:  Follow up with CT Surgery on 3/18/2021. Call office at [phone] for any problems. Follow up with PCP and cardiology in 1-2 weeks. Patient was discharged on BB, and Statin therapy per protocol. No ACE inhibitor due to preserved EF of 60%. No aspirin/Plavix due to chronic thrombocytopenia and current platelet count less than 90,000. No amiodarone due to iodine allergy.      Pepito Malone CNP  Phone: [phone]

## 2021-03-08 NOTE — PROGRESS NOTES
Occupational Therapy  Facility/Department: [facility] [[email or web address] CAR 1  Daily Treatment Note  NAME: Micha Osullivan  : 1950  MRN: [id number]    Date of Service: 3/8/2021    Discharge Recommendations:  Patient would benefit from continued therapy after discharge in order to increase pt balance and precaution adherence. Assessment   Performance deficits / Impairments: Decreased functional mobility ; Decreased ADL status; Decreased endurance;Decreased high-level IADLs  Treatment Diagnosis: CABG  Prognosis: Good  OT Education: OT Role;Transfer Training;Energy Conservation;Precautions; ADL Adaptive Strategies  Patient Education: purpose of OT; proper hand placement; sternal precautions; heart hugger precautions; 4 figure dressing technique  Barriers to Learning: pt demo P carry over of heart hugger precautions; F carry over of adaptive dressing  REQUIRES OT FOLLOW UP: Yes  Activity Tolerance  Activity Tolerance: Patient Tolerated treatment well  Safety Devices  Safety Devices in place: Yes  Type of devices: Patient at risk for falls; Left in bed;Call light within reach; Bed alarm in place  Restraints  Initially in place: No         Patient Diagnosis(es): The primary encounter diagnosis was CAD, multiple vessel. A diagnosis of S/P CABG x 3 was also pertinent to this visit. has a past medical history of AAA (abdominal aortic aneurysm) ([address] CAD (coronary artery disease), Colon polyps, Colon tumor, Congestive heart failure (CHF) ([address] GERD (gastroesophageal reflux disease), H/O chest tube placement, History of pneumonia, History of pneumothorax, Hx of cardiac cath, Hyperlipidemia, Hypertension, Non Hodgkin's lymphoma ([address] Pneumonia, Unspecified cerebral artery occlusion with cerebral infarction, Wellness examination, Wellness examination, and Wellness examination. has a past surgical history that includes transesophageal echocardiogram (10/3/14); Colonoscopy; Vasectomy ();  Cholecystectomy, open (04/21/15); right colectomy (04/21/15); Appendectomy (4/21/15); Colonoscopy (07/10/2020); Colonoscopy (N/A, 7/10/2020); [place] tooth extraction; other surgical history (2019); and Coronary artery bypass graft (N/A, 3/3/2021). Restrictions  Restrictions/Precautions  Restrictions/Precautions: Fall Risk, General Precautions, Surgical Protocols, Cardiac  Required Braces or Orthoses?: Yes  Required Braces or Orthoses  Other: Heart Hugger Brace  Position Activity Restriction  Sternal Precautions: No Pushing, No Pulling, 5# Lifting Restrictions  Other position/activity restrictions: amb pt, up in chair. CABG x3 3/3/2021  Subjective   General  Chart Reviewed: Yes  Patient assessed for rehabilitation services?: Yes  Family / Caregiver Present: No  Diagnosis: CABG  General Comment  Comments: RN and pt agreeable to therapy this day  Vital Signs  Patient Currently in Pain: Denies   Orientation  Orientation  Overall Orientation Status: Within Functional Limits  Objective    ADL  Grooming: Supervision;Setup;Minimal assistance(hand hygiene completed standing at sink; Min A for hair brushing seated at EOB)  UE Dressing: Moderate assistance;Setup;Verbal cueing; Increased time to complete(to doff/don heart hugger)  LE Dressing: Stand by assistance;Setup(to doff/don underwear and socks pt demo F hip flexion)  Toileting: Supervision;Setup(completed standing at standard toilet)  Additional Comments: Pt supine in bed at start of session noting need for restroom. Min encouragement req for dressing. Dressing completed seated/standing at EOB without AD. Heart hugger donned throughout session pt educated on purpose and importance of heart hugger. Min unsteadiness noted during ambulation and standing.      Balance  Sitting Balance: Independent(Pt tolerated approx 10 min seated unsupported at EOB)  Standing Balance: Stand by assistance  Standing Balance  Time: Pt tolerated approx 8-10 min  Activity: dynamic standing during ADLs  Comment: without AD req education on deep breathing for SOB  Functional Mobility  Functional - Mobility Device: No device  Activity: To/from bathroom  Assist Level: Stand by assistance  Functional Mobility Comments: 0 LOB noted  Toilet Transfers  Toilet Transfers Comments: Toileting completed standing at sink without AD  Bed mobility  Supine to Sit: Modified independent  Sit to Supine: Modified independent  Scooting: Modified independent  Comment: HOB elevated  Transfers  Stand Step Transfers: Stand by assistance  Sit to stand: Stand by assistance  Stand to sit: Stand by assistance  Transfer Comments: without AD req Max cues for heart hugger management  Cognition  Overall Cognitive Status: WFL     Pt minimally anxious throughout session req therapeutic use of self. ADL tasks of dressing, toileting and grooming completed see above for LOF. Discussion/edcuation provided on sternal precautions pt able to recall 0/3. Max encouragement req throughout session for heart hugger management. At session end pt supine in bed with call light in reach and bed alarm on.    Plan   Plan  Times per week: 4-6x/wk (CABG)   Cont POC    Goals  Short term goals  Time Frame for Short term goals: pt will, by discharge  Short term goal 1: complete LB ADLs with min A, set up and Ae, as needed  Short term goal 2: complete UB ADLs and grooming tasks with mod I  Short term goal 3: increase activity tolerance to 20+ minutes in order to participate in daily tasks  Short term goal 4: dem SBA during functional transfers/functional mobility with LRD  Short term goal 5: ind maintain sternal precautions during functional transfer/tasks       Therapy Time   Individual Concurrent Group Co-treatment   Time In 1124         Time Out 1153         Minutes 29         Timed Code Treatment Minutes: [address], CAR[place]/L

## 2021-03-08 NOTE — PLAN OF CARE
Problem: Falls - Risk of:  Goal: Will remain free from falls  Description: Will remain free from falls  3/7/2021 1952 by Maurisio Riley RN  Outcome: Ongoing  3/7/2021 1512 by Ranjana Gurrola  Outcome: Ongoing  Goal: Absence of physical injury  Description: Absence of physical injury  3/7/2021 1952 by Maurisio Riley RN  Outcome: Ongoing  3/7/2021 1512 by Ranjana Gurrola  Outcome: Ongoing

## 2021-03-08 NOTE — CARE COORDINATION
Due to patient becoming tachycardic with standing we will keep patient on house one more night and evaluate in the AM.

## 2021-03-08 NOTE — PROGRESS NOTES
Physical Therapy    DATE: 3/8/2021    NAME: Low Figueredo  MRN: [id number]   : 1950      Patient not seen this date for Physical Therapy due to: Other: Pt is with OT. Pt is scheduled for discharge today.       Electronically signed by Thompson Padilla PTA on 3/8/2021 at 11:51 AM

## 2021-03-08 NOTE — CARE COORDINATION
Increased his beta blocker to 75mg BID due to patient being tachycardic.  We will reassess HR in clinic on scheduled date

## 2021-03-09 ENCOUNTER — APPOINTMENT (OUTPATIENT)
Dept: GENERAL RADIOLOGY | Age: 71
DRG: 236 | End: 2021-03-09
Attending: THORACIC SURGERY (CARDIOTHORACIC VASCULAR SURGERY)
Payer: MEDICARE

## 2021-03-09 VITALS
BODY MASS INDEX: 27.76 KG/M2 | SYSTOLIC BLOOD PRESSURE: 132 MMHG | RESPIRATION RATE: 16 BRPM | HEIGHT: 74 IN | TEMPERATURE: 98.2 F | WEIGHT: 216.27 LBS | HEART RATE: 90 BPM | DIASTOLIC BLOOD PRESSURE: 73 MMHG | OXYGEN SATURATION: 97 %

## 2021-03-09 LAB
ANION GAP SERPL CALCULATED.3IONS-SCNC: 9 MMOL/L (ref 9–17)
BUN BLDV-MCNC: 19 MG/DL (ref 8–23)
BUN/CREAT BLD: ABNORMAL (ref 9–20)
CALCIUM SERPL-MCNC: 7.8 MG/DL (ref 8.6–10.4)
CHLORIDE BLD-SCNC: 103 MMOL/L (ref 98–107)
CO2: 27 MMOL/L (ref 20–31)
CREAT SERPL-MCNC: 0.72 MG/DL (ref 0.7–1.2)
EKG ATRIAL RATE: 103 BPM
EKG P AXIS: 113 DEGREES
EKG P-R INTERVAL: 286 MS
EKG Q-T INTERVAL: 314 MS
EKG QRS DURATION: 94 MS
EKG QTC CALCULATION (BAZETT): 411 MS
EKG R AXIS: -9 DEGREES
EKG T AXIS: 20 DEGREES
EKG VENTRICULAR RATE: 103 BPM
GFR AFRICAN AMERICAN: >60 ML/MIN
GFR NON-AFRICAN AMERICAN: >60 ML/MIN
GFR SERPL CREATININE-BSD FRML MDRD: ABNORMAL ML/MIN/{1.73_M2}
GFR SERPL CREATININE-BSD FRML MDRD: ABNORMAL ML/MIN/{1.73_M2}
GLUCOSE BLD-MCNC: 110 MG/DL (ref 70–99)
HCT VFR BLD CALC: 31.1 % (ref 40.7–50.3)
HEMOGLOBIN: 10 G/DL (ref 13–17)
INR BLD: 1.1
MAGNESIUM: 2.2 MG/DL (ref 1.6–2.6)
MCH RBC QN AUTO: 32.6 PG (ref 25.2–33.5)
MCHC RBC AUTO-ENTMCNC: 32.2 G/DL (ref 28.4–34.8)
MCV RBC AUTO: 101.3 FL (ref 82.6–102.9)
NRBC AUTOMATED: 0 PER 100 WBC
PDW BLD-RTO: 16.2 % (ref 11.8–14.4)
PLATELET # BLD: 86 K/UL (ref 138–453)
PMV BLD AUTO: 9.4 FL (ref 8.1–13.5)
POTASSIUM SERPL-SCNC: 3.8 MMOL/L (ref 3.7–5.3)
PROTHROMBIN TIME: 11.4 SEC (ref 9.1–12.3)
RBC # BLD: 3.07 M/UL (ref 4.21–5.77)
SODIUM BLD-SCNC: 139 MMOL/L (ref 135–144)
WBC # BLD: 16.5 K/UL (ref 3.5–11.3)

## 2021-03-09 PROCEDURE — 6370000000 HC RX 637 (ALT 250 FOR IP): Performed by: NURSE PRACTITIONER

## 2021-03-09 PROCEDURE — 97535 SELF CARE MNGMENT TRAINING: CPT

## 2021-03-09 PROCEDURE — 71045 X-RAY EXAM CHEST 1 VIEW: CPT

## 2021-03-09 PROCEDURE — 85027 COMPLETE CBC AUTOMATED: CPT

## 2021-03-09 PROCEDURE — 93005 ELECTROCARDIOGRAM TRACING: CPT | Performed by: THORACIC SURGERY (CARDIOTHORACIC VASCULAR SURGERY)

## 2021-03-09 PROCEDURE — 85610 PROTHROMBIN TIME: CPT

## 2021-03-09 PROCEDURE — 80048 BASIC METABOLIC PNL TOTAL CA: CPT

## 2021-03-09 PROCEDURE — 99024 POSTOP FOLLOW-UP VISIT: CPT | Performed by: NURSE PRACTITIONER

## 2021-03-09 PROCEDURE — 97530 THERAPEUTIC ACTIVITIES: CPT

## 2021-03-09 PROCEDURE — 83735 ASSAY OF MAGNESIUM: CPT

## 2021-03-09 PROCEDURE — 94761 N-INVAS EAR/PLS OXIMETRY MLT: CPT

## 2021-03-09 PROCEDURE — 94669 MECHANICAL CHEST WALL OSCILL: CPT

## 2021-03-09 PROCEDURE — 97116 GAIT TRAINING THERAPY: CPT

## 2021-03-09 PROCEDURE — 99024 POSTOP FOLLOW-UP VISIT: CPT | Performed by: PHYSICIAN ASSISTANT

## 2021-03-09 PROCEDURE — 94640 AIRWAY INHALATION TREATMENT: CPT

## 2021-03-09 PROCEDURE — 36415 COLL VENOUS BLD VENIPUNCTURE: CPT

## 2021-03-09 PROCEDURE — APPSS30 APP SPLIT SHARED TIME 16-30 MINUTES: Performed by: NURSE PRACTITIONER

## 2021-03-09 PROCEDURE — 2580000003 HC RX 258: Performed by: NURSE PRACTITIONER

## 2021-03-09 RX ORDER — POTASSIUM CHLORIDE 7.45 MG/ML
10 INJECTION INTRAVENOUS PRN
Status: DISCONTINUED | OUTPATIENT
Start: 2021-03-09 | End: 2021-03-09 | Stop reason: HOSPADM

## 2021-03-09 RX ORDER — METOPROLOL TARTRATE 50 MG/1
100 TABLET, FILM COATED ORAL 2 TIMES DAILY
Status: DISCONTINUED | OUTPATIENT
Start: 2021-03-09 | End: 2021-03-09 | Stop reason: HOSPADM

## 2021-03-09 RX ORDER — POTASSIUM CHLORIDE 20 MEQ/1
40 TABLET, EXTENDED RELEASE ORAL PRN
Status: DISCONTINUED | OUTPATIENT
Start: 2021-03-09 | End: 2021-03-09 | Stop reason: HOSPADM

## 2021-03-09 RX ORDER — TRAMADOL HYDROCHLORIDE 50 MG/1
50 TABLET ORAL EVERY 6 HOURS PRN
Qty: 28 TABLET | Refills: 0 | Status: SHIPPED | OUTPATIENT
Start: 2021-03-09 | End: 2021-03-16

## 2021-03-09 RX ORDER — METOPROLOL TARTRATE 100 MG/1
100 TABLET ORAL 2 TIMES DAILY
Qty: 60 TABLET | Refills: 3 | Status: SHIPPED | OUTPATIENT
Start: 2021-03-09 | End: 2021-04-23 | Stop reason: SDUPTHER

## 2021-03-09 RX ADMIN — POTASSIUM CHLORIDE 40 MEQ: 1500 TABLET, EXTENDED RELEASE ORAL at 06:43

## 2021-03-09 RX ADMIN — PANTOPRAZOLE SODIUM 40 MG: 40 TABLET, DELAYED RELEASE ORAL at 07:58

## 2021-03-09 RX ADMIN — IPRATROPIUM BROMIDE AND ALBUTEROL SULFATE 1 AMPULE: .5; 3 SOLUTION RESPIRATORY (INHALATION) at 12:11

## 2021-03-09 RX ADMIN — POLYETHYLENE GLYCOL 3350 17 G: 17 POWDER, FOR SOLUTION ORAL at 07:59

## 2021-03-09 RX ADMIN — METOPROLOL TARTRATE 25 MG: 25 TABLET, FILM COATED ORAL at 13:13

## 2021-03-09 RX ADMIN — METOPROLOL TARTRATE 75 MG: 50 TABLET, FILM COATED ORAL at 07:57

## 2021-03-09 RX ADMIN — OXYCODONE HYDROCHLORIDE AND ACETAMINOPHEN 1 TABLET: 5; 325 TABLET ORAL at 07:58

## 2021-03-09 RX ADMIN — SODIUM CHLORIDE, PRESERVATIVE FREE 10 ML: 5 INJECTION INTRAVENOUS at 07:58

## 2021-03-09 RX ADMIN — FUROSEMIDE 40 MG: 40 TABLET ORAL at 07:58

## 2021-03-09 RX ADMIN — IPRATROPIUM BROMIDE AND ALBUTEROL SULFATE 1 AMPULE: .5; 3 SOLUTION RESPIRATORY (INHALATION) at 08:05

## 2021-03-09 RX ADMIN — OXYCODONE HYDROCHLORIDE AND ACETAMINOPHEN 1 TABLET: 5; 325 TABLET ORAL at 03:17

## 2021-03-09 ASSESSMENT — PAIN SCALES - GENERAL
PAINLEVEL_OUTOF10: 4
PAINLEVEL_OUTOF10: 3
PAINLEVEL_OUTOF10: 0
PAINLEVEL_OUTOF10: 5

## 2021-03-09 ASSESSMENT — PAIN DESCRIPTION - PAIN TYPE
TYPE: SURGICAL PAIN
TYPE: ACUTE PAIN;SURGICAL PAIN

## 2021-03-09 ASSESSMENT — PAIN DESCRIPTION - LOCATION
LOCATION: STERNUM
LOCATION: STERNUM

## 2021-03-09 NOTE — PROGRESS NOTES
[facility]  Daily Progress Note    Surgeon:  Dr. Carrillo Perkins  S/P : CABG x3: LIMA to LAD, SVG to OM1, SVG to PDA   POD#: 5  EF: 60%      Subjective:  Mr. Abraham Cantu feels well today with no acute complaints. Continues to be tachycardic upon standing/exertion. Dose of beta-blocker increased. Pain is controlled on current medication regimen. OOBTC and ambulating. Last BMs this morning. Good appetite. Denies chest pain or shortness of breath. Plan of care reviewed and questions answered. Planning to discharge home this afternoon with home health care services. Physical Exam  Vital Signs: /67   Pulse 95   Temp 98 °F (36.7 °C) (Oral)   Resp 18   Ht 6' 2\" (1.88 m)   Wt 216 lb 4.3 oz (98.1 kg)   SpO2 97%   BMI 27.77 kg/m²  O2 Flow Rate (L/min): 2 L/min   Admit Weight: Weight: 227 lb 1.2 oz (103 kg)   WEIGHTWeight: 216 lb 4.3 oz (98.1 kg)     General: alert and oriented to person, place and time, well-developed and well-nourished, in no acute distress. Up in chair  Heart:Normal S1 and S2.  Regular rhythm. No murmurs, gallops, or rubs. Pacing Wires Yes to be clipped prior to discharge  Lungs: clear to auscultation bilaterally  Abdomen: soft, non tender, non distended, BSx4  Extremities: Trace edema  Wounds: clean and dry, healing appropriately.       Scheduled Meds:    metoprolol tartrate  75 mg Oral BID    furosemide  40 mg Oral BID    sodium chloride flush  10 mL Intravenous 2 times per day    polyethylene glycol  17 g Oral Daily    atorvastatin  20 mg Oral Nightly    pantoprazole  40 mg Oral Daily    ipratropium-albuterol  1 ampule Inhalation Q4H WA     Continuous Infusions:    norepinephrine      dextrose         Data:  CBC:   Recent Labs     03/07/21  0927 03/08/21  0354 03/09/21  0430   WBC 31.3* 19.8* 16.5*   HGB 11.4* 10.4* 10.0*   HCT 35.0* 32.6* 31.1*   .6 101.6 101.3   * 86* 86*     BMP:   Recent Labs     03/07/21  0927 03/08/21  0354 03/09/21  0430   * 137 139   K 3.8 4.3 3.8   CL 97* 101 103   CO2 24 30 27   BUN 26* 23 19   CREATININE 0.94 0.83 0.72     PT/INR:   Recent Labs     03/07/21  0927 03/08/21  0354 03/09/21  0430   PROTIME 10.4 10.7 11.4   INR 1.0 1.0 1.1     APTT: No results for input(s): APTT in the last 72 hours. Chest X-Ray:    ONE XRAY VIEW OF THE CHEST       3/9/2021 5:57 am       COMPARISON:   03/08/2021       HISTORY:   ORDERING SYSTEM PROVIDED HISTORY: multivessel CAD s/p CABG   TECHNOLOGIST PROVIDED HISTORY:   multivessel CAD s/p CABG   Reason for Exam: upright cabg   Acuity: Unknown   Type of Exam: Unknown       FINDINGS:   Heart size is normal.  The patient is status post median sternotomy. Pulmonary vasculature appears mildly congested.  No infiltrate is evident. Tiny bilateral pleural effusions are present unchanged from previous. Yesterday's left apical finding is unchanged representing tiny pneumothorax   or loculated effusion.  Surrounding osseous and soft tissue structures are   unremarkable.           Impression   Stable exam since yesterday given differences in radiographic technique.             I/O:  I/O last 3 completed shifts: In: 3318 [P.O.:300; IV Piggyback:1449]  Out: 500 [Urine:500]      Assessment:  · Multivessel CAD s/p CABG x3 on 3/4/2021  ? POD 5, without complication  · Acute blood loss anemia secondary to above  · Acute on chronic thrombocytopenia secondary to above  · Marginal zone lymphoma  ? Follows with Dr. Sisi Bray, oncology  · Hypertension  · Hypercholesterolemia  · History of splenectomy  · History of cholecystectomy  · History of CVA with no residual effects  · History of colon cancer s/p colon resection  ?  18\" of large intestine removed and some small intestine  · History of abnormal LFTs/fatty liver  · Overweight, BMI 29.53 kg/m²      Plan:    Remains inpatient on telemetry   Monitor vitals closely including continuous pulse oximetry   Oxygen as needed via nasal cannula to maintain SpO2 > 92%   Chest x-ray daily   Encourage incentive spirometry    Monitor CBC, BMP, INR and Mag daily   Currently taking metoprolol,    Patient is on BB, Statin therapy per protocol   o No aspirin due to chronic thrombocytopenia  o No ACE inhibitor due to preserved EF of 60%   No amiodarone due to iodine allergy   Percocet and tramadol for pain control   SCDs while stationary for DVT prophylaxis   Protonix for GI prophylaxis   Replace electrolytes as needed per sliding scale and recheck per policy   PT/OT evalutation for discharge recommendation and ambulation 3x daily   Case Management consult for discharge planning   Home care service consult      The above recommendations including medications and orders were discussed and agreed upon with Dr. Eliceo Anderson, the attending on service for the cardiothoracic surgery group today. Electronically signed by ELISEO Marie CNP on 3/9/2021 at 11:10 AM    On this date 3/9/2021 I have spent 20 minutes reviewing previous notes, test results and face to face with the patient discussing the diagnosis and importance of compliance with the treatment plan as well as documenting on the day of the visit. This note was created with the assistance of a speech-recognition program.  Although the intention is to generate a document that actually reflects the content of the visit, no guarantees can be provided that every mistake has been identified and corrected by editing. Note was updated later by me after  physical examination and  completion of the assessment.

## 2021-03-09 NOTE — PROGRESS NOTES
Discharge medication instructions and follow up appointments reviewed with patient. Verbalized understanding.

## 2021-03-09 NOTE — PROGRESS NOTES
Occupational Therapy  Facility/Department: [facility] CAR 1  Daily Treatment Note  NAME: Rosaura Mann  : 1950  MRN: [id number]  Date of Service: 3/9/2021  Discharge Recommendations:  Patient would benefit from continued therapy after discharge  OT Equipment Recommendations  Equipment Needed: Yes  Other: Shower chair with back  Assessment   Performance deficits / Impairments: Decreased functional mobility ; Decreased ADL status; Decreased endurance;Decreased high-level IADLs  Treatment Diagnosis: CABG  Prognosis: Good  Decision Making: Medium Complexity  OT Education: OT Role;Transfer Training;Energy Conservation;Precautions; ADL Adaptive Strategies; Equipment  Patient Education: Purpose of OT; sternal precautions; heart hugger precautions; 4 figure dressing technique; AD options for LB dressing, EC/WS tech during ADL/IADL activity. Pt. didier ERVIN carryover. REQUIRES OT FOLLOW UP: Yes  Activity Tolerance  Activity Tolerance: Patient Tolerated treatment well  Safety Devices  Safety Devices in place: Yes  Type of devices: Patient at risk for falls; Left in bed;Call light within reach;Gait belt;Left in chair  Restraints  Initially in place: No     Patient Diagnosis(es): The primary encounter diagnosis was CAD, multiple vessel. Diagnoses of S/P CABG x 3 and Essential hypertension were also pertinent to this visit. has a past medical history of AAA (abdominal aortic aneurysm) ([address] CAD (coronary artery disease), Colon polyps, Colon tumor, Congestive heart failure (CHF) ([address] GERD (gastroesophageal reflux disease), H/O chest tube placement, History of pneumonia, History of pneumothorax, Hx of cardiac cath, Hyperlipidemia, Hypertension, Non Hodgkin's lymphoma ([address] Pneumonia, Unspecified cerebral artery occlusion with cerebral infarction, Wellness examination, Wellness examination, and Wellness examination. has a past surgical history that includes transesophageal echocardiogram (10/3/14);  Colonoscopy; Vasectomy (); Cholecystectomy, open (04/21/15); right colectomy (04/21/15); Appendectomy (4/21/15); Colonoscopy (07/10/2020); Colonoscopy (N/A, 7/10/2020); [place] tooth extraction; other surgical history (2019); and Coronary artery bypass graft (N/A, 3/3/2021). Restrictions  Restrictions/Precautions  Restrictions/Precautions: Fall Risk, General Precautions, Surgical Protocols, Cardiac  Required Braces or Orthoses?: Yes  Required Braces or Orthoses  Other: Heart Hugger Brace  Position Activity Restriction  Sternal Precautions: No Pushing, No Pulling, 5# Lifting Restrictions  Other position/activity restrictions: amb pt, up in chair. CABG x3 3/3/2021  Subjective   General  Chart Reviewed: Yes  Patient assessed for rehabilitation services?: Yes  Family / Caregiver Present: Yes(Friend present)  Diagnosis: CABG  General Comment  Comments: RN ok'd for therapy tx this date. Pt agreeable to participate in session and cooperative/pleasant throughout  Vital Signs  Patient Currently in Pain: Denies   Orientation  Orientation  Overall Orientation Status: Within Functional Limits  Objective    ADL  Grooming: Supervision;Setup;Stand by assistance  Toileting: Supervision  Additional Comments: Pt. in supine position at start of session. Pt. agreeable to OT services. Pt completed toilet transfer at S level, G follow through of heart hugger during transfer. Grooming standing at sink (hand washing/brushing teeth) S-SBA to ensure zero LOB when reaching and bending outside SHONDA. Pt. educated on sitting vs. standing to save energy if needed- pt. demo G understanding. Pt. also educated on 4 figure tech for LB dressing and LB AE options of needed. Pt. demo ability to maintain 4 figure tech while sitting on couch, and stated he has G understanding.   Balance  Sitting Balance: Independent  Standing Balance: Stand by assistance  Standing Balance  Time: ~13 minutes  Activity: dynamic standing during grooming/functional mobility/transfers  Comment: without AD req education on deep breathing for SOB  Functional Mobility  Functional - Mobility Device: No device  Activity: To/from bathroom; Other(Around room, no AE)  Assist Level: Stand by assistance  Functional Mobility Comments: S-SBA, NO LOB  Toilet Transfers  Toilet - Technique: Stand step  Equipment Used: Standard toilet  Bed mobility  Supine to Sit: Modified independent  Scooting: Modified independent  Comment: HOB elevated. Transfers  Stand Step Transfers: Stand by assistance  Sit to stand: Stand by assistance  Stand to sit: Stand by assistance  Transfer Comments: NO AE, G follow through with heart hugger utilization. Supine->stand->toilet->couch->recliner. Cognition  Overall Cognitive Status: WFL  Arousal/Alertness: Appropriate responses to stimuli  Following Commands:  Follows all commands without difficulty  Initiation: Does not require cues  Sequencing: Does not require cues     Plan   Plan  Times per week: 4-6x/wk (CABG)    Goals  Short term goals  Time Frame for Short term goals: pt will, by discharge  Short term goal 1: complete LB ADLs with min A, set up and Ae, as needed  Short term goal 2: complete UB ADLs and grooming tasks with mod I  Short term goal 3: increase activity tolerance to 20+ minutes in order to participate in daily tasks  Short term goal 4: dem SBA during functional transfers/functional mobility with LRD  Short term goal 5: ind maintain sternal precautions during functional transfer/tasks       Therapy Time   Individual Concurrent Group Co-treatment   Time In 1340         Time Out 1410         Minutes 30         Timed Code Treatment Minutes: [address] [facility], CARLIN/L

## 2021-03-09 NOTE — PROGRESS NOTES
Physical Therapy  Daily Treatment Note  NAME: Kareem Osborne  : 1950  MRN: [id number]    Date of Service: 3/9/2021    Discharge Recommendations:  Patient would benefit from continued therapy after discharge     PT Equipment Recommendations  Equipment Needed: No    Assessment     Body structures, Functions, Activity limitations: Decreased functional mobility ; Decreased strength;Decreased balance;Decreased coordination;Decreased endurance;Decreased posture; Increased pain  Assessment: Pt ',with SBA and no AD. Pt limited by endurance and fatigue. Pt would benefit from additional therapy upon discharge. Prognosis: Good  PT Education: Goals; General Safety; Functional Mobility Training;Precautions;PT Role;Plan of Care;Home Exercise Program;Energy Conservation; Injury Prevention;Pressure Relief;Gait Training;Transfer Training  Patient Education: reviewed CR/HEP plan and precautions, pt had no questions at this time  REQUIRES PT FOLLOW UP: Yes  Activity Tolerance  Activity Tolerance: Patient limited by endurance; Patient limited by fatigue         Patient Diagnosis(es): The primary encounter diagnosis was CAD, multiple vessel. Diagnoses of S/P CABG x 3 and Essential hypertension were also pertinent to this visit. has a past medical history of AAA (abdominal aortic aneurysm) ([address] CAD (coronary artery disease), Colon polyps, Colon tumor, Congestive heart failure (CHF) ([address] GERD (gastroesophageal reflux disease), H/O chest tube placement, History of pneumonia, History of pneumothorax, Hx of cardiac cath, Hyperlipidemia, Hypertension, Non Hodgkin's lymphoma ([address] Pneumonia, Unspecified cerebral artery occlusion with cerebral infarction, Wellness examination, Wellness examination, and Wellness examination. has a past surgical history that includes transesophageal echocardiogram (10/3/14); Colonoscopy; Vasectomy (); Cholecystectomy, open (04/21/15); right colectomy (04/21/15);  Appendectomy (4/21/15); Colonoscopy (07/10/2020); Colonoscopy (N/A, 7/10/2020); [place] tooth extraction; other surgical history (2019); and Coronary artery bypass graft (N/A, 3/3/2021). Restrictions  Restrictions/Precautions  Restrictions/Precautions: Fall Risk, General Precautions, Surgical Protocols, Cardiac  Required Braces or Orthoses?: Yes  Required Braces or Orthoses  Other: Heart Hugger Brace  Position Activity Restriction  Sternal Precautions: No Pushing, No Pulling, 5# Lifting Restrictions  Other position/activity restrictions: amb pt, up in chair. CABG x3 3/3/2021     Subjective   Pt sitting up in his chair. Pt wanting to go for a walk and do stairs. Pt has no c/o pan. Orientation     Overall Orientation Status: Within Functional Limits  Objective    Transfers  Sit to Stand: Stand by assistance  Stand to sit: Stand by assistance  Comment: PTA helped with lines/tubes  Ambulation  Ambulation?: Yes  Ambulation 1  Surface: level tile  Assistance: Stand by assistance  Gait Deviations: Slow Janneth;Decreased arm swing;Decreased head and trunk rotation  Distance: ~350ft  Stairs/Curb  Stairs?: Yes,  Pt ascended/descended 8 steps using one HR with a nonreiprocal pattern. SBA. Comment: Pt's HR elevated to 135 BPM.  Balance  Posture: Good  Sitting - Static: Good  Sitting - Dynamic: Good  Standing - Static: +;Fair  Standing - Dynamic: Fair   No ex's     Goals  Short term goals  Time Frame for Short term goals: 14 visits  Short term goal 1: Pt will ambulate 300 feet with least restrictive device and modified independence to promote functional independence. Short term goal 2: Pt will negotiate 2 stairs with unilateral handrail with SBA to allow the pt to enter into his home. Short term goal 3: Pt will demonstrate good dynamic standing balance to decrease fall risk. Short term goal 4: Pt will perform sit<>stand, stand<>sit transfers with independence to promote functional independence.   Short term goal 5: Pt will toleratee a 30 minute therapy session to promote increased endurance.     Plan    Plan  Times per week: 7  Times per day: Daily(1-2x/day)  Current Treatment Recommendations: Strengthening, Transfer Training, Endurance Training, Balance Training, Gait Training, Functional Mobility Training, Stair training, Safety Education & Training, Patient/Caregiver Education & Training, Home Exercise Program  Safety Devices  Type of devices: Call light within reach, Gait belt, All fall risk precautions in place, Nurse notified, Left in chair  Restraints  Initially in place: No     Therapy Time   Individual Concurrent Group Co-treatment   Time In  815         Time Out  845         Minutes  [address]

## 2021-03-09 NOTE — PLAN OF CARE
Problem: Falls - Risk of:  Goal: Will remain free from falls  Description: Will remain free from falls  3/9/2021 0814 by Jami Barone RN  Outcome: Ongoing  3/8/2021 2359 by Francesca Pacheco RN  Outcome: Met This Shift  Goal: Absence of physical injury  Description: Absence of physical injury  3/9/2021 0814 by Jami Barone RN  Outcome: Ongoing  3/8/2021 2359 by Francesca Pacheco RN  Outcome: Met This Shift     Problem: OXYGENATION/RESPIRATORY FUNCTION  Goal: Patient will maintain patent airway  3/9/2021 0814 by Jami Barone RN  Outcome: Ongoing  3/8/2021 2359 by Francesca Pacheco RN  Outcome: Ongoing  Goal: Patient will achieve/maintain normal respiratory rate/effort  Description: Respiratory rate and effort will be within normal limits for the patient  3/9/2021 0814 by Jami Barone RN  Outcome: Ongoing  3/8/2021 2359 by Francesca Pacheco RN  Outcome: Ongoing     Problem: SKIN INTEGRITY  Goal: Skin integrity is maintained or improved  3/9/2021 0814 by Jami Barone RN  Outcome: Ongoing  3/8/2021 2359 by Francesca Pacheco RN  Outcome: Ongoing     Problem: Pain:  Goal: Pain level will decrease  Description: Pain level will decrease  3/9/2021 0814 by Jami Barone RN  Outcome: Ongoing  3/8/2021 2359 by Francesca Pacheco RN  Outcome: Ongoing  Goal: Control of acute pain  Description: Control of acute pain  3/9/2021 0814 by Jami Barone RN  Outcome: Ongoing  3/8/2021 2359 by Francesca Pacheco RN  Outcome: Ongoing  Goal: Control of chronic pain  Description: Control of chronic pain  3/9/2021 0814 by Jami Barone RN  Outcome: Ongoing  3/8/2021 2359 by Francesca Pacheco RN  Outcome: Ongoing     Problem: Musculor/Skeletal Functional Status  Goal: Highest potential functional level  3/9/2021 0814 by Jami Barone RN  Outcome: Ongoing  3/8/2021 2359 by Francesca Pcaheco RN  Outcome: Ongoing  Goal: Absence of falls  3/9/2021 0814 by Jami Barone RN  Outcome: Ongoing  3/8/2021 2359 by Francesca Pacheco RN  Outcome: Met This Shift Problem: Skin Integrity:  Goal: Will show no infection signs and symptoms  Description: Will show no infection signs and symptoms  3/9/2021 0814 by Zahraa Julien RN  Outcome: Ongoing  3/8/2021 2359 by Mamadou Guerra RN  Outcome: Ongoing  Goal: Absence of new skin breakdown  Description: Absence of new skin breakdown  3/9/2021 0814 by Zahraa Julien RN  Outcome: Ongoing  3/8/2021 2359 by Mamadou Guerra RN  Outcome: Met This Shift

## 2021-03-09 NOTE — PROGRESS NOTES
[facility]   Progress Note                   Date:   3/9/2021  Patient name: Margarita Cruz  Date of admission:  3/3/2021  5:58 AM  MRN:   [id number]  YOB: 1950  PCP: KEISHA AvilaC    Reason for Admission:      Subjective:    Patient seen and examined in bed in room with son at bedside after discussion with RN and CTS NPs. .  Denies chest pain or SOB. Episode of A flutter on tele this am.  Currently SR HR 92      Medications:   Scheduled Meds:   metoprolol tartrate  75 mg Oral BID    furosemide  40 mg Oral BID    sodium chloride flush  10 mL Intravenous 2 times per day    polyethylene glycol  17 g Oral Daily    atorvastatin  20 mg Oral Nightly    pantoprazole  40 mg Oral Daily    ipratropium-albuterol  1 ampule Inhalation Q4H WA       Continuous Infusions:   norepinephrine      dextrose         CBC:   Recent Labs     03/07/21 0927 03/08/21 0354 03/09/21  0430   WBC 31.3* 19.8* 16.5*   HGB 11.4* 10.4* 10.0*   * 86* 86*     BMP:    Recent Labs     03/07/21 0927 03/08/21 0354 03/09/21  0430   * 137 139   K 3.8 4.3 3.8   CL 97* 101 103   CO2 24 30 27   BUN 26* 23 19   CREATININE 0.94 0.83 0.72   GLUCOSE 179* 115* 110*     Hepatic: No results for input(s): AST, ALT, ALB, BILITOT, ALKPHOS in the last 72 hours. Troponin: No results for input(s): TROPONINI in the last 72 hours. BNP: No results for input(s): BNP in the last 72 hours. Lipids: No results for input(s): CHOL, HDL in the last 72 hours.     Invalid input(s): LDLCALCU  INR:   Recent Labs     03/07/21 0927 03/08/21  0354 03/09/21  0430   INR 1.0 1.0 1.1       Objective:   Vitals: /73   Pulse 95   Temp 98.2 °F (36.8 °C)   Resp 16   Ht 6' 2\" (1.88 m)   Wt 216 lb 4.3 oz (98.1 kg)   SpO2 97%   BMI 27.77 kg/m²     General appearance: awake, alert, in no apparent respiratory distress   HEENT: Head: Normocephalic, no lesions, without obvious abnormality  Neck: no JVD  Lungs: clear to ([facility] [address]     Leukocytosis     Marginal zone lymphoma ([facility] [address]     CAD in native artery     CAD, multiple vessel    RECOMMENDATIONS:  1. Start ASA and Plavix once okay with CTS. Platelets down to 86 today  2. Continue PO statin, BB, & Lasix  Will increase BB for better heart rate, rhythm, and BP control. Discussed with Yulissa Murphy NP and Susan Smith NP  3. Paroxysmal atrial flutter on tele this am.  SR currently  Continue BB. Not on AC d/t thrombocytopenia. 4. Post op management per CV surgery  5. K>4, Mg>2  6. Post-op care per CTS  7. DC planning possibly today with home PT/OT  8. Follow up with CTS and TCC as scheduled.     ..

## 2021-03-09 NOTE — PROGRESS NOTES
Writer updated Mary Carmen Hull NP with CTS. Pt flipped into aflutter around 0431 35 06 90 right after physical therapy. Pt is rate controlled running 70-80s. EKG completed and placed in chart. Pt received scheduled 75mg lopressor around 0800. No new orders at this time. Will update cardiology.

## 2021-03-09 NOTE — PROGRESS NOTES
Writer updated Rikki Trent NP with cardiology on new aflutter. Pt flipped into aflutter around 0431 35 06 90 right after therapy. Pt is rate controlled running 70-80s. EKG completed. Pt received scheduled 75mg lopressor this morning around 8. No new orders at this time.

## 2021-03-09 NOTE — PLAN OF CARE
Problem: Falls - Risk of:  Goal: Will remain free from falls  Description: Will remain free from falls  3/8/2021 2359 by Denice Uribe RN  Outcome: Met This Shift  3/8/2021 1003 by Prince Warren RN  Outcome: Ongoing  Goal: Absence of physical injury  Description: Absence of physical injury  3/8/2021 2359 by Denice Uribe, RN  Outcome: Met This Shift  3/8/2021 1003 by Prince Warren RN  Outcome: Ongoing     Problem: Musculor/Skeletal Functional Status  Goal: Absence of falls  3/8/2021 2359 by Denice Uribe, RN  Outcome: Met This Shift  3/8/2021 1003 by Prince Warren RN  Outcome: Ongoing     Problem: Skin Integrity:  Goal: Absence of new skin breakdown  Description: Absence of new skin breakdown  3/8/2021 2359 by Denice Uribe, RN  Outcome: Met This Shift  3/8/2021 1003 by Prince Warren RN  Outcome: Ongoing     Problem: OXYGENATION/RESPIRATORY FUNCTION  Goal: Patient will maintain patent airway  3/8/2021 2359 by Denice Urbie RN  Outcome: Ongoing  3/8/2021 1003 by Prince Warren RN  Outcome: Ongoing  Goal: Patient will achieve/maintain normal respiratory rate/effort  Description: Respiratory rate and effort will be within normal limits for the patient  3/8/2021 2359 by Denice Uribe RN  Outcome: Ongoing  3/8/2021 1003 by Prince Warren RN  Outcome: Ongoing     Problem: SKIN INTEGRITY  Goal: Skin integrity is maintained or improved  3/8/2021 2359 by Denice Uribe RN  Outcome: Ongoing  3/8/2021 1003 by Prince Warren RN  Outcome: Ongoing     Problem: Pain:  Goal: Pain level will decrease  Description: Pain level will decrease  3/8/2021 2359 by Denice Uribe, RN  Outcome: Ongoing  3/8/2021 1003 by Prince Warren RN  Outcome: Ongoing  Goal: Control of acute pain  Description: Control of acute pain  3/8/2021 2359 by Denice Uribe RN  Outcome: Ongoing  3/8/2021 1003 by Prince Warren RN  Outcome: Ongoing  Goal: Control of chronic pain  Description: Control of chronic pain  3/8/2021 2359 by Rupali Lopez LOUIS Verma RN  Outcome: Ongoing  3/8/2021 1003 by Omar Maldonado RN  Outcome: Ongoing     Problem: Musculor/Skeletal Functional Status  Goal: Highest potential functional level  3/8/2021 2359 by Darline Bosworth, RN  Outcome: Ongoing  3/8/2021 1003 by Omar Maldonado RN  Outcome: Ongoing     Problem: Skin Integrity:  Goal: Will show no infection signs and symptoms  Description: Will show no infection signs and symptoms  3/8/2021 2359 by Darline Bosworth, RN  Outcome: Ongoing  3/8/2021 1003 by Omar Maldonado RN  Outcome: Ongoing

## 2021-03-09 NOTE — DISCHARGE SUMMARY
Physician Discharge Summary     Patient ID:  Melani Malhotra  [id number]  69 y.o.  1950    Admit date: 3/3/2021    Discharge date and time: No discharge date for patient encounter. Admitting Physician: Greg France MD     Discharge Physician: Samia Sullivan    Admission Diagnoses: Coronary artery disease [I25.10]  CAD, multiple vessel [I25.10]    Discharge Diagnoses: CAD    Admission Condition: good    Discharged Condition: good    Indication for Admission: CAD  For CABG x 3. Post operative course slightly prolonged for chest tube output and brief episode of SVT. On 3/9/21 AVSS Incisions intact and healing appropriately. Minor skin brackdown on back from heart hugger. Ambulating eating and voiding without difficulty. It was felt he could be    Hospital Course: Taken to the OR on 3/331 for     Consults: cardiology    Discharge Exam:  Vital Signs: /67   Pulse 95   Temp 98 °F (36.7 °C) (Oral)   Resp 18   Ht 6' 2\" (1.88 m)   Wt 216 lb 4.3 oz (98.1 kg)   SpO2 97%   BMI 27.77 kg/m²  O2 Flow Rate (L/min): 2 L/min   Admit Weight: Weight: 227 lb 1.2 oz (103 kg)              WEIGHTWeight: 216 lb 4.3 oz (98.1 kg)      General: alert and oriented to person, place and time, well-developed and well-nourished, in no acute distress. Up in chair  Heart:Normal S1 and S2.  Regular rhythm. No murmurs, gallops, or rubs. Pacing Wires Yes to be clipped prior to discharge  Lungs: clear to auscultation bilaterally  Abdomen: soft, non tender, non distended, BSx4  Extremities: Trace edema  Wounds: clean and dry, healing appropriately      Disposition: home    Patient Instructions:   Current Discharge Medication List      START taking these medications    Details   oxyCODONE-acetaminophen (PERCOCET) 5-325 MG per tablet Take 1 tablet by mouth every 6 hours as needed for Pain for up to 7 days.   Qty: 28 tablet, Refills: 0    Comments: Reduce doses taken as pain becomes manageable  Associated Diagnoses: S/P CABG x 3      traMADol (ULTRAM) 50 MG tablet Take 1 tablet by mouth every 6 hours as needed for Pain for up to 7 days. Qty: 28 tablet, Refills: 0    Comments: Reduce doses taken as pain becomes manageable  Associated Diagnoses: S/P CABG x 3      metoprolol tartrate (LOPRESSOR) 50 MG tablet Take 1.5 tablets by mouth 2 times daily  Qty: 60 tablet, Refills: 3         CONTINUE these medications which have CHANGED    Details   atorvastatin (LIPITOR) 20 MG tablet Take 1 tablet by mouth nightly  Qty: 30 tablet, Refills: 3         CONTINUE these medications which have NOT CHANGED    Details   Handicap Placard MISC by Does not apply route  2025  Qty: 1 each, Refills: 0      aspirin 81 MG tablet Take 81 mg by mouth daily      omeprazole (PRILOSEC) 10 MG delayed release capsule Take 1 capsule by mouth daily as needed (GERD)  Qty: 90 capsule, Refills: 3         STOP taking these medications       metoprolol succinate (TOPROL XL) 50 MG extended release tablet Comments:   Reason for Stopping:         hydroCHLOROthiazide (HYDRODIURIL) 25 MG tablet Comments:   Reason for Stopping:             Activity: activity as tolerated  Diet: cardiac diet  Wound Care: keep wound clean and dry    Follow-up with clinic in 1-2 weeks. Signed:   Josue Pérez    3/9/2021  1:54 PM

## 2021-03-10 LAB
EKG ATRIAL RATE: 258 BPM
EKG Q-T INTERVAL: 396 MS
EKG QRS DURATION: 90 MS
EKG QTC CALCULATION (BAZETT): 454 MS
EKG R AXIS: 4 DEGREES
EKG T AXIS: 23 DEGREES
EKG VENTRICULAR RATE: 79 BPM

## 2021-03-10 PROCEDURE — 93010 ELECTROCARDIOGRAM REPORT: CPT | Performed by: INTERNAL MEDICINE

## 2021-03-10 NOTE — OP NOTE
oscillating sternal saw. The pericardium was opened. The heart was  inspected. Rultract retractor was placed on the left hemithorax, which  was elevated. The endothoracic fascia was opened. The patient had the  LIMA taken down off the chest wall using electrocautery. Papaverine was  placed in papaverine-soaked Ray-William in the left chest.    At this time, the patient had the greater saphenous vein harvested from  the right leg in the following fashion:  An 1-inch incision was made at  the knee. The endoscope was passed proximally and distally, and the  greater saphenous vein was removed. The side branches were doubly  clipped and divided. The patient had the vein prepared. It was noted  to be of good caliber, and the patient then had the leg closed using 2,  3, and 4-0 Vicryl suture. The total dose of heparin was given. The  patient had the ACT checked. It was noted to be acceptable. A  double-javier 2-0 Ethibond pursestring was placed on the high point of  the aorta. 2-0 Ethibond was placed on the right atrial appendage, and a  5-0 Prolene was placed to the antegrade needle. The aortic cannula was  placed in the aortic root. The venous cannula was placed in the  inferior vena cava, and the anterior needle was placed into the aorta. The lines were deaired and hooked up to the pump. The patient had the  lines de-aired. The aortic cannula was tested, and then the patient was  placed on cardiopulmonary bypass. The heart was emptied out. At this juncture, the patient had the targets identified including the  PDA, the OM1, and the mid-LAD. Cross-clamp was applied. The antegrade  blood cardioplegia was used to arrest the heart. The first target, the  right coronary artery, PDA was opened using a 15-blade and a Drayton  blade. The artery did probe well distally. The vein was spatulated and  anastomosed to the opened coronary artery.   Once the anastomosis was  completed, the vein was measured Additional protamine was given. The aortic  cannula was removed. The site was oversewn using 4-0 pledgeted Prolene  suture. Three mediastinal chest tubes were placed. The chest was then  closed using 10 sternal wires. Suprasternal fascia was closed using a  #1 running Vicryl suture, 2, 3, and 4-0 Vicryl for the superficial  layer. The patient had the chest tubes hooked to Pleur-evac suction. He was taken to the intensive care unit in critical condition.     It should be noted this procedure could not have been completed without  Carina Brady assistance in harvesting the vein and _____ chest.        Roselyn Vences MD    D: 03/04/2021 13:10:49       T: 03/04/2021 13:19:35     [id number]  Job#: [id number]     Doc#: [id number]    CC:

## 2021-03-11 ENCOUNTER — TELEPHONE (OUTPATIENT)
Dept: CARDIOTHORACIC SURGERY | Age: 71
End: 2021-03-11

## 2021-03-11 ENCOUNTER — TELEPHONE (OUTPATIENT)
Dept: FAMILY MEDICINE CLINIC | Age: 71
End: 2021-03-11

## 2021-03-11 NOTE — TELEPHONE ENCOUNTER
[address] Trans[facility] Initial Follow Up Call    Outreach made within 2 business days of discharge: No    Patient: Marvel Schmitt Patient : 1950   MRN: [id number]  Reason for Admission: There are no discharge diagnoses documented for the most recent discharge. Discharge Date: 3/9/21       Spoke with: yes    Discharge department/facility: [facility] Patient Contact:  Was patient able to fill all prescriptions: Yes  Was patient instructed to bring all medications to the follow-up visit: Yes  Is patient taking all medications as directed in the discharge summary?  Yes  Does patient understand their discharge instructions: Yes  Does patient have questions or concerns that need addressed prior to 7-14 day follow up office visit: no    Scheduled appointment with PCP within 7-14 days    Follow Up  Future Appointments   Date Time Provider [facility] Marcel[facility]   3/18/2021 11:15 AM Hannah White MD [facility]   3/19/2021  4:30 PM Shira Ahmadi MD [facility]   2021  8:30 AM ANASTACIA ALICIA [facility]   2021  8:45 AM Gail Wong MD [facility]       Jose Delgado MA

## 2021-03-11 NOTE — TELEPHONE ENCOUNTER
Pt calls in regards to his medications pt states he was dischargwe with instructions to take 1.5 tablets of 50mg Lopressor. And on his discharge summary it states that it says take 1 tablet of 100mg Lopressor BID. He wanted clarification on which to follow. Spoke to MARQUITA Savage regarding this she states patient should take the 100mg of Lopressor BID. Spoke to patient and he verbalized understanding.

## 2021-03-18 ENCOUNTER — OFFICE VISIT (OUTPATIENT)
Dept: CARDIOTHORACIC SURGERY | Age: 71
End: 2021-03-18

## 2021-03-18 VITALS
HEIGHT: 74 IN | HEART RATE: 64 BPM | OXYGEN SATURATION: 96 % | SYSTOLIC BLOOD PRESSURE: 129 MMHG | DIASTOLIC BLOOD PRESSURE: 64 MMHG | WEIGHT: 229 LBS | BODY MASS INDEX: 29.39 KG/M2

## 2021-03-18 DIAGNOSIS — I25.10 CAD IN NATIVE ARTERY: ICD-10-CM

## 2021-03-18 PROCEDURE — 99024 POSTOP FOLLOW-UP VISIT: CPT | Performed by: NURSE PRACTITIONER

## 2021-03-18 RX ORDER — FUROSEMIDE 20 MG/1
TABLET ORAL
Status: ON HOLD | COMMUNITY
Start: 2021-03-08 | End: 2021-03-18

## 2021-03-18 RX ORDER — TRAMADOL HYDROCHLORIDE 50 MG/1
50 TABLET ORAL EVERY 6 HOURS PRN
COMMUNITY
End: 2021-07-22 | Stop reason: ALTCHOICE

## 2021-03-18 NOTE — PROGRESS NOTES
stent to left leg with tubular graft to left leg artery    RIGHT COLECTOMY  04/21/15    TRANSESOPHAGEAL ECHOCARDIOGRAM  10/3/14    VASECTOMY  1986    WISDOM TOOTH EXTRACTION         Social Hx: reports that he quit smoking about 7 years ago. His smoking use included cigarettes. He has a 30.00 pack-year smoking history. He has never used smokeless tobacco.    Assessment & Plan:   Patient will follow up with cardiology on Monday at 1 PM  Please continue to wear BESS hose throughout the day when doing activities. All questions and concerns answered.   No Plavix due to non-Hodgkin lymphoma    MINERVA YE,ELISEO CNP

## 2021-04-12 ENCOUNTER — TELEPHONE (OUTPATIENT)
Dept: ADMINISTRATIVE | Age: 71
End: 2021-04-12

## 2021-04-12 ENCOUNTER — NURSE TRIAGE (OUTPATIENT)
Dept: OTHER | Facility: CLINIC | Age: 71
End: 2021-04-12

## 2021-04-12 NOTE — TELEPHONE ENCOUNTER
past 24 hours? \"       Denies     5. ABDOMINAL PAIN: Avani Baldwin you having any abdominal pain? \" If yes: \"What does it feel like? \" (e.g., crampy, dull, intermittent, constant)       Yes, constant \"discomfort\"    6. ABDOMINAL PAIN SEVERITY: If present, ask: \"How bad is the pain? \"  (e.g., Scale 1-10; mild, moderate, or severe)    - MILD (1-3): doesn't interfere with normal activities, abdomen soft and not tender to touch     - MODERATE (4-7): interferes with normal activities or awakens from sleep, tender to touch     - SEVERE (8-10): excruciating pain, doubled over, unable to do any normal activities       5 out of 10 currently    7. ORAL INTAKE: If vomiting, \"Have you been able to drink liquids? \" \"How much fluids have you had in the past 24 hours? \"      N/a - denies vomiting     8. HYDRATION: \"Any signs of dehydration? \" (e.g., dry mouth [not just dry lips], too weak to stand, dizziness, new weight loss) \"When did you last urinate? \"      Denies dizziness nor light headedness. Pt last urinated 30 min ago. 9. EXPOSURE: \"Have you traveled to a foreign country recently? \" \"Have you been exposed to anyone with diarrhea? \" \"Could you have eaten any food that was spoiled? \"      Denies     10. ANTIBIOTIC USE: \"Are you taking antibiotics now or have you taken antibiotics in the past 2 months? \"        Denies     11. OTHER SYMPTOMS: \"Do you have any other symptoms? \" (e.g., fever, blood in stool)        Pt also reports abdominal bloating for the last 5 days    12. PREGNANCY: \"Is there any chance you are pregnant? \" \"When was your last menstrual period? \"        N/a    Protocols used: [id number]

## 2021-04-13 ENCOUNTER — OFFICE VISIT (OUTPATIENT)
Dept: FAMILY MEDICINE CLINIC | Age: 71
End: 2021-04-13
Payer: MEDICARE

## 2021-04-13 VITALS
BODY MASS INDEX: 30.54 KG/M2 | DIASTOLIC BLOOD PRESSURE: 84 MMHG | HEIGHT: 74 IN | OXYGEN SATURATION: 96 % | WEIGHT: 238 LBS | SYSTOLIC BLOOD PRESSURE: 138 MMHG | HEART RATE: 56 BPM | TEMPERATURE: 98.2 F

## 2021-04-13 DIAGNOSIS — R79.89 ABNORMAL LFTS: ICD-10-CM

## 2021-04-13 DIAGNOSIS — I50.9 CHRONIC CONGESTIVE HEART FAILURE, UNSPECIFIED HEART FAILURE TYPE (HCC): Primary | ICD-10-CM

## 2021-04-13 DIAGNOSIS — R19.7 DIARRHEA, UNSPECIFIED TYPE: ICD-10-CM

## 2021-04-13 PROBLEM — I71.40 ABDOMINAL AORTIC ANEURYSM (AAA) WITHOUT RUPTURE: Status: ACTIVE | Noted: 2019-08-27

## 2021-04-13 PROBLEM — Z98.890 S/P AAA REPAIR: Status: ACTIVE | Noted: 2019-09-13

## 2021-04-13 PROBLEM — I77.9 CAROTID ARTERY DISEASE (HCC): Status: ACTIVE | Noted: 2017-06-19

## 2021-04-13 PROBLEM — Z86.79 S/P AAA REPAIR: Status: ACTIVE | Noted: 2019-09-13

## 2021-04-13 PROBLEM — I63.9 CEREBRAL INFARCTION (HCC): Status: ACTIVE | Noted: 2017-06-19

## 2021-04-13 PROCEDURE — 99205 OFFICE O/P NEW HI 60 MIN: CPT | Performed by: PHYSICIAN ASSISTANT

## 2021-04-13 RX ORDER — FUROSEMIDE 20 MG/1
20 TABLET ORAL DAILY
Qty: 30 TABLET | Refills: 0 | Status: SHIPPED | OUTPATIENT
Start: 2021-04-13 | End: 2021-04-23 | Stop reason: SDUPTHER

## 2021-04-13 ASSESSMENT — ENCOUNTER SYMPTOMS
COLOR CHANGE: 0
COUGH: 0
VOMITING: 0
DIARRHEA: 1
EYE REDNESS: 0
BACK PAIN: 0
EYE PAIN: 0
NAUSEA: 0
RHINORRHEA: 0
ABDOMINAL PAIN: 1
SHORTNESS OF BREATH: 0
ABDOMINAL DISTENTION: 1

## 2021-04-13 NOTE — PROGRESS NOTES
rupture ([facility]    S/P AAA repair    [facility] ([facility]    Cerebral infarction ([facility]    Chronic congestive heart failure (HCC)    Diarrhea       Review of Systems   Constitutional: Negative for appetite change, fatigue and fever. HENT: Negative for congestion and rhinorrhea. Eyes: Negative for pain and redness. Respiratory: Negative for cough and shortness of breath. Cardiovascular: Positive for leg swelling. Negative for chest pain. Gastrointestinal: Positive for abdominal distention, abdominal pain (states that the discomfort is diffuse. ) and diarrhea. Negative for nausea and vomiting. Genitourinary: Negative for difficulty urinating and dysuria. Musculoskeletal: Negative for back pain. Skin: Negative for color change and rash. Neurological: Negative for light-headedness and headaches. Prior to Visit Medications    Medication Sig Taking? Authorizing Provider   furosemide (LASIX) 20 MG tablet Take 1 tablet by mouth daily Yes Aden Goode PA-C   ferrous sulfate (IRON 325) 325 (65 Fe) MG tablet Take 1 tablet by mouth 2 times daily Yes Olinda Aguilar MD   metoprolol tartrate (LOPRESSOR) 100 MG tablet Take 1 tablet by mouth 2 times daily Yes ELIESO Chin CNP   atorvastatin (LIPITOR) 20 MG tablet Take 1 tablet by mouth nightly Yes ELISEO Chin CNP   aspirin 81 MG tablet Take 81 mg by mouth daily Yes Historical Provider, MD   omeprazole (PRILOSEC) 10 MG delayed release capsule Take 1 capsule by mouth daily as needed (GERD) Yes Ofelia Lu MD   traMADol (ULTRAM) 50 MG tablet Take 50 mg by mouth every 6 hours as needed for Pain. Historical Provider, MD        Allergies   Allergen Reactions    Iv Dye [Iodides] Hives    Iodinated Diagnostic Agents Hives     Patient developed hives even with steroid prep.        Past Medical History:   Diagnosis Date    AAA (abdominal aortic aneurysm) ([facility] 3/23/2015    3.8cm     CAD (coronary artery disease)     Colon polyps     Colon tumor     Congestive heart failure (CHF) ([facility] 01/2021    GERD (gastroesophageal reflux disease)     prilosec as needed    H/O chest tube placement     chest tube x4 different times    History of pneumonia     History of pneumothorax     x4    Hx of cardiac cath 02/19/2021    Hyperlipidemia     Hypertension     Non Hodgkin's lymphoma ([facility]     NHL    Pneumonia 1970's    patient had 3 chest tubes and in hospital for 13 days    Unspecified cerebral artery occlusion with cerebral infarction 10/01/2014    stroke , numbness in the left arm from the elbow down    Wellness examination 02/25/2021    [facility] nov 2020    Wellness examination 02/25/2021    Cardiology-Dr [facility] [address] feb 2021    Wellness examination 02/25/2021    onc-Dr Roger Lauren 2021       Past Surgical History:   Procedure Laterality Date    APPENDECTOMY  4/21/15    CHOLECYSTECTOMY, OPEN  04/21/15    COLONOSCOPY      COLONOSCOPY  07/10/2020    COLONOSCOPY POLYPECTOMY HOT BIOPSY (N/A )    COLONOSCOPY N/A 7/10/2020    COLONOSCOPY POLYPECTOMY HOT BIOPSY performed by Chong Sebastian MD at [place] [address] N/A 3/3/2021    CABG CORONARY ARTERY BYPASS X3, ON PUMP; SWAN MARLENY, MIRANDA PER ANESTHESIA performed by Freida Michael MD at [facility]  2019    stent to left leg with tubular graft to left leg artery    RIGHT COLECTOMY  04/21/15    TRANSESOPHAGEAL ECHOCARDIOGRAM  10/3/14    VASECTOMY  1986    WISDOM TOOTH EXTRACTION         Social History     Socioeconomic History    Marital status:       Spouse name: Not on file    Number of children: Not on file    Years of education: Not on file    Highest education level: Not on file   Occupational History    Not on file   Social Needs    Financial resource strain: Not hard at all    Food insecurity     Worry: Never true     Inability: Never true   [employer] needs Medical: No     Non-medical: No   Tobacco Use    Smoking status: Former Smoker     Packs/day: 1.00     Years: 30.00     Pack years: 30.00     Types: Cigarettes     Quit date: 2013     Years since quittin.6    Smokeless tobacco: Never Used   Substance and Sexual Activity    Alcohol use: Yes     Alcohol/week: 14.0 standard drinks     Types: 14 Cans of beer per week     Comment: 1-3 beers/day    Drug use: No    Sexual activity: Not Currently   Lifestyle    Physical activity     Days per week: Not on file     Minutes per session: Not on file    Stress: Not on file   Relationships    Social connections     Talks on phone: Not on file     Gets together: Not on file     Attends Sabianist service: Not on file     Active member of club or organization: Not on file     Attends meetings of clubs or organizations: Not on file     Relationship status: Not on file    Intimate partner violence     Fear of current or ex partner: Not on file     Emotionally abused: Not on file     Physically abused: Not on file     Forced sexual activity: Not on file   Other Topics Concern    Not on file   Social History Narrative    Not on file        Family History   Problem Relation Age of Onset    Heart Disease Mother     Stroke Mother     Alcohol Abuse Father     Cancer Father        ADVANCE DIRECTIVE: N, <no information>    Vitals:    21 1256   BP: 138/84   Pulse: 56   Temp: 98.2 °F (36.8 °C)   SpO2: 96%   Weight: 238 lb (108 kg)   Height: 6' 2\" (1.88 m)     Estimated body mass index is 30.56 kg/m² as calculated from the following:    Height as of this encounter: 6' 2\" (1.88 m). Weight as of this encounter: 238 lb (108 kg). Physical Exam  Vitals signs and nursing note reviewed. Constitutional:       Comments: Patient appears stated age and with comorbidities    HENT:      Head: Normocephalic and atraumatic. Nose: Nose normal. No congestion.       Mouth/Throat:      Mouth: Mucous membranes are moist. Eyes:      General: No scleral icterus. Extraocular Movements: Extraocular movements intact. Pupils: Pupils are equal, round, and reactive to light. Neck:      Musculoskeletal: Normal range of motion and neck supple. Cardiovascular:      Rate and Rhythm: Normal rate and regular rhythm. Pulses: Normal pulses. Pulmonary:      Effort: Pulmonary effort is normal.      Breath sounds: Normal breath sounds. No wheezing or rales. Abdominal:      General: Abdomen is flat. There is distension. Palpations: Abdomen is soft. Tenderness: There is abdominal tenderness. There is no guarding. Comments: Bowel sounds heard but distant    Musculoskeletal: Normal range of motion. General: Swelling (bilateral lower ext, L approx 2+, R 1+) present. No tenderness. Lymphadenopathy:      Cervical: No cervical adenopathy. Skin:     General: Skin is warm and dry. Capillary Refill: Capillary refill takes 2 to 3 seconds. Neurological:      General: No focal deficit present. Mental Status: He is alert and oriented to person, place, and time. No flowsheet data found. Lab Results   Component Value Date    CHOL 93 02/19/2020    CHOL 128 03/27/2017    CHOL 138 03/15/2016    TRIG 84 02/19/2020    TRIG 113 03/27/2017    TRIG 99 03/15/2016    HDL 37 02/19/2020    HDL 50 03/27/2017    HDL 46 03/15/2016    LDLCHOLESTEROL 39 02/19/2020    LDLCHOLESTEROL 55 03/27/2017    LDLCHOLESTEROL 72 03/15/2016    GLUCOSE 110 03/09/2021    LABA1C 5.9 02/19/2021       The ASCVD Risk score (Shane Taylor., et al., 2013) failed to calculate for the following reasons:     The valid total cholesterol range is 130 to 320 mg/dL    Immunization History   Administered Date(s) Administered    Influenza Virus Vaccine 10/17/2014, 10/18/2016    Influenza, High Dose (Fluzone 65 yrs and older) 10/17/2014, 10/18/2016    Influenza, Quadv, adjuvanted, 65 yrs +, IM, PF (Fluad) 09/29/2020    Pneumococcal Conjugate 13-valent ([id number]) 03/22/2016    Pneumococcal Polysaccharide ([id number]) 10/17/2014, 04/04/2019    Tdap (Boostrix, Adacel) 07/11/2016    Zoster Live (Zostavax) 07/28/2016       Health Maintenance   Topic Date Due    Meningococcal (ACWY) vaccine (1 - Risk start before 7 months 4-dose series) Never done    Hib vaccine (1 of 1 - Risk 1-dose series) Never done    Meningococcal B vaccine (1 of 4 - Increased Risk Bexsero 2-dose series) Never done    Shingles Vaccine (2 of 3) 09/22/2016    Lipid screen  02/19/2021    Annual Wellness Visit (AWV)  07/23/2021    A1C test (Diabetic or Prediabetic)  02/19/2022    Low dose CT lung screening  03/02/2022    Colon cancer screen colonoscopy  07/10/2023    DTaP/Tdap/Td vaccine (2 - Td) 07/11/2026    Flu vaccine  Completed    Pneumococcal 65+ yrs at Risk Vaccine  Completed    COVID-19 Vaccine  Completed    Hepatitis C screen  Completed    Hepatitis A vaccine  Aged Out    Hepatitis B vaccine  Aged Out       ASSESSMENT/PLAN:    Discussed at length with the patient about current symptoms and concerns at this time. Due to the complexity of patient's medical history, and known history of CHF as well as recent CABG, advised patient that he will be started on 20mg of lasix QD and advised him to take that in the morning. Further discussed with the patient due to his weight gain of 10lbs over the past month as well as his abdominal distension and worsening diarrhea that labs will be ordered at this time and that he needs to contact his cardiologist to schedule a f/u appointment with them as soon as possible. He is to keep his currently scheduled appointment with his oncologist for the 25th. Advised the patient that if his symptoms do not improve or worsen in the next seven days to contact the office for another appointment. The patient verbalized agreement and understanding to the plan of care at this time.      1. Chronic congestive heart failure, unspecified heart failure type (HCC)  - CBC Auto Differential; Future  - Comprehensive Metabolic Panel; Future  - Brain Natriuretic Peptide; Future  - furosemide (LASIX) 20 MG tablet; Take 1 tablet by mouth daily  Dispense: 30 tablet; Refill: 0    2. Abnormal LFTs  - Comprehensive Metabolic Panel; Future    3. Diarrhea, unspecified type  - Clostridium Difficile Toxin/Antigen; Future  - O&P Panel (Travel Associated) #1; Future      Return in about 3 months (around 7/13/2021) for Call the office in 7 days if symptoms do not improve. Otherwise schedule f/u appt for 3 months.  .    Electronically signed by Gildardo Cornejo PA-C on 4/13/21 at 10:47 AM EDT

## 2021-04-14 ENCOUNTER — HOSPITAL ENCOUNTER (OUTPATIENT)
Facility: MEDICAL CENTER | Age: 71
End: 2021-04-14
Payer: MEDICARE

## 2021-04-19 DIAGNOSIS — C85.80 MARGINAL ZONE LYMPHOMA (HCC): Primary | ICD-10-CM

## 2021-04-20 ENCOUNTER — HOSPITAL ENCOUNTER (OUTPATIENT)
Age: 71
Discharge: HOME | End: 2021-04-20
Payer: MEDICARE

## 2021-04-20 DIAGNOSIS — R79.89 ABNORMAL LFTS: ICD-10-CM

## 2021-04-20 DIAGNOSIS — I50.9 CHRONIC CONGESTIVE HEART FAILURE, UNSPECIFIED HEART FAILURE TYPE (HCC): ICD-10-CM

## 2021-04-20 DIAGNOSIS — R19.7 DIARRHEA, UNSPECIFIED TYPE: ICD-10-CM

## 2021-04-20 DIAGNOSIS — I25.10 CAD, MULTIPLE VESSEL: ICD-10-CM

## 2021-04-20 DIAGNOSIS — Z00.00 ROUTINE ADULT HEALTH MAINTENANCE: ICD-10-CM

## 2021-04-20 DIAGNOSIS — D64.9 ANEMIA, UNSPECIFIED TYPE: ICD-10-CM

## 2021-04-20 LAB
ABSOLUTE EOS #: 0 K/UL (ref 0–0.4)
ABSOLUTE EOS #: 0.17 K/UL (ref 0–0.4)
ABSOLUTE IMMATURE GRANULOCYTE: 0 K/UL (ref 0–0.3)
ABSOLUTE IMMATURE GRANULOCYTE: 0 K/UL (ref 0–0.3)
ABSOLUTE LYMPH #: 10.26 K/UL (ref 1–4.8)
ABSOLUTE LYMPH #: 12.41 K/UL (ref 1–4.8)
ABSOLUTE MONO #: 0.85 K/UL (ref 0.1–0.8)
ABSOLUTE MONO #: 2.05 K/UL (ref 0.1–0.8)
ALBUMIN SERPL-MCNC: 3.5 G/DL (ref 3.5–5.2)
ALBUMIN SERPL-MCNC: 3.5 G/DL (ref 3.5–5.2)
ALBUMIN/GLOBULIN RATIO: 1 (ref 1–2.5)
ALBUMIN/GLOBULIN RATIO: 1 (ref 1–2.5)
ALP BLD-CCNC: 151 U/L (ref 40–129)
ALP BLD-CCNC: 152 U/L (ref 40–129)
ALT SERPL-CCNC: 26 U/L (ref 5–41)
ALT SERPL-CCNC: 27 U/L (ref 5–41)
ANION GAP SERPL CALCULATED.3IONS-SCNC: 7 MMOL/L (ref 9–17)
ANION GAP SERPL CALCULATED.3IONS-SCNC: 9 MMOL/L (ref 9–17)
AST SERPL-CCNC: 45 U/L
AST SERPL-CCNC: 46 U/L
BASOPHILS # BLD: 0 % (ref 0–2)
BASOPHILS # BLD: 0 % (ref 0–2)
BASOPHILS ABSOLUTE: 0 K/UL (ref 0–0.2)
BASOPHILS ABSOLUTE: 0 K/UL (ref 0–0.2)
BILIRUB SERPL-MCNC: 0.95 MG/DL (ref 0.3–1.2)
BILIRUB SERPL-MCNC: 0.97 MG/DL (ref 0.3–1.2)
BNP INTERPRETATION: ABNORMAL
BUN BLDV-MCNC: 9 MG/DL (ref 8–23)
BUN BLDV-MCNC: 9 MG/DL (ref 8–23)
BUN/CREAT BLD: ABNORMAL (ref 9–20)
BUN/CREAT BLD: ABNORMAL (ref 9–20)
CALCIUM SERPL-MCNC: 8.4 MG/DL (ref 8.6–10.4)
CALCIUM SERPL-MCNC: 8.5 MG/DL (ref 8.6–10.4)
CHLORIDE BLD-SCNC: 105 MMOL/L (ref 98–107)
CHLORIDE BLD-SCNC: 105 MMOL/L (ref 98–107)
CHOLESTEROL/HDL RATIO: 2.5
CHOLESTEROL: 88 MG/DL
CO2: 22 MMOL/L (ref 20–31)
CO2: 25 MMOL/L (ref 20–31)
CREAT SERPL-MCNC: 0.73 MG/DL (ref 0.7–1.2)
CREAT SERPL-MCNC: 0.77 MG/DL (ref 0.7–1.2)
DIFFERENTIAL TYPE: ABNORMAL
DIFFERENTIAL TYPE: ABNORMAL
EOSINOPHILS RELATIVE PERCENT: 0 % (ref 1–4)
EOSINOPHILS RELATIVE PERCENT: 1 % (ref 1–4)
ESTIMATED AVERAGE GLUCOSE: 103 MG/DL
GFR AFRICAN AMERICAN: >60 ML/MIN
GFR AFRICAN AMERICAN: >60 ML/MIN
GFR NON-AFRICAN AMERICAN: >60 ML/MIN
GFR NON-AFRICAN AMERICAN: >60 ML/MIN
GFR SERPL CREATININE-BSD FRML MDRD: ABNORMAL ML/MIN/{1.73_M2}
GLUCOSE BLD-MCNC: 120 MG/DL (ref 70–99)
GLUCOSE BLD-MCNC: 122 MG/DL (ref 70–99)
HBA1C MFR BLD: 5.2 % (ref 4–6)
HCT VFR BLD CALC: 40.1 % (ref 40.7–50.3)
HCT VFR BLD CALC: 40.5 % (ref 40.7–50.3)
HDLC SERPL-MCNC: 35 MG/DL
HEMOGLOBIN: 12.5 G/DL (ref 13–17)
HEMOGLOBIN: 12.5 G/DL (ref 13–17)
IMMATURE GRANULOCYTES: 0 %
IMMATURE GRANULOCYTES: 0 %
LDL CHOLESTEROL: 34 MG/DL (ref 0–130)
LYMPHOCYTES # BLD: 60 % (ref 24–44)
LYMPHOCYTES # BLD: 73 % (ref 24–44)
MCH RBC QN AUTO: 31.6 PG (ref 25.2–33.5)
MCH RBC QN AUTO: 32 PG (ref 25.2–33.5)
MCHC RBC AUTO-ENTMCNC: 30.9 G/DL (ref 28.4–34.8)
MCHC RBC AUTO-ENTMCNC: 31.2 G/DL (ref 28.4–34.8)
MCV RBC AUTO: 102.5 FL (ref 82.6–102.9)
MCV RBC AUTO: 102.6 FL (ref 82.6–102.9)
MONOCYTES # BLD: 12 % (ref 1–7)
MONOCYTES # BLD: 5 % (ref 1–7)
MORPHOLOGY: ABNORMAL
MORPHOLOGY: ABNORMAL
NRBC AUTOMATED: 0 PER 100 WBC
NRBC AUTOMATED: 0 PER 100 WBC
PDW BLD-RTO: 15.3 % (ref 11.8–14.4)
PDW BLD-RTO: 15.3 % (ref 11.8–14.4)
PLATELET # BLD: 64 K/UL (ref 138–453)
PLATELET # BLD: 66 K/UL (ref 138–453)
PLATELET ESTIMATE: ABNORMAL
PLATELET ESTIMATE: ABNORMAL
PMV BLD AUTO: 10.2 FL (ref 8.1–13.5)
PMV BLD AUTO: 9.9 FL (ref 8.1–13.5)
POTASSIUM SERPL-SCNC: 4.3 MMOL/L (ref 3.7–5.3)
POTASSIUM SERPL-SCNC: 4.5 MMOL/L (ref 3.7–5.3)
PRO-BNP: 816 PG/ML
RBC # BLD: 3.91 M/UL (ref 4.21–5.77)
RBC # BLD: 3.95 M/UL (ref 4.21–5.77)
RBC # BLD: ABNORMAL 10*6/UL
RBC # BLD: ABNORMAL 10*6/UL
SEG NEUTROPHILS: 22 % (ref 36–66)
SEG NEUTROPHILS: 27 % (ref 36–66)
SEGMENTED NEUTROPHILS ABSOLUTE COUNT: 3.74 K/UL (ref 1.8–7.7)
SEGMENTED NEUTROPHILS ABSOLUTE COUNT: 4.62 K/UL (ref 1.8–7.7)
SODIUM BLD-SCNC: 136 MMOL/L (ref 135–144)
SODIUM BLD-SCNC: 137 MMOL/L (ref 135–144)
TOTAL PROTEIN: 6.9 G/DL (ref 6.4–8.3)
TOTAL PROTEIN: 6.9 G/DL (ref 6.4–8.3)
TRIGL SERPL-MCNC: 93 MG/DL
TSH SERPL DL<=0.05 MIU/L-ACNC: 2.33 MIU/L (ref 0.3–5)
VLDLC SERPL CALC-MCNC: ABNORMAL MG/DL (ref 1–30)
WBC # BLD: 17 K/UL (ref 3.5–11.3)
WBC # BLD: 17.1 K/UL (ref 3.5–11.3)
WBC # BLD: ABNORMAL 10*3/UL
WBC # BLD: ABNORMAL 10*3/UL

## 2021-04-20 PROCEDURE — 85025 COMPLETE CBC W/AUTO DIFF WBC: CPT

## 2021-04-20 PROCEDURE — 83036 HEMOGLOBIN GLYCOSYLATED A1C: CPT

## 2021-04-20 PROCEDURE — 87449 NOS EACH ORGANISM AG IA: CPT

## 2021-04-20 PROCEDURE — 80061 LIPID PANEL: CPT

## 2021-04-20 PROCEDURE — 36415 COLL VENOUS BLD VENIPUNCTURE: CPT

## 2021-04-20 PROCEDURE — 87328 CRYPTOSPORIDIUM AG IA: CPT

## 2021-04-20 PROCEDURE — 87329 GIARDIA AG IA: CPT

## 2021-04-20 PROCEDURE — 87324 CLOSTRIDIUM AG IA: CPT

## 2021-04-20 PROCEDURE — 84443 ASSAY THYROID STIM HORMONE: CPT

## 2021-04-20 PROCEDURE — 80053 COMPREHEN METABOLIC PANEL: CPT

## 2021-04-20 PROCEDURE — 83880 ASSAY OF NATRIURETIC PEPTIDE: CPT

## 2021-04-21 LAB
C DIFF AG + TOXIN: NEGATIVE
DIRECT EXAM: NORMAL
DIRECT EXAM: NORMAL
Lab: NORMAL
SPECIMEN DESCRIPTION: NORMAL
SPECIMEN DESCRIPTION: NORMAL

## 2021-04-22 ENCOUNTER — HOSPITAL ENCOUNTER (OUTPATIENT)
Facility: MEDICAL CENTER | Age: 71
End: 2021-04-22
Payer: MEDICARE

## 2021-04-23 ENCOUNTER — OFFICE VISIT (OUTPATIENT)
Dept: FAMILY MEDICINE CLINIC | Age: 71
End: 2021-04-23
Payer: MEDICARE

## 2021-04-23 VITALS
HEART RATE: 66 BPM | OXYGEN SATURATION: 94 % | DIASTOLIC BLOOD PRESSURE: 74 MMHG | BODY MASS INDEX: 29.65 KG/M2 | HEIGHT: 74 IN | TEMPERATURE: 97.2 F | SYSTOLIC BLOOD PRESSURE: 128 MMHG | WEIGHT: 231 LBS

## 2021-04-23 DIAGNOSIS — Z78.9 ALCOHOL USE: ICD-10-CM

## 2021-04-23 DIAGNOSIS — I10 ESSENTIAL HYPERTENSION: ICD-10-CM

## 2021-04-23 DIAGNOSIS — I50.43 ACUTE ON CHRONIC COMBINED SYSTOLIC AND DIASTOLIC HEART FAILURE (HCC): Primary | ICD-10-CM

## 2021-04-23 DIAGNOSIS — Z95.1 S/P CABG X 3: ICD-10-CM

## 2021-04-23 DIAGNOSIS — R19.7 DIARRHEA, UNSPECIFIED TYPE: ICD-10-CM

## 2021-04-23 PROCEDURE — 99214 OFFICE O/P EST MOD 30 MIN: CPT | Performed by: STUDENT IN AN ORGANIZED HEALTH CARE EDUCATION/TRAINING PROGRAM

## 2021-04-23 RX ORDER — METOPROLOL TARTRATE 100 MG/1
100 TABLET ORAL 2 TIMES DAILY
Qty: 120 TABLET | Refills: 1 | Status: SHIPPED | OUTPATIENT
Start: 2021-04-23

## 2021-04-23 RX ORDER — FUROSEMIDE 20 MG/1
20 TABLET ORAL DAILY
Qty: 30 TABLET | Refills: 2 | Status: SHIPPED | OUTPATIENT
Start: 2021-04-23 | End: 2021-07-22 | Stop reason: ALTCHOICE

## 2021-04-23 RX ORDER — GREEN TEA/HOODIA GORDONII 315-12.5MG
1 CAPSULE ORAL 2 TIMES DAILY
Qty: 60 TABLET | Refills: 1 | Status: SHIPPED | OUTPATIENT
Start: 2021-04-23 | End: 2021-05-23

## 2021-04-23 RX ORDER — ATORVASTATIN CALCIUM 20 MG/1
20 TABLET, FILM COATED ORAL NIGHTLY
Qty: 90 TABLET | Refills: 0 | Status: SHIPPED | OUTPATIENT
Start: 2021-04-23 | End: 2022-02-07 | Stop reason: SDUPTHER

## 2021-04-23 NOTE — PROGRESS NOTES
[address] PRIMARY CARE  [address] [place] [address] [address] [facility]  Dept: [phone]  Dept Fax: [phone]    Kota Loya is a 69 y.o. male who is a Established patient, who presents today for his medical conditions/complaints as noted below:  Chief Complaint   Patient presents with    Follow-up    Discuss Labs         HPI:     He is here today to follow-up on abdominal and leg swelling. Says that he started taking Lasix 20 mg daily and that improved his symptoms. He has history of cholecystectomy and partial colectomy after which she has had chronic loose bowel movements. Says that the diarrhea was not new for him when it happened 2 weeks ago but he has never felt bloated like this before. His recent labs were negative for C. difficile and any GI pathogen. He was found to have elevated BNP in 800s. Says that he was taken off of hydrochlorothiazide and Lasix after his recent CABG 6 weeks ago. He is also been taking several ibuprofens in a day for his hip pain. He said he was never told that he should not be taking any NSAIDs. Also on low-dose atorvastatin and is not sure why he was not switched to high-dose at the time of his discharge from the hospital.  He is currently on metoprolol 100 mg twice daily for hypertension. He has a follow-up with cardiology in a month. He admits to drinking 2-3 beers a day, says he was not aware of the ill effects of alcohol. His recent labs showed elevated white count, he has history of lymphoma and follows with oncology. He has history of anemia and is on iron supplements which he says he will stop taking once he runs out of the course. Has history of abdominal aortic aneurysm s/p repair.       Hemoglobin A1C (%)   Date Value   04/20/2021 5.2   02/19/2021 5.9             ( goal A1Cis < 7)   No results found for: LABMICR  LDL Cholesterol (mg/dL)   Date Value   04/20/2021 34   02/19/2020 39   03/27/2017 55       (goal LDL is <100) AST (U/L)   Date Value   04/20/2021 46 (H)     ALT (U/L)   Date Value   04/20/2021 27     BUN (mg/dL)   Date Value   04/20/2021 9     BP Readings from Last 3 Encounters:   04/23/21 128/74   04/13/21 138/84   03/18/21 129/64          (goal 120/80)    Past Medical History:   Diagnosis Date    AAA (abdominal aortic aneurysm) ([facility] 3/23/2015    3.8cm     CAD (coronary artery disease)     Colon polyps     Colon tumor     Congestive heart failure (CHF) ([facility] 01/2021    GERD (gastroesophageal reflux disease)     prilosec as needed    H/O chest tube placement     chest tube x4 different times    History of pneumonia     History of pneumothorax     x4    Hx of cardiac cath 02/19/2021    Hyperlipidemia     Hypertension     Non Hodgkin's lymphoma ([facility]     NHL    Pneumonia 1970's    patient had 3 chest tubes and in hospital for 13 days    Unspecified cerebral artery occlusion with cerebral infarction 10/01/2014    stroke , numbness in the left arm from the elbow down    Wellness examination 02/25/2021    [facility] nov 2020    Wellness examination 02/25/2021    [facility] [address]    Wellness examination 02/25/2021    [facility]      Past Surgical History:   Procedure Laterality Date    APPENDECTOMY  4/21/15    CHOLECYSTECTOMY, OPEN  04/21/15    COLONOSCOPY      COLONOSCOPY  07/10/2020    COLONOSCOPY POLYPECTOMY HOT BIOPSY (N/A )    COLONOSCOPY N/A 7/10/2020    COLONOSCOPY POLYPECTOMY HOT BIOPSY performed by Sravanthi Storm MD at [place] [address] N/A 3/3/2021    CABG CORONARY ARTERY BYPASS X3, ON PUMP; SWAN MARLENY, MIRANDA PER ANESTHESIA performed by Juan Pichardo MD at [facility]  2019    stent to left leg with tubular graft to left leg artery    RIGHT COLECTOMY  04/21/15    TRANSESOPHAGEAL ECHOCARDIOGRAM  10/3/14    VASECTOMY  1986    WISDOM TOOTH EXTRACTION         Family History Problem Relation Age of Onset    Heart Disease Mother     Stroke Mother     Alcohol Abuse Father     Cancer Father        Social History     Tobacco Use    Smoking status: Former Smoker     Packs/day: 1.00     Years: 30.00     Pack years: 30.00     Types: Cigarettes     Quit date: 2013     Years since quittin.6    Smokeless tobacco: Never Used   Substance Use Topics    Alcohol use: Yes     Alcohol/week: 14.0 standard drinks     Types: 14 Cans of beer per week     Comment: 1-3 beers/day      Current Outpatient Medications   Medication Sig Dispense Refill    atorvastatin (LIPITOR) 20 MG tablet Take 1 tablet by mouth nightly 90 tablet 0    metoprolol (LOPRESSOR) 100 MG tablet Take 1 tablet by mouth 2 times daily 120 tablet 1    Probiotic Acidophilus (FLORANEX) TABS Take 1 tablet by mouth 2 times daily 60 tablet 1    furosemide (LASIX) 20 MG tablet Take 1 tablet by mouth daily 30 tablet 2    ferrous sulfate (IRON 325) 325 (65 Fe) MG tablet Take 1 tablet by mouth 2 times daily 60 tablet 5    traMADol (ULTRAM) 50 MG tablet Take 50 mg by mouth every 6 hours as needed for Pain.  aspirin 81 MG tablet Take 81 mg by mouth daily      omeprazole (PRILOSEC) 10 MG delayed release capsule Take 1 capsule by mouth daily as needed (GERD) 90 capsule 3     No current facility-administered medications for this visit. Allergies   Allergen Reactions    Iv Dye [Iodides] Hives    Iodinated Diagnostic Agents Hives     Patient developed hives even with steroid prep.        Health Maintenance   Topic Date Due    Shingles Vaccine (2 of 3) 2016    Meningococcal (ACWY) vaccine (1 - Risk start before 7 months 4-dose series) 2021 (Originally 1950)    Hib vaccine (1 of 1 - Risk 1-dose series) 2022 (Originally 1951)    Meningococcal B vaccine (1 of 4 - Increased Risk Bexsero 2-dose series) 2022 (Originally 1960)    Annual Wellness Visit (AWV)  2021    Low dose CT lung screening  03/02/2022    Lipid screen  04/20/2022    Potassium monitoring  04/20/2022    Creatinine monitoring  04/20/2022    Colon cancer screen colonoscopy  07/10/2023    DTaP/Tdap/Td vaccine (2 - Td) 07/11/2026    Flu vaccine  Completed    Pneumococcal 65+ yrs at Risk Vaccine  Completed    COVID-19 Vaccine  Completed    Hepatitis C screen  Completed    Hepatitis A vaccine  Aged Out    Hepatitis B vaccine  Aged Out       Subjective:     Review of Systems    Objective:     Physical Exam  Vitals signs reviewed. Constitutional:       Appearance: Normal appearance. He is normal weight. HENT:      Head: Normocephalic and atraumatic. Right Ear: Tympanic membrane normal.      Left Ear: Tympanic membrane normal.      Nose: Nose normal.      Mouth/Throat:      Mouth: Mucous membranes are moist.      Pharynx: Oropharynx is clear. Eyes:      Extraocular Movements: Extraocular movements intact. Conjunctiva/sclera: Conjunctivae normal.      Pupils: Pupils are equal, round, and reactive to light. Neck:      Musculoskeletal: Normal range of motion and neck supple. Cardiovascular:      Rate and Rhythm: Normal rate and regular rhythm. Heart sounds: Normal heart sounds. No murmur. No gallop. Pulmonary:      Effort: Pulmonary effort is normal. No respiratory distress. Breath sounds: Normal breath sounds. No stridor. No wheezing. Comments: Mild bibasilar crackles, no wheezing or rhonchi  Abdominal:      General: Bowel sounds are normal. There is distension. Palpations: Abdomen is soft. There is no mass. Tenderness: There is no abdominal tenderness. There is no rebound. Musculoskeletal: Normal range of motion. General: No swelling or tenderness. Left lower leg: Edema present. Skin:     General: Skin is warm and dry. Coloration: Skin is not jaundiced. Findings: No rash. Neurological:      General: No focal deficit present.       Mental Status: He is alert and oriented to person, place, and time. Psychiatric:         Mood and Affect: Mood normal.         Behavior: Behavior normal.         Thought Content: Thought content normal.         Judgment: Judgment normal.       /74   Pulse 66   Temp 97.2 °F (36.2 °C)   Ht 6' 2\" (1.88 m)   Wt 231 lb (104.8 kg)   SpO2 94%   BMI 29.66 kg/m²     Assessment/Plan:   1. Acute on chronic combined systolic and diastolic heart failure (HCC)  -     atorvastatin (LIPITOR) 20 MG tablet; Take 1 tablet by mouth nightly, Disp-90 tablet, R-0Normal  -     furosemide (LASIX) 20 MG tablet; Take 1 tablet by mouth daily, Disp-30 tablet, R-2Normal  2. Essential hypertension  -     metoprolol (LOPRESSOR) 100 MG tablet; Take 1 tablet by mouth 2 times daily, Disp-120 tablet, R-1Normal  3. Diarrhea, unspecified type  -     Probiotic Acidophilus (FLORANEX) TABS; Take 1 tablet by mouth 2 times daily, Disp-60 tablet, R-1Normal  4. Alcohol use  5. S/P CABG x 3    1. Heart failure exacerbation-recent , advised to take Lasix 20 mg, 2 tablets for next 2 to 3 days and then resume 1 tablet daily. Was taken off of Lasix and hydrochlorothiazide at the time of discharge post CABG, will follow up with cardiology. On moderate intensity statin-atorvastatin 20 mg daily. Not on aspirin/Plavix due to chronic thrombocytopenia and non-Hodgkin's lymphoma. 2.  Hypertension-controlled. On metoprolol 100 mg twice daily. Hydrochlorothiazide was discontinued at the time of discharge from hospital post CABG 6 weeks ago. Not on ACE inhibitor due to heart failure with preserved ejection fraction. 3.  Chronic diarrhea-has had chronic diarrhea post colectomy for colon cancer. Recent C. difficile panel and GI pathogen panel negative. Discussed dietary modifications including probiotics in diet. 4.  Alcohol use-drinking 2-3 beers every day. Discussed the risks of alcohol with exacerbation of heart failure.   He verbalized understanding and agrees to cut down. Has chronic leukocytosis secondary to lymphoma. Hemoglobin has improved to 12.5. On iron supplements currently which he says he will stop taking once he finishes the course. Return in about 3 months (around 7/23/2021) for Follow up HTN. No orders of the defined types were placed in this encounter. Orders Placed This Encounter   Medications    atorvastatin (LIPITOR) 20 MG tablet     Sig: Take 1 tablet by mouth nightly     Dispense:  90 tablet     Refill:  0    metoprolol (LOPRESSOR) 100 MG tablet     Sig: Take 1 tablet by mouth 2 times daily     Dispense:  120 tablet     Refill:  1    Probiotic Acidophilus (FLORANEX) TABS     Sig: Take 1 tablet by mouth 2 times daily     Dispense:  60 tablet     Refill:  1    furosemide (LASIX) 20 MG tablet     Sig: Take 1 tablet by mouth daily     Dispense:  30 tablet     Refill:  2       Patient given educational materials - see patient instructions. Discussed use, benefit, and side effects of prescribed medications. All patientquestions answered. Pt voiced understanding. Reviewed health maintenance. Instructedto continue current medications, diet and exercise. Patient agreed with treatmentplan. Follow up as directed.      Electronically signed by Sanjuanita Morrell MD on 4/23/2021 at 5:49 PM

## 2021-04-29 ENCOUNTER — OFFICE VISIT (OUTPATIENT)
Dept: ONCOLOGY | Age: 71
End: 2021-04-29
Payer: MEDICARE

## 2021-04-29 ENCOUNTER — TELEPHONE (OUTPATIENT)
Dept: ONCOLOGY | Age: 71
End: 2021-04-29

## 2021-04-29 VITALS
DIASTOLIC BLOOD PRESSURE: 70 MMHG | RESPIRATION RATE: 18 BRPM | TEMPERATURE: 98.2 F | SYSTOLIC BLOOD PRESSURE: 124 MMHG | WEIGHT: 231.9 LBS | HEART RATE: 56 BPM | BODY MASS INDEX: 29.77 KG/M2

## 2021-04-29 DIAGNOSIS — C85.80 MARGINAL ZONE LYMPHOMA (HCC): Primary | ICD-10-CM

## 2021-04-29 PROCEDURE — 99211 OFF/OP EST MAY X REQ PHY/QHP: CPT | Performed by: INTERNAL MEDICINE

## 2021-04-29 PROCEDURE — 99214 OFFICE O/P EST MOD 30 MIN: CPT | Performed by: INTERNAL MEDICINE

## 2021-04-29 NOTE — TELEPHONE ENCOUNTER
Herbert Hawkins MD VISIT  RV 3-4 MTHS W/ LABS BEFORE RV  LAB ORDERS GIVEN TO PT ON EXIT TO BE DONE 1 WK PRIOR TO MD VISIT  MD VISIT 8/31/21 @ 8:45AM  AVS PRINTED W/ INSTRUCTIONS AND GIVEN TO PT ON EXIT

## 2021-04-29 NOTE — PROGRESS NOTES
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 BRIEF CASE HISTORY:      Mr. Jade Camejo is a very pleasant 69 y.o. male with history of multiple co morbidities as listed. Patient states that he had stroke about 6 years ago. In addition he had right hemicolectomy in 2015 for tubular adenomatous polyp. Since then he had few GI symptoms. Overall he continued to do just fine. Patient had flulike illness about 2 weeks ago. He had blood work at that time which showed elevated white blood cells with significant lymphocytosis. Patient also had thrombocytopenia. Chemistry test showed elevated transaminases. Patient had liver ultrasound which showed steatosis with possible fatty infiltration of the liver. Patient had mild easy bruising. No fever or night sweats. No weight loss or decreased appetite. No enlarged lymph nodes. No abdominal pain or swelling. No ascites. No chest pain or shortness of breath. The patient drinks beer every day. He quit smoking 13 years ago. He smokes about 1 pack/day for about 40 years. .     INTERIM HISTORY:   Patient seen for follow-up marginal zone lymphoma. He has leukocytosis and lymphocytosis. Lab work-up was done. No changes. Clinically he is stable. No fever or night sweats. No enlarged lymph nodes. No weight loss or decreased appetite. No repeated infections. No other complaints.       PAST MEDICAL HISTORY: has a past medical history of AAA (abdominal aortic aneurysm) ([facility] [address] CAD (coronary artery disease), Colon polyps, Colon tumor, Congestive heart failure (CHF) ([facility] [address] GERD (gastroesophageal reflux disease), H/O chest tube placement, History of pneumonia, History of pneumothorax, Hx of cardiac cath, Hyperlipidemia, Hypertension, Non Hodgkin's lymphoma ([facility] [address] Pneumonia, Unspecified cerebral artery occlusion with cerebral infarction, Wellness examination, Wellness examination, and Wellness examination. PAST SURGICAL HISTORY: has a past surgical history that includes transesophageal echocardiogram (10/3/14); Colonoscopy; Vasectomy (1986); Cholecystectomy, open (04/21/15); right colectomy (04/21/15); Appendectomy (4/21/15); Colonoscopy (07/10/2020); Colonoscopy (N/A, 7/10/2020); [place] tooth extraction; other surgical history (2019); and Coronary artery bypass graft (N/A, 3/3/2021). CURRENT MEDICATIONS:  has a current medication list which includes the following prescription(s): atorvastatin, metoprolol, probiotic acidophilus, furosemide, ferrous sulfate, aspirin, tramadol, and omeprazole. ALLERGIES:  is allergic to iv dye [iodides] and iodinated diagnostic agents. FAMILY HISTORY: Father and brother had colon cancer at late age. Otherwise negative for any hematological or oncological conditions. SOCIAL HISTORY:  reports that he quit smoking about 7 years ago. His smoking use included cigarettes. He has a 30.00 pack-year smoking history. He has never used smokeless tobacco. He reports current alcohol use of about 14.0 standard drinks of alcohol per week. He reports that he does not use drugs. REVIEW OF SYSTEMS:     · General: No weakness or fatigue. No unanticipated weight loss or decreased appetite. No fever or chills. · Eyes: No blurred vision, eye pain or double vision. · Ears: No hearing problems or drainage. No tinnitus. · Throat: No sore throat, problems with swallowing or dysphagia. · Respiratory: No cough, sputum or hemoptysis. No shortness of breath. No pleuritic chest pain.      · Cardiovascular: No chest pain, orthopnea or PND. No lower extremity edema. No palpitation. · Gastrointestinal: No problems with swallowing. No abdominal pain or bloating. No nausea or vomiting. No diarrhea or constipation. No GI bleeding. · Genitourinary: No dysuria, hematuria, frequency or urgency. · Musculoskeletal: No muscle aches or pains. No limitation of movement. No back pain. No gait disturbance, No joint complaints. · Dermatologic: No skin rashes or pruritus. No skin lesions or discolorations. · Psychiatric: No depression, anxiety, or stress or signs of schizophrenia. No change in mood or affect. · Hematologic: No history of bleeding tendency. No bruises or ecchymosis. No history of clotting problems. · Infectious disease: No fever, chills or frequent infections. · Endocrine: No polydipsia or polyuria. No temperature intolerance. · Neurologic: No headaches or dizziness. No weakness or numbness of the extremities. No changes in balance, coordination,  memory, mentation, behavior. · Allergic/Immunologic: No nasal congestion or hives. No repeated infections. PHYSICAL EXAM:  The patient is not in acute distress. Vital signs: Blood pressure 124/70, pulse 56, temperature 98.2 °F (36.8 °C), temperature source Temporal, resp. rate 18, weight 231 lb 14.4 oz (105.2 kg).      General appearance - well appearing, not in pain or distress  Mental status - good mood, alert and oriented  Eyes - pupils equal and reactive, extraocular eye movements intact  Ears - bilateral TM's and external ear canals normal  Nose - normal and patent, no erythema, discharge or polyps  Mouth - mucous membranes moist, pharynx normal without lesions  Neck - supple, no significant adenopathy  Lymphatics - no palpable lymphadenopathy, no hepatosplenomegaly  Chest - clear to auscultation, no wheezes, rales or rhonchi, symmetric air entry  Heart - normal rate, regular rhythm, normal S1, S2, no murmurs, rubs, clicks or gallops  Abdomen - soft, nontender, nondistended, no masses or organomegaly  Neurological - alert, oriented, normal speech, no focal findings or movement disorder noted  Musculoskeletal - no joint tenderness, deformity or swelling  Extremities - peripheral pulses normal, no pedal edema, no clubbing or cyanosis  Skin - normal coloration and turgor, no rashes, no suspicious skin lesions noted     Review of Diagnostic data:   Lab Results   Component Value Date    WBC 17.1 (H) 04/20/2021    HGB 12.5 (L) 04/20/2021    HCT 40.5 (L) 04/20/2021    .5 04/20/2021    PLT 66 (L) 04/20/2021       Chemistry        Component Value Date/Time     04/20/2021 0941    K 4.3 04/20/2021 0941     04/20/2021 0941    CO2 22 04/20/2021 0941    BUN 9 04/20/2021 0941    CREATININE 0.73 04/20/2021 0941        Component Value Date/Time    CALCIUM 8.5 (L) 04/20/2021 0941    ALKPHOS 152 (H) 04/20/2021 0941    AST 46 (H) 04/20/2021 0941    ALT 27 04/20/2021 0941    BILITOT 0.97 04/20/2021 0941            IMPRESSION:   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 I explained to the patient the nature of this hematologic problem. I explained the significance of these abnormalities in layman language. For evaluation of persistent lymphocytosis patient had flow cytometry done. Results were positive for low-grade marginal zone lymphoma. I explained to the patient the nature of this lymphoma, staging, prognosis and treatment. I explained that this is low-grade lymphoma and recommendation at this point is observation and monitoring with no need for active treatment. We will consider active treatment for enlarging lymph nodes or splenomegaly or for unexplained fever or weight loss.   I will see the patient again in 3-4 months with repeated labs. He will be seen sooner if he develops any problems. Counseled about importance of alcohol cessation. Patient's questions were answered to the best of his satisfaction and he verbalized full understanding and agreement.

## 2021-06-01 ENCOUNTER — TELEPHONE (OUTPATIENT)
Dept: CARDIOTHORACIC SURGERY | Age: 71
End: 2021-06-01

## 2021-06-01 DIAGNOSIS — Z95.1 S/P CABG X 3: Primary | ICD-10-CM

## 2021-07-08 ENCOUNTER — TELEPHONE (OUTPATIENT)
Dept: ONCOLOGY | Age: 71
End: 2021-07-08

## 2021-07-08 NOTE — TELEPHONE ENCOUNTER
SPOKE Tony Norwood 137 PHONE. CLARIFIED LABS THAT ARE DUE PRIOR TO MD EXAM AND PROCESS FOR COLLECTION. UNDERSTANDING VOICED.

## 2021-07-22 ENCOUNTER — HOSPITAL ENCOUNTER (OUTPATIENT)
Facility: CLINIC | Age: 71
Discharge: HOME | End: 2021-07-24
Payer: MEDICARE

## 2021-07-22 ENCOUNTER — OFFICE VISIT (OUTPATIENT)
Dept: FAMILY MEDICINE CLINIC | Age: 71
End: 2021-07-22
Payer: MEDICARE

## 2021-07-22 ENCOUNTER — HOSPITAL ENCOUNTER (OUTPATIENT)
Dept: GENERAL RADIOLOGY | Facility: CLINIC | Age: 71
Discharge: HOME | End: 2021-07-24
Payer: MEDICARE

## 2021-07-22 VITALS
HEIGHT: 74 IN | TEMPERATURE: 96.8 F | SYSTOLIC BLOOD PRESSURE: 138 MMHG | WEIGHT: 227 LBS | BODY MASS INDEX: 29.13 KG/M2 | DIASTOLIC BLOOD PRESSURE: 68 MMHG

## 2021-07-22 DIAGNOSIS — I10 ESSENTIAL HYPERTENSION: Primary | ICD-10-CM

## 2021-07-22 DIAGNOSIS — I77.9 ARTERIAL DISEASE (HCC): ICD-10-CM

## 2021-07-22 DIAGNOSIS — R14.0 ABDOMINAL DISTENTION: ICD-10-CM

## 2021-07-22 DIAGNOSIS — D69.6 THROMBOCYTOPENIA (HCC): ICD-10-CM

## 2021-07-22 DIAGNOSIS — Z95.1 S/P CABG X 3: ICD-10-CM

## 2021-07-22 DIAGNOSIS — T82.9XXA DISORDER OF CARDIAC PACEMAKER SYSTEM, INITIAL ENCOUNTER: ICD-10-CM

## 2021-07-22 PROCEDURE — 99214 OFFICE O/P EST MOD 30 MIN: CPT | Performed by: STUDENT IN AN ORGANIZED HEALTH CARE EDUCATION/TRAINING PROGRAM

## 2021-07-22 PROCEDURE — 71046 X-RAY EXAM CHEST 2 VIEWS: CPT

## 2021-07-22 SDOH — ECONOMIC STABILITY: FOOD INSECURITY: WITHIN THE PAST 12 MONTHS, YOU WORRIED THAT YOUR FOOD WOULD RUN OUT BEFORE YOU GOT MONEY TO BUY MORE.: NEVER TRUE

## 2021-07-22 SDOH — ECONOMIC STABILITY: FOOD INSECURITY: WITHIN THE PAST 12 MONTHS, THE FOOD YOU BOUGHT JUST DIDN'T LAST AND YOU DIDN'T HAVE MONEY TO GET MORE.: NEVER TRUE

## 2021-07-22 ASSESSMENT — ENCOUNTER SYMPTOMS
COUGH: 0
SORE THROAT: 0
NAUSEA: 0
VOMITING: 0
ABDOMINAL DISTENTION: 1
WHEEZING: 0
CHEST TIGHTNESS: 0
EYE DISCHARGE: 0
DIARRHEA: 1
CONSTIPATION: 1
ABDOMINAL PAIN: 0
SHORTNESS OF BREATH: 0

## 2021-07-22 ASSESSMENT — SOCIAL DETERMINANTS OF HEALTH (SDOH): HOW HARD IS IT FOR YOU TO PAY FOR THE VERY BASICS LIKE FOOD, HOUSING, MEDICAL CARE, AND HEATING?: NOT HARD AT ALL

## 2021-07-22 NOTE — PROGRESS NOTES
[address] PRIMARY CARE  [address] [place] [address] [address] [place] [address]  Dept: [phone]  Dept Fax: [phone]    Talib Dyer is a 66 y.o. male who is a Established patient, who presents today for his medical conditions/complaints as noted below:  Chief Complaint   Patient presents with    Hypertension    Other     hasnt been feeling well stomach bloating          HPI:     He is here today to follow-up on hypertension and discuss concerns about pacemaker wire. He is doing well on the current blood pressure medications. He says a few weeks ago he thought he had a pimple on his stomach and started scratching on it and ended up pulling out a metallic wire which was supposedly his pacemaker wire. He did call cardiology and advised him to get an x-ray done but he never got that done. He did not think it was related to his heart in any way and thought it was coming out from his belly. He is also been having abdominal distention for past several weeks. States that it started almost after he got his CABG done. Initially it was thought that he had fluid retention and he was put on diuretics but he says that the abdominal bloating has not changed at all. In fact he feels it has gotten worse and his bowel movements are all irregular. He goes from loose stools to constipation alternately and occasionally will have blood in stool.       Hemoglobin A1C (%)   Date Value   04/20/2021 5.2   02/19/2021 5.9             ( goal A1Cis < 7)   No results found for: LABMICR  LDL Cholesterol (mg/dL)   Date Value   04/20/2021 34   02/19/2020 39   03/27/2017 55       (goal LDL is <100)   AST (U/L)   Date Value   04/20/2021 46 (H)     ALT (U/L)   Date Value   04/20/2021 27     BUN (mg/dL)   Date Value   04/20/2021 9     BP Readings from Last 3 Encounters:   07/22/21 138/68   04/29/21 124/70   04/23/21 128/74          (goal 120/80)    Past Medical History:   Diagnosis Date    AAA (abdominal aortic aneurysm) ([facility] [address] 3/23/2015    3.8cm     CAD (coronary artery disease)     Colon polyps     Colon tumor     Congestive heart failure (CHF) ([facility] [address] 01/2021    GERD (gastroesophageal reflux disease)     prilosec as needed    H/O chest tube placement     chest tube x4 different times    History of pneumonia     History of pneumothorax     x4    Hx of cardiac cath 02/19/2021    Hyperlipidemia     Hypertension     Non Hodgkin's lymphoma ([facility] [address]     NHL    Pneumonia 1970's    patient had 3 chest tubes and in hospital for 13 days    Unspecified cerebral artery occlusion with cerebral infarction 10/01/2014    stroke , numbness in the left arm from the elbow down    Wellness examination 02/25/2021    [facility] nov 2020    Wellness examination 02/25/2021    Cardiology-Dr [facility] feb 2021    Wellness examination 02/25/2021    onc-Dr Manpreet Brody 2021      Past Surgical History:   Procedure Laterality Date    APPENDECTOMY  4/21/15    CHOLECYSTECTOMY, OPEN  04/21/15    COLONOSCOPY      COLONOSCOPY  07/10/2020    COLONOSCOPY POLYPECTOMY HOT BIOPSY (N/A )    COLONOSCOPY N/A 7/10/2020    COLONOSCOPY POLYPECTOMY HOT BIOPSY performed by Katherine Cuevas MD at [place] [address] N/A 3/3/2021    CABG CORONARY ARTERY BYPASS X3, ON PUMP; SWAN MARLENY, MIRANDA PER ANESTHESIA performed by She Rees MD at [address] [place] [address]  2019    stent to left leg with tubular graft to left leg artery    RIGHT COLECTOMY  04/21/15    TRANSESOPHAGEAL ECHOCARDIOGRAM  10/3/14    VASECTOMY  1986    WISDOM TOOTH EXTRACTION         Family History   Problem Relation Age of Onset    Heart Disease Mother     Stroke Mother     Alcohol Abuse Father     Cancer Father        Social History     Tobacco Use    Smoking status: Former Smoker     Packs/day: 1.00     Years: 30.00     Pack years: 30.00     Types: Cigarettes     Quit date: 9/1/2013     Years since Negative for cough, chest tightness, shortness of breath and wheezing. Cardiovascular: Negative for chest pain, palpitations and leg swelling. Gastrointestinal: Positive for abdominal distention, constipation and diarrhea. Negative for abdominal pain, nausea and vomiting. Genitourinary: Negative for flank pain, frequency, hematuria and urgency. Musculoskeletal: Negative for arthralgias, gait problem, joint swelling and myalgias. Skin: Negative. Neurological: Positive for dizziness. Negative for weakness, numbness and headaches. Psychiatric/Behavioral: Negative for dysphoric mood. The patient is nervous/anxious. Objective:     Physical Exam  Vitals reviewed. Constitutional:       Appearance: Normal appearance. He is normal weight. HENT:      Head: Normocephalic and atraumatic. Right Ear: Tympanic membrane normal.      Left Ear: Tympanic membrane normal.      Nose: Nose normal.      Mouth/Throat:      Mouth: Mucous membranes are moist.      Pharynx: Oropharynx is clear. Eyes:      Extraocular Movements: Extraocular movements intact. Conjunctiva/sclera: Conjunctivae normal.      Pupils: Pupils are equal, round, and reactive to light. Cardiovascular:      Rate and Rhythm: Normal rate and regular rhythm. Heart sounds: Normal heart sounds. No murmur heard. No gallop. Pulmonary:      Effort: Pulmonary effort is normal. No respiratory distress. Breath sounds: Normal breath sounds. No stridor. No wheezing. Abdominal:      General: Bowel sounds are normal. There is distension. Palpations: Abdomen is soft. Tenderness: There is no abdominal tenderness. There is no guarding or rebound. Musculoskeletal:         General: No swelling or tenderness. Normal range of motion. Cervical back: Normal range of motion and neck supple. Skin:     General: Skin is warm and dry. Coloration: Skin is not jaundiced. Findings: No rash.       Comments: Multiple well healed surgical scars upper abdomen   Neurological:      General: No focal deficit present. Mental Status: He is alert and oriented to person, place, and time. Psychiatric:         Mood and Affect: Mood normal.         Behavior: Behavior normal.         Thought Content: Thought content normal.         Judgment: Judgment normal.       /68 (Site: Right Upper Arm, Position: Sitting, Cuff Size: Medium Adult)   Temp 96.8 °F (36 °C) (Temporal)   Ht 6' 2\" (1.88 m)   Wt 227 lb (103 kg)   BMI 29.15 kg/m²     Assessment/Plan:   1. Essential hypertension  2. S/P CABG x 3  3. Disorder of cardiac pacemaker system, initial encounter  -     XR CHEST (2 VW); Future  4. Arterial disease ([address]  5. Thrombocytopenia (HCC)  6. Abdominal distention    Hypertension-on metoprolol 50 mg twice daily. Blood pressure is mildly elevated today. Advised to continue to monitor    Accidentally pulled out pacemaker wire, was advised by CT surgery to get chest x-ray done. Requesting new order for chest x-ray. Recommended calling cardiology and setting up an appointment. AAA and carotid artery disease-stable, follows with vascular surgery. 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 Follows with hematology-oncology. Abdominal distention-worsening abdominal distention with irregular bowel movements. Last colonoscopy 2020, history of colon polyps s/p colon resection. CT abdomen scheduled for 7/27. Return in about 3 months (around 10/22/2021) for Follow up HTN. Orders Placed This Encounter   Procedures    XR CHEST (2 VW)     Standing Status:   Future     Number of Occurrences:   1     Standing Expiration Date:   7/22/2022     Order Specific Question:   Reason for exam:     Answer:   Vee Cantu op complication     No orders of the defined types were placed in this encounter. Patient given educational materials - see patient instructions. Discussed use, benefit, and side effects of prescribed medications. All patientquestions answered. Pt voiced understanding. Reviewed health maintenance. Instructedto continue current medications, diet and exercise. Patient agreed with treatmentplan. Follow up as directed.      Electronically signed by Armando Lechuga MD on 7/22/2021 at 6:00 PM

## 2021-07-28 ENCOUNTER — HOSPITAL ENCOUNTER (OUTPATIENT)
Dept: CT IMAGING | Age: 71
Discharge: HOME | End: 2021-07-30
Payer: MEDICARE

## 2021-07-28 DIAGNOSIS — R14.0 ABDOMINAL DISTENTION: ICD-10-CM

## 2021-07-28 LAB
CREAT SERPL-MCNC: 0.72 MG/DL (ref 0.7–1.2)
GFR AFRICAN AMERICAN: >60 ML/MIN
GFR NON-AFRICAN AMERICAN: >60 ML/MIN
GFR SERPL CREATININE-BSD FRML MDRD: NORMAL ML/MIN/{1.73_M2}
GFR SERPL CREATININE-BSD FRML MDRD: NORMAL ML/MIN/{1.73_M2}

## 2021-07-28 PROCEDURE — 36415 COLL VENOUS BLD VENIPUNCTURE: CPT

## 2021-07-28 PROCEDURE — 74177 CT ABD & PELVIS W/CONTRAST: CPT

## 2021-07-28 PROCEDURE — 82565 ASSAY OF CREATININE: CPT

## 2021-07-28 PROCEDURE — 6360000004 HC RX CONTRAST MEDICATION: Performed by: SURGERY

## 2021-07-28 PROCEDURE — 2580000003 HC RX 258: Performed by: SURGERY

## 2021-07-28 RX ORDER — SODIUM CHLORIDE 0.9 % (FLUSH) 0.9 %
10 SYRINGE (ML) INJECTION PRN
Status: DISCONTINUED | OUTPATIENT
Start: 2021-07-28 | End: 2021-07-31 | Stop reason: HOSPADM

## 2021-07-28 RX ORDER — 0.9 % SODIUM CHLORIDE 0.9 %
80 INTRAVENOUS SOLUTION INTRAVENOUS ONCE
Status: COMPLETED | OUTPATIENT
Start: 2021-07-28 | End: 2021-07-28

## 2021-07-28 RX ADMIN — SODIUM CHLORIDE, PRESERVATIVE FREE 10 ML: 5 INJECTION INTRAVENOUS at 15:09

## 2021-07-28 RX ADMIN — IOHEXOL 30 ML: 300 INJECTION, SOLUTION INTRAVENOUS at 15:09

## 2021-07-28 RX ADMIN — IOPAMIDOL 75 ML: 755 INJECTION, SOLUTION INTRAVENOUS at 15:08

## 2021-07-28 RX ADMIN — SODIUM CHLORIDE 80 ML: 9 INJECTION, SOLUTION INTRAVENOUS at 15:09

## 2021-07-29 ENCOUNTER — TELEPHONE (OUTPATIENT)
Dept: INTERVENTIONAL RADIOLOGY/VASCULAR | Age: 71
End: 2021-07-29

## 2021-08-03 RX ORDER — SODIUM CHLORIDE 9 MG/ML
INJECTION, SOLUTION INTRAVENOUS CONTINUOUS
Status: CANCELLED | OUTPATIENT
Start: 2021-08-03

## 2021-08-04 ENCOUNTER — HOSPITAL ENCOUNTER (OUTPATIENT)
Dept: INTERVENTIONAL RADIOLOGY/VASCULAR | Age: 71
Discharge: HOME | End: 2021-08-06
Payer: MEDICARE

## 2021-08-04 VITALS
OXYGEN SATURATION: 97 % | SYSTOLIC BLOOD PRESSURE: 139 MMHG | HEART RATE: 64 BPM | DIASTOLIC BLOOD PRESSURE: 78 MMHG | RESPIRATION RATE: 18 BRPM

## 2021-08-04 DIAGNOSIS — R18.8 OTHER ASCITES: ICD-10-CM

## 2021-08-04 LAB
INR BLD: 1.3
PARTIAL THROMBOPLASTIN TIME: 38.5 SEC (ref 23.9–33.8)
PLATELET # BLD: 72 K/UL (ref 138–453)
PROTHROMBIN TIME: 15.5 SEC (ref 11.5–14.2)

## 2021-08-04 PROCEDURE — 88112 CYTOPATH CELL ENHANCE TECH: CPT

## 2021-08-04 PROCEDURE — 87070 CULTURE OTHR SPECIMN AEROBIC: CPT

## 2021-08-04 PROCEDURE — 88305 TISSUE EXAM BY PATHOLOGIST: CPT

## 2021-08-04 PROCEDURE — 87075 CULTR BACTERIA EXCEPT BLOOD: CPT

## 2021-08-04 PROCEDURE — 49083 ABD PARACENTESIS W/IMAGING: CPT | Performed by: RADIOLOGY

## 2021-08-04 PROCEDURE — 85049 AUTOMATED PLATELET COUNT: CPT

## 2021-08-04 PROCEDURE — 36415 COLL VENOUS BLD VENIPUNCTURE: CPT

## 2021-08-04 PROCEDURE — 85610 PROTHROMBIN TIME: CPT

## 2021-08-04 PROCEDURE — P9047 ALBUMIN (HUMAN), 25%, 50ML: HCPCS | Performed by: RADIOLOGY

## 2021-08-04 PROCEDURE — 85730 THROMBOPLASTIN TIME PARTIAL: CPT

## 2021-08-04 PROCEDURE — C1729 CATH, DRAINAGE: HCPCS

## 2021-08-04 PROCEDURE — 6360000002 HC RX W HCPCS: Performed by: RADIOLOGY

## 2021-08-04 PROCEDURE — 87205 SMEAR GRAM STAIN: CPT

## 2021-08-04 RX ORDER — SODIUM CHLORIDE 9 MG/ML
INJECTION, SOLUTION INTRAVENOUS CONTINUOUS
Status: DISCONTINUED | OUTPATIENT
Start: 2021-08-04 | End: 2021-08-07 | Stop reason: HOSPADM

## 2021-08-04 RX ORDER — ALBUMIN (HUMAN) 12.5 G/50ML
50 SOLUTION INTRAVENOUS ONCE
Status: COMPLETED | OUTPATIENT
Start: 2021-08-04 | End: 2021-08-04

## 2021-08-04 RX ADMIN — ALBUMIN (HUMAN) 50 G: 0.25 INJECTION, SOLUTION INTRAVENOUS at 17:00

## 2021-08-04 NOTE — PROGRESS NOTES
Pt tolerated procedure without distress. 7400 ml of yellow ascitic fluid removed albumin given as ordered specimen collected for labs Dressing applied to procedure site. Discharge instructions reviewed understanding verbalized, pt released ambulatory in nad.

## 2021-08-04 NOTE — BRIEF OP NOTE
Brief Postoperative Note    Brandon Cruz  YOB: 1950  [id number]    Pre-operative Diagnosis: ascites; abdominal discomfort    Post-operative Diagnosis: Same    Procedure: US guided paracentesis     Anesthesia: Local    Surgeons/Assistants: Casey    Estimated Blood Loss: less than 50     Complications: None    Specimens: Was Obtained: clear straw colored fluid    Electronically signed by Madhavi Rock MD on 8/4/2021 at 5:07 PM

## 2021-08-05 ENCOUNTER — TELEPHONE (OUTPATIENT)
Dept: FAMILY MEDICINE CLINIC | Age: 71
End: 2021-08-05

## 2021-08-05 DIAGNOSIS — R18.8 CIRRHOSIS OF LIVER WITH ASCITES, UNSPECIFIED HEPATIC CIRRHOSIS TYPE (HCC): Primary | ICD-10-CM

## 2021-08-05 DIAGNOSIS — K74.60 CIRRHOSIS OF LIVER WITH ASCITES, UNSPECIFIED HEPATIC CIRRHOSIS TYPE (HCC): Primary | ICD-10-CM

## 2021-08-05 LAB
CASE NUMBER:: NORMAL
SPECIMEN DESCRIPTION: NORMAL

## 2021-08-05 NOTE — TELEPHONE ENCOUNTER
----- Message from Alyse Collado sent at 8/5/2021  1:11 PM EDT -----  Subject: Referral Request    QUESTIONS   Reason for referral request? patient is needed a referral for a liver   doctor   Has the physician seen you for this condition before? No   Preferred Specialist (if applicable)? Do you already have an appointment scheduled? No  Additional Information for Provider?   ---------------------------------------------------------------------------  --------------  CALL BACK INFO  What is the best way for the office to contact you? OK to leave message on   voicemail  Preferred Call Back Phone Number?  [phone]

## 2021-08-06 LAB — SURGICAL PATHOLOGY REPORT: NORMAL

## 2021-08-09 ENCOUNTER — TELEPHONE (OUTPATIENT)
Dept: GASTROENTEROLOGY | Age: 71
End: 2021-08-09

## 2021-08-09 NOTE — TELEPHONE ENCOUNTER
Pt call and left vm to schedule appt from referral. Returned call and LVM to callback and schedule.
16-May-2020 01:46

## 2021-08-10 LAB
CULTURE: ABNORMAL
DIRECT EXAM: ABNORMAL
Lab: ABNORMAL
SPECIMEN DESCRIPTION: ABNORMAL

## 2021-08-11 NOTE — PLAN OF CARE
Problem: Falls - Risk of:  Goal: Will remain free from falls  Description: Will remain free from falls  3/9/2021 1444 by Xochitl Andrea RN  Outcome: Completed  3/9/2021 0814 by Tami Szymanski RN  Outcome: Ongoing  Goal: Absence of physical injury  Description: Absence of physical injury  3/9/2021 1444 by Xochitl Andrea RN  Outcome: Completed  3/9/2021 0814 by Tami Szymanski RN  Outcome: Ongoing     Problem: OXYGENATION/RESPIRATORY FUNCTION  Goal: Patient will maintain patent airway  3/9/2021 1444 by Xochitl Andrea RN  Outcome: Completed  3/9/2021 0814 by Tami Szymanski RN  Outcome: Ongoing  Goal: Patient will achieve/maintain normal respiratory rate/effort  Description: Respiratory rate and effort will be within normal limits for the patient  3/9/2021 1444 by Xochitl Andrea RN  Outcome: Completed  3/9/2021 0814 by Tami Szymanski RN  Outcome: Ongoing     Problem: SKIN INTEGRITY  Goal: Skin integrity is maintained or improved  3/9/2021 1444 by Xochitl Andrea RN  Outcome: Completed  3/9/2021 0814 by Tami Szymanski RN  Outcome: Ongoing     Problem: Pain:  Goal: Pain level will decrease  Description: Pain level will decrease  3/9/2021 1444 by Xochitl Andrea RN  Outcome: Completed  3/9/2021 0814 by Tami Szymanski RN  Outcome: Ongoing  Goal: Control of acute pain  Description: Control of acute pain  3/9/2021 1444 by Xochitl Andrea RN  Outcome: Completed  3/9/2021 0814 by Tami Szymanski RN  Outcome: Ongoing  Goal: Control of chronic pain  Description: Control of chronic pain  3/9/2021 1444 by Xochitl Andrea RN  Outcome: Completed  3/9/2021 0814 by Tami Szymanski RN  Outcome: Ongoing     Problem: Musculor/Skeletal Functional Status  Goal: Highest potential functional level  3/9/2021 1444 by Xochitl Andrea RN  Outcome: Completed  3/9/2021 0814 by Tami Szymanski RN  Outcome: Ongoing  Goal: Absence of falls  3/9/2021 1444 by Xochitl Andrea RN  Outcome: Completed  3/9/2021 0814 by Tami Szymanski RN  Outcome: Ongoing Problem: Skin Integrity:  Goal: Will show no infection signs and symptoms  Description: Will show no infection signs and symptoms  3/9/2021 1444 by Shay Israel RN  Outcome: Completed  3/9/2021 0814 by Mahin Gutierrez RN  Outcome: Ongoing  Goal: Absence of new skin breakdown  Description: Absence of new skin breakdown  3/9/2021 1444 by Shay Israel RN  Outcome: Completed  3/9/2021 0814 by Mahin Gutierrez RN  Outcome: Ongoing details… Affect and characteristics of appearance, verbalizations, behaviors are appropriate

## 2021-08-12 ENCOUNTER — OFFICE VISIT (OUTPATIENT)
Dept: GASTROENTEROLOGY | Age: 71
End: 2021-08-12
Payer: MEDICARE

## 2021-08-12 VITALS
HEIGHT: 74 IN | DIASTOLIC BLOOD PRESSURE: 72 MMHG | TEMPERATURE: 98.5 F | HEART RATE: 78 BPM | BODY MASS INDEX: 28.19 KG/M2 | SYSTOLIC BLOOD PRESSURE: 131 MMHG | WEIGHT: 219.7 LBS

## 2021-08-12 DIAGNOSIS — Z90.49 HX OF CHOLECYSTECTOMY: ICD-10-CM

## 2021-08-12 DIAGNOSIS — Z95.1 S/P CABG X 3: ICD-10-CM

## 2021-08-12 DIAGNOSIS — I71.40 ABDOMINAL AORTIC ANEURYSM (AAA) WITHOUT RUPTURE: ICD-10-CM

## 2021-08-12 DIAGNOSIS — K70.31 ALCOHOLIC CIRRHOSIS OF LIVER WITH ASCITES (HCC): Primary | ICD-10-CM

## 2021-08-12 DIAGNOSIS — D36.9 ADENOMATOUS POLYPS: ICD-10-CM

## 2021-08-12 PROCEDURE — 99214 OFFICE O/P EST MOD 30 MIN: CPT | Performed by: INTERNAL MEDICINE

## 2021-08-12 RX ORDER — SPIRONOLACTONE 25 MG/1
TABLET ORAL
COMMUNITY
Start: 2021-08-09 | End: 2021-08-13 | Stop reason: SDUPTHER

## 2021-08-12 RX ORDER — FUROSEMIDE 20 MG/1
20 TABLET ORAL DAILY
COMMUNITY
Start: 2021-08-09 | End: 2021-09-28

## 2021-08-12 ASSESSMENT — ENCOUNTER SYMPTOMS
SHORTNESS OF BREATH: 0
BACK PAIN: 0
VOMITING: 0
ABDOMINAL PAIN: 1
COUGH: 0
ABDOMINAL DISTENTION: 1
RECTAL PAIN: 0
BLOOD IN STOOL: 0
ANAL BLEEDING: 0
SORE THROAT: 0
TROUBLE SWALLOWING: 0
NAUSEA: 0
VOICE CHANGE: 0
CHOKING: 0
CONSTIPATION: 0
DIARRHEA: 0

## 2021-08-12 NOTE — PROGRESS NOTES
GI CLINIC FOLLOW UP    INTERVAL HISTORY:   Freida Terrell MD  [address] [facility],  [address]    Chief Complaint   Patient presents with    Cirrhosis     Patient is are today due to new referral for cirrhosis of liver with acites           HISTORY OF PRESENT ILLNESS: Shar Lemons is a 66 y.o. male , referred for evaluation of*lareg flat polyps, elevated LFT, NHL   Here with another referal   non compliant    seen in 2015 with no f/u, even we were concerned about a flat polyp on him and we need to repeat his colonoscopy in 1 year, but he did not follow-up, and he came back in 2020  Seen in 2020, we will schedule him for colonoscopy and labs and MRI, I did the MRI   and did not follow   last visit 2020 we ordered liver dx w/u for elevated LFT. MRI because of lymphoma, and colonoscopy     MRI : cirrhosis, splenomegaly,no mass, AAA graft . colon , and labs  not done ? ?!!!     sent back now with large Ascites     4/2021:Elevated ALP and AST . Cbc LOW PLAT , INR 1.3  Quit ETOH 3 weeks  Had bypass sx in 3/2021       Was started on Aldactone/Lasix 25/20 mg he took the first dose today  Had paracentesis 7.4 L , on 8/4/2021b   Following low salt diet   Fluid is building up again in his belly is gradually getting bigger. Denied any change in his mental status  Denied any bleeding  Was very depressed over his situation and he is not happy that he is not drinking anymore but he is trying to be very faithful and not drink again he said. Past Medical,Family, and Social History reviewed and does contribute to the patient presentingcondition. Patient's PMH/PSH,SH,PSYCH Hx, MEDs, ALLERGIES, and ROS were all reviewed and updated in the appropriate sections.     PAST MEDICAL HISTORY:  Past Medical History:   Diagnosis Date    AAA (abdominal aortic aneurysm) ([facility] [address] 3/23/2015    3.8cm     CAD (coronary artery disease)     Colon polyps     Colon tumor     Congestive heart failure (CHF) ([facility] [address] 01/2021  GERD (gastroesophageal reflux disease)     prilosec as needed    H/O chest tube placement     chest tube x4 different times    History of pneumonia     History of pneumothorax     x4    Hx of cardiac cath 02/19/2021    Hyperlipidemia     Hypertension     Non Hodgkin's lymphoma ([facility] [address]     NHL    Pneumonia 1970's    patient had 3 chest tubes and in hospital for 13 days    Unspecified cerebral artery occlusion with cerebral infarction 10/01/2014    stroke , numbness in the left arm from the elbow down    Wellness examination 02/25/2021    pcp-[facility] nov 2020    Wellness examination 02/25/2021    [facility] [address]    Wellness examination 02/25/2021    onc-Dr Mathis Pouch 2021       Past Surgical History:   Procedure Laterality Date    APPENDECTOMY  4/21/15    CHOLECYSTECTOMY, OPEN  04/21/15    COLONOSCOPY      COLONOSCOPY  07/10/2020    COLONOSCOPY POLYPECTOMY HOT BIOPSY (N/A )    COLONOSCOPY N/A 7/10/2020    COLONOSCOPY POLYPECTOMY HOT BIOPSY performed by Ana Avina MD at [place] [address] N/A 3/3/2021    CABG CORONARY ARTERY BYPASS X3, ON PUMP; SWAN MARLENY, MIRANDA PER ANESTHESIA performed by Andreea Mccormack MD at [address] [place] [address],[facility] [place] [facility]  2019    stent to left leg with tubular graft to left leg artery    RIGHT COLECTOMY  04/21/15    TRANSESOPHAGEAL ECHOCARDIOGRAM  10/3/14    VASECTOMY  1986    WISDOM TOOTH EXTRACTION         CURRENT MEDICATIONS:    Current Outpatient Medications:     furosemide (LASIX) 20 MG tablet, , Disp: , Rfl:     spironolactone (ALDACTONE) 25 MG tablet, , Disp: , Rfl:     atorvastatin (LIPITOR) 20 MG tablet, Take 1 tablet by mouth nightly, Disp: 90 tablet, Rfl: 0    metoprolol (LOPRESSOR) 100 MG tablet, Take 1 tablet by mouth 2 times daily (Patient taking differently: Take 50 mg by mouth 2 times daily ), Disp: 120 tablet, Rfl: 1    aspirin 81 MG tablet, Take 81 mg by mouth daily, Disp: , Rfl:     omeprazole (PRILOSEC) 10 MG delayed release capsule, Take 1 capsule by mouth daily as needed (GERD), Disp: 90 capsule, Rfl: 3    ALLERGIES:   Allergies   Allergen Reactions    Iv Dye [Iodides] Hives    Iodinated Diagnostic Agents Hives     Patient developed hives even with steroid prep. FAMILY HISTORY:       Problem Relation Age of Onset    Heart Disease Mother     Stroke Mother     Alcohol Abuse Father     Cancer Father          SOCIAL HISTORY:   Social History     Socioeconomic History    Marital status:      Spouse name: Not on file    Number of children: Not on file    Years of education: Not on file    Highest education level: Not on file   Occupational History    Not on file   Tobacco Use    Smoking status: Former Smoker     Packs/day: 1.00     Years: 30.00     Pack years: 30.00     Types: Cigarettes     Quit date: 2013     Years since quittin.9    Smokeless tobacco: Never Used   Vaping Use    Vaping Use: Never used   Substance and Sexual Activity    Alcohol use: Yes     Alcohol/week: 14.0 standard drinks     Types: 14 Cans of beer per week     Comment: 1-3 beers/day    Drug use: No    Sexual activity: Not Currently   Other Topics Concern    Not on file   Social History Narrative    Not on file     Social Determinants of Health     Financial Resource Strain: Low Risk     Difficulty of Paying Living Expenses: Not hard at all   Food Insecurity: No Food Insecurity    Worried About Running Out of Food in the Last Year: Never true    Ashvin of Food in the Last Year: Never true   Transportation Needs: No Transportation Needs    Lack of Transportation (Medical): No    Lack of Transportation (Non-Medical):  No   Physical Activity:     Days of Exercise per Week:     Minutes of Exercise per Session:    Stress:     Feeling of Stress :    Social Connections:     Frequency of Communication with Friends and Family:     Frequency of Social Gatherings with Friends and Family:     Attends Yarsani Services:     Active Member of Clubs or Organizations:     Attends Club or Organization Meetings:     Marital Status:    Intimate Partner Violence:     Fear of Current or Ex-Partner:     Emotionally Abused:     Physically Abused:     Sexually Abused:        REVIEW OF SYSTEMS: A 12-point review of systemswas obtained and pertinent positives and negatives were enumerated above in the history of present illness. All other reviewed systems / symptoms were negative. Review of Systems   Constitutional: Positive for appetite change, fatigue and unexpected weight change. HENT: Negative for sore throat, trouble swallowing and voice change. Eyes: Negative for visual disturbance (glasses). Respiratory: Negative for cough, choking and shortness of breath. Cardiovascular: Negative for chest pain and leg swelling. Gastrointestinal: Positive for abdominal distention and abdominal pain. Negative for anal bleeding, blood in stool, constipation, diarrhea (loose stool), nausea, rectal pain and vomiting. Genitourinary: Negative for difficulty urinating. Musculoskeletal: Positive for myalgias. Negative for back pain and joint swelling. Allergic/Immunologic: Negative for environmental allergies and food allergies. Neurological: Positive for dizziness. Negative for tremors, weakness, light-headedness, numbness (left arm) and headaches. Hematological: Bruises/bleeds easily. Psychiatric/Behavioral: Positive for sleep disturbance. The patient is not nervous/anxious.             LABORATORY DATA: Reviewed  Lab Results   Component Value Date    WBC 17.1 (H) 04/20/2021    HGB 12.5 (L) 04/20/2021    HCT 40.5 (L) 04/20/2021    .5 04/20/2021    PLT 72 (L) 08/04/2021     04/20/2021    K 4.3 04/20/2021     04/20/2021    CO2 22 04/20/2021    BUN 9 04/20/2021    CREATININE 0.72 07/28/2021    LABALBU 3.5 04/20/2021    BILITOT 0.97 04/20/2021    ALKPHOS 152 (H) 04/20/2021    AST 46 (H) 04/20/2021    ALT 27 04/20/2021    INR 1.3 08/04/2021         Lab Results   Component Value Date    RBC 3.95 (L) 04/20/2021    HGB 12.5 (L) 04/20/2021    .5 04/20/2021    MCH 31.6 04/20/2021    MCHC 30.9 04/20/2021    RDW 15.3 (H) 04/20/2021    MPV 9.9 04/20/2021    BASOPCT 0 04/20/2021    LYMPHSABS 10.26 (H) 04/20/2021    MONOSABS 2.05 (H) 04/20/2021    NEUTROABS 4.62 04/20/2021    EOSABS 0.17 04/20/2021    BASOSABS 0.00 04/20/2021         DIAGNOSTIC TESTING:     XR CHEST (2 VW)    Result Date: 7/22/2021  EXAMINATION: TWO XRAY VIEWS OF THE CHEST 7/22/2021 11:15 am COMPARISON: March 9, 2021, chest exam HISTORY: ORDERING SYSTEM PROVIDED HISTORY: Disorder of cardiac pacemaker system, initial encounter TECHNOLOGIST PROVIDED HISTORY: Post op complication Reason for Exam: Patient states coughing with flem x 5 months. Patient states abdomen pain and swelling due to patient \"pulling wire out of stomach\". Acuity: Unknown Type of Exam: Unknown FINDINGS: Median sternotomy. Stable cardiopericardial silhouette Mild blunting of the right costophrenic angle likely related to pleural thickening. .. Otherwise clear lungs Remote remodeled left rib fractures. No acute cardiopulmonary findings     CT ABDOMEN PELVIS W IV CONTRAST Additional Contrast? None    Result Date: 7/28/2021  EXAMINATION: CT OF THE ABDOMEN AND PELVIS WITH CONTRAST 7/28/2021 3:02 pm TECHNIQUE: CT of the abdomen and pelvis was performed with the administration of intravenous contrast. Multiplanar reformatted images are provided for review. Dose modulation, iterative reconstruction, and/or weight based adjustment of the mA/kV was utilized to reduce the radiation dose to as low as reasonably achievable. COMPARISON: Chest CT 03/02/2021. MRI abdomen 06/10/2020.  HISTORY: ORDERING SYSTEM PROVIDED HISTORY: Abdominal distention TECHNOLOGIST PROVIDED HISTORY: Reason for Exam: Abdominal distention, pain, diarrhea, triple bypass march 2021 Acuity: Unknown Type of Exam: Unknown FINDINGS: Lower Chest: No acute findings. Organs: Morphologic findings of cirrhosis and portal hypertension are demonstrated. The spleen measures up to 18 cm. Small perigastric and paraesophageal varices. No focal liver lesion identified. The gallbladder is not visualized. The pancreas, adrenals and kidneys reveal no acute findings. GI/Bowel: There is no bowel dilatation or wall thickening identified. Pelvis: No acute findings. Peritoneum/Retroperitoneum: Large volume abdominopelvic ascites. No loculated fluid collection. No free air. No enlarged or suspicious-appearing lymph nodes. Status post endovascular infrarenal aortic aneurysm and bilateral iliac artery aneurysm repair. The aneurysm sac measures up to 4.5cm, which once measured up to 4.8 cm. Bones/Soft Tissues: Mild body wall edema. Advanced multilevel degenerative disc disease and lower lumbar facet arthropathy. Morphologic findings of cirrhosis and portal hypertension are demonstrated with large volume abdominopelvic ascites, splenomegaly and small varices. IR US GUIDED PARACENTESIS    Result Date: 8/4/2021  PROCEDURE: ULTRASOUND GUIDED PARACENTESIS 8/4/2021 HISTORY: ORDERING SYSTEM PROVIDED HISTORY: Other ascites TECHNIQUE: Informed consent was obtained after a detailed explanation of the procedure including risks, benefits, and alternatives. Universal protocol was followed. Preprocedure ultrasound shows a dominant fluid pocket in the right lowerquadrant. Using ultrasound guidance (image attached to the medical record), the fluid pocket was accessed with a 5 Western Riya Yueh needle with aspiration of non turbid straw-colored fluid. Vacuum bottle paracentesis was performed and approximately 7.4 L was removed. A sample was sent for laboratory analysis. the patient tolerated the procedure well and left the department in good condition. Dr. Edilma Cain was present.   The patient received IV albumin replacement. FINDINGS: A total of 7.4 L was removed. Successful ultrasound guided paracentesis. PHYSICAL EXAMINATION: Vital signs reviewed per the nursing documentation. There were no vitals taken for this visit. There is no height or weight on file to calculate BMI. Physical Exam  Vitals and nursing note reviewed. Constitutional:       General: He is not in acute distress. Appearance: He is well-developed. He is not diaphoretic. HENT:      Head: Normocephalic and atraumatic. Eyes:      General: No scleral icterus. Pupils: Pupils are equal, round, and reactive to light. Neck:      Thyroid: No thyromegaly. Vascular: No JVD. Trachea: No tracheal deviation. Cardiovascular:      Rate and Rhythm: Normal rate and regular rhythm. Heart sounds: Normal heart sounds. No murmur heard. Pulmonary:      Effort: Pulmonary effort is normal. No respiratory distress. Breath sounds: Normal breath sounds. No wheezing. Abdominal:      General: Bowel sounds are normal. There is no distension. Palpations: Abdomen is soft. There is no mass. Tenderness: There is no abdominal tenderness. There is no guarding or rebound. Musculoskeletal:         General: No tenderness. Normal range of motion. Cervical back: Normal range of motion and neck supple. Skin:     General: Skin is warm. Coloration: Skin is not pale. Findings: No erythema or rash. Comments: He is not diaphoretic   Neurological:      Mental Status: He is alert and oriented to person, place, and time. Deep Tendon Reflexes: Reflexes are normal and symmetric. Psychiatric:         Behavior: Behavior normal.         Thought Content: Thought content normal.         Judgment: Judgment normal.           IMPRESSION: Mr. Kristyn Mcintyre is a 66 y.o. male with      Diagnosis Orders   1.  Alcoholic cirrhosis of liver with ascites (HCC)  Hepatitis A Antibody, Total    Hepatitis C Antibody    Hepatitis B Surface Antigen    HAYDEN    Smooth Muscle Antibody Quant    Iron and TIBC   2. Adenomatous polyps  COLONOSCOPY W/ OR W/O BIOPSY   3. Hx of cholecystectomy     4. Abdominal aortic aneurysm (AAA) without rupture ([address]     5. S/P CABG x 3       Long discussion with the patient about alcohol was made  We given very reassuring look at his liver disease if he quit drinking  Advised him to stay on the low-salt diet  We can increase his spironolactone to 50 mg at least  Continue with the Lasix at 20 mg  Recheck electrolytes and kidney function in a week  We might increase in further as he is building up again quickly and he has 7 L aspirated  I told him to call us so we can reschedule another paracentesis in the meantime when he is feeling that is tense  At this time he want to watch with the water pills will do for him he said. We need to follow on the colonoscopy and he had flat lesions in the past so we have to be very careful I explained that to him in details. He would need an EGD to rule out varices but I do not overwhelm this noe who is on the edge in just started to do well for himself and take care of it    Diet/life style/natural hx /complication of the dx were all explained in details   Past medical, past surgical, social history, psychiatric history, medications or allergies, all reviewed and  updated    Thank you for allowing me to participate in the care of Mr. Janes Belcher. For any further questions please do not hesitate to contact me. I have reviewed and agree with the ROS entered by the MA/RN. Note is dictated utilizing voice recognition software. Unfortunately this leads to occasional typographical errors. Please contact our office if you have any questions.       Erik Miles MD  [facility]  O: #[phone]

## 2021-08-13 DIAGNOSIS — K70.31 ALCOHOLIC CIRRHOSIS OF LIVER WITH ASCITES (HCC): Primary | ICD-10-CM

## 2021-08-13 RX ORDER — POLYETHYLENE GLYCOL 3350 17 G/17G
POWDER, FOR SOLUTION ORAL
Qty: 238 G | Refills: 0 | Status: SHIPPED | OUTPATIENT
Start: 2021-08-13 | End: 2021-09-14 | Stop reason: ALTCHOICE

## 2021-08-13 RX ORDER — SPIRONOLACTONE 25 MG/1
50 TABLET ORAL DAILY
Qty: 60 TABLET | Refills: 3 | Status: SHIPPED
Start: 2021-08-13 | End: 2021-10-06 | Stop reason: DRUGHIGH

## 2021-08-17 ENCOUNTER — TELEPHONE (OUTPATIENT)
Dept: GASTROENTEROLOGY | Age: 71
End: 2021-08-17

## 2021-08-17 DIAGNOSIS — K70.31 ALCOHOLIC CIRRHOSIS OF LIVER WITH ASCITES (HCC): Primary | ICD-10-CM

## 2021-08-17 NOTE — TELEPHONE ENCOUNTER
Writer put a paracentesis order in for him as his last one ( first one) was done 2 weeks ago. Put a standing order in just in case he needs another in the future . He may need more but hes hoping not to. Standing order placed for 6 every 2 weeks.

## 2021-08-23 ENCOUNTER — HOSPITAL ENCOUNTER (OUTPATIENT)
Age: 71
Discharge: HOME | End: 2021-08-23
Payer: MEDICARE

## 2021-08-23 ENCOUNTER — HOSPITAL ENCOUNTER (OUTPATIENT)
Facility: MEDICAL CENTER | Age: 71
End: 2021-08-23
Payer: MEDICARE

## 2021-08-23 ENCOUNTER — HOSPITAL ENCOUNTER (OUTPATIENT)
Dept: INTERVENTIONAL RADIOLOGY/VASCULAR | Age: 71
Discharge: HOME | End: 2021-08-25
Payer: MEDICARE

## 2021-08-23 VITALS
OXYGEN SATURATION: 97 % | HEART RATE: 69 BPM | RESPIRATION RATE: 16 BRPM | SYSTOLIC BLOOD PRESSURE: 153 MMHG | DIASTOLIC BLOOD PRESSURE: 67 MMHG

## 2021-08-23 DIAGNOSIS — C85.80 MARGINAL ZONE LYMPHOMA (HCC): ICD-10-CM

## 2021-08-23 DIAGNOSIS — K70.31 ALCOHOLIC CIRRHOSIS OF LIVER WITH ASCITES (HCC): ICD-10-CM

## 2021-08-23 LAB
ABSOLUTE EOS #: 0.19 K/UL (ref 0–0.4)
ABSOLUTE IMMATURE GRANULOCYTE: 0 K/UL (ref 0–0.3)
ABSOLUTE LYMPH #: 11.46 K/UL (ref 1–4.8)
ABSOLUTE MONO #: 2.67 K/UL (ref 0.1–0.8)
BASOPHILS # BLD: 0 % (ref 0–2)
BASOPHILS ABSOLUTE: 0 K/UL (ref 0–0.2)
DIFFERENTIAL TYPE: ABNORMAL
EOSINOPHILS RELATIVE PERCENT: 1 % (ref 1–4)
HAV AB SERPL IA-ACNC: NONREACTIVE
HCT VFR BLD CALC: 44.8 % (ref 40.7–50.3)
HEMOGLOBIN: 13.8 G/DL (ref 13–17)
HEPATITIS B SURFACE ANTIGEN: NONREACTIVE
HEPATITIS C ANTIBODY: NONREACTIVE
IMMATURE GRANULOCYTES: 0 %
IRON SATURATION: 29 % (ref 20–55)
IRON: 62 UG/DL (ref 59–158)
LACTATE DEHYDROGENASE: 170 U/L (ref 135–225)
LYMPHOCYTES # BLD: 60 % (ref 24–44)
MCH RBC QN AUTO: 30.9 PG (ref 25.2–33.5)
MCHC RBC AUTO-ENTMCNC: 30.8 G/DL (ref 28.4–34.8)
MCV RBC AUTO: 100.4 FL (ref 82.6–102.9)
MONOCYTES # BLD: 14 % (ref 1–7)
MORPHOLOGY: ABNORMAL
NRBC AUTOMATED: 0 PER 100 WBC
PDW BLD-RTO: 16.7 % (ref 11.8–14.4)
PLATELET # BLD: 72 K/UL (ref 138–453)
PLATELET ESTIMATE: ABNORMAL
PMV BLD AUTO: 9.9 FL (ref 8.1–13.5)
RBC # BLD: 4.46 M/UL (ref 4.21–5.77)
RBC # BLD: ABNORMAL 10*6/UL
SEG NEUTROPHILS: 25 % (ref 36–66)
SEGMENTED NEUTROPHILS ABSOLUTE COUNT: 4.78 K/UL (ref 1.8–7.7)
TOTAL IRON BINDING CAPACITY: 214 UG/DL (ref 250–450)
UNSATURATED IRON BINDING CAPACITY: 152 UG/DL (ref 112–347)
WBC # BLD: 19.1 K/UL (ref 3.5–11.3)
WBC # BLD: ABNORMAL 10*3/UL

## 2021-08-23 PROCEDURE — 36415 COLL VENOUS BLD VENIPUNCTURE: CPT

## 2021-08-23 PROCEDURE — 86708 HEPATITIS A ANTIBODY: CPT

## 2021-08-23 PROCEDURE — 85025 COMPLETE CBC W/AUTO DIFF WBC: CPT

## 2021-08-23 PROCEDURE — 7100000010 HC PHASE II RECOVERY - FIRST 15 MIN

## 2021-08-23 PROCEDURE — 83615 LACTATE (LD) (LDH) ENZYME: CPT

## 2021-08-23 PROCEDURE — 86803 HEPATITIS C AB TEST: CPT

## 2021-08-23 PROCEDURE — C1729 CATH, DRAINAGE: HCPCS

## 2021-08-23 PROCEDURE — P9047 ALBUMIN (HUMAN), 25%, 50ML: HCPCS | Performed by: RADIOLOGY

## 2021-08-23 PROCEDURE — 86256 FLUORESCENT ANTIBODY TITER: CPT

## 2021-08-23 PROCEDURE — 6360000002 HC RX W HCPCS: Performed by: RADIOLOGY

## 2021-08-23 PROCEDURE — 83540 ASSAY OF IRON: CPT

## 2021-08-23 PROCEDURE — 83516 IMMUNOASSAY NONANTIBODY: CPT

## 2021-08-23 PROCEDURE — 83550 IRON BINDING TEST: CPT

## 2021-08-23 PROCEDURE — 86225 DNA ANTIBODY NATIVE: CPT

## 2021-08-23 PROCEDURE — 49083 ABD PARACENTESIS W/IMAGING: CPT | Performed by: RADIOLOGY

## 2021-08-23 PROCEDURE — 7100000011 HC PHASE II RECOVERY - ADDTL 15 MIN

## 2021-08-23 PROCEDURE — 87340 HEPATITIS B SURFACE AG IA: CPT

## 2021-08-23 PROCEDURE — 86038 ANTINUCLEAR ANTIBODIES: CPT

## 2021-08-23 RX ORDER — ALBUMIN (HUMAN) 12.5 G/50ML
50 SOLUTION INTRAVENOUS ONCE
Status: COMPLETED | OUTPATIENT
Start: 2021-08-23 | End: 2021-08-23

## 2021-08-23 RX ADMIN — ALBUMIN (HUMAN) 50 G: 0.25 INJECTION, SOLUTION INTRAVENOUS at 09:22

## 2021-08-23 NOTE — PROGRESS NOTES
Patient tolerated paracentesis without distress, 8300 ml removed. Patient to PACU for albumin transfusion.

## 2021-08-23 NOTE — BRIEF OP NOTE
Brief Postoperative Note    Zaida Cruz  YOB: 1950  [id number]    Pre-operative Diagnosis: Recurrent ascites; abdominal discomfort    Post-operative Diagnosis: Same    Procedure: US guided paracentesis     Anesthesia: Local    Surgeons/Assistants: Casey    Estimated Blood Loss: less than 50     Complications: None    Specimens: Was Not Obtained    Electronically signed by Leona Rios MD on 8/23/2021 at 9:23 AM

## 2021-08-24 LAB
ANTI DNA DOUBLE STRANDED: 2.8 IU/ML
ANTI-NUCLEAR ANTIBODY (ANA): NEGATIVE
ENA ANTIBODIES SCREEN: 0.3 U/ML

## 2021-08-25 LAB — SMOOTH MUSCLE ANTIBODY: 38 UNITS (ref 0–19)

## 2021-08-27 LAB — SMOOTH MUSCLE AB IGG TITER: ABNORMAL

## 2021-08-31 ENCOUNTER — TELEPHONE (OUTPATIENT)
Dept: ONCOLOGY | Age: 71
End: 2021-08-31

## 2021-08-31 ENCOUNTER — OFFICE VISIT (OUTPATIENT)
Dept: ONCOLOGY | Age: 71
End: 2021-08-31
Payer: MEDICARE

## 2021-08-31 VITALS
HEART RATE: 48 BPM | WEIGHT: 207.1 LBS | RESPIRATION RATE: 18 BRPM | DIASTOLIC BLOOD PRESSURE: 85 MMHG | TEMPERATURE: 97.6 F | SYSTOLIC BLOOD PRESSURE: 134 MMHG | BODY MASS INDEX: 26.59 KG/M2

## 2021-08-31 DIAGNOSIS — D69.6 THROMBOCYTOPENIA (HCC): ICD-10-CM

## 2021-08-31 DIAGNOSIS — C85.80 MARGINAL ZONE LYMPHOMA (HCC): Primary | ICD-10-CM

## 2021-08-31 PROCEDURE — 99211 OFF/OP EST MAY X REQ PHY/QHP: CPT | Performed by: INTERNAL MEDICINE

## 2021-08-31 PROCEDURE — 99214 OFFICE O/P EST MOD 30 MIN: CPT | Performed by: INTERNAL MEDICINE

## 2021-08-31 NOTE — TELEPHONE ENCOUNTER
David Ely MD VISIT  RV 3-4 MTHS W/ LABS BEFORE RV  LABS CDP LD ORDERS GIVEN TO PT ON EXIT  MD VISIT 11/30/21 @ 9AM  AVS PRINTED W/ INSTRUCTIONS AND GIVEN TO PT ON EXIT

## 2021-08-31 NOTE — PROGRESS NOTES
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 Work-up in progress for liver cirrhosis    BRIEF CASE HISTORY:      Mr. Víctor Sneed is a very pleasant 66 y.o. male with history of multiple co morbidities as listed. Patient states that he had stroke about 6 years ago. In addition he had right hemicolectomy in 2015 for tubular adenomatous polyp. Since then he had few GI symptoms. Overall he continued to do just fine. Patient had flulike illness about 2 weeks ago. He had blood work at that time which showed elevated white blood cells with significant lymphocytosis. Patient also had thrombocytopenia. Chemistry test showed elevated transaminases. Patient had liver ultrasound which showed steatosis with possible fatty infiltration of the liver. Patient had mild easy bruising. No fever or night sweats. No weight loss or decreased appetite. No enlarged lymph nodes. No abdominal pain or swelling. No ascites. No chest pain or shortness of breath. The patient drinks beer every day. He quit smoking 13 years ago. He smokes about 1 pack/day for about 40 years. .     INTERIM HISTORY:   Patient seen for follow-up marginal zone lymphoma. He has leukocytosis and lymphocytosis. Lab work-up was done. No changes. Clinically patient is having worsening abdominal distention and ascites. He had work-up with GI and he was found to have cirrhosis. Further work-up in progress. He had paracentesis twice. He continues to have abdominal distention.     No fever or night sweats. No enlarged lymph nodes. PAST MEDICAL HISTORY: has a past medical history of AAA (abdominal aortic aneurysm) ([facility] [address] CAD (coronary artery disease), Colon polyps, Colon tumor, Congestive heart failure (CHF) ([address] GERD (gastroesophageal reflux disease), H/O chest tube placement, History of pneumonia, History of pneumothorax, Hx of cardiac cath, Hyperlipidemia, Hypertension, Non Hodgkin's lymphoma ([facility] [address] Pneumonia, Unspecified cerebral artery occlusion with cerebral infarction, Wellness examination, Wellness examination, and Wellness examination. PAST SURGICAL HISTORY: has a past surgical history that includes transesophageal echocardiogram (10/3/14); Colonoscopy; Vasectomy (1986); Cholecystectomy, open (04/21/15); right colectomy (04/21/15); Appendectomy (4/21/15); Colonoscopy (07/10/2020); Colonoscopy (N/A, 7/10/2020); [place] tooth extraction; other surgical history (2019); and Coronary artery bypass graft (N/A, 3/3/2021). CURRENT MEDICATIONS:  has a current medication list which includes the following prescription(s): spironolactone, furosemide, atorvastatin, metoprolol, aspirin, omeprazole, magnesium citrate, polyethylene glycol, and bisacodyl. ALLERGIES:  is allergic to iv dye [iodides] and iodinated diagnostic agents. FAMILY HISTORY: Father and brother had colon cancer at late age. Otherwise negative for any hematological or oncological conditions. SOCIAL HISTORY:  reports that he quit smoking about 8 years ago. His smoking use included cigarettes. He has a 30.00 pack-year smoking history. He has never used smokeless tobacco. He reports previous alcohol use of about 14.0 standard drinks of alcohol per week. He reports that he does not use drugs. REVIEW OF SYSTEMS:     · General: No weakness or fatigue. No unanticipated weight loss or decreased appetite. No fever or chills. · Eyes: No blurred vision, eye pain or double vision. · Ears: No hearing problems or drainage. No tinnitus. · Throat: No sore throat, problems with swallowing or dysphagia. · Respiratory: No cough, sputum or hemoptysis. No shortness of breath. No pleuritic chest pain. · Cardiovascular: No chest pain, orthopnea or PND. No lower extremity edema. No palpitation. · Gastrointestinal: As above. Genitourinary: No dysuria, hematuria, frequency or urgency. · Musculoskeletal: No muscle aches or pains. No limitation of movement. No back pain. No gait disturbance, No joint complaints. · Dermatologic: No skin rashes or pruritus. No skin lesions or discolorations. · Psychiatric: No depression, anxiety, or stress or signs of schizophrenia. No change in mood or affect. · Hematologic: No history of bleeding tendency. No bruises or ecchymosis. No history of clotting problems. · Infectious disease: No fever, chills or frequent infections. · Endocrine: No polydipsia or polyuria. No temperature intolerance. · Neurologic: No headaches or dizziness. No weakness or numbness of the extremities. No changes in balance, coordination,  memory, mentation, behavior. · Allergic/Immunologic: No nasal congestion or hives. No repeated infections. PHYSICAL EXAM:  The patient is not in acute distress. Vital signs: Blood pressure 134/85, pulse (!) 48, temperature 97.6 °F (36.4 °C), temperature source Temporal, resp. rate 18, weight 207 lb 1.6 oz (93.9 kg).      General appearance - well appearing, not in pain or distress  Mental status - good mood, alert and oriented  Eyes - pupils equal and reactive, extraocular eye movements intact  Ears - bilateral TM's and external ear canals normal  Nose - normal and patent, no erythema, discharge or polyps  Mouth - mucous membranes moist, pharynx normal without lesions  Neck - supple, no significant adenopathy  Lymphatics - no palpable lymphadenopathy, no hepatosplenomegaly  Chest - clear to auscultation, no wheezes, rales or rhonchi, symmetric air entry  Heart - normal rate, regular rhythm, normal S1, S2, no murmurs, rubs, clicks or gallops  Abdomen -distended. Positive ascites. Neurological - alert, oriented, normal speech, no focal findings or movement disorder noted  Musculoskeletal - no joint tenderness, deformity or swelling  Extremities - peripheral pulses normal, no pedal edema, no clubbing or cyanosis  Skin - normal coloration and turgor, no rashes, no suspicious skin lesions noted     Review of Diagnostic data:   Lab Results   Component Value Date    WBC 19.1 (H) 08/23/2021    HGB 13.8 08/23/2021    HCT 44.8 08/23/2021    .4 08/23/2021    PLT 72 (L) 08/23/2021       Chemistry        Component Value Date/Time     04/20/2021 0941    K 4.3 04/20/2021 0941     04/20/2021 0941    CO2 22 04/20/2021 0941    BUN 9 04/20/2021 0941    CREATININE 0.72 07/28/2021 1357        Component Value Date/Time    CALCIUM 8.5 (L) 04/20/2021 0941    ALKPHOS 152 (H) 04/20/2021 0941    AST 46 (H) 04/20/2021 0941    ALT 27 04/20/2021 0941    BILITOT 0.97 04/20/2021 0941            IMPRESSION:   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 I explained to the patient the nature of this hematologic problem. I explained the significance of these abnormalities in layman language. For evaluation of persistent lymphocytosis patient had flow cytometry done. Results were positive for low-grade marginal zone lymphoma. I explained to the patient the nature of this lymphoma, staging, prognosis and treatment. I explained that this is low-grade lymphoma and recommendation at this point is observation and monitoring with no need for active treatment.   We will consider active treatment for enlarging lymph nodes or splenomegaly. Patient will continue follow-up with his gastroenterologist for further management of liver cirrhosis. I will see the patient again in 3-4 months with repeated labs. He will be seen sooner if he develops any problems. Counseled about importance of alcohol cessation. Patient's questions were answered to the best of his satisfaction and he verbalized full understanding and agreement.

## 2021-09-03 ENCOUNTER — TELEPHONE (OUTPATIENT)
Dept: GASTROENTEROLOGY | Age: 71
End: 2021-09-03

## 2021-09-03 NOTE — TELEPHONE ENCOUNTER
Patient LVM for a call back. Mentioned that two weeks from Monday he had a paracentesis and is in need of another one. Requesting a call back to discuss.

## 2021-09-07 NOTE — TELEPHONE ENCOUNTER
Pt calling again says he has not heard back yet from anyone and does not want to have his procedure until he is able to speak with clinical or

## 2021-09-09 NOTE — TELEPHONE ENCOUNTER
Writer returned pt's phone call and spoke to pt in regards to cancelling proc on 9/17/21 due to not feeling well and his belly is filling up with fluids and he needs another paracentesis. Writer informed pt we will cancel proc and to call back to resched after he is feeling better. Writer informed pt that Dr Saint Carbo clinical staff will call him back in regards to the paracentesis. Writer informed pt Dr Saint Carbo clinical staff is clinic this afternoon and he may not receive a call back to late this afternoon or tomorrow. Pt also mentioned Dr Lennox Lui was supposed to call and talk to Dr Nusrat Castro in regards to his bloating and filling up with fluids due to it is getting worse. Pt would like to know if Dr Nusrat Castro has spoke to Dr Lennox Lui. Please call pt back asap in regards to him needing paracentesis.

## 2021-09-09 NOTE — TELEPHONE ENCOUNTER
Patient called back again and states he needs to speak with nurse about ordering a paracentesis again. He also states he wants to cancel his procedure until he is feeling better .  Please return his call [phone]

## 2021-09-10 NOTE — TELEPHONE ENCOUNTER
Writer called and spoke with patient. He is informed he can call at any time to schedule appt. He is informed of the standing order for every 2 weeks until his upcoming appt 10/20. He is also informed if we have a cancellation by Monday for appt on next tues, I would call him to move his appt up.

## 2021-09-13 ENCOUNTER — HOSPITAL ENCOUNTER (OUTPATIENT)
Dept: INTERVENTIONAL RADIOLOGY/VASCULAR | Age: 71
Discharge: HOME | End: 2021-09-15
Payer: MEDICARE

## 2021-09-13 VITALS
SYSTOLIC BLOOD PRESSURE: 133 MMHG | OXYGEN SATURATION: 98 % | WEIGHT: 217 LBS | HEART RATE: 62 BPM | BODY MASS INDEX: 27.85 KG/M2 | DIASTOLIC BLOOD PRESSURE: 74 MMHG | RESPIRATION RATE: 18 BRPM | TEMPERATURE: 96.6 F | HEIGHT: 74 IN

## 2021-09-13 DIAGNOSIS — K70.31 ALCOHOLIC CIRRHOSIS OF LIVER WITH ASCITES (HCC): ICD-10-CM

## 2021-09-13 PROCEDURE — 6360000002 HC RX W HCPCS: Performed by: RADIOLOGY

## 2021-09-13 PROCEDURE — 49083 ABD PARACENTESIS W/IMAGING: CPT

## 2021-09-13 PROCEDURE — C1729 CATH, DRAINAGE: HCPCS

## 2021-09-13 PROCEDURE — P9047 ALBUMIN (HUMAN), 25%, 50ML: HCPCS | Performed by: RADIOLOGY

## 2021-09-13 RX ORDER — ALBUMIN (HUMAN) 12.5 G/50ML
50 SOLUTION INTRAVENOUS ONCE
Status: COMPLETED | OUTPATIENT
Start: 2021-09-13 | End: 2021-09-13

## 2021-09-13 RX ADMIN — ALBUMIN (HUMAN) 50 G: 0.25 INJECTION, SOLUTION INTRAVENOUS at 11:49

## 2021-09-14 ENCOUNTER — OFFICE VISIT (OUTPATIENT)
Dept: GASTROENTEROLOGY | Age: 71
End: 2021-09-14
Payer: MEDICARE

## 2021-09-14 VITALS
HEART RATE: 64 BPM | TEMPERATURE: 97.7 F | DIASTOLIC BLOOD PRESSURE: 77 MMHG | WEIGHT: 197 LBS | BODY MASS INDEX: 25.29 KG/M2 | SYSTOLIC BLOOD PRESSURE: 125 MMHG

## 2021-09-14 DIAGNOSIS — Z90.49 HX OF CHOLECYSTECTOMY: ICD-10-CM

## 2021-09-14 DIAGNOSIS — D36.9 ADENOMATOUS POLYPS: ICD-10-CM

## 2021-09-14 DIAGNOSIS — I71.40 ABDOMINAL AORTIC ANEURYSM (AAA) WITHOUT RUPTURE: ICD-10-CM

## 2021-09-14 DIAGNOSIS — K70.31 ALCOHOLIC CIRRHOSIS OF LIVER WITH ASCITES (HCC): Primary | ICD-10-CM

## 2021-09-14 DIAGNOSIS — Z95.1 S/P CABG X 3: ICD-10-CM

## 2021-09-14 PROCEDURE — 99214 OFFICE O/P EST MOD 30 MIN: CPT | Performed by: INTERNAL MEDICINE

## 2021-09-14 RX ORDER — FUROSEMIDE 40 MG/1
40 TABLET ORAL DAILY
Qty: 60 TABLET | Refills: 3 | Status: SHIPPED | OUTPATIENT
Start: 2021-09-14 | End: 2021-10-06 | Stop reason: SDUPTHER

## 2021-09-14 RX ORDER — SPIRONOLACTONE 100 MG/1
100 TABLET, FILM COATED ORAL DAILY
Qty: 30 TABLET | Refills: 3 | Status: SHIPPED | OUTPATIENT
Start: 2021-09-14 | End: 2021-10-06 | Stop reason: SDUPTHER

## 2021-09-14 ASSESSMENT — ENCOUNTER SYMPTOMS
VOICE CHANGE: 0
NAUSEA: 0
CHOKING: 0
TROUBLE SWALLOWING: 0
VOMITING: 0
BACK PAIN: 0
ABDOMINAL PAIN: 1
RECTAL PAIN: 0
BLOOD IN STOOL: 0
SHORTNESS OF BREATH: 0
SORE THROAT: 0
ANAL BLEEDING: 0
CONSTIPATION: 0
DIARRHEA: 0
COUGH: 0
ABDOMINAL DISTENTION: 1

## 2021-09-14 NOTE — PROGRESS NOTES
GI CLINIC FOLLOW UP    INTERVAL HISTORY:   No referring provider defined for this encounter. Chief Complaint   Patient presents with    Cirrhosis     Patient is f/u on cirrhosis. He has been getting paracentesis done every 2 weeks. He states they are removing about 9 L per procedure. He would like to know if he is a canidate           HISTORY OF PRESENT ILLNESS: Rachel Fine is a 66 y.o. male , referred for evaluation of alcohol liver cirrhosis,*large flat polyps, elevated LFT, NHL   Here for f/u   Patient is here with his friend who takes care of him also  His main issue that he is requiring frequent paracentesis, he needs paracentesis q 3 weeks   Is trying to have a low-salt diet but is taking salt substitute  He denied any confusion denied any bleeding  Also had flat polyp schedule multiple times  admitted follow-up and try to, and he said he could not take the prep the last time because of the ascites  Also order an EGD to look if he has varices  , He did not do that in the  non compliant    seen in 2015 with no f/u, even that we were concerned about a flat polyp on him and we needed to repeat his colonoscopy in 1 year, but he did not follow-up, and he came back in 2020  Seen in 2020, we will scheduled him for colonoscopy and labs and MRI  Did do    seen in 8/2021 :reordered the labs and colon    still did not do colonoscopy ( could not drink the prep)  On Aldactone/Lasix 50/20   Staying sober wants to do better  Wants to see transplant service  Review of system otherwise negative    Fluid negative for malignancy       Past Medical,Family, and Social History reviewed and does contribute to the patient presentingcondition. Patient's PMH/PSH,SH,PSYCH Hx, MEDs, ALLERGIES, and ROS were all reviewed and updated in the appropriate sections.     PAST MEDICAL HISTORY:  Past Medical History:   Diagnosis Date    AAA (abdominal aortic aneurysm) ([facility] [address] 3/23/2015    3.8cm     CAD (coronary artery disease)     Colon polyps     Colon tumor     Congestive heart failure (CHF) ([facility] [address] 01/2021    GERD (gastroesophageal reflux disease)     prilosec as needed    H/O chest tube placement     chest tube x4 different times    History of pneumonia     History of pneumothorax     x4    Hx of cardiac cath 02/19/2021    Hyperlipidemia     Hypertension     Non Hodgkin's lymphoma ([facility] [address]     NHL    Pneumonia 1970's    patient had 3 chest tubes and in hospital for 13 days    Unspecified cerebral artery occlusion with cerebral infarction 10/01/2014    stroke , numbness in the left arm from the elbow down    Wellness examination 02/25/2021    [facility] nov 2020    Wellness examination 02/25/2021    [email or web address] [address] feb 2021    Wellness examination 02/25/2021    onc-Dr Mathis Pouch 2021       Past Surgical History:   Procedure Laterality Date    APPENDECTOMY  4/21/15    CHOLECYSTECTOMY, OPEN  04/21/15    COLONOSCOPY      COLONOSCOPY  07/10/2020    COLONOSCOPY POLYPECTOMY HOT BIOPSY (N/A )    COLONOSCOPY N/A 7/10/2020    COLONOSCOPY POLYPECTOMY HOT BIOPSY performed by Ana Avina MD at [place] [id number] N/A 3/3/2021    CABG CORONARY ARTERY BYPASS X3, ON PUMP; SWAN MARLENY, MIRANDA PER ANESTHESIA performed by Andreea Mccormack MD at [facility] "[employer] [facility]  2019    stent to left leg with tubular graft to left leg artery    RIGHT COLECTOMY  04/21/15    TRANSESOPHAGEAL ECHOCARDIOGRAM  10/3/14    VASECTOMY  1986    WISDOM TOOTH EXTRACTION         CURRENT MEDICATIONS:    Current Outpatient Medications:     spironolactone (ALDACTONE) 100 MG tablet, Take 1 tablet by mouth daily, Disp: 30 tablet, Rfl: 3    furosemide (LASIX) 40 MG tablet, Take 1 tablet by mouth daily, Disp: 60 tablet, Rfl: 3    spironolactone (ALDACTONE) 25 MG tablet, Take 2 tablets by mouth daily (Patient taking differently: Take 25 mg by mouth 2 times daily ), Disp: 60 tablet, Rfl: 3    furosemide (LASIX) 20 MG tablet, Take 20 mg by mouth daily , Disp: , Rfl:     atorvastatin (LIPITOR) 20 MG tablet, Take 1 tablet by mouth nightly, Disp: 90 tablet, Rfl: 0    metoprolol (LOPRESSOR) 100 MG tablet, Take 1 tablet by mouth 2 times daily (Patient taking differently: Take 50 mg by mouth 2 times daily ), Disp: 120 tablet, Rfl: 1    aspirin 81 MG tablet, Take 81 mg by mouth daily, Disp: , Rfl:     omeprazole (PRILOSEC) 10 MG delayed release capsule, Take 1 capsule by mouth daily as needed (GERD), Disp: 90 capsule, Rfl: 3    ALLERGIES:   Allergies   Allergen Reactions    Iv Dye [Iodides] Hives    Iodinated Diagnostic Agents Hives     Patient developed hives even with steroid prep. FAMILY HISTORY:       Problem Relation Age of Onset    Heart Disease Mother     Stroke Mother     Alcohol Abuse Father     Cancer Father          SOCIAL HISTORY:   Social History     Socioeconomic History    Marital status:       Spouse name: Not on file    Number of children: Not on file    Years of education: Not on file    Highest education level: Not on file   Occupational History    Not on file   Tobacco Use    Smoking status: Former Smoker     Packs/day: 1.00     Years: 30.00     Pack years: 30.00     Types: Cigarettes     Quit date: 2013     Years since quittin.0    Smokeless tobacco: Never Used   Vaping Use    Vaping Use: Never used   Substance and Sexual Activity    Alcohol use: Not Currently     Alcohol/week: 14.0 standard drinks     Types: 14 Cans of beer per week     Comment: 1-3 beers/day    Drug use: No    Sexual activity: Not Currently   Other Topics Concern    Not on file   Social History Narrative    Not on file     Social Determinants of Health     Financial Resource Strain: Low Risk     Difficulty of Paying Living Expenses: Not hard at all   Food Insecurity: No Food Insecurity    Worried About [address] [employer] in the Last Year: Never true  Ran Out of Food in the Last Year: Never true   Transportation Needs: No Transportation Needs    Lack of Transportation (Medical): No    Lack of Transportation (Non-Medical): No   Physical Activity:     Days of Exercise per Week:     Minutes of Exercise per Session:    Stress:     Feeling of Stress :    Social Connections:     Frequency of Communication with Friends and Family:     Frequency of Social Gatherings with Friends and Family:     Attends [employer]oration[employer] Services:     Active Member of Clubs or Organizations:     Attends Club or Organization Meetings:     Marital Status:    Intimate Partner Violence:     Fear of Current or Ex-Partner:     Emotionally Abused:     Physically Abused:     Sexually Abused:        REVIEW OF SYSTEMS: A 12-point review of systemswas obtained and pertinent positives and negatives were enumerated above in the history of present illness. All other reviewed systems / symptoms were negative. Review of Systems   Constitutional: Positive for appetite change, fatigue and unexpected weight change. HENT: Negative for sore throat, trouble swallowing and voice change. Eyes: Positive for visual disturbance (glasses). Respiratory: Negative for cough, choking and shortness of breath. Cardiovascular: Negative for chest pain and leg swelling. Gastrointestinal: Positive for abdominal distention and abdominal pain. Negative for anal bleeding, blood in stool, constipation, diarrhea, nausea, rectal pain and vomiting. Genitourinary: Negative for difficulty urinating. Musculoskeletal: Positive for myalgias. Negative for back pain and joint swelling. Allergic/Immunologic: Negative for environmental allergies and food allergies. Neurological: Positive for dizziness. Negative for tremors, weakness, light-headedness, numbness (left arm) and headaches. Hematological: Bruises/bleeds easily. Psychiatric/Behavioral: Positive for sleep disturbance.  The patient is not nervous/anxious. LABORATORY DATA: Reviewed  Lab Results   Component Value Date    WBC 19.1 (H) 08/23/2021    HGB 13.8 08/23/2021    HCT 44.8 08/23/2021    .4 08/23/2021    PLT 72 (L) 08/23/2021     04/20/2021    K 4.3 04/20/2021     04/20/2021    CO2 22 04/20/2021    BUN 9 04/20/2021    CREATININE 0.72 07/28/2021    LABALBU 3.5 04/20/2021    BILITOT 0.97 04/20/2021    ALKPHOS 152 (H) 04/20/2021    AST 46 (H) 04/20/2021    ALT 27 04/20/2021    INR 1.3 08/04/2021         Lab Results   Component Value Date    RBC 4.46 08/23/2021    HGB 13.8 08/23/2021    .4 08/23/2021    MCH 30.9 08/23/2021    MCHC 30.8 08/23/2021    RDW 16.7 (H) 08/23/2021    MPV 9.9 08/23/2021    BASOPCT 0 08/23/2021    LYMPHSABS 11.46 (H) 08/23/2021    MONOSABS 2.67 (H) 08/23/2021    NEUTROABS 4.78 08/23/2021    EOSABS 0.19 08/23/2021    BASOSABS 0.00 08/23/2021         DIAGNOSTIC TESTING:     IR US GUIDED PARACENTESIS    Result Date: 9/13/2021  PROCEDURE: ULTRASOUND GUIDED PARACENTESIS 9/13/2021 HISTORY: ORDERING SYSTEM PROVIDED HISTORY: Alcoholic cirrhosis of liver with ascites [facility]) TECHNOLOGIST PROVIDED HISTORY: Ascites TECHNIQUE: Informed consent was obtained after a detailed explanation of the procedure including risks, benefits, and alternatives. Universal protocol was followed. The right abdomen was prepped and draped in sterile fashion and local anesthesia was achieved with lidocaine. 5 Western Riya Informed Tradeseh needle sheath was advanced under ultrasound guidance into ascites and paracentesis was performed. The patient tolerated the procedure well. FINDINGS: A total of 9.1 L straw-colored fluid was removed. Successful ultrasound guided paracentesis.      IR US GUIDED PARACENTESIS    Result Date: 8/23/2021  PROCEDURE: ULTRASOUND GUIDED PARACENTESIS 8/23/2021 HISTORY: ORDERING SYSTEM PROVIDED HISTORY: Alcoholic cirrhosis of liver with ascites [facility]) TECHNOLOGIST PROVIDED HISTORY: Ascites TECHNIQUE: Informed consent was obtained after a detailed explanation of the procedure including risks, benefits, and alternatives. Universal protocol was followed. All elements of maximal sterile barrier technique were used. Preprocedure ultrasound shows a dominant fluid pocket in the right lowerquadrant. Using ultrasound guidance (image attached to the medical record), the fluid pocket was accessed with a 5 Western Riya Yueh needle with aspiration of inch clear yellow fluid. Vacuum bottle paracentesis was performed and approximately 8.3 L was removed. A sample was not requested. The patient received IV albumin replacement. .  The patient tolerated the procedure well and left the department in good condition. Dr. Jesus Ortega was present. FINDINGS: A total of 8.3 L was removed. Successful ultrasound guided therapeutic paracentesis. PHYSICAL EXAMINATION: Vital signs reviewed per the nursing documentation. /77   Pulse 64   Temp 97.7 °F (36.5 °C)   Wt 197 lb (89.4 kg)   BMI 25.29 kg/m²   Body mass index is 25.29 kg/m². Physical Exam  Vitals and nursing note reviewed. Constitutional:       General: He is not in acute distress. Appearance: He is well-developed. He is not diaphoretic. HENT:      Head: Normocephalic and atraumatic. Eyes:      General: No scleral icterus. Pupils: Pupils are equal, round, and reactive to light. Neck:      Thyroid: No thyromegaly. Vascular: No JVD. Trachea: No tracheal deviation. Cardiovascular:      Rate and Rhythm: Normal rate and regular rhythm. Heart sounds: Normal heart sounds. No murmur heard. Pulmonary:      Effort: Pulmonary effort is normal. No respiratory distress. Breath sounds: Normal breath sounds. No wheezing. Abdominal:      General: Bowel sounds are normal. There is no distension. Palpations: Abdomen is soft. There is no mass. Tenderness: There is no abdominal tenderness. There is no guarding or rebound.       Comments: ascites   Musculoskeletal:         General: No tenderness. Normal range of motion. Cervical back: Normal range of motion and neck supple. Skin:     General: Skin is warm. Coloration: Skin is not pale. Findings: No erythema or rash. Comments: He is not diaphoretic   Neurological:      Mental Status: He is alert and oriented to person, place, and time. Deep Tendon Reflexes: Reflexes are normal and symmetric. Psychiatric:         Behavior: Behavior normal.         Thought Content: Thought content normal.         Judgment: Judgment normal.           IMPRESSION: Mr. Alvarez Nails is a 66 y.o. male with      Diagnosis Orders   1. Alcoholic cirrhosis of liver with ascites (HCC)  Comp Metabolic w Bili Profile    EGD   2. Adenomatous polyps  COLONOSCOPY W/ OR W/O BIOPSY   3. Hx of cholecystectomy     4. Abdominal aortic aneurysm (AAA) without rupture ([address]     5. S/P CABG x 3     Patient with recurrent ascites related to alcohol liver disease has been sober for 2 months  We will increase his diuretic right now to 100/40 we will repeat his 1 week after that kidney function and electrolytes  Long discussion with him about compliance with the scopes will proceed with a colonoscopy to follow on the flat polyp he had which been way overdue right now  And to make sure he does not have any esophageal varices  We will refer him [facility] at the [facility]      Diet/life style/natural hx /complication of the dx were all explained in details   Past medical, past surgical, social history, psychiatric history, medications or allergies, all reviewed and  updated    Thank you for allowing me to participate in the care of Mr. Alvarez Nails. For any further questions please do not hesitate to contact me. I have reviewed and agree with the ROS entered by the MA/RN. Note is dictated utilizing voice recognition software. Unfortunately this leads to occasional typographical errors.  Please contact our office if you have any questions.       Ramin Gabriel MD  [facility]  O: #[phone]

## 2021-09-16 NOTE — TELEPHONE ENCOUNTER
Writer spoke to pt over the phone in regards to changing arrival an proc time on 9/17/21. Pt is asked to arrive at 11:45am and proc will be at 1:15pm.  Pt states he is able to arrive at 11:45am.  Pt states that he took the Dulcolax tablets and he is having yellow watery stools already. He states he also vomited a thick yellow bile and he is not sure he is going to be able to finish drinking the other stuff he has to drink. Writer instructed pt since he is having watery stools already that he does not have to drink the bottle of Magnesium Citrate. Pt is instructed he does need to drink and finish the Miralax and Gatorades. Pt states he can & will finish the Miralax & Gatorade mixture.

## 2021-09-17 ENCOUNTER — ANESTHESIA (OUTPATIENT)
Dept: OPERATING ROOM | Age: 71
End: 2021-09-17
Payer: MEDICARE

## 2021-09-17 ENCOUNTER — ANESTHESIA EVENT (OUTPATIENT)
Dept: OPERATING ROOM | Age: 71
End: 2021-09-17
Payer: MEDICARE

## 2021-09-17 ENCOUNTER — HOSPITAL ENCOUNTER (OUTPATIENT)
Age: 71
Setting detail: OUTPATIENT SURGERY
Discharge: HOME | End: 2021-09-17
Attending: INTERNAL MEDICINE | Admitting: INTERNAL MEDICINE
Payer: MEDICARE

## 2021-09-17 VITALS
DIASTOLIC BLOOD PRESSURE: 65 MMHG | RESPIRATION RATE: 20 BRPM | HEIGHT: 74 IN | BODY MASS INDEX: 24.77 KG/M2 | HEART RATE: 60 BPM | WEIGHT: 193 LBS | OXYGEN SATURATION: 97 % | TEMPERATURE: 97.9 F | SYSTOLIC BLOOD PRESSURE: 113 MMHG

## 2021-09-17 VITALS
DIASTOLIC BLOOD PRESSURE: 68 MMHG | OXYGEN SATURATION: 98 % | SYSTOLIC BLOOD PRESSURE: 123 MMHG | RESPIRATION RATE: 28 BRPM

## 2021-09-17 PROCEDURE — 2500000003 HC RX 250 WO HCPCS: Performed by: NURSE ANESTHETIST, CERTIFIED REGISTERED

## 2021-09-17 PROCEDURE — 2580000003 HC RX 258: Performed by: ANESTHESIOLOGY

## 2021-09-17 PROCEDURE — 7100000010 HC PHASE II RECOVERY - FIRST 15 MIN: Performed by: INTERNAL MEDICINE

## 2021-09-17 PROCEDURE — 43239 EGD BIOPSY SINGLE/MULTIPLE: CPT | Performed by: INTERNAL MEDICINE

## 2021-09-17 PROCEDURE — 3700000000 HC ANESTHESIA ATTENDED CARE: Performed by: INTERNAL MEDICINE

## 2021-09-17 PROCEDURE — 7100000011 HC PHASE II RECOVERY - ADDTL 15 MIN: Performed by: INTERNAL MEDICINE

## 2021-09-17 PROCEDURE — 2709999900 HC NON-CHARGEABLE SUPPLY: Performed by: INTERNAL MEDICINE

## 2021-09-17 PROCEDURE — 43244 EGD VARICES LIGATION: CPT | Performed by: INTERNAL MEDICINE

## 2021-09-17 PROCEDURE — 45380 COLONOSCOPY AND BIOPSY: CPT | Performed by: INTERNAL MEDICINE

## 2021-09-17 PROCEDURE — 3700000001 HC ADD 15 MINUTES (ANESTHESIA): Performed by: INTERNAL MEDICINE

## 2021-09-17 PROCEDURE — 6360000002 HC RX W HCPCS: Performed by: NURSE ANESTHETIST, CERTIFIED REGISTERED

## 2021-09-17 PROCEDURE — 3609012300 HC EGD BAND LIGATION ESOPHGEAL/GASTRIC VARICES: Performed by: INTERNAL MEDICINE

## 2021-09-17 PROCEDURE — 2720000010 HC SURG SUPPLY STERILE: Performed by: INTERNAL MEDICINE

## 2021-09-17 PROCEDURE — 3609010300 HC COLONOSCOPY W/BIOPSY SINGLE/MULTIPLE: Performed by: INTERNAL MEDICINE

## 2021-09-17 PROCEDURE — 88305 TISSUE EXAM BY PATHOLOGIST: CPT

## 2021-09-17 RX ORDER — SODIUM CHLORIDE 0.9 % (FLUSH) 0.9 %
10 SYRINGE (ML) INJECTION EVERY 12 HOURS SCHEDULED
Status: DISCONTINUED | OUTPATIENT
Start: 2021-09-17 | End: 2021-09-17 | Stop reason: HOSPADM

## 2021-09-17 RX ORDER — PROPOFOL 10 MG/ML
INJECTION, EMULSION INTRAVENOUS PRN
Status: DISCONTINUED | OUTPATIENT
Start: 2021-09-17 | End: 2021-09-17 | Stop reason: SDUPTHER

## 2021-09-17 RX ORDER — SODIUM CHLORIDE 9 MG/ML
INJECTION, SOLUTION INTRAVENOUS CONTINUOUS
Status: DISCONTINUED | OUTPATIENT
Start: 2021-09-17 | End: 2021-09-17 | Stop reason: HOSPADM

## 2021-09-17 RX ORDER — SODIUM CHLORIDE, SODIUM LACTATE, POTASSIUM CHLORIDE, CALCIUM CHLORIDE 600; 310; 30; 20 MG/100ML; MG/100ML; MG/100ML; MG/100ML
INJECTION, SOLUTION INTRAVENOUS CONTINUOUS
Status: DISCONTINUED | OUTPATIENT
Start: 2021-09-17 | End: 2021-09-17 | Stop reason: HOSPADM

## 2021-09-17 RX ORDER — SODIUM CHLORIDE 9 MG/ML
25 INJECTION, SOLUTION INTRAVENOUS PRN
Status: DISCONTINUED | OUTPATIENT
Start: 2021-09-17 | End: 2021-09-17 | Stop reason: HOSPADM

## 2021-09-17 RX ORDER — LIDOCAINE HYDROCHLORIDE 20 MG/ML
INJECTION, SOLUTION EPIDURAL; INFILTRATION; INTRACAUDAL; PERINEURAL PRN
Status: DISCONTINUED | OUTPATIENT
Start: 2021-09-17 | End: 2021-09-17 | Stop reason: SDUPTHER

## 2021-09-17 RX ORDER — SODIUM CHLORIDE 0.9 % (FLUSH) 0.9 %
10 SYRINGE (ML) INJECTION PRN
Status: DISCONTINUED | OUTPATIENT
Start: 2021-09-17 | End: 2021-09-17 | Stop reason: HOSPADM

## 2021-09-17 RX ORDER — ONDANSETRON 2 MG/ML
4 INJECTION INTRAMUSCULAR; INTRAVENOUS
Status: DISCONTINUED | OUTPATIENT
Start: 2021-09-17 | End: 2021-09-17 | Stop reason: HOSPADM

## 2021-09-17 RX ORDER — HYDROMORPHONE HYDROCHLORIDE 1 MG/ML
0.25 INJECTION, SOLUTION INTRAMUSCULAR; INTRAVENOUS; SUBCUTANEOUS EVERY 5 MIN PRN
Status: DISCONTINUED | OUTPATIENT
Start: 2021-09-17 | End: 2021-09-17 | Stop reason: HOSPADM

## 2021-09-17 RX ORDER — FENTANYL CITRATE 50 UG/ML
25 INJECTION, SOLUTION INTRAMUSCULAR; INTRAVENOUS EVERY 5 MIN PRN
Status: DISCONTINUED | OUTPATIENT
Start: 2021-09-17 | End: 2021-09-17 | Stop reason: HOSPADM

## 2021-09-17 RX ORDER — LIDOCAINE HYDROCHLORIDE 10 MG/ML
1 INJECTION, SOLUTION EPIDURAL; INFILTRATION; INTRACAUDAL; PERINEURAL
Status: DISCONTINUED | OUTPATIENT
Start: 2021-09-17 | End: 2021-09-17 | Stop reason: HOSPADM

## 2021-09-17 RX ADMIN — PROPOFOL 20 MG: 10 INJECTION, EMULSION INTRAVENOUS at 13:34

## 2021-09-17 RX ADMIN — PROPOFOL 20 MG: 10 INJECTION, EMULSION INTRAVENOUS at 13:28

## 2021-09-17 RX ADMIN — SODIUM CHLORIDE, POTASSIUM CHLORIDE, SODIUM LACTATE AND CALCIUM CHLORIDE: 600; 310; 30; 20 INJECTION, SOLUTION INTRAVENOUS at 13:06

## 2021-09-17 RX ADMIN — LIDOCAINE HYDROCHLORIDE 100 MG: 20 INJECTION, SOLUTION EPIDURAL; INFILTRATION; INTRACAUDAL; PERINEURAL at 13:07

## 2021-09-17 RX ADMIN — PROPOFOL 20 MG: 10 INJECTION, EMULSION INTRAVENOUS at 13:37

## 2021-09-17 RX ADMIN — PROPOFOL 30 MG: 10 INJECTION, EMULSION INTRAVENOUS at 13:22

## 2021-09-17 RX ADMIN — PROPOFOL 30 MG: 10 INJECTION, EMULSION INTRAVENOUS at 13:12

## 2021-09-17 RX ADMIN — PROPOFOL 30 MG: 10 INJECTION, EMULSION INTRAVENOUS at 13:15

## 2021-09-17 RX ADMIN — PROPOFOL 30 MG: 10 INJECTION, EMULSION INTRAVENOUS at 13:19

## 2021-09-17 RX ADMIN — PROPOFOL 20 MG: 10 INJECTION, EMULSION INTRAVENOUS at 13:39

## 2021-09-17 RX ADMIN — PROPOFOL 30 MG: 10 INJECTION, EMULSION INTRAVENOUS at 13:09

## 2021-09-17 RX ADMIN — PROPOFOL 20 MG: 10 INJECTION, EMULSION INTRAVENOUS at 13:31

## 2021-09-17 RX ADMIN — PROPOFOL 30 MG: 10 INJECTION, EMULSION INTRAVENOUS at 13:45

## 2021-09-17 RX ADMIN — PROPOFOL 20 MG: 10 INJECTION, EMULSION INTRAVENOUS at 13:42

## 2021-09-17 RX ADMIN — PROPOFOL 20 MG: 10 INJECTION, EMULSION INTRAVENOUS at 13:16

## 2021-09-17 RX ADMIN — PROPOFOL 50 MG: 10 INJECTION, EMULSION INTRAVENOUS at 13:25

## 2021-09-17 RX ADMIN — PROPOFOL 50 MG: 10 INJECTION, EMULSION INTRAVENOUS at 13:07

## 2021-09-17 ASSESSMENT — PULMONARY FUNCTION TESTS
PIF_VALUE: 1

## 2021-09-17 ASSESSMENT — PAIN - FUNCTIONAL ASSESSMENT: PAIN_FUNCTIONAL_ASSESSMENT: 0-10

## 2021-09-17 ASSESSMENT — PAIN SCALES - GENERAL
PAINLEVEL_OUTOF10: 0

## 2021-09-17 NOTE — OP NOTE
Operative Note    PROCEDURE NOTE    DATE OF PROCEDURE: 9/17/2021    SURGEON: Jeremy Decker MD  Facility : [facility]  ASSISTANT: None  Anesthesia: MAC  PREOPERATIVE DIAGNOSIS:   History of polyps    POSTOPERATIVE DIAGNOSIS: as described below    OPERATION: Total colonoscopy     ANESTHESIA: Moderate Sedation    ESTIMATED BLOOD LOSS: less than 50     COMPLICATIONS: None. SPECIMENS:  Was Obtained:     Surgical anastomosis with the ileum the mucosa of the ileum is prolapsing back into the cecum and had multiple nodular on it could be just a normal variant because of the edema and portal hypertension but could be also polyps for which I went ahead and biopsied please see the image      the mucosa in the sigmoid area is edematous    HISTORY: The patient is a 66y.o. year old male with history of above preop diagnosis. I recommended colonoscopy with possible biopsy or polypectomy and I explained the risk, benefits, expected outcome, and alternatives to the procedure. Risks included but are not limited to bleeding, infection, respiratory distress, hypotension, and perforation of the colon and possibility of missing a lesion. The patient understands and is in agreement. The patient was counseled at length about the risks of jaime Covid-19 during their perioperative period and any recovery window from their procedure. The patient was made aware that jaime Covid-19  may worsen their prognosis for recovering from their procedure  and lend to a higher morbidity and/or mortality risk. All material risks, benefits, and reasonable alternatives including postponing the procedure were discussed. The patient does wish to proceed with the procedure at this time. PROCEDURE: The patient was given IV conscious sedation. The patient's SPO2 remained above 90% throughout the procedure. The colonoscope was inserted per rectum and advanced under direct vision to the cecum without difficulty. Post sedation note : The patient's SPO2 remained above 90% throughout the procedure. the vital signs remained stable , and no immediate complication form the procedure noted, patient will be ready for d/c when criteria is met . The prep was fair. Findings:  Terminal ileum/cecum : Surgical anastomosis with the ileum the mucosa of the ileum is prolapsing back into the cecum and had multiple nodular on it could be just a normal variant because of the edema and portal hypertension but could be also polyps for which I went ahead and biopsied please see the image              Transverse colon: normal    Descending/Sigmoid colon: abnormal: the mucosa in the sigmoid area is edematous  Diverticulosis    Rectum/Anus: examined in normal and retroflexed positions and was abnormal: Hemorrhoids    Withdrawal Time was (minutes): 13    The colon was decompressed and the scope was removed. The patient tolerated the procedure well. Recommendations/Plan:   1. Lifestyle and dietary modifications as discussed  2. F/U Biopsies  3. F/U In OfficeYes  4. Discussed with the family  5.  Repeat colonoscopy [id number]    Electronically signed by Kinga Pacheco MD  on 9/17/2021 at 1:53 PM

## 2021-09-17 NOTE — ANESTHESIA PRE PROCEDURE
Department of Anesthesiology  Preprocedure Note       Name:  Jazlyn Canela   Age:  66 y.o.  :  1950                                          MRN:  [id number]         Date:  2021      Surgeon: Rubens Drew):  Nehal Rhodes MD    Procedure:  CABG CORONARY ARTERY BYPASS X3, ON PUMP, SWAN MARLENY, MIRANDA (N/A )      Medications prior to admission:   Prior to Admission medications    Medication Sig Start Date End Date Taking? Authorizing Provider   spironolactone (ALDACTONE) 100 MG tablet Take 1 tablet by mouth daily 21   Enid Adames MD   furosemide (LASIX) 40 MG tablet Take 1 tablet by mouth daily 21   Enid Adames MD   spironolactone (ALDACTONE) 25 MG tablet Take 2 tablets by mouth daily  Patient taking differently: Take 25 mg by mouth 2 times daily  21   Enid Adames MD   furosemide (LASIX) 20 MG tablet Take 20 mg by mouth daily  21   Historical Provider, MD   atorvastatin (LIPITOR) 20 MG tablet Take 1 tablet by mouth nightly 21   Uziel Ziegler MD   metoprolol (LOPRESSOR) 100 MG tablet Take 1 tablet by mouth 2 times daily  Patient taking differently: Take 50 mg by mouth 2 times daily  21   Uziel Ziegler MD   aspirin 81 MG tablet Take 81 mg by mouth daily    Historical Provider, MD   omeprazole (PRILOSEC) 10 MG delayed release capsule Take 1 capsule by mouth daily as needed (GERD) 17   Grace Kaur MD       Current medications:    No current outpatient medications on file. No current facility-administered medications for this visit. Allergies: Allergies   Allergen Reactions    Iv Dye [Iodides] Hives    Iodinated Diagnostic Agents Hives     Patient developed hives even with steroid prep.        Problem List:    Patient Active Problem List   Diagnosis Code    Abnormal LFTs R94.5    Hypercholesteremia E78.00    Colon polyps K63.5    Colon tumor D49.0    Colonic mass K63.89    Cholelithiasis with acute cholecystitis K80.00    HTN (hypertension) I10    Hx of ARTERY BYPASS GRAFT N/A 3/3/2021    CABG CORONARY ARTERY BYPASS X3, ON PUMP; HERI FARMER MIRANDA PER ANESTHESIA performed by Shaunna Davidson MD at [address] [place] [address]  2019    stent to left leg with tubular graft to left leg artery    RIGHT COLECTOMY  04/21/15    TRANSESOPHAGEAL ECHOCARDIOGRAM  10/3/14    VASECTOMY  1986    WISDOM TOOTH EXTRACTION         Social History:    Social History     Tobacco Use    Smoking status: Former Smoker     Packs/day: 1.00     Years: 30.00     Pack years: 30.00     Types: Cigarettes     Quit date: 2013     Years since quittin.0    Smokeless tobacco: Never Used   Substance Use Topics    Alcohol use: Not Currently     Alcohol/week: 14.0 standard drinks     Types: 14 Cans of beer per week     Comment: 1-3 beers/day                                Counseling given: Not Answered      Vital Signs (Current): There were no vitals filed for this visit.                                            BP Readings from Last 3 Encounters:   21 125/68   21 133/74   21 125/77       NPO Status:                                                                                 BMI:   Wt Readings from Last 3 Encounters:   21 217 lb (98.4 kg)   21 197 lb (89.4 kg)   21 207 lb 1.6 oz (93.9 kg)     There is no height or weight on file to calculate BMI.    CBC:   Lab Results   Component Value Date    WBC 19.1 2021    RBC 4.46 2021    HGB 13.8 2021    HCT 44.8 2021    .4 2021    RDW 16.7 2021    PLT 72 2021       CMP:   Lab Results   Component Value Date     2021    K 4.3 2021     2021    CO2 22 2021    BUN 9 2021    CREATININE 0.72 2021    GFRAA >60 2021    LABGLOM >60 2021    GLUCOSE 122 2021    PROT 6.9 2021    CALCIUM 8.5 2021    BILITOT 0.97 2021    ALKPHOS 152 2021    AST 46 2021    ALT 27 2021 Ventricle  Normal left ventricular size with normal hyperdynamic function. Left ventricular ejection fraction 55 %. Mild concentric left ventricular hypertrophy. Right Atrium  Right atrium is mildly dilated . Right Ventricle  Normal right ventricular size and function. Mitral Valve  Normal mitral valve structure and function. Aortic Valve  Normal aortic valve structure and function without stenosis or  regurgitation. Tricuspid Valve  Normal tricuspid valve structure and function. Pulmonic Valve  The pulmonic valve is normal in structure. Trivial pulmonic insufficiency           Anesthesia Evaluation  Patient summary reviewed and Nursing notes reviewed no history of anesthetic complications:   Airway: Mallampati: II  TM distance: >3 FB   Neck ROM: full  Mouth opening: > = 3 FB Dental:    (+) partials      Pulmonary:normal exam    (+) pneumonia:      (-) COPD                           Cardiovascular:  Exercise tolerance: no interval change,   (+) hypertension:, CAD:, CABG/stent:, CHF:, hyperlipidemia          Rate: normal                    Neuro/Psych:   (+) CVA: residual symptoms,              ROS comment: Left arm numbness GI/Hepatic/Renal:   (+) GERD:,           Endo/Other:    (+) malignancy/cancer. (-) diabetes mellitus                ROS comment: History of non-Hodgkin's lymphoma Abdominal:             Vascular:   + PVD, aortic or cerebral, . Other Findings:               Anesthesia Plan      TIVA     ASA 4       Induction: intravenous. MIPS: Prophylactic antiemetics administered and Postoperative ventilation. Anesthetic plan and risks discussed with patient. Plan discussed with CRNA.     Attending anesthesiologist reviewed and agrees with Preprocedure content            Armen Fisher MD   9/17/2021

## 2021-09-17 NOTE — ANESTHESIA POSTPROCEDURE EVALUATION
Department of Anesthesiology  Postprocedure Note    Patient: El Momin  MRN: [id number]  YOB: 1950  Date of evaluation: 9/17/2021  Time:  2:58 PM     Procedure Summary     Date: 09/17/21 Room / Location: [place] [address]    Anesthesia Start: 6150 Anesthesia Stop: 1498    Procedures:       COLONOSCOPY WITH BIOPSY (N/A )      EGD BAND LIGATION AND BIOPSY (N/A ) Diagnosis: (DX CIRRHOSIS, ADENOMATOUS POLYP)    Surgeons: Kenia Mcelroy MD Responsible Provider: Jon Dinh MD    Anesthesia Type: MAC ASA Status: 4          Anesthesia Type: MAC    Lavern Phase I:      Lavern Phase II:      Last vitals: Reviewed and per EMR flowsheets.        Anesthesia Post Evaluation    Patient location during evaluation: PACU  Patient participation: complete - patient participated  Level of consciousness: awake  Airway patency: patent  Nausea & Vomiting: no nausea  Complications: no  Cardiovascular status: blood pressure returned to baseline  Respiratory status: acceptable  Hydration status: euvolemic

## 2021-09-17 NOTE — H&P
Interval H&P Note    Pt Name: Blair Jones  MRN: [id number]  YOB: 1950  Date of evaluation: 9/17/2021      [x] I have reviewed in [facility] the Gastroenterology Progress Note by Dr. Madeline Arevalo dated 9/14/2021 attached below which meets the criteria for an Interval History and Physical note. [x] I have examined  [facility]  There are no changes to the patient who is scheduled for a diagnostic colonoscopy and EGD by Dr. Madeline Arevalo for cirrhosis, adenomatous polyp. The patient denies new health changes, fever, chills, wheezing, cough, increased SOB, chest pain, open sores or wounds. Denies hx diabetes. Last ASA 81mg 9/14/2021. Hx CAD, AAA, CHF, HTN, CABG x3 (3/2021). Patient is unable to take Plavix due to lymphoma. Patient's follows with [facility]. Denies chest pain, shortness of breath, dizziness, syncope. Patient last evaluated by cardiology \"2-3 months ago. \"     Vital signs: /68   Pulse 64   Temp 97 °F (36.1 °C) (Temporal)   Resp 20   SpO2 98%     Allergies: Iv dye [iodides] and Iodinated diagnostic agents    Medications:    Prior to Admission medications    Medication Sig Start Date End Date Taking?  Authorizing Provider   spironolactone (ALDACTONE) 100 MG tablet Take 1 tablet by mouth daily 9/14/21   Enid Adames MD   furosemide (LASIX) 40 MG tablet Take 1 tablet by mouth daily 9/14/21   Enid Adames MD   spironolactone (ALDACTONE) 25 MG tablet Take 2 tablets by mouth daily  Patient taking differently: Take 25 mg by mouth 2 times daily  8/13/21   Enid Adames MD   furosemide (LASIX) 20 MG tablet Take 20 mg by mouth daily  8/9/21   Historical Provider, MD   atorvastatin (LIPITOR) 20 MG tablet Take 1 tablet by mouth nightly 4/23/21   Inna Drummond MD   metoprolol (LOPRESSOR) 100 MG tablet Take 1 tablet by mouth 2 times daily  Patient taking differently: Take 50 mg by mouth 2 times daily  4/23/21   Inna Drummond MD   aspirin 81 MG tablet Take 81 mg by mouth daily    Historical Provider, MD   omeprazole (PRILOSEC) 10 MG delayed release capsule Take 1 capsule by mouth daily as needed (GERD) 6/9/17   Colon MD Alejandro         This is a 66 y.o. male who is pleasant, cooperative, alert and oriented x3, in no acute distress. Heart: Heart sounds are normal.  HR 64 regular rate and rhythm without murmur, gallop or rub. Lungs: Normal respiratory effort with equal expansion, good air exchange, unlabored and clear to auscultation without wheezes or rales bilaterally   Abdomen: soft, nontender, distended with bowel sounds active. Labs:  Recent Labs     08/23/21  0740   HGB 13.8   HCT 44.8   WBC 19.1*   .4   PLT 72*       No results for input(s): COVID19 in the last 720 hours. ELISEO Mckeon CNP  Electronically signed 9/17/2021 at 11:53 Capri Rincon MD   Physician   Specialty:  Gastroenterology   Progress Notes      Signed   Encounter Date:  9/14/2021         Related encounter: Office Visit from 9/14/2021 in [address] [place] [address]      Show:Clear all  [x]Manual[x]Template[x]Copied    Added by:  Kath Bernal RN[x]Enid Lorenzo MD    [][facility] for details          GI CLINIC FOLLOW UP     INTERVAL HISTORY:   No referring provider defined for this encounter.          Chief Complaint   Patient presents with    Cirrhosis       Patient is f/u on cirrhosis. He has been getting paracentesis done every 2 weeks. He states they are removing about 9 L per procedure.   He would like to know if he is a canidate               HISTORY OF PRESENT ILLNESS: Danielle Tucker is a 66 y.o. male , referred for evaluation of alcohol liver cirrhosis,*large flat polyps, elevated LFT, NHL   Here for f/u   Patient is here with his friend who takes care of him also  His main issue that he is requiring frequent paracentesis, he needs paracentesis q 3 weeks   Is trying to have a low-salt diet but is taking salt substitute  He denied any confusion denied any bleeding  Also had flat polyp schedule multiple times  admitted follow-up and try to, and he said he could not take the prep the last time because of the ascites  Also order an EGD to look if he has varices  , He did not do that in the  non compliant    seen in 2015 with no f/u, even that we were concerned about a flat polyp on him and we needed to repeat his colonoscopy in 1 year, but he did not follow-up, and he came back in 2020  Seen in 2020, we will scheduled him for colonoscopy and labs and MRI  Did do    seen in 8/2021 :reordered the labs and colon    still did not do colonoscopy ( could not drink the prep)  On Aldactone/Lasix 50/20   Staying sober wants to do better  Wants to see transplant service  Review of system otherwise negative     Fluid negative for malignancy         Past Medical,Family, and Social History reviewed and does contribute to the patient presentingcondition.     Patient's PMH/PSH,SH,PSYCH Hx, MEDs, ALLERGIES, and ROS were all reviewed and updated in the appropriate sections.     PAST MEDICAL HISTORY:  Past Medical History   Past Medical History:   Diagnosis Date    AAA (abdominal aortic aneurysm) ([address] 3/23/2015     3.8cm     CAD (coronary artery disease)      Colon polyps      Colon tumor      Congestive heart failure (CHF) ([address] 01/2021    GERD (gastroesophageal reflux disease)       prilosec as needed    H/O chest tube placement       chest tube x4 different times    History of pneumonia      History of pneumothorax       x4    Hx of cardiac cath 02/19/2021    Hyperlipidemia      Hypertension      Non Hodgkin's lymphoma ([address]       NHL    Pneumonia 1970's     patient had 3 chest tubes and in hospital for 13 days    Unspecified cerebral artery occlusion with cerebral infarction 10/01/2014     stroke , numbness in the left arm from the elbow down    Wellness examination 02/25/2021     pcp-Pilar Horton-[facility] nov 2020  Wellness examination 02/25/2021     Cardiology-Dr ohara-Heth henry lucase-lv feb 2021    Wellness examination 02/25/2021     onc-Dr Patterson Folds 2021            Past Surgical History         Past Surgical History:   Procedure Laterality Date    APPENDECTOMY   4/21/15    CHOLECYSTECTOMY, OPEN   04/21/15    COLONOSCOPY        COLONOSCOPY   07/10/2020     COLONOSCOPY POLYPECTOMY HOT BIOPSY (N/A )    COLONOSCOPY N/A 7/10/2020     COLONOSCOPY POLYPECTOMY HOT BIOPSY performed by Du Delatorre MD at [place] [address] N/A 3/3/2021     CABG CORONARY ARTERY BYPASS X3, ON PUMP; SWAN MARLENY, MIRANDA PER ANESTHESIA performed by Shanel Rivers MD at [facility]   2019     stent to left leg with tubular graft to left leg artery    RIGHT COLECTOMY   04/21/15    TRANSESOPHAGEAL ECHOCARDIOGRAM   10/3/14    VASECTOMY   1986    WISDOM TOOTH EXTRACTION                CURRENT MEDICATIONS:    Current Medication      Current Outpatient Medications:     spironolactone (ALDACTONE) 100 MG tablet, Take 1 tablet by mouth daily, Disp: 30 tablet, Rfl: 3    furosemide (LASIX) 40 MG tablet, Take 1 tablet by mouth daily, Disp: 60 tablet, Rfl: 3    spironolactone (ALDACTONE) 25 MG tablet, Take 2 tablets by mouth daily (Patient taking differently: Take 25 mg by mouth 2 times daily ), Disp: 60 tablet, Rfl: 3    furosemide (LASIX) 20 MG tablet, Take 20 mg by mouth daily , Disp: , Rfl:     atorvastatin (LIPITOR) 20 MG tablet, Take 1 tablet by mouth nightly, Disp: 90 tablet, Rfl: 0    metoprolol (LOPRESSOR) 100 MG tablet, Take 1 tablet by mouth 2 times daily (Patient taking differently: Take 50 mg by mouth 2 times daily ), Disp: 120 tablet, Rfl: 1    aspirin 81 MG tablet, Take 81 mg by mouth daily, Disp: , Rfl:     omeprazole (PRILOSEC) 10 MG delayed release capsule, Take 1 capsule by mouth daily as needed (GERD), Disp: 90 capsule, Rfl: 3        ALLERGIES:         Allergies Allergen Reactions    Iv Dye [Iodides] Hives    Iodinated Diagnostic Agents Hives       Patient developed hives even with steroid prep.         FAMILY HISTORY:   Family History             Problem Relation Age of Onset    Heart Disease Mother      Stroke Mother      Alcohol Abuse Father      Cancer Father                 SOCIAL HISTORY:   Social History               Socioeconomic History    Marital status:        Spouse name: Not on file    Number of children: Not on file    Years of education: Not on file    Highest education level: Not on file   Occupational History    Not on file   Tobacco Use    Smoking status: Former Smoker       Packs/day: 1.00       Years: 30.00       Pack years: 30.00       Types: Cigarettes       Quit date: 2013       Years since quittin.0    Smokeless tobacco: Never Used   Vaping Use    Vaping Use: Never used   Substance and Sexual Activity    Alcohol use: Not Currently       Alcohol/week: 14.0 standard drinks       Types: 14 Cans of beer per week       Comment: 1-3 beers/day    Drug use: No    Sexual activity: Not Currently   Other Topics Concern    Not on file   Social History Narrative    Not on file      Social Determinants of Health          Financial Resource Strain: Low Risk     Difficulty of Paying Living Expenses: Not hard at all   Food Insecurity: No Food Insecurity    Worried About Running Out of Food in the Last Year: Never true    [place] of Food in the Last Year: Never true   Transportation Needs: No Transportation Needs    Lack of Transportation (Medical): No    Lack of Transportation (Non-Medical):  No   Physical Activity:     Days of Exercise per Week:     Minutes of Exercise per Session:    Stress:     Feeling of Stress :    Social Connections:     Frequency of Communication with Friends and Family:     Frequency of Social Gatherings with Friends and Family:     Attends Advent Services:     Active Member of Clubs or Organizations:     Attends Club or Organization Meetings:     Marital Status:    Intimate Partner Violence:     Fear of Current or Ex-Partner:     Emotionally Abused:     Physically Abused:     Sexually Abused:             REVIEW OF SYSTEMS: A 12-point review of systemswas obtained and pertinent positives and negatives were enumerated above in the history of present illness. All other reviewed systems / symptoms were negative.     Review of Systems   Constitutional: Positive for appetite change, fatigue and unexpected weight change. HENT: Negative for sore throat, trouble swallowing and voice change. Eyes: Positive for visual disturbance (glasses). Respiratory: Negative for cough, choking and shortness of breath. Cardiovascular: Negative for chest pain and leg swelling. Gastrointestinal: Positive for abdominal distention and abdominal pain. Negative for anal bleeding, blood in stool, constipation, diarrhea, nausea, rectal pain and vomiting. Genitourinary: Negative for difficulty urinating. Musculoskeletal: Positive for myalgias. Negative for back pain and joint swelling. Allergic/Immunologic: Negative for environmental allergies and food allergies. Neurological: Positive for dizziness. Negative for tremors, weakness, light-headedness, numbness (left arm) and headaches. Hematological: Bruises/bleeds easily. Psychiatric/Behavioral: Positive for sleep disturbance.  The patient is not nervous/anxious.                LABORATORY DATA: Reviewed        Lab Results   Component Value Date     WBC 19.1 (H) 08/23/2021     HGB 13.8 08/23/2021     HCT 44.8 08/23/2021     .4 08/23/2021     PLT 72 (L) 08/23/2021      04/20/2021     K 4.3 04/20/2021      04/20/2021     CO2 22 04/20/2021     BUN 9 04/20/2021     CREATININE 0.72 07/28/2021     LABALBU 3.5 04/20/2021     BILITOT 0.97 04/20/2021     ALKPHOS 152 (H) 04/20/2021     AST 46 (H) 04/20/2021     ALT 27 04/20/2021     INR 1.3 08/04/2021                  Lab Results   Component Value Date     RBC 4.46 08/23/2021     HGB 13.8 08/23/2021     .4 08/23/2021     MCH 30.9 08/23/2021     MCHC 30.8 08/23/2021     RDW 16.7 (H) 08/23/2021     MPV 9.9 08/23/2021     BASOPCT 0 08/23/2021     LYMPHSABS 11.46 (H) 08/23/2021     MONOSABS 2.67 (H) 08/23/2021     NEUTROABS 4.78 08/23/2021     EOSABS 0.19 08/23/2021     BASOSABS 0.00 08/23/2021            DIAGNOSTIC TESTING:      IR US GUIDED PARACENTESIS     Result Date: 9/13/2021  PROCEDURE: ULTRASOUND GUIDED PARACENTESIS 9/13/2021 HISTORY: ORDERING SYSTEM PROVIDED HISTORY: Alcoholic cirrhosis of liver with ascites ([facility] TECHNOLOGIST PROVIDED HISTORY: Ascites TECHNIQUE: Informed consent was obtained after a detailed explanation of the procedure including risks, benefits, and alternatives. Universal protocol was followed. The right abdomen was prepped and draped in sterile fashion and local anesthesia was achieved with lidocaine. 5 Western Riya Yueh needle sheath was advanced under ultrasound guidance into ascites and paracentesis was performed. The patient tolerated the procedure well. FINDINGS: A total of 9.1 L straw-colored fluid was removed.      Successful ultrasound guided paracentesis.      IR US GUIDED PARACENTESIS     Result Date: 8/23/2021  PROCEDURE: ULTRASOUND GUIDED PARACENTESIS 8/23/2021 HISTORY: ORDERING SYSTEM PROVIDED HISTORY: Alcoholic cirrhosis of liver with ascites [facility] TECHNOLOGIST PROVIDED HISTORY: Ascites TECHNIQUE: Informed consent was obtained after a detailed explanation of the procedure including risks, benefits, and alternatives. Universal protocol was followed. All elements of maximal sterile barrier technique were used. Preprocedure ultrasound shows a dominant fluid pocket in the right lowerquadrant. Using ultrasound guidance (image attached to the medical record), the fluid pocket was accessed with a 5 Western Riya Yueh needle with aspiration of inch clear yellow fluid. Vacuum bottle paracentesis was performed and approximately 8.3 L was removed. A sample was not requested. The patient received IV albumin replacement. .  The patient tolerated the procedure well and left the department in good condition. Dr. Jesus Ortega was present. FINDINGS: A total of 8.3 L was removed.      Successful ultrasound guided therapeutic paracentesis.       PHYSICAL EXAMINATION: Vital signs reviewed per the nursing documentation.      /77   Pulse 64   Temp 97.7 °F (36.5 °C)   Wt 197 lb (89.4 kg)   BMI 25.29 kg/m²   Body mass index is 25.29 kg/m². Physical Exam  Vitals and nursing note reviewed. Constitutional:       General: He is not in acute distress. Appearance: He is well-developed. He is not diaphoretic. HENT:      Head: Normocephalic and atraumatic. Eyes:      General: No scleral icterus. Pupils: Pupils are equal, round, and reactive to light. Neck:      Thyroid: No thyromegaly. Vascular: No JVD. Trachea: No tracheal deviation. Cardiovascular:      Rate and Rhythm: Normal rate and regular rhythm. Heart sounds: Normal heart sounds. No murmur heard. Pulmonary:      Effort: Pulmonary effort is normal. No respiratory distress. Breath sounds: Normal breath sounds. No wheezing. Abdominal:      General: Bowel sounds are normal. There is no distension. Palpations: Abdomen is soft. There is no mass. Tenderness: There is no abdominal tenderness. There is no guarding or rebound. Comments: ascites   Musculoskeletal:         General: No tenderness. Normal range of motion. Cervical back: Normal range of motion and neck supple. Skin:     General: Skin is warm. Coloration: Skin is not pale. Findings: No erythema or rash. Comments: He is not diaphoretic   Neurological:      Mental Status: He is alert and oriented to person, place, and time. Deep Tendon Reflexes: Reflexes are normal and symmetric.    Psychiatric: Behavior: Behavior normal.         Thought Content: Thought content normal.         Judgment: Judgment normal.               IMPRESSION: Mr. Saul Handley is a 66 y.o. male with        Diagnosis Orders   1. Alcoholic cirrhosis of liver with ascites (HCC)  Comp Metabolic w Bili Profile     EGD   2. Adenomatous polyps  COLONOSCOPY W/ OR W/O BIOPSY   3. Hx of cholecystectomy      4. Abdominal aortic aneurysm (AAA) without rupture (HCC)      5. S/P CABG x 3      Patient with recurrent ascites related to alcohol liver disease has been sober for 2 months  We will increase his diuretic right now to 100/40 we will repeat his 1 week after that kidney function and electrolytes  Long discussion with him about compliance with the scopes will proceed with a colonoscopy to follow on the flat polyp he had which been way overdue right now  And to make sure he does not have any esophageal varices  We will refer him to the transplant service at the [facility]        Diet/life style/natural hx /complication of the dx were all explained in details   Past medical, past surgical, social history, psychiatric history, medications or allergies, all reviewed and  updated     Thank you for allowing me to participate in the care of Mr. Saul Handley. For any further questions please do not hesitate to contact me.     I have reviewed and agree with the ROS entered by the MA/RN.      Note is dictated utilizing voice recognition software. Unfortunately this leads to occasional typographical errors.  Please contact our office if you have any questions.        Ramonita Garcia MD  [facility]  O: #[phone]                  Revision History

## 2021-09-17 NOTE — OP NOTE
Operative Note  PROCEDURE NOTE    DATE OF PROCEDURE: 9/17/2021     SURGEON: Kinga Pacheco MD  Facility: [facility] D[facility]  ASSISTANT: None  Anesthesia: MAC  PREOPERATIVE DIAGNOSIS:   Liver cirrhosis rule out varices    Diagnosis:  F2F3 2 columns of varices were banded    Portal hypertension gastropathy gentle biopsies were taken      POSTOPERATIVE DIAGNOSIS: As described below    OPERATION: Upper GI endoscopy with Biopsy    ANESTHESIA: Moderate Sedation     ESTIMATED BLOOD LOSS: Less than 50 ml    COMPLICATIONS: None. SPECIMENS:  Was Obtained:         Portal hypertension gastropathy gentle biopsies were taken    HISTORY: The patient is a 66y.o. year old male with history of above preop diagnosis. I recommended esophagogastroduodenoscopy with possible biopsy and I explained the risk, benefits, expected outcome, and alternatives to the procedure. Risks included but are not limited to bleeding, infection, respiratory distress, hypotension, and perforation of the esophagus, stomach, or duodenum. Patient understands and is in agreement. The patient was counseled at length about the risks of jaime Covid-19 during their perioperative period and any recovery window from their procedure. The patient was made aware that jaime Covid-19  may worsen their prognosis for recovering from their procedure  and lend to a higher morbidity and/or mortality risk. All material risks, benefits, and reasonable alternatives including postponing the procedure were discussed. The patient does wish to proceed with the procedure at this time. PROCEDURE: The patient was given IV conscious sedation. The patient's SPO2 remained above 90% throughout the procedure. The gastroscope was inserted orally and advanced under direct vision through the esophagus, through the stomach, through the pylorus, and into the descending duodenum. Post sedation note : The patient's SPO2 remained above 90% throughout the procedure. the vital signs remained stable , and no immediate complication form the procedure noted, patient will be ready for d/c when criteria is met . Findings:    Retropharyngeal area was grossly normal appearing    Esophagus: abnormal: F2F3 2 columns of varices were banded      Stomach:    Fundus: Portal hypertension gastropathy gentle biopsies were taken      Body: abnormal: Portal hypertension gastropathy gentle biopsies were taken      Antrum: abnormal: Portal hypertension gastropathy gentle biopsies were taken      Duodenum:     Descending: normal    Bulb: normal    The scope was removed and the patient tolerated the procedure well. Recommendations/Plan:   1. F/U Biopsies  2. F/U In Office in 3-4 weeks  3.  Discussed with the family    Electronically signed by Edwina Molina MD  on 9/17/2021 at 1:29 PM       Electronically signed by Edwina Molina MD on 9/17/2021 at 1:29 PM

## 2021-09-21 LAB — SURGICAL PATHOLOGY REPORT: NORMAL

## 2021-09-22 ENCOUNTER — HOSPITAL ENCOUNTER (OUTPATIENT)
Age: 71
Discharge: HOME | End: 2021-09-22
Payer: MEDICARE

## 2021-09-22 DIAGNOSIS — K70.31 ALCOHOLIC CIRRHOSIS OF LIVER WITH ASCITES (HCC): ICD-10-CM

## 2021-09-22 LAB
ALBUMIN SERPL-MCNC: 3.2 G/DL (ref 3.5–5.2)
ALBUMIN/GLOBULIN RATIO: 0.9 (ref 1–2.5)
ALP BLD-CCNC: 159 U/L (ref 40–129)
ALT SERPL-CCNC: 17 U/L (ref 5–41)
ANION GAP SERPL CALCULATED.3IONS-SCNC: 10 MMOL/L (ref 9–17)
AST SERPL-CCNC: 31 U/L
BILIRUB SERPL-MCNC: 1.49 MG/DL (ref 0.3–1.2)
BILIRUBIN DIRECT: 0.52 MG/DL
BILIRUBIN, INDIRECT: 0.97 MG/DL (ref 0–1)
BUN BLDV-MCNC: 17 MG/DL (ref 8–23)
CALCIUM SERPL-MCNC: 8 MG/DL (ref 8.6–10.4)
CHLORIDE BLD-SCNC: 100 MMOL/L (ref 98–107)
CO2: 23 MMOL/L (ref 20–31)
CREAT SERPL-MCNC: 0.85 MG/DL (ref 0.7–1.2)
GFR AFRICAN AMERICAN: >60 ML/MIN
GFR NON-AFRICAN AMERICAN: >60 ML/MIN
GFR SERPL CREATININE-BSD FRML MDRD: ABNORMAL ML/MIN/{1.73_M2}
GFR SERPL CREATININE-BSD FRML MDRD: ABNORMAL ML/MIN/{1.73_M2}
GLUCOSE BLD-MCNC: 100 MG/DL (ref 70–99)
POTASSIUM SERPL-SCNC: 4.3 MMOL/L (ref 3.7–5.3)
SODIUM BLD-SCNC: 133 MMOL/L (ref 135–144)
TOTAL PROTEIN: 6.9 G/DL (ref 6.4–8.3)

## 2021-09-22 PROCEDURE — 82248 BILIRUBIN DIRECT: CPT

## 2021-09-22 PROCEDURE — 80053 COMPREHEN METABOLIC PANEL: CPT

## 2021-09-22 PROCEDURE — 36415 COLL VENOUS BLD VENIPUNCTURE: CPT

## 2021-09-24 ENCOUNTER — TELEPHONE (OUTPATIENT)
Dept: GASTROENTEROLOGY | Age: 71
End: 2021-09-24

## 2021-09-24 NOTE — TELEPHONE ENCOUNTER
Referral sent via Epic 9/15/21 and faxed 9/24/21. Fax consisted of referral and face sheet. Confirmation received.

## 2021-09-27 ENCOUNTER — NURSE TRIAGE (OUTPATIENT)
Dept: OTHER | Facility: CLINIC | Age: 71
End: 2021-09-27

## 2021-09-27 NOTE — TELEPHONE ENCOUNTER
Reason for Disposition   Abdominal pain is a chronic symptom (recurrent or ongoing AND lasting > 4 weeks)    Answer Assessment - Initial Assessment Questions  1. LOCATION: \"Where does it hurt? \"       Lower abdomen - pt reports noticing a bulge in the lower abdomen, about golf-ball sized. 2. RADIATION: \"Does the pain shoot anywhere else? \" (e.g., chest, back)      Not very painful \"it doesn't really bother me\"    3. ONSET: \"When did the pain begin? \" (Minutes, hours or days ago)       About one month    4. SUDDEN: \"Gradual or sudden onset? \"      Gradual - but pt has noticed that his abdomen is swelling and has to have paracenteses -- has had 3 iftikhar recently -- knows that he has liver disease and non-hodgkins lymphoma    5. PATTERN \"Does the pain come and go, or is it constant? \"     - If constant: \"Is it getting better, staying the same, or worsening? \"       (Note: Constant means the pain never goes away completely; most serious pain is constant and it progresses)      - If intermittent: \"How long does it last?\" \"Do you have pain now? \"      (Note: Intermittent means the pain goes away completely between bouts)      Pt has many concurrent issues    6. SEVERITY: \"How bad is the pain? \"  (e.g., Scale 1-10; mild, moderate, or severe)     - MILD (1-3): doesn't interfere with normal activities, abdomen soft and not tender to touch      - MODERATE (4-7): interferes with normal activities or awakens from sleep, tender to touch      - SEVERE (8-10): excruciating pain, doubled over, unable to do any normal activities       Doesn't hurt to touch it    7. RECURRENT SYMPTOM: \"Have you ever had this type of abdominal pain before? \" If so, ask: \"When was the last time? \" and \"What happened that time? \"       Not before    8. CAUSE: \"What do you think is causing the abdominal pain?\"        9. RELIEVING/AGGRAVATING FACTORS: \"What makes it better or worse? \" (e.g., movement, antacids, bowel movement)      Pt reports that when he lays on his back, the lump will recede back into his stomach    10. OTHER SYMPTOMS: \"Has there been any vomiting, diarrhea, constipation, or urine problems? \"    Protocols used: ABDOMINAL PAIN - MALE-ADULT-OH    Received call from Flaca Jacob at [facility] with Red Flag Complaint. Brief description of triage: pt reports he has a golf-ball sized lump on his lower abdomen near the leg fold -- it goes back in when he lays down    Triage indicates for patient to be seen by Provider    Care advice provided, patient verbalizes understanding; denies any other questions or concerns; instructed to call back for any new or worsening symptoms. Writer provided warm transfer to [place] at [facility] for appointment scheduling. Attention Provider: Thank you for allowing me to participate in the care of your patient. The patient was connected to triage in response to information provided to the ECC/PSC. Please do not respond through this encounter as the response is not directed to a shared pool.

## 2021-09-28 ENCOUNTER — OFFICE VISIT (OUTPATIENT)
Dept: FAMILY MEDICINE CLINIC | Age: 71
End: 2021-09-28
Payer: MEDICARE

## 2021-09-28 ENCOUNTER — TELEPHONE (OUTPATIENT)
Dept: GASTROENTEROLOGY | Age: 71
End: 2021-09-28

## 2021-09-28 VITALS
DIASTOLIC BLOOD PRESSURE: 76 MMHG | TEMPERATURE: 97.6 F | BODY MASS INDEX: 27.08 KG/M2 | WEIGHT: 211 LBS | HEART RATE: 61 BPM | SYSTOLIC BLOOD PRESSURE: 126 MMHG | HEIGHT: 74 IN

## 2021-09-28 DIAGNOSIS — Z23 NEED FOR PROPHYLACTIC VACCINATION AND INOCULATION AGAINST VARICELLA: ICD-10-CM

## 2021-09-28 DIAGNOSIS — F32.1 CURRENT MODERATE EPISODE OF MAJOR DEPRESSIVE DISORDER WITHOUT PRIOR EPISODE (HCC): ICD-10-CM

## 2021-09-28 DIAGNOSIS — Z00.00 ROUTINE GENERAL MEDICAL EXAMINATION AT A HEALTH CARE FACILITY: Primary | ICD-10-CM

## 2021-09-28 DIAGNOSIS — K40.90 INGUINAL HERNIA OF LEFT SIDE WITHOUT OBSTRUCTION OR GANGRENE: ICD-10-CM

## 2021-09-28 DIAGNOSIS — Z23 NEEDS FLU SHOT: ICD-10-CM

## 2021-09-28 PROBLEM — R18.8 CIRRHOSIS OF LIVER WITH ASCITES (HCC): Status: ACTIVE | Noted: 2021-09-28

## 2021-09-28 PROBLEM — K74.60 CIRRHOSIS OF LIVER WITH ASCITES (HCC): Status: ACTIVE | Noted: 2021-09-28

## 2021-09-28 PROCEDURE — G0439 PPPS, SUBSEQ VISIT: HCPCS | Performed by: STUDENT IN AN ORGANIZED HEALTH CARE EDUCATION/TRAINING PROGRAM

## 2021-09-28 PROCEDURE — 90694 VACC AIIV4 NO PRSRV 0.5ML IM: CPT | Performed by: STUDENT IN AN ORGANIZED HEALTH CARE EDUCATION/TRAINING PROGRAM

## 2021-09-28 PROCEDURE — G0008 ADMIN INFLUENZA VIRUS VAC: HCPCS | Performed by: STUDENT IN AN ORGANIZED HEALTH CARE EDUCATION/TRAINING PROGRAM

## 2021-09-28 RX ORDER — LANOLIN ALCOHOL/MO/W.PET/CERES
3 CREAM (GRAM) TOPICAL DAILY
Qty: 90 TABLET | Refills: 1 | Status: SHIPPED | OUTPATIENT
Start: 2021-09-28

## 2021-09-28 ASSESSMENT — PATIENT HEALTH QUESTIONNAIRE - PHQ9
8. MOVING OR SPEAKING SO SLOWLY THAT OTHER PEOPLE COULD HAVE NOTICED. OR THE OPPOSITE, BEING SO FIGETY OR RESTLESS THAT YOU HAVE BEEN MOVING AROUND A LOT MORE THAN USUAL: 0
2. FEELING DOWN, DEPRESSED OR HOPELESS: 1
4. FEELING TIRED OR HAVING LITTLE ENERGY: 3
SUM OF ALL RESPONSES TO PHQ QUESTIONS 1-9: 12
3. TROUBLE FALLING OR STAYING ASLEEP: 2
SUM OF ALL RESPONSES TO PHQ9 QUESTIONS 1 & 2: 4
10. IF YOU CHECKED OFF ANY PROBLEMS, HOW DIFFICULT HAVE THESE PROBLEMS MADE IT FOR YOU TO DO YOUR WORK, TAKE CARE OF THINGS AT HOME, OR GET ALONG WITH OTHER PEOPLE: 1
1. LITTLE INTEREST OR PLEASURE IN DOING THINGS: 3
6. FEELING BAD ABOUT YOURSELF - OR THAT YOU ARE A FAILURE OR HAVE LET YOURSELF OR YOUR FAMILY DOWN: 0
9. THOUGHTS THAT YOU WOULD BE BETTER OFF DEAD, OR OF HURTING YOURSELF: 0
5. POOR APPETITE OR OVEREATING: 3
SUM OF ALL RESPONSES TO PHQ QUESTIONS 1-9: 12
SUM OF ALL RESPONSES TO PHQ QUESTIONS 1-9: 12
7. TROUBLE CONCENTRATING ON THINGS, SUCH AS READING THE NEWSPAPER OR WATCHING TELEVISION: 0

## 2021-09-28 ASSESSMENT — LIFESTYLE VARIABLES
HOW OFTEN DO YOU HAVE A DRINK CONTAINING ALCOHOL: NEVER
AUDIT-C TOTAL SCORE: INCOMPLETE
AUDIT TOTAL SCORE: INCOMPLETE
HOW OFTEN DO YOU HAVE A DRINK CONTAINING ALCOHOL: 0

## 2021-09-28 NOTE — PROGRESS NOTES
Vaccine Information Sheet, \"Influenza - Inactivated\"  given to [place], or parent/legal guardian of  [place] and verbalized understanding. Patient responses:    Have you ever had a reaction to a flu vaccine? No  Do you have any current illness? No  Have you ever had Guillian [place] Syndrome? No  Do you have a serious allergy to any of the following: Neomycin, Polymyxin, Thimerosal, eggs or egg products? No    Flu vaccine given per order. Please see immunization tab. Risks and benefits explained. Current VIS given.       Immunizations Administered     Name Date Dose Route    Influenza, Quadv, adjuvanted, 65 yrs +, IM, PF (Fluad) 9/28/2021 0.5 mL Intramuscular    Site: Deltoid- Right    Lot: [id number]    [address]: [id number]

## 2021-09-28 NOTE — TELEPHONE ENCOUNTER
Patient LM asking for procedure results as well as letting us know about a bulge above his scrotum. Writer returned call. Results are reviewed. Patient is also advised to contact PC regarding bulge. He dis see PCP and was referred to surgeon today.

## 2021-09-28 NOTE — PATIENT INSTRUCTIONS
Personalized Preventive Plan for Travis Willson Links - 9/28/2021  Medicare offers a range of preventive health benefits. Some of the tests and screenings are paid in full while other may be subject to a deductible, co-insurance, and/or copay. Some of these benefits include a comprehensive review of your medical history including lifestyle, illnesses that may run in your family, and various assessments and screenings as appropriate. After reviewing your medical record and screening and assessments performed today your provider may have ordered immunizations, labs, imaging, and/or referrals for you. A list of these orders (if applicable) as well as your Preventive Care list are included within your After Visit Summary for your review. Other Preventive Recommendations:    · A preventive eye exam performed by an eye specialist is recommended every 1-2 years to screen for glaucoma; cataracts, macular degeneration, and other eye disorders. · A preventive dental visit is recommended every 6 months. · Try to get at least 150 minutes of exercise per week or 10,000 steps per day on a pedometer . · Order or download the FREE \"Exercise & Physical Activity: Your Everyday Guide\" from [employer]. Call 7-645.923.6730 or search [employer] online. · You need 4881-5593 mg of calcium and 6649-0617 IU of vitamin D per day. It is possible to meet your calcium requirement with diet alone, but a vitamin D supplement is usually necessary to meet this goal.  · When exposed to the sun, use a sunscreen that protects against both UVA and UVB radiation with an SPF of 30 or greater. Reapply every 2 to 3 hours or after sweating, drying off with a towel, or swimming. · Always wear a seat belt when traveling in a car. Always wear a helmet when riding a bicycle or motorcycle.

## 2021-09-28 NOTE — PROGRESS NOTES
Medicare Annual Wellness Visit  Name: Cristela Span Date: 2021   MRN: [id number] Sex: Male   Age: 66 y.o. Ethnicity: Non- / Non    : 1950 Race: White (non-)      Jemma Daly is here for Mass (lower abdomen/groin area noticed is  no pain ) and Medicare AWV    Screenings for behavioral, psychosocial and functional/safety risks, and cognitive dysfunction are all negative except as indicated below. These results, as well as other patient data from the [address] form, are documented in Flowsheets linked to this Encounter. Allergies   Allergen Reactions    Iv Dye [Iodides] Hives    Iodinated Diagnostic Agents Hives     Patient developed hives even with steroid prep. Prior to Visit Medications    Medication Sig Taking?  Authorizing Provider   zoster recombinant adjuvanted vaccine (SHINGRIX) 50 MCG/0.5ML SUSR injection Inject 0.5 mLs into the muscle once for 1 dose Yes Umu Glynn MD   melatonin (RA MELATONIN) 3 MG TABS tablet Take 1 tablet by mouth daily Yes Umu Glynn MD   spironolactone (ALDACTONE) 100 MG tablet Take 1 tablet by mouth daily Yes Carlos Scott MD   furosemide (LASIX) 40 MG tablet Take 1 tablet by mouth daily Yes Enid Adames MD   atorvastatin (LIPITOR) 20 MG tablet Take 1 tablet by mouth nightly Yes Umu Glynn MD   metoprolol (LOPRESSOR) 100 MG tablet Take 1 tablet by mouth 2 times daily  Patient taking differently: Take 50 mg by mouth 2 times daily  Yes Umu Glynn MD   aspirin 81 MG tablet Take 81 mg by mouth daily Yes Historical Provider, MD   omeprazole (PRILOSEC) 10 MG delayed release capsule Take 1 capsule by mouth daily as needed (GERD) Yes Rios Fleming MD   spironolactone (ALDACTONE) 25 MG tablet Take 2 tablets by mouth daily  Patient not taking: Reported on 2021  Carlos Scott MD         Past Medical History:   Diagnosis Date    AAA (abdominal aortic aneurysm) ([facility] [address] 3/23/2015    3.8cm     CAD (coronary artery disease)     Colon polyps     Colon tumor     Congestive heart failure (CHF) (HCC) 01/2021    GERD (gastroesophageal reflux disease)     prilosec as needed    H/O chest tube placement     chest tube x4 different times    History of pneumonia     History of pneumothorax     x4    Hx of cardiac cath 02/19/2021    Hyperlipidemia     Hypertension     Non Hodgkin's lymphoma ([facility] [address]     NHL    Pneumonia 1970's    patient had 3 chest tubes and in hospital for 13 days    Unspecified cerebral artery occlusion with cerebral infarction 10/01/2014    stroke , numbness in the left arm from the elbow down    Wellness examination 02/25/2021    [facility]p-[facility] nov 2020    Wellness examination 02/25/2021    Cardiology-Dr [address] feb 2021    Wellness examination 02/25/2021    onc-Dr Sandford Bosworth 2021       Past Surgical History:   Procedure Laterality Date    APPENDECTOMY  4/21/15    CHOLECYSTECTOMY, OPEN  04/21/15    COLONOSCOPY      COLONOSCOPY  07/10/2020    COLONOSCOPY POLYPECTOMY HOT BIOPSY (N/A )    COLONOSCOPY N/A 7/10/2020    COLONOSCOPY POLYPECTOMY HOT BIOPSY performed by Erik Miles MD at [facility] N/A 9/17/2021    COLONOSCOPY WITH BIOPSY performed by Erik Miles MD at [place] [id number] N/A 3/3/2021    CABG CORONARY ARTERY BYPASS X3, ON PUMP; SWAN MARLENY, MIRANDA PER ANESTHESIA performed by Kenan Huston MD at [place] [address]  2019    stent to left leg with tubular graft to left leg artery    RIGHT COLECTOMY  04/21/15    TRANSESOPHAGEAL ECHOCARDIOGRAM  10/3/14    UPPER GASTROINTESTINAL ENDOSCOPY N/A 9/17/2021    EGD BAND LIGATION AND BIOPSY performed by Erik Miles MD at [address] [place] [address]           Family History   Problem Relation Age of Onset    Heart Disease Mother     Stroke Mother     Alcohol Abuse Father     Cancer Father        CareTe reflexes normal and symmetric, no cranial nerve deficit, gait, coordination and speech normal    Patient's complete Health Risk Assessment and screening values have been reviewed and are found in Flowsheets. The following problems were reviewed today and where indicated follow up appointments were made and/or referrals ordered. Positive Risk Factor Screenings with Interventions:       Depression:  PHQ-2 Score: 4  PHQ-9 Total Score: 12    Severity:1-4 = minimal depression, 5-9 = mild depression, 10-14 = moderate depression, 15-19 = moderately severe depression, 20-27 = severe depression  Depression Interventions:  · Patient declines any further evaluation/treatment for this issue        General Health and ACP:  General  In general, how would you say your health is?: (!) Poor  In the past 7 days, have you experienced any of the following?  New or Increased Pain, New or Increased Fatigue, Loneliness, Social Isolation, Stress or Anger?: (!) New or Increased Fatigue, Stress  Do you get the social and emotional support that you need?: Yes  Do you have a Living Will?: Yes  Advance Directives     Power of  Living Will ACP-Advance Directive ACP-Power of Rajat Marroquin on 03/10/21 Filed on 03/10/21 [facility] Health Risk Interventions:  · Stress: patient's comments regarding reasons for stress and/or anger: Dealing with medical issues, patient declines any further evaluation/treatment for this issue    Health Habits/Nutrition:  Health Habits/Nutrition  Do you exercise for at least 20 minutes 2-3 times per week?: (!) No  Have you lost any weight without trying in the past 3 months?: (!) Yes  Do you eat only one meal per day?: No  Have you seen the dentist within the past year?: Yes  Body mass index: (!) 27.09  Health Habits/Nutrition Interventions:  · Nutritional issues:  patient is not ready to address his/her nutritional/weight issues at this time    Hearing/Vision:  No exam data present  Hearing/Vision  Do you or your family notice any trouble with your hearing that hasn't been managed with hearing aids?: No  Do you have difficulty driving, watching TV, or doing any of your daily activities because of your eyesight?: No  Have you had an eye exam within the past year?: (!) No  Hearing/Vision Interventions:  · Vision concerns:  patient encouraged to make appointment with his/her eye specialist    Safety:  Safety  Do you have working smoke detectors?: Yes  Have all throw rugs been removed or fastened?: (!) No  Do you have non-slip mats or surfaces in all bathtubs/showers?: (!) No  Do all of your stairways have a railing or banister?: Yes  Are your doorways, halls and stairs free of clutter?: Yes  Do you always fasten your seatbelt when you are in a car?: Yes  Safety Interventions:  · Patient declines any further evaluation/treatment for this issue     Personalized Preventive Plan   Current Health Maintenance Status  Immunization History   Administered Date(s) Administered    COVID-19, J&J, PF, 0.5 mL 03/31/2021    Influenza Virus Vaccine 10/17/2014, 10/18/2016    Influenza, High Dose (Fluzone 65 yrs and older) 10/17/2014, 10/18/2016    Influenza, Quadv, adjuvanted, 65 yrs +, IM, PF (Fluad) 09/29/2020, 09/28/2021    Pneumococcal Conjugate 13-valent ([id number]) 03/22/2016    Pneumococcal Polysaccharide ([id number]) 10/17/2014, 04/04/2019    Tdap (Boostrix, Adacel) 07/11/2016    Zoster Live (Zostavax) 07/28/2016        Health Maintenance   Topic Date Due    Meningococcal (ACWY) vaccine (1 - Risk start before 7 months 4-dose series) Never done    Hepatitis A vaccine (1 of 2 - Risk 2-dose series) Never done    Hepatitis B vaccine (1 of 3 - Risk 3-dose series) Never done    Shingles Vaccine (2 of 3) 09/22/2016    Annual Wellness Visit (AWV)  Never done    Hib vaccine (1 of 1 - Risk 1-dose series) 04/23/2022 (Originally 9/8/1951)    Meningococcal B vaccine (1 of 4 - Increased Risk Bexsero 2-dose series) 04/23/2022 (Originally 6/8/1960)    Low dose CT lung screening  03/02/2022    Lipid screen  04/20/2022    Creatinine monitoring  07/28/2022    Potassium monitoring  09/22/2022    Colon cancer screen colonoscopy  09/17/2024    DTaP/Tdap/Td vaccine (2 - Td or Tdap) 07/11/2026    Flu vaccine  Completed    Pneumococcal 65+ yrs at Risk Vaccine  Completed    COVID-19 Vaccine  Completed    Hepatitis C screen  Completed     Recommendations for Preventive Services Due: see orders and patient instructions/AVS.  . Recommended screening schedule for the next 5-10 years is provided to the patient in written form: see Patient Instructions/AVS.    Yue Cohen was seen today for [facility] and medicare awv. Diagnoses and all orders for this visit:    Routine general medical examination at a health care facility    Current moderate episode of major depressive disorder without prior episode (HCC)  -     melatonin (RA MELATONIN) 3 MG TABS tablet; Take 1 tablet by mouth daily    Need for prophylactic vaccination and inoculation against varicella  -     zoster recombinant adjuvanted vaccine [facility]) 50 MCG/0.5ML SUSR injection;  Inject 0.5 mLs into the muscle once for 1 dose    Needs flu shot  -     INFLUENZA, QUADV, ADJUVANTED, 65 YRS =, IM, PF, PREFILL SYR, 0.5ML (FLUAD)    Inguinal hernia of left side without obstruction or gangrene  -     Maritza Salinas DO, General Surgery, [place]

## 2021-10-04 ENCOUNTER — HOSPITAL ENCOUNTER (OUTPATIENT)
Dept: INTERVENTIONAL RADIOLOGY/VASCULAR | Age: 71
Discharge: HOME | End: 2021-10-06
Payer: MEDICARE

## 2021-10-04 VITALS
TEMPERATURE: 96.4 F | DIASTOLIC BLOOD PRESSURE: 70 MMHG | HEART RATE: 52 BPM | OXYGEN SATURATION: 96 % | SYSTOLIC BLOOD PRESSURE: 132 MMHG | RESPIRATION RATE: 16 BRPM

## 2021-10-04 DIAGNOSIS — K70.31 ALCOHOLIC CIRRHOSIS OF LIVER WITH ASCITES (HCC): ICD-10-CM

## 2021-10-04 LAB
INR BLD: 1.4
PARTIAL THROMBOPLASTIN TIME: 38 SEC (ref 23.9–33.8)
PLATELET # BLD: 69 K/UL (ref 138–453)
PROTHROMBIN TIME: 16.9 SEC (ref 11.5–14.2)

## 2021-10-04 PROCEDURE — 85610 PROTHROMBIN TIME: CPT

## 2021-10-04 PROCEDURE — 49083 ABD PARACENTESIS W/IMAGING: CPT

## 2021-10-04 PROCEDURE — 6360000002 HC RX W HCPCS: Performed by: RADIOLOGY

## 2021-10-04 PROCEDURE — 85730 THROMBOPLASTIN TIME PARTIAL: CPT

## 2021-10-04 PROCEDURE — C1729 CATH, DRAINAGE: HCPCS

## 2021-10-04 PROCEDURE — P9047 ALBUMIN (HUMAN), 25%, 50ML: HCPCS | Performed by: RADIOLOGY

## 2021-10-04 PROCEDURE — 85049 AUTOMATED PLATELET COUNT: CPT

## 2021-10-04 PROCEDURE — 36415 COLL VENOUS BLD VENIPUNCTURE: CPT

## 2021-10-04 RX ORDER — ALBUMIN (HUMAN) 12.5 G/50ML
50 SOLUTION INTRAVENOUS ONCE
Status: COMPLETED | OUTPATIENT
Start: 2021-10-04 | End: 2021-10-04

## 2021-10-04 RX ADMIN — ALBUMIN (HUMAN) 50 G: 0.25 INJECTION, SOLUTION INTRAVENOUS at 09:38

## 2021-10-04 NOTE — PROGRESS NOTES
Patient tolerated paracentesis without distress. [id number] removed. Dressing to site. Discharge instructions given, no questions at this time. Patient discharged home via private auto.

## 2021-10-04 NOTE — BRIEF OP NOTE
Brief Postoperative Note    Carmelina Cruz  YOB: 1950  [id number]    Pre-operative Diagnosis: Ascites    Post-operative Diagnosis: Same    Procedure: Paracentesis    Anesthesia: Local    Surgeons/Assistants: John Reis MD    Estimated Blood Loss: less than 50     Complications: None    Specimens: Was Obtained: if ordered    Findings: Paracentesis.  See radiology report for details    Electronically signed by Pennie Machado MD on 10/8/2021 at 8:33 AM

## 2021-10-05 ENCOUNTER — PATIENT MESSAGE (OUTPATIENT)
Dept: GASTROENTEROLOGY | Age: 71
End: 2021-10-05

## 2021-10-05 DIAGNOSIS — K74.60 CIRRHOSIS OF LIVER WITH ASCITES, UNSPECIFIED HEPATIC CIRRHOSIS TYPE (HCC): Primary | ICD-10-CM

## 2021-10-05 DIAGNOSIS — R18.8 CIRRHOSIS OF LIVER WITH ASCITES, UNSPECIFIED HEPATIC CIRRHOSIS TYPE (HCC): Primary | ICD-10-CM

## 2021-10-06 RX ORDER — FUROSEMIDE 40 MG/1
40 TABLET ORAL 2 TIMES DAILY
Qty: 60 TABLET | Refills: 3 | Status: SHIPPED | OUTPATIENT
Start: 2021-10-06 | End: 2022-02-07 | Stop reason: ALTCHOICE

## 2021-10-06 RX ORDER — SPIRONOLACTONE 100 MG/1
200 TABLET, FILM COATED ORAL DAILY
Qty: 60 TABLET | Refills: 3 | Status: SHIPPED | OUTPATIENT
Start: 2021-10-06 | End: 2022-02-10

## 2021-10-06 NOTE — TELEPHONE ENCOUNTER
From: Asiya Reza Links  To: Erica Rosen MD  Sent: 10/5/2021 3:30 PM EDT  Subject: Prescription Question    I have been asking for a review of the water delinition pills, Dr Shiloh Bailey said it cAn be increased last I spoke with him. ALSO. Yesterday I had my 4th paracentesis at [facility]. 1St was 7.4 liters, 2nd was 8,3, 3rd 9.1 and yesterday 10.2. Please some one doctor know and notify me by phone or this.

## 2021-10-12 ENCOUNTER — OFFICE VISIT (OUTPATIENT)
Dept: SURGERY | Age: 71
End: 2021-10-12
Payer: MEDICARE

## 2021-10-12 VITALS
OXYGEN SATURATION: 99 % | HEIGHT: 74 IN | WEIGHT: 211 LBS | RESPIRATION RATE: 12 BRPM | HEART RATE: 73 BPM | BODY MASS INDEX: 27.08 KG/M2

## 2021-10-12 DIAGNOSIS — K70.31 ALCOHOLIC CIRRHOSIS OF LIVER WITH ASCITES (HCC): ICD-10-CM

## 2021-10-12 DIAGNOSIS — K40.90 RIGHT INGUINAL HERNIA: Primary | ICD-10-CM

## 2021-10-12 PROCEDURE — 99203 OFFICE O/P NEW LOW 30 MIN: CPT | Performed by: SURGERY

## 2021-10-12 NOTE — PROGRESS NOTES
in his mother; Stroke in his mother. Social History  Tobacco use:  reports that he quit smoking about 8 years ago. His smoking use included cigarettes. He has a 30.00 pack-year smoking history. He has never used smokeless tobacco.  Alcohol use:  reports previous alcohol use of about 14.0 standard drinks of alcohol per week. Drug use:  reports no history of drug use. Medications  Current Medications:   Current Outpatient Medications   Medication Sig Dispense Refill    spironolactone (ALDACTONE) 100 MG tablet Take 2 tablets by mouth daily 60 tablet 3    furosemide (LASIX) 40 MG tablet Take 1 tablet by mouth 2 times daily 60 tablet 3    melatonin (RA MELATONIN) 3 MG TABS tablet Take 1 tablet by mouth daily 90 tablet 1    atorvastatin (LIPITOR) 20 MG tablet Take 1 tablet by mouth nightly 90 tablet 0    metoprolol (LOPRESSOR) 100 MG tablet Take 1 tablet by mouth 2 times daily (Patient taking differently: Take 50 mg by mouth 2 times daily ) 120 tablet 1    aspirin 81 MG tablet Take 81 mg by mouth daily      omeprazole (PRILOSEC) 10 MG delayed release capsule Take 1 capsule by mouth daily as needed (GERD) 90 capsule 3     No current facility-administered medications for this visit. Home Medications:   Prior to Admission medications    Medication Sig Start Date End Date Taking?  Authorizing Provider   spironolactone (ALDACTONE) 100 MG tablet Take 2 tablets by mouth daily 10/6/21  Yes Cony Gaitan MD   furosemide (LASIX) 40 MG tablet Take 1 tablet by mouth 2 times daily 10/6/21  Yes Cony Gaitan MD   melatonin (RA MELATONIN) 3 MG TABS tablet Take 1 tablet by mouth daily 9/28/21  Yes Salty Roldan MD   atorvastatin (LIPITOR) 20 MG tablet Take 1 tablet by mouth nightly 4/23/21  Yes Salty Roldan MD   metoprolol (LOPRESSOR) 100 MG tablet Take 1 tablet by mouth 2 times daily  Patient taking differently: Take 50 mg by mouth 2 times daily  4/23/21  Yes Salty Roldan MD   aspirin 81 MG tablet Take 81 mg by mouth daily   Yes Historical Provider, MD   omeprazole (PRILOSEC) 10 MG delayed release capsule Take 1 capsule by mouth daily as needed (GERD) 6/9/17  Yes Abigail Jarrell MD       Allergies  Iv dye [iodides] and Iodinated diagnostic agents      Review of Systems:  General: Denies any fever, chills. Eyes: Denies any changes in vision, diplopia or eye pain  Ears, Nose, Mouth: Denies changes in hearing/tinnitus or drainage from ears, no rhinorrhea or bloody nose, no difficulty chewing  Throat: no difficulty swallowing, no throat pain  Respiratory: Denies any shortness of breath or cough. Cardiac: Denies any chest pain, palpitations, claudication or edema. Gastrointestinal: Denies any melena, hematochezia, hematemesis or pyrosis. Genitourinary: Denies any frequency, urgency, hesitancy or incontinence. Musculoskeletal: Denies worsening muscle weakness or recent trauma  Skin: Denies rashes or lesions  Psychiatric: Denies any recent changes in mood or affect  Hematologic: Denies bruising or bleeding easily. PHYSICAL EXAMINATION  Vitals:   Vitals:    10/12/21 0925   Pulse: 73   Resp: 12   SpO2: 99%       General Appearance:  awake, alert, no acute distress, well developed, well nourished   Skin:  Skin color, texture, turgor normal. No rashes or lesions. Head/face:  NCAT, face symmetrical  Eyes:  PERRL, no evidence of conjunctivitis or ptosis bilaterally  Ears:  External ears and canals grossly normal, no evidence of otorrhea. Nose/Sinuses:  Nares normal. Septum midline. Mucosa normal. No external drainage noted. Mouth/Neck:  Mucosa moist.  No external oral lesions. Trachea midline. No visible masses. Lungs:  Normal chest expansion, unlabored breathing without accessory muscle use. No audible rales, rhonchi, or wheezing. Cardiovascular: S1S2. No evidence of JVD. No evidence of pulsatile masses in abdomen  Abdomen:  Soft, non-tender, no organomegaly, no masses.   Right inguinal bulge that is easily reducible Electronically signed by Garcia Adhikari DO on 10/12/2021 at 10:20 AM

## 2021-10-15 ENCOUNTER — HOSPITAL ENCOUNTER (OUTPATIENT)
Dept: INTERVENTIONAL RADIOLOGY/VASCULAR | Age: 71
Discharge: HOME | End: 2021-10-17
Payer: MEDICARE

## 2021-10-15 VITALS
TEMPERATURE: 98.2 F | RESPIRATION RATE: 16 BRPM | SYSTOLIC BLOOD PRESSURE: 97 MMHG | HEART RATE: 55 BPM | OXYGEN SATURATION: 99 % | DIASTOLIC BLOOD PRESSURE: 63 MMHG

## 2021-10-15 DIAGNOSIS — K70.31 ALCOHOLIC CIRRHOSIS OF LIVER WITH ASCITES (HCC): ICD-10-CM

## 2021-10-15 PROCEDURE — 7100000010 HC PHASE II RECOVERY - FIRST 15 MIN

## 2021-10-15 PROCEDURE — 7100000011 HC PHASE II RECOVERY - ADDTL 15 MIN

## 2021-10-15 PROCEDURE — P9047 ALBUMIN (HUMAN), 25%, 50ML: HCPCS | Performed by: RADIOLOGY

## 2021-10-15 PROCEDURE — 6360000002 HC RX W HCPCS: Performed by: RADIOLOGY

## 2021-10-15 PROCEDURE — 49083 ABD PARACENTESIS W/IMAGING: CPT | Performed by: RADIOLOGY

## 2021-10-15 PROCEDURE — C1729 CATH, DRAINAGE: HCPCS

## 2021-10-15 RX ORDER — ALBUMIN (HUMAN) 12.5 G/50ML
50 SOLUTION INTRAVENOUS ONCE
Status: COMPLETED | OUTPATIENT
Start: 2021-10-15 | End: 2021-10-15

## 2021-10-15 RX ORDER — SODIUM CHLORIDE 9 MG/ML
INJECTION, SOLUTION INTRAVENOUS CONTINUOUS
Status: DISCONTINUED | OUTPATIENT
Start: 2021-10-15 | End: 2021-10-18 | Stop reason: HOSPADM

## 2021-10-15 RX ORDER — ACETAMINOPHEN 325 MG/1
650 TABLET ORAL EVERY 4 HOURS PRN
Status: DISCONTINUED | OUTPATIENT
Start: 2021-10-15 | End: 2021-10-18 | Stop reason: HOSPADM

## 2021-10-15 RX ADMIN — ALBUMIN (HUMAN) 50 G: 0.25 INJECTION, SOLUTION INTRAVENOUS at 09:15

## 2021-10-15 ASSESSMENT — PAIN SCALES - GENERAL
PAINLEVEL_OUTOF10: 0

## 2021-10-15 NOTE — PROGRESS NOTES
Paracentesis completed with 6.6 clear yellow fluid removed via paracentesis. Pt to go to recovery to receive albumin before discharge.

## 2021-10-15 NOTE — BRIEF OP NOTE
Brief Postoperative Note    Pipo Cruz  YOB: 1950  [id number]    Pre-operative Diagnosis: Recurrent ascites; abdominal discomfort    Post-operative Diagnosis: Same    Procedure: US guided paracentesis     Anesthesia: Local    Surgeons/Assistants: Casey    Estimated Blood Loss: less than 50     Complications: None    Specimens: Was Not Obtained    Electronically signed by Valery Ignacio MD on 10/15/2021 at 8:38 AM

## 2021-10-20 ENCOUNTER — HOSPITAL ENCOUNTER (OUTPATIENT)
Age: 71
Discharge: HOME | End: 2021-10-20
Payer: MEDICARE

## 2021-10-20 DIAGNOSIS — K74.60 CIRRHOSIS OF LIVER WITH ASCITES, UNSPECIFIED HEPATIC CIRRHOSIS TYPE (HCC): ICD-10-CM

## 2021-10-20 DIAGNOSIS — R18.8 CIRRHOSIS OF LIVER WITH ASCITES, UNSPECIFIED HEPATIC CIRRHOSIS TYPE (HCC): ICD-10-CM

## 2021-10-20 LAB
ALBUMIN SERPL-MCNC: 3.3 G/DL (ref 3.5–5.2)
ALBUMIN/GLOBULIN RATIO: 1 (ref 1–2.5)
ALP BLD-CCNC: 193 U/L (ref 40–129)
ALT SERPL-CCNC: 20 U/L (ref 5–41)
ANION GAP SERPL CALCULATED.3IONS-SCNC: 11 MMOL/L (ref 9–17)
AST SERPL-CCNC: 38 U/L
BILIRUB SERPL-MCNC: 1.31 MG/DL (ref 0.3–1.2)
BILIRUBIN DIRECT: 0.42 MG/DL
BILIRUBIN, INDIRECT: 0.89 MG/DL (ref 0–1)
BUN BLDV-MCNC: 19 MG/DL (ref 8–23)
CALCIUM SERPL-MCNC: 8.3 MG/DL (ref 8.6–10.4)
CHLORIDE BLD-SCNC: 98 MMOL/L (ref 98–107)
CO2: 24 MMOL/L (ref 20–31)
CREAT SERPL-MCNC: 1.01 MG/DL (ref 0.7–1.2)
GFR AFRICAN AMERICAN: >60 ML/MIN
GFR NON-AFRICAN AMERICAN: >60 ML/MIN
GFR SERPL CREATININE-BSD FRML MDRD: ABNORMAL ML/MIN/{1.73_M2}
GFR SERPL CREATININE-BSD FRML MDRD: ABNORMAL ML/MIN/{1.73_M2}
GLUCOSE BLD-MCNC: 105 MG/DL (ref 70–99)
POTASSIUM SERPL-SCNC: 4.3 MMOL/L (ref 3.7–5.3)
SODIUM BLD-SCNC: 133 MMOL/L (ref 135–144)
TOTAL PROTEIN: 6.6 G/DL (ref 6.4–8.3)

## 2021-10-20 PROCEDURE — 36415 COLL VENOUS BLD VENIPUNCTURE: CPT

## 2021-10-20 PROCEDURE — 80053 COMPREHEN METABOLIC PANEL: CPT

## 2021-10-20 PROCEDURE — 82248 BILIRUBIN DIRECT: CPT

## 2021-10-20 NOTE — TELEPHONE ENCOUNTER
Patient states that he thinks that the lasix is helping and that we may want to increase it. Would like a call back to discuss.

## 2021-10-22 NOTE — TELEPHONE ENCOUNTER
Referral returned. Patient did not return 2 phone calls. Writer spoke to pt as well. He is declining appt at this time for TIPS procedure. He believes this can be resolved with increase in medications.

## 2021-11-15 RX ORDER — SODIUM CHLORIDE 9 MG/ML
INJECTION, SOLUTION INTRAVENOUS CONTINUOUS
Status: CANCELLED | OUTPATIENT
Start: 2021-11-15

## 2021-11-17 ENCOUNTER — HOSPITAL ENCOUNTER (OUTPATIENT)
Dept: INTERVENTIONAL RADIOLOGY/VASCULAR | Age: 71
Discharge: HOME | End: 2021-11-19
Payer: MEDICARE

## 2021-11-17 VITALS
BODY MASS INDEX: 26.18 KG/M2 | WEIGHT: 204 LBS | OXYGEN SATURATION: 97 % | SYSTOLIC BLOOD PRESSURE: 121 MMHG | HEIGHT: 74 IN | TEMPERATURE: 96.9 F | RESPIRATION RATE: 16 BRPM | HEART RATE: 59 BPM | DIASTOLIC BLOOD PRESSURE: 64 MMHG

## 2021-11-17 DIAGNOSIS — K70.31 ALCOHOLIC CIRRHOSIS OF LIVER WITH ASCITES (HCC): ICD-10-CM

## 2021-11-17 LAB
INR BLD: 1.3
PLATELET # BLD: 80 K/UL (ref 138–453)
PROTHROMBIN TIME: 16 SEC (ref 11.5–14.2)

## 2021-11-17 PROCEDURE — 85049 AUTOMATED PLATELET COUNT: CPT

## 2021-11-17 PROCEDURE — 2580000003 HC RX 258: Performed by: RADIOLOGY

## 2021-11-17 PROCEDURE — 49083 ABD PARACENTESIS W/IMAGING: CPT | Performed by: RADIOLOGY

## 2021-11-17 PROCEDURE — 2500000003 HC RX 250 WO HCPCS: Performed by: RADIOLOGY

## 2021-11-17 PROCEDURE — C1729 CATH, DRAINAGE: HCPCS

## 2021-11-17 PROCEDURE — 6360000002 HC RX W HCPCS: Performed by: RADIOLOGY

## 2021-11-17 PROCEDURE — P9047 ALBUMIN (HUMAN), 25%, 50ML: HCPCS | Performed by: RADIOLOGY

## 2021-11-17 PROCEDURE — 85610 PROTHROMBIN TIME: CPT

## 2021-11-17 RX ORDER — SODIUM CHLORIDE 9 MG/ML
INJECTION, SOLUTION INTRAVENOUS CONTINUOUS
Status: DISCONTINUED | OUTPATIENT
Start: 2021-11-17 | End: 2021-11-20 | Stop reason: HOSPADM

## 2021-11-17 RX ORDER — ALBUMIN (HUMAN) 12.5 G/50ML
50 SOLUTION INTRAVENOUS ONCE
Status: COMPLETED | OUTPATIENT
Start: 2021-11-17 | End: 2021-11-17

## 2021-11-17 RX ORDER — LIDOCAINE HYDROCHLORIDE 10 MG/ML
5 INJECTION, SOLUTION EPIDURAL; INFILTRATION; INTRACAUDAL; PERINEURAL ONCE
Status: COMPLETED | OUTPATIENT
Start: 2021-11-17 | End: 2021-11-17

## 2021-11-17 RX ADMIN — SODIUM CHLORIDE: 9 INJECTION, SOLUTION INTRAVENOUS at 13:44

## 2021-11-17 RX ADMIN — ALBUMIN (HUMAN) 50 G: 0.25 INJECTION, SOLUTION INTRAVENOUS at 14:53

## 2021-11-17 RX ADMIN — LIDOCAINE HYDROCHLORIDE 5 ML: 10 INJECTION, SOLUTION EPIDURAL; INFILTRATION; INTRACAUDAL; PERINEURAL at 14:30

## 2021-11-17 ASSESSMENT — PAIN DESCRIPTION - PAIN TYPE: TYPE: ACUTE PAIN

## 2021-11-17 ASSESSMENT — PAIN SCALES - GENERAL: PAINLEVEL_OUTOF10: 1

## 2021-11-17 ASSESSMENT — PAIN DESCRIPTION - DESCRIPTORS: DESCRIPTORS: ACHING

## 2021-11-17 ASSESSMENT — PAIN DESCRIPTION - LOCATION: LOCATION: ABDOMEN

## 2021-11-17 NOTE — BRIEF OP NOTE
Brief Postoperative Note    Catherine Cruz  YOB: 1950  [id number]    Pre-operative Diagnosis: Recurrent ascites; abdominal discomfort    Post-operative Diagnosis: Same    Procedure: US guided paracentesis     Anesthesia: Local    Surgeons/Assistants: Casey    Estimated Blood Loss: less than 50     Complications: None    Specimens: Was Not Obtained    Electronically signed by Guerda Jay MD on 11/17/2021 at 3:00 PM

## 2021-11-17 NOTE — FLOWSHEET NOTE
Pt tolerated procedure without distress. 9.8 liters of yellow ascitic fluid removed no specimens ordered  Dressing applied to procedure site. Discharge instructions reviewed understanding verbalized, pt released ambulatory in nad.

## 2021-11-17 NOTE — H&P
History and Physical Service   [place] [address]    HISTORY AND PHYSICAL EXAMINATION            Date of Evaluation: 11/17/2021  Patient name:  Mimi Ly  MRN:   [id number]  YOB: 1950  PCP:    Betty Jones MD    History Obtained From:     Patient, medical records    History of Present Illness: This is Mimi Ly a 66 y.o. male with multiple comorbidities who presents today for a U/S guided paracentesis in IR by Dr Jason Jaramillo for cirrhosis. Patient follows with Gastroenterologist, Dr Robert Garay for alcohol liver cirrhosis. He is having a paracentesis done routinely Last paracentesis on 10/15/21 with 6.6 clear yellow fluid removed. Jose Gutter He returns today for his scheduled procedure. Patient c/o increasig abdominal fluid with mild SOB with exertion  Denies swelling in his legs, recent fever, chills, open sores or wounds. No DM. Hx CAD, AAA, CHF, HTN, CABG x3 (3/2021). Patient is unable to take Plavix due to lymphoma. Patient's follows with [facility]. Today denies dizziness, lightheadedness, syncope, chest pain or palpitations.  Last ASA 81mg 5 days ago     Past Medical History:     Past Medical History:   Diagnosis Date    AAA (abdominal aortic aneurysm) ([address] 3/23/2015    3.8cm     CAD (coronary artery disease)     Colon polyps     Colon tumor     Congestive heart failure (CHF) ([address] 01/2021    GERD (gastroesophageal reflux disease)     prilosec as needed    H/O chest tube placement     chest tube x4 different times    History of pneumonia     History of pneumothorax     x4    Hx of cardiac cath 02/19/2021    Hyperlipidemia     Hypertension     Non Hodgkin's lymphoma ([facility]     NHL    Pneumonia 1970's    patient had 3 chest tubes and in hospital for 13 days    Unspecified cerebral artery occlusion with cerebral infarction 10/01/2014    stroke , numbness in the left arm from the elbow down    Wellness examination 02/25/2021    [facility] nov 2020    Wellness examination 02/25/2021    Cardiology-Dr [facility]ara[facility] [address] feb 2021    Wellness examination 02/25/2021    onc-Dr Neil Members 2021        Past Surgical History:     Past Surgical History:   Procedure Laterality Date    APPENDECTOMY  4/21/15    CHOLECYSTECTOMY, OPEN  04/21/15    COLONOSCOPY      COLONOSCOPY  07/10/2020    COLONOSCOPY POLYPECTOMY HOT BIOPSY (N/A )    COLONOSCOPY N/A 7/10/2020    COLONOSCOPY POLYPECTOMY HOT BIOPSY performed by Laurel Hylton MD at [facility] 9/17/2021    COLONOSCOPY WITH BIOPSY performed by Laruel Hylton MD at [place] [address] N/A 3/3/2021    CABG CORONARY ARTERY BYPASS X3, ON PUMP; SWAN MARLENY, MIRANDA PER ANESTHESIA performed by Wing Nkechi MD at [address] [place] [address]    stent to left leg with tubular graft to left leg artery    RIGHT COLECTOMY  04/21/15    TRANSESOPHAGEAL ECHOCARDIOGRAM  10/3/14    UPPER GASTROINTESTINAL ENDOSCOPY N/A 9/17/2021    EGD BAND LIGATION AND BIOPSY performed by Laurel Hylton MD at [address] [place] [address]          Medications Prior to Admission:     Prior to Admission medications    Medication Sig Start Date End Date Taking?  Authorizing Provider   spironolactone (ALDACTONE) 100 MG tablet Take 2 tablets by mouth daily 10/6/21  Yes Laurel Hylton MD   furosemide (LASIX) 40 MG tablet Take 1 tablet by mouth 2 times daily 10/6/21  Yes Laurel Hylton MD   melatonin (RA MELATONIN) 3 MG TABS tablet Take 1 tablet by mouth daily 9/28/21  Yes Marquis Mahajan MD   atorvastatin (LIPITOR) 20 MG tablet Take 1 tablet by mouth nightly 4/23/21  Yes Marquis Mahajan MD   metoprolol (LOPRESSOR) 100 MG tablet Take 1 tablet by mouth 2 times daily  Patient taking differently: Take 50 mg by mouth 2 times daily  4/23/21  Yes Marquis Mahajan MD   omeprazole (PRILOSEC) 10 MG delayed release capsule Take 1 capsule by mouth daily as needed (GERD) 6/9/17  Yes Ronald Griffiht MD   aspirin 81 MG tablet Take 81 mg by mouth daily    Historical Provider, MD        Allergies: Iv dye [iodides] and Iodinated diagnostic agents    Social History:     Tobacco:    reports that he quit smoking about 8 years ago. His smoking use included cigarettes. He has a 30.00 pack-year smoking history. He has never used smokeless tobacco.  Alcohol:      reports previous alcohol use of about 14.0 standard drinks of alcohol per week. Drug Use:  reports no history of drug use. Family History:     Family History   Problem Relation Age of Onset    Heart Disease Mother     Stroke Mother     Alcohol Abuse Father     Cancer Father        Review of Systems:     Positive and Negative as described in HPI. CONSTITUTIONAL:  negative for fevers, chills, sweats, fatigue, weight loss  HEENT:  negative for vision, hearing changes, runny nose, throat pain  RESPIRATORY:  SOB with exertion negative congestion, wheezing. CARDIOVASCULAR: See HPI  negative for chest pain, palpitations. GASTROINTESTINAL:  + cirrhosis See HPI negative for nausea, vomiting, diarrhea, constipation, change in bowel habits, abdominal pain   GENITOURINARY:  negative for difficulty of urination, burning with urination, frequency   INTEGUMENT:  negative for rash, skin lesions, easy bruising   HEMATOLOGIC/LYMPHATIC:  Occasional swelling in legs    ALLERGIC/IMMUNOLOGIC:  negative for urticaria , itching  ENDOCRINE:  negative increase in drinking, increase in urination, hot or cold intolerance  MUSCULOSKELETAL:  negative joint pains, muscle aches, swelling of joints  NEUROLOGICAL:  negative for headaches, dizziness, lightheadedness, numbness, pain, tingling extremities  BEHAVIOR/PSYCH:  negative for depression, anxiety    Physical Exam:   /68   Pulse 57   Temp 96.9 °F (36.1 °C) (Tympanic)   Resp 20   Ht 6' 2\" (1.88 m)   Wt 204 lb (92.5 kg)   SpO2 98%   BMI 26.19 kg/m²   No LMP for male patient. No obstetric history on file. No results for input(s): POCGLU in the last 72 hours. General Appearance:  alert, well appearing, and in no acute distress  Mental status: oriented to person, place, and time with normal affect  Head:  normocephalic, atraumatic. Eye: no icterus, redness, pupils equal and reactive, extraocular eye movements intact, conjunctiva clear  Ear: normal external ear, no discharge, hearing intact  Nose:  no drainage noted  Mouth: mucous membranes moist  Neck: supple, no carotid bruits, thyroid not palpable  Lungs: Bilateral equal air entry, clear to ausculation, no wheezing, rales or rhonchi, normal effort  Cardiovascular: HR 57 asymptomatic bradycardic rate and regular rhythm, no murmur, gallop, rub. Abdomen: Distended, + fluid with firm abdomen Soft, nontender, nondistended, normal bowel sounds  Neurologic: There are no new focal motor or sensory deficits, normal muscle tone and bulk, no abnormal sensation, normal speech, cranial nerves II through XII grossly intact  Skin: No gross lesions, rashes, bruising or bleeding on exposed skin area  Extremities: minimal ankle swelling   peripheral pulses palpable, no calf pain with palpation  Psych: normal affect     Investigations:      Laboratory Testing:  No results found for this or any previous visit (from the past 24 hour(s)). Recent Labs     10/20/21  1337   *   K 4.3   CL 98   CO2 24   BUN 19   CREATININE 1.01   GLUCOSE 105*   AST 38   ALT 20   LABALBU 3.3*       No results for input(s): COVID19 in the last 720 hours. Imaging/Diagnostics:    No results found. Diagnosis:      1. Cirrhosis liver with ascites     Plans:     1.  U/S guided paracentesis      ELISEO Oneil CNP  11/17/2021  1:44 PM

## 2021-11-24 DIAGNOSIS — C85.80 MARGINAL ZONE LYMPHOMA (HCC): Primary | ICD-10-CM

## 2021-11-30 ENCOUNTER — TELEPHONE (OUTPATIENT)
Dept: ONCOLOGY | Age: 71
End: 2021-11-30

## 2021-11-30 ENCOUNTER — OFFICE VISIT (OUTPATIENT)
Dept: ONCOLOGY | Age: 71
End: 2021-11-30
Payer: MEDICARE

## 2021-11-30 ENCOUNTER — HOSPITAL ENCOUNTER (OUTPATIENT)
Facility: MEDICAL CENTER | Age: 71
Discharge: HOME | End: 2021-11-30
Payer: MEDICARE

## 2021-11-30 VITALS
WEIGHT: 200.9 LBS | BODY MASS INDEX: 25.79 KG/M2 | DIASTOLIC BLOOD PRESSURE: 64 MMHG | HEART RATE: 59 BPM | SYSTOLIC BLOOD PRESSURE: 120 MMHG | TEMPERATURE: 98.2 F

## 2021-11-30 DIAGNOSIS — C85.80 MARGINAL ZONE LYMPHOMA (HCC): ICD-10-CM

## 2021-11-30 DIAGNOSIS — C85.80 MARGINAL ZONE LYMPHOMA (HCC): Primary | ICD-10-CM

## 2021-11-30 LAB
ABSOLUTE EOS #: 0.28 K/UL (ref 0–0.44)
ABSOLUTE IMMATURE GRANULOCYTE: 0 K/UL (ref 0–0.3)
ABSOLUTE LYMPH #: 18 K/UL (ref 1.1–3.7)
ABSOLUTE MONO #: 5.82 K/UL (ref 0.1–1.2)
BASOPHILS # BLD: 0 % (ref 0–2)
BASOPHILS ABSOLUTE: 0 K/UL (ref 0–0.2)
DIFFERENTIAL TYPE: ABNORMAL
EOSINOPHILS RELATIVE PERCENT: 1 % (ref 1–4)
HCT VFR BLD CALC: 40.1 % (ref 40.7–50.3)
HEMOGLOBIN: 13 G/DL (ref 13–17)
IMMATURE GRANULOCYTES: 0 %
LACTATE DEHYDROGENASE: 176 U/L (ref 135–225)
LYMPHOCYTES # BLD: 65 % (ref 24–43)
MCH RBC QN AUTO: 33.2 PG (ref 25.2–33.5)
MCHC RBC AUTO-ENTMCNC: 32.4 G/DL (ref 28.4–34.8)
MCV RBC AUTO: 102.6 FL (ref 82.6–102.9)
MONOCYTES # BLD: 21 % (ref 3–12)
NRBC AUTOMATED: 0 PER 100 WBC
PDW BLD-RTO: 17 % (ref 11.8–14.4)
PLATELET # BLD: 84 K/UL (ref 138–453)
PLATELET ESTIMATE: ABNORMAL
PMV BLD AUTO: 8.7 FL (ref 8.1–13.5)
RBC # BLD: 3.91 M/UL (ref 4.21–5.77)
RBC # BLD: ABNORMAL 10*6/UL
SEG NEUTROPHILS: 13 % (ref 36–65)
SEGMENTED NEUTROPHILS ABSOLUTE COUNT: 3.6 K/UL (ref 1.5–8.1)
WBC # BLD: 27.7 K/UL (ref 3.5–11.3)
WBC # BLD: ABNORMAL 10*3/UL

## 2021-11-30 PROCEDURE — 83615 LACTATE (LD) (LDH) ENZYME: CPT

## 2021-11-30 PROCEDURE — 99211 OFF/OP EST MAY X REQ PHY/QHP: CPT | Performed by: INTERNAL MEDICINE

## 2021-11-30 PROCEDURE — 99214 OFFICE O/P EST MOD 30 MIN: CPT | Performed by: INTERNAL MEDICINE

## 2021-11-30 PROCEDURE — 85025 COMPLETE CBC W/AUTO DIFF WBC: CPT

## 2021-11-30 PROCEDURE — 36415 COLL VENOUS BLD VENIPUNCTURE: CPT

## 2021-11-30 NOTE — TELEPHONE ENCOUNTER
Sanjay Barnes MD VISIT  DR Meagan Luz IN TO SEE PATIENT  ORDERS RECEIVED  RV 3-4 MONTHS WITH LABS BEFORE  LABS CDP [facility] 03/24/22 (PT REQUESTED SAME Mary Willson)  MD VISIT 03/24/22 @9AM  AVS PRINTED AND GIVEN TO PATIENT WITH INSTRUCTIONS  PATIENT DISCHARGED AMBULATORY

## 2021-12-01 DIAGNOSIS — C85.80 MARGINAL ZONE LYMPHOMA (HCC): Primary | ICD-10-CM

## 2021-12-01 NOTE — PROGRESS NOTES
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 Further management of liver cirrhosis by GI    BRIEF CASE HISTORY:      Mr. Jonnie King is a very pleasant 66 y.o. male with history of multiple co morbidities as listed. Patient states that he had stroke about 6 years ago. In addition he had right hemicolectomy in 2015 for tubular adenomatous polyp. Since then he had few GI symptoms. Overall he continued to do just fine. Patient had flulike illness about 2 weeks ago. He had blood work at that time which showed elevated white blood cells with significant lymphocytosis. Patient also had thrombocytopenia. Chemistry test showed elevated transaminases. Patient had liver ultrasound which showed steatosis with possible fatty infiltration of the liver. Patient had mild easy bruising. No fever or night sweats. No weight loss or decreased appetite. No enlarged lymph nodes. No abdominal pain or swelling. No ascites. No chest pain or shortness of breath. The patient drinks beer every day. He quit smoking 13 years ago. He smokes about 1 pack/day for about 40 years. .     INTERIM HISTORY:   Patient seen for follow-up marginal zone lymphoma. He has leukocytosis and lymphocytosis. Lab work-up was done. No changes. Clinically patient is having worsening abdominal distention and ascites. He had work-up with GI and he was found to have cirrhosis. Further work-up in progress. He had paracentesis almost every 2 weeks. He continues to have abdominal distention.     No fever or night sweats. No enlarged lymph nodes. PAST MEDICAL HISTORY: has a past medical history of AAA (abdominal aortic aneurysm) ([facility] CAD (coronary artery disease), Colon polyps, Colon tumor, Congestive heart failure (CHF) ([address] GERD (gastroesophageal reflux disease), H/O chest tube placement, History of pneumonia, History of pneumothorax, Hx of cardiac cath, Hyperlipidemia, Hypertension, Non Hodgkin's lymphoma ([facility] Pneumonia, Unspecified cerebral artery occlusion with cerebral infarction, Wellness examination, Wellness examination, and Wellness examination. PAST SURGICAL HISTORY: has a past surgical history that includes transesophageal echocardiogram (10/3/14); Colonoscopy; Vasectomy (1986); Cholecystectomy, open (04/21/15); right colectomy (04/21/15); Appendectomy (4/21/15); Colonoscopy (07/10/2020); Colonoscopy (N/A, 7/10/2020); [place] tooth extraction; other surgical history (2019); Coronary artery bypass graft (N/A, 3/3/2021); Colonoscopy (N/A, 9/17/2021); and Upper gastrointestinal endoscopy (N/A, 9/17/2021). CURRENT MEDICATIONS:  has a current medication list which includes the following prescription(s): spironolactone, furosemide, melatonin, atorvastatin, metoprolol, aspirin, and omeprazole. ALLERGIES:  is allergic to iv dye [iodides] and iodinated diagnostic agents. FAMILY HISTORY: Father and brother had colon cancer at late age. Otherwise negative for any hematological or oncological conditions. SOCIAL HISTORY:  reports that he quit smoking about 8 years ago. His smoking use included cigarettes. He has a 30.00 pack-year smoking history. He has never used smokeless tobacco. He reports previous alcohol use of about 14.0 standard drinks of alcohol per week. He reports that he does not use drugs. REVIEW OF SYSTEMS:     · General: No weakness or fatigue. No unanticipated weight loss or decreased appetite. No fever or chills. · Eyes: No blurred vision, eye pain or double vision. entry  Heart - normal rate, regular rhythm, normal S1, S2, no murmurs, rubs, clicks or gallops  Abdomen -distended. Positive ascites. Neurological - alert, oriented, normal speech, no focal findings or movement disorder noted  Musculoskeletal - no joint tenderness, deformity or swelling  Extremities - peripheral pulses normal, no pedal edema, no clubbing or cyanosis  Skin - normal coloration and turgor, no rashes, no suspicious skin lesions noted     Review of Diagnostic data:   Lab Results   Component Value Date    WBC 27.7 (H) 11/30/2021    HGB 13.0 11/30/2021    HCT 40.1 (L) 11/30/2021    .6 11/30/2021    PLT 84 (L) 11/30/2021       Chemistry        Component Value Date/Time     (L) 10/20/2021 1337    K 4.3 10/20/2021 1337    CL 98 10/20/2021 1337    CO2 24 10/20/2021 1337    BUN 19 10/20/2021 1337    CREATININE 1.01 10/20/2021 1337        Component Value Date/Time    CALCIUM 8.3 (L) 10/20/2021 1337    ALKPHOS 193 (H) 10/20/2021 1337    AST 38 10/20/2021 1337    ALT 20 10/20/2021 1337    BILITOT 1.31 (H) 10/20/2021 1337            IMPRESSION:   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 I explained to the patient the nature of this hematologic problem. I explained the significance of these abnormalities in layman language. For evaluation of persistent lymphocytosis patient had flow cytometry done. Results were positive for low-grade marginal zone lymphoma. I explained to the patient the nature of this lymphoma, staging, prognosis and treatment. I explained that this is low-grade lymphoma and recommendation at this point is observation and monitoring with no need for active treatment.   We will consider active treatment for enlarging lymph nodes or splenomegaly. Patient will continue follow-up with his gastroenterologist for further management of liver cirrhosis. I will see the patient again in 3-4 months with repeated labs. He will be seen sooner if he develops any problems. Counseled about importance of alcohol cessation. Patient's questions were answered to the best of his satisfaction and he verbalized full understanding and agreement.

## 2021-12-07 ENCOUNTER — TELEPHONE (OUTPATIENT)
Dept: GASTROENTEROLOGY | Age: 71
End: 2021-12-07

## 2021-12-07 DIAGNOSIS — K70.31 ALCOHOLIC CIRRHOSIS OF LIVER WITH ASCITES (HCC): Primary | ICD-10-CM

## 2021-12-07 NOTE — TELEPHONE ENCOUNTER
Writer placed orders per Dr Bowers Kelsi verbal request.  He will f/u with patient regarding TIPS procedure at next appt

## 2021-12-07 NOTE — TELEPHONE ENCOUNTER
Patient called states he needs  Orders  for Paracentesis to be done he is filling up again . Please return his call to [phone]  Thank you

## 2021-12-09 ENCOUNTER — HOSPITAL ENCOUNTER (OUTPATIENT)
Dept: INTERVENTIONAL RADIOLOGY/VASCULAR | Age: 71
Discharge: HOME | End: 2021-12-11
Payer: MEDICARE

## 2021-12-09 VITALS — OXYGEN SATURATION: 98 % | DIASTOLIC BLOOD PRESSURE: 70 MMHG | SYSTOLIC BLOOD PRESSURE: 109 MMHG

## 2021-12-09 DIAGNOSIS — K70.31 ALCOHOLIC CIRRHOSIS OF LIVER WITH ASCITES (HCC): ICD-10-CM

## 2021-12-09 LAB
LACTATE DEHYDROGENASE, FLUID: 57 U/L
SPECIMEN TYPE: NORMAL

## 2021-12-09 PROCEDURE — 83615 LACTATE (LD) (LDH) ENZYME: CPT

## 2021-12-09 PROCEDURE — 6360000002 HC RX W HCPCS: Performed by: RADIOLOGY

## 2021-12-09 PROCEDURE — C1729 CATH, DRAINAGE: HCPCS

## 2021-12-09 PROCEDURE — P9047 ALBUMIN (HUMAN), 25%, 50ML: HCPCS | Performed by: RADIOLOGY

## 2021-12-09 PROCEDURE — 2500000003 HC RX 250 WO HCPCS: Performed by: RADIOLOGY

## 2021-12-09 PROCEDURE — 89051 BODY FLUID CELL COUNT: CPT

## 2021-12-09 PROCEDURE — 49083 ABD PARACENTESIS W/IMAGING: CPT | Performed by: RADIOLOGY

## 2021-12-09 RX ORDER — LIDOCAINE HYDROCHLORIDE 10 MG/ML
INJECTION, SOLUTION EPIDURAL; INFILTRATION; INTRACAUDAL; PERINEURAL
Status: COMPLETED | OUTPATIENT
Start: 2021-12-09 | End: 2021-12-09

## 2021-12-09 RX ORDER — ALBUMIN (HUMAN) 12.5 G/50ML
50 SOLUTION INTRAVENOUS ONCE
Status: COMPLETED | OUTPATIENT
Start: 2021-12-09 | End: 2021-12-09

## 2021-12-09 RX ADMIN — LIDOCAINE HYDROCHLORIDE 10 ML: 10 INJECTION, SOLUTION EPIDURAL; INFILTRATION; INTRACAUDAL; PERINEURAL at 09:29

## 2021-12-09 RX ADMIN — ALBUMIN (HUMAN) 50 G: 0.25 INJECTION, SOLUTION INTRAVENOUS at 09:54

## 2021-12-09 NOTE — BRIEF OP NOTE
Brief Postoperative Note    Catherine Cruz  YOB: 1950  [id number]    Pre-operative Diagnosis: Recurrent ascites; abdominal discomfort    Post-operative Diagnosis: Same    Procedure: US guided paracentesis     Anesthesia: Local    Surgeons/Assistants: Casey    Estimated Blood Loss: less than 50     Complications: None    Specimens: Was Not Obtained    Electronically signed by Guerda Jya MD on 12/9/2021 at 9:34 AM

## 2021-12-13 LAB
APPEARANCE FLUID: NORMAL
BASO FLUID: NORMAL %
COLOR FLUID: NORMAL
EOSINOPHIL FLUID: NORMAL %
FLUID DIFF COMMENT: NORMAL
LYMPHOCYTES, BODY FLUID: 65 %
MONOCYTE, FLUID: NORMAL %
NEUTROPHIL, FLUID: 1 %
OTHER CELLS FLUID: NORMAL %
RBC FLUID: <3000 /MM3
SPECIMEN TYPE: NORMAL
WBC FLUID: 159 /MM3

## 2021-12-21 ENCOUNTER — HOSPITAL ENCOUNTER (OUTPATIENT)
Dept: INTERVENTIONAL RADIOLOGY/VASCULAR | Age: 71
Discharge: HOME | End: 2021-12-23
Payer: MEDICARE

## 2021-12-21 ENCOUNTER — TELEPHONE (OUTPATIENT)
Dept: GASTROENTEROLOGY | Age: 71
End: 2021-12-21

## 2021-12-21 ENCOUNTER — HOSPITAL ENCOUNTER (OUTPATIENT)
Dept: ULTRASOUND IMAGING | Age: 71
Discharge: HOME | End: 2021-12-23
Payer: MEDICARE

## 2021-12-21 VITALS
DIASTOLIC BLOOD PRESSURE: 68 MMHG | TEMPERATURE: 97.1 F | WEIGHT: 200 LBS | OXYGEN SATURATION: 97 % | RESPIRATION RATE: 18 BRPM | SYSTOLIC BLOOD PRESSURE: 121 MMHG | HEIGHT: 74 IN | BODY MASS INDEX: 25.67 KG/M2 | HEART RATE: 47 BPM

## 2021-12-21 DIAGNOSIS — K70.31 ALCOHOLIC CIRRHOSIS OF LIVER WITH ASCITES (HCC): Primary | ICD-10-CM

## 2021-12-21 DIAGNOSIS — K70.31 ALCOHOLIC CIRRHOSIS OF LIVER WITH ASCITES (HCC): ICD-10-CM

## 2021-12-21 LAB
INR BLD: 1.3
PARTIAL THROMBOPLASTIN TIME: 34.7 SEC (ref 23.9–33.8)
PLATELET # BLD: 78 K/UL (ref 138–453)
PROTHROMBIN TIME: 15.8 SEC (ref 11.5–14.2)

## 2021-12-21 PROCEDURE — P9047 ALBUMIN (HUMAN), 25%, 50ML: HCPCS | Performed by: RADIOLOGY

## 2021-12-21 PROCEDURE — 36415 COLL VENOUS BLD VENIPUNCTURE: CPT

## 2021-12-21 PROCEDURE — 49083 ABD PARACENTESIS W/IMAGING: CPT

## 2021-12-21 PROCEDURE — 89051 BODY FLUID CELL COUNT: CPT

## 2021-12-21 PROCEDURE — 85610 PROTHROMBIN TIME: CPT

## 2021-12-21 PROCEDURE — 85730 THROMBOPLASTIN TIME PARTIAL: CPT

## 2021-12-21 PROCEDURE — 85049 AUTOMATED PLATELET COUNT: CPT

## 2021-12-21 PROCEDURE — 6360000002 HC RX W HCPCS: Performed by: RADIOLOGY

## 2021-12-21 RX ORDER — ALBUMIN (HUMAN) 12.5 G/50ML
50 SOLUTION INTRAVENOUS ONCE
Status: COMPLETED | OUTPATIENT
Start: 2021-12-21 | End: 2021-12-21

## 2021-12-21 RX ADMIN — Medication 50 G: at 14:26

## 2021-12-21 ASSESSMENT — PAIN - FUNCTIONAL ASSESSMENT: PAIN_FUNCTIONAL_ASSESSMENT: 0-10

## 2021-12-21 NOTE — PROGRESS NOTES
Patient tolerated paracentesis without distress. 6300ml removed. Dressing to site. Discharge instructions given, no questions at this time. Patient discharged home via private auto.

## 2021-12-21 NOTE — H&P
Interval H&P Note    Pt Name: Elaine Presley  MRN: [id number]  YOB: 1950  Date of evaluation: 12/21/2021      [x] I have reviewed in [facility] the Oncology Progress Note byDr Hull dated 11/30/21 attached below for an Interval History and Physical note. [x] I have examined  Katelin Urena Links  There are no changes to the patient who is scheduled for a US guided paracentesis in  by Dr Tabatha Castillo for alcoholic cirrhosis of liver with ascites. The patient denies new health changes, fever, chills, wheezing, cough, increased SOB, chest pain, open sores or wounds. Hx CAD, AAA, CHF, HTN, CABG x3 (3/2021). Patient is unable to take Plavix due to lymphoma. Patient's follows with [facility]. Today denies dizziness, lightheadedness, syncope, chest pain or palpitations. Last ASA 81mg 2 days ago     PMH, Surgical History, Social History, Psych, and Family History reviewed and updated in EPIC in appropriate section. Vital signs: /69   Pulse 96   Temp 97.1 °F (36.2 °C) (Temporal)   Resp 18   Ht 6' 2\" (1.88 m)   Wt 200 lb (90.7 kg)   SpO2 95%   BMI 25.68 kg/m²     Allergies: Iv dye [iodides] and Iodinated diagnostic agents    Medications:    Prior to Admission medications    Medication Sig Start Date End Date Taking?  Authorizing Provider   spironolactone (ALDACTONE) 100 MG tablet Take 2 tablets by mouth daily 10/6/21   Enid Adames MD   furosemide (LASIX) 40 MG tablet Take 1 tablet by mouth 2 times daily 10/6/21   Enid Adames MD   melatonin (RA MELATONIN) 3 MG TABS tablet Take 1 tablet by mouth daily  Patient taking differently: Take 3 mg by mouth nightly as needed  9/28/21   Benjamin Ruiz MD   atorvastatin (LIPITOR) 20 MG tablet Take 1 tablet by mouth nightly 4/23/21   Benjamin Ruiz MD   metoprolol (LOPRESSOR) 100 MG tablet Take 1 tablet by mouth 2 times daily  Patient taking differently: Take 50 mg by mouth 2 times daily  4/23/21   Benjamin Ruiz MD   aspirin 81 MG tablet Take 81 mg by mouth daily    Historical Provider, MD   omeprazole (PRILOSEC) 10 MG delayed release capsule Take 1 capsule by mouth daily as needed (GERD) 6/9/17   Andrez Farah MD         This is a 66 y.o. male who is pleasant, cooperative, alert and oriented x3, in no acute distress    Physical Exam:  Lungs: Bilateral equal air entry, unlabored w/o use of accessory muscles, clear to ausculation, no wheezing, rales or rhonchi, normal effort  Cardiovascular: HR 96 normal rate, regular rhythm, no murmur, gallop, rub. Abdomen: Distended moderately firm with  Fluid  nontender, normal bowel sounds. Extremities:  Minimal left ankle swelling No calf tenderness   Labs:  Recent Labs     11/30/21  0846   HGB 13.0   HCT 40.1*   WBC 27.7*   .6   PLT 84*       No results for input(s): COVID19 in the last 720 hours. 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 Further management of liver cirrhosis by GI     BRIEF CASE HISTORY:      Mr. Marleen Persaud is a very pleasant 66 y.o. male with history of multiple co morbidities as listed. Patient states that he had stroke about 6 years ago. In addition he had right hemicolectomy in 2015 for tubular adenomatous polyp.   Since then he had few GI symptoms. Overall he continued to do just fine. Patient had flulike illness about 2 weeks ago. He had blood work at that time which showed elevated white blood cells with significant lymphocytosis. Patient also had thrombocytopenia. Chemistry test showed elevated transaminases. Patient had liver ultrasound which showed steatosis with possible fatty infiltration of the liver. Patient had mild easy bruising. No fever or night sweats. No weight loss or decreased appetite. No enlarged lymph nodes. No abdominal pain or swelling. No ascites. No chest pain or shortness of breath. The patient drinks beer every day. He quit smoking 13 years ago. He smokes about 1 pack/day for about 40 years. .      INTERIM HISTORY:   Patient seen for follow-up marginal zone lymphoma. He has leukocytosis and lymphocytosis. Lab work-up was done. No changes. Clinically patient is having worsening abdominal distention and ascites. He had work-up with GI and he was found to have cirrhosis. Further work-up in progress. He had paracentesis almost every 2 weeks. He continues to have abdominal distention. No fever or night sweats. No enlarged lymph nodes. PAST MEDICAL HISTORY: has a past medical history of AAA (abdominal aortic aneurysm) ([address] CAD (coronary artery disease), Colon polyps, Colon tumor, Congestive heart failure (CHF) ([address] GERD (gastroesophageal reflux disease), H/O chest tube placement, History of pneumonia, History of pneumothorax, Hx of cardiac cath, Hyperlipidemia, Hypertension, Non Hodgkin's lymphoma ([address] Pneumonia, Unspecified cerebral artery occlusion with cerebral infarction, Wellness examination, Wellness examination, and Wellness examination. PAST SURGICAL HISTORY: has a past surgical history that includes transesophageal echocardiogram (10/3/14); Colonoscopy; Vasectomy (1986); Cholecystectomy, open (04/21/15); right colectomy (04/21/15);  Appendectomy (4/21/15); Colonoscopy (07/10/2020); Colonoscopy (N/A, 7/10/2020); Hydro tooth extraction; other surgical history (2019); Coronary artery bypass graft (N/A, 3/3/2021); Colonoscopy (N/A, 9/17/2021); and Upper gastrointestinal endoscopy (N/A, 9/17/2021). CURRENT MEDICATIONS:  has a current medication list which includes the following prescription(s): spironolactone, furosemide, melatonin, atorvastatin, metoprolol, aspirin, and omeprazole. ALLERGIES:  is allergic to iv dye [iodides] and iodinated diagnostic agents. FAMILY HISTORY: Father and brother had colon cancer at late age. Otherwise negative for any hematological or oncological conditions. SOCIAL HISTORY:  reports that he quit smoking about 8 years ago. His smoking use included cigarettes. He has a 30.00 pack-year smoking history. He has never used smokeless tobacco. He reports previous alcohol use of about 14.0 standard drinks of alcohol per week. He reports that he does not use drugs. REVIEW OF SYSTEMS:     · General: No weakness or fatigue. No unanticipated weight loss or decreased appetite. No fever or chills. · Eyes: No blurred vision, eye pain or double vision. · Ears: No hearing problems or drainage. No tinnitus. · Throat: No sore throat, problems with swallowing or dysphagia. · Respiratory: No cough, sputum or hemoptysis. No shortness of breath. No pleuritic chest pain. · Cardiovascular: No chest pain, orthopnea or PND. No lower extremity edema. No palpitation. · Gastrointestinal: As above. Genitourinary: No dysuria, hematuria, frequency or urgency. · Musculoskeletal: No muscle aches or pains. No limitation of movement. No back pain. No gait disturbance, No joint complaints. · Dermatologic: No skin rashes or pruritus. No skin lesions or discolorations. · Psychiatric: No depression, anxiety, or stress or signs of schizophrenia. No change in mood or affect. · Hematologic: No history of bleeding tendency.  No bruises or ecchymosis. No history of clotting problems. · Infectious disease: No fever, chills or frequent infections. · Endocrine: No polydipsia or polyuria. No temperature intolerance. · Neurologic: No headaches or dizziness. No weakness or numbness of the extremities. No changes in balance, coordination,  memory, mentation, behavior. · Allergic/Immunologic: No nasal congestion or hives. No repeated infections. PHYSICAL EXAM:  The patient is not in acute distress. Vital signs: Blood pressure 120/64, pulse 59, temperature 98.2 °F (36.8 °C), temperature source Temporal, weight 200 lb 14.4 oz (91.1 kg). General appearance - well appearing, not in pain or distress  Mental status - good mood, alert and oriented  Eyes - pupils equal and reactive, extraocular eye movements intact  Ears - bilateral TM's and external ear canals normal  Nose - normal and patent, no erythema, discharge or polyps  Mouth - mucous membranes moist, pharynx normal without lesions  Neck - supple, no significant adenopathy  Lymphatics - no palpable lymphadenopathy, no hepatosplenomegaly  Chest - clear to auscultation, no wheezes, rales or rhonchi, symmetric air entry  Heart - normal rate, regular rhythm, normal S1, S2, no murmurs, rubs, clicks or gallops  Abdomen -distended. Positive ascites.   Neurological - alert, oriented, normal speech, no focal findings or movement disorder noted  Musculoskeletal - no joint tenderness, deformity or swelling  Extremities - peripheral pulses normal, no pedal edema, no clubbing or cyanosis  Skin - normal coloration and turgor, no rashes, no suspicious skin lesions noted      Review of Diagnostic data:         Lab Results   Component Value Date     WBC 27.7 (H) 11/30/2021     HGB 13.0 11/30/2021     HCT 40.1 (L) 11/30/2021     .6 11/30/2021     PLT 84 (L) 11/30/2021        Chemistry               Component Value Date/Time      (L) 10/20/2021 1337     K 4.3 10/20/2021 1337     CL 98 10/20/2021 1337     CO2 24 10/20/2021 1337     BUN 19 10/20/2021 1337     CREATININE 1.01 10/20/2021 1337               Component Value Date/Time     CALCIUM 8.3 (L) 10/20/2021 1337     ALKPHOS 193 (H) 10/20/2021 1337     AST 38 10/20/2021 1337     ALT 20 10/20/2021 1337     BILITOT 1.31 (H) 10/20/2021 1337                IMPRESSION:   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 I explained to the patient the nature of this hematologic problem. I explained the significance of these abnormalities in layman language. For evaluation of persistent lymphocytosis patient had flow cytometry done. Results were positive for low-grade marginal zone lymphoma. I explained to the patient the nature of this lymphoma, staging, prognosis and treatment. I explained that this is low-grade lymphoma and recommendation at this point is observation and monitoring with no need for active treatment. We will consider active treatment for enlarging lymph nodes or splenomegaly. Patient will continue follow-up with his gastroenterologist for further management of liver cirrhosis. I will see the patient again in 3-4 months with repeated labs. He will be seen sooner if he develops any problems. Counseled about importance of alcohol cessation. Patient's questions were answered to the best of his satisfaction and he verbalized full understanding and agreement.

## 2021-12-22 NOTE — TELEPHONE ENCOUNTER
Returned patients call to inform him there are 2 standing orders for him.      Left voice mail for return call

## 2021-12-22 NOTE — TELEPHONE ENCOUNTER
Returned call. Placed standing order for 2 occurences. Patient is currently have paracentesis every 2 weeks. Patients next office visit is not until 01/25/22*   These orders will get him up until next appt where physician can place new orders.

## 2021-12-23 LAB
APPEARANCE FLUID: NORMAL
BASO FLUID: NORMAL %
COLOR FLUID: NORMAL
EOSINOPHIL FLUID: NORMAL %
FLUID DIFF COMMENT: NORMAL
LYMPHOCYTES, BODY FLUID: 54 %
MONOCYTE, FLUID: NORMAL %
NEUTROPHIL, FLUID: 0 %
OTHER CELLS FLUID: NORMAL %
RBC FLUID: <3000 /MM3
SPECIMEN TYPE: NORMAL
WBC FLUID: 168 /MM3

## 2022-01-04 ENCOUNTER — OFFICE VISIT (OUTPATIENT)
Dept: FAMILY MEDICINE CLINIC | Age: 72
End: 2022-01-04
Payer: MEDICARE

## 2022-01-04 VITALS
HEART RATE: 54 BPM | OXYGEN SATURATION: 99 % | SYSTOLIC BLOOD PRESSURE: 119 MMHG | DIASTOLIC BLOOD PRESSURE: 74 MMHG | WEIGHT: 204 LBS | TEMPERATURE: 97.9 F | BODY MASS INDEX: 26.18 KG/M2 | HEIGHT: 74 IN

## 2022-01-04 DIAGNOSIS — K70.31 ALCOHOLIC CIRRHOSIS OF LIVER WITH ASCITES (HCC): ICD-10-CM

## 2022-01-04 DIAGNOSIS — D69.6 THROMBOCYTOPENIA (HCC): ICD-10-CM

## 2022-01-04 DIAGNOSIS — C85.80 MARGINAL ZONE LYMPHOMA (HCC): ICD-10-CM

## 2022-01-04 DIAGNOSIS — I85.10 SECONDARY ESOPHAGEAL VARICES WITHOUT BLEEDING (HCC): ICD-10-CM

## 2022-01-04 DIAGNOSIS — F32.1 CURRENT MODERATE EPISODE OF MAJOR DEPRESSIVE DISORDER WITHOUT PRIOR EPISODE (HCC): Primary | ICD-10-CM

## 2022-01-04 DIAGNOSIS — I71.40 ABDOMINAL AORTIC ANEURYSM (AAA) WITHOUT RUPTURE: ICD-10-CM

## 2022-01-04 PROBLEM — I48.0 PAROXYSMAL ATRIAL FIBRILLATION (HCC): Status: ACTIVE | Noted: 2022-01-04

## 2022-01-04 PROBLEM — I50.9 CHRONIC CONGESTIVE HEART FAILURE (HCC): Status: RESOLVED | Noted: 2021-04-13 | Resolved: 2022-01-04

## 2022-01-04 PROCEDURE — 99214 OFFICE O/P EST MOD 30 MIN: CPT | Performed by: STUDENT IN AN ORGANIZED HEALTH CARE EDUCATION/TRAINING PROGRAM

## 2022-01-04 RX ORDER — SERTRALINE HYDROCHLORIDE 25 MG/1
25 TABLET, FILM COATED ORAL DAILY
Qty: 30 TABLET | Refills: 3 | Status: SHIPPED | OUTPATIENT
Start: 2022-01-04

## 2022-01-04 ASSESSMENT — ENCOUNTER SYMPTOMS
WHEEZING: 0
ABDOMINAL PAIN: 0
COUGH: 0
EYE DISCHARGE: 0
VOMITING: 0
SHORTNESS OF BREATH: 0
CHEST TIGHTNESS: 0
SORE THROAT: 0
CONSTIPATION: 0
DIARRHEA: 0
ABDOMINAL DISTENTION: 1
NAUSEA: 0

## 2022-01-04 NOTE — PROGRESS NOTES
[address] PRIMARY CARE  [address] [place] [address] [address] [place] [address]  Dept: [phone]  Dept Fax: [phone]    Merlin Lomas is a 66 y.o. male who is a Established patient, who presents today for his medical conditions/complaints as noted below:  Chief Complaint   Patient presents with    3 Month Follow-Up    Hypertension    Anxiety    Depression    Cirrhosis     ascites gets drained every 2 weeks         HPI:     He is here today to follow-up on his ongoing liver cirrhosis. He says that he has been going for paracentesis every 2 weeks and every time he has a large volume drained out. He is currently on Lasix 40 mg twice daily and spironolactone 200 mg daily. He says that he has not had a visit with his gastroenterologist in a while little frustrated currently. He is requesting a referral to [facility] to get a second opinion. He says that he feels very depressed and low due to his ongoing health issues. Says that sometimes he feels like not following up with treatment either. He has trouble sleeping at night despite taking melatonin. Says that he feels very fatigued and has poor appetite. He is single and is not able to cook so he gets his meals from [employer], tries to usually pick healthier options with less sodium. He has completely stopped drinking alcohol since he found out about his liver issues.       Hemoglobin A1C (%)   Date Value   04/20/2021 5.2   02/19/2021 5.9             ( goal A1Cis < 7)   No results found for: LABMICR  LDL Cholesterol (mg/dL)   Date Value   04/20/2021 34   02/19/2020 39   03/27/2017 55       (goal LDL is <100)   AST (U/L)   Date Value   10/20/2021 38     ALT (U/L)   Date Value   10/20/2021 20     BUN (mg/dL)   Date Value   10/20/2021 19     BP Readings from Last 3 Encounters:   01/04/22 119/74   12/21/21 121/68   12/09/21 109/70          (goal 120/80)    Past Medical History:   Diagnosis Date    AAA (abdominal aortic aneurysm) ([facility] 3/23/2015    3.8cm     CAD (coronary artery disease)     Colon polyps     Colon tumor     Congestive heart failure (CHF) ([facility] 01/2021    GERD (gastroesophageal reflux disease)     prilosec as needed    H/O chest tube placement     chest tube x4 different times    History of pneumonia     History of pneumothorax     x4    Hx of cardiac cath 02/19/2021    Hyperlipidemia     Hypertension     Non Hodgkin's lymphoma ([facility]     NHL    Pneumonia 1970's    patient had 3 chest tubes and in hospital for 13 days    Unspecified cerebral artery occlusion with cerebral infarction 10/01/2014    stroke , numbness in the left arm from the elbow down    Wellness examination 02/25/2021    [facility] nov 2020    Wellness examination 02/25/2021    Cardiology-Dr [facility] [address] feb 2021    Wellness examination 02/25/2021    onc-Dr Paulette Weinberg 2021      Past Surgical History:   Procedure Laterality Date    APPENDECTOMY  4/21/15    CHOLECYSTECTOMY, OPEN  04/21/15    COLONOSCOPY      COLONOSCOPY  07/10/2020    COLONOSCOPY POLYPECTOMY HOT BIOPSY (N/A )    COLONOSCOPY N/A 7/10/2020    COLONOSCOPY POLYPECTOMY HOT BIOPSY performed by Comfort Henning MD at [facility] 80 N/A 9/17/2021    COLONOSCOPY WITH BIOPSY performed by Comfort Henning MD at [facility] 12 N/A 3/3/2021    CABG CORONARY ARTERY BYPASS X3, ON PUMP; SWAN MARLENY, MIRANDA PER ANESTHESIA performed by Long Wei MD at [facility] 6 2019    stent to left leg with tubular graft to left leg artery    RIGHT COLECTOMY  04/21/15    TRANSESOPHAGEAL ECHOCARDIOGRAM  10/3/14    UPPER GASTROINTESTINAL ENDOSCOPY N/A 9/17/2021    EGD BAND LIGATION AND BIOPSY performed by Comfort Henning MD at [address] EXTRACTION         Family History   Problem Relation Age of Onset    Heart Disease Mother     Stroke Mother     Alcohol Abuse Father     Cancer Father        Social History     Tobacco Use    Smoking status: Former Smoker     Packs/day: 1.00     Years: 30.00     Pack years: 30.00     Types: Cigarettes     Quit date: 2013     Years since quittin.3    Smokeless tobacco: Never Used   Substance Use Topics    Alcohol use: Not Currently     Alcohol/week: 14.0 standard drinks     Types: 14 Cans of beer per week     Comment: 1-3 beers/day      Current Outpatient Medications   Medication Sig Dispense Refill    sertraline (ZOLOFT) 25 MG tablet Take 1 tablet by mouth daily 30 tablet 3    spironolactone (ALDACTONE) 100 MG tablet Take 2 tablets by mouth daily 60 tablet 3    furosemide (LASIX) 40 MG tablet Take 1 tablet by mouth 2 times daily 60 tablet 3    melatonin (RA MELATONIN) 3 MG TABS tablet Take 1 tablet by mouth daily (Patient taking differently: Take 3 mg by mouth nightly as needed ) 90 tablet 1    atorvastatin (LIPITOR) 20 MG tablet Take 1 tablet by mouth nightly 90 tablet 0    metoprolol (LOPRESSOR) 100 MG tablet Take 1 tablet by mouth 2 times daily (Patient taking differently: Take 50 mg by mouth 2 times daily ) 120 tablet 1    aspirin 81 MG tablet Take 81 mg by mouth daily      omeprazole (PRILOSEC) 10 MG delayed release capsule Take 1 capsule by mouth daily as needed (GERD) 90 capsule 3     No current facility-administered medications for this visit. Allergies   Allergen Reactions    Iv Dye [Iodides] Hives    Iodinated Diagnostic Agents Hives     Patient developed hives even with steroid prep.        Health Maintenance   Topic Date Due    Meningococcal (ACWY) vaccine (1 - Risk start 2-23 months series) Never done    Hepatitis A vaccine (1 of 2 - Risk 2-dose series) Never done    Hepatitis B vaccine (1 of 3 - Risk 3-dose series) Never done    Hib vaccine (1 of 1 - Risk 1-dose series) 20[birth year] (Originally 1951)    Meningococcal B vaccine (1 of 4 - Increased Risk Bexsero 2-dose series) 2022 (Originally 6/8/1960)    Low dose CT lung screening  03/02/2022    Lipid screen  04/20/2022    Creatinine monitoring  07/28/2022    Depression Monitoring  09/28/2022    Annual Wellness Visit (AWV)  09/29/2022    Potassium monitoring  10/20/2022    Colon cancer screen colonoscopy  09/17/2024    DTaP/Tdap/Td vaccine (2 - Td or Tdap) 07/11/2026    Flu vaccine  Completed    Shingles Vaccine  Completed    Pneumococcal 65+ yrs at Risk Vaccine  Completed    COVID-19 Vaccine  Completed    Hepatitis C screen  Completed       Subjective:     Review of Systems   Constitutional: Positive for fatigue. Negative for appetite change and fever. HENT: Negative for congestion, ear pain, hearing loss and sore throat. Eyes: Negative for discharge and visual disturbance. Respiratory: Negative for cough, chest tightness, shortness of breath and wheezing. Cardiovascular: Negative for chest pain, palpitations and leg swelling. Gastrointestinal: Positive for abdominal distention. Negative for abdominal pain, constipation, diarrhea, nausea and vomiting. Genitourinary: Negative for flank pain, frequency, hematuria and urgency. Musculoskeletal: Negative for arthralgias, gait problem, joint swelling and myalgias. Skin: Negative. Neurological: Negative for dizziness, weakness, numbness and headaches. Psychiatric/Behavioral: Positive for dysphoric mood and sleep disturbance. The patient is not nervous/anxious. Objective:     Physical Exam  Vitals reviewed. Constitutional:       Appearance: Normal appearance. He is normal weight. HENT:      Head: Normocephalic and atraumatic. Nose: Nose normal.      Mouth/Throat:      Mouth: Mucous membranes are moist.      Pharynx: Oropharynx is clear. Eyes:      Extraocular Movements: Extraocular movements intact. Conjunctiva/sclera: Conjunctivae normal.      Pupils: Pupils are equal, round, and reactive to light.    Cardiovascular:      Rate and Rhythm: Normal rate and regular rhythm. Heart sounds: Normal heart sounds. No murmur heard. No gallop. Pulmonary:      Effort: Pulmonary effort is normal. No respiratory distress. Breath sounds: Normal breath sounds. No stridor. No wheezing. Abdominal:      General: Bowel sounds are normal. There is distension. Palpations: Abdomen is soft. Tenderness: There is no abdominal tenderness. There is no guarding or rebound. Musculoskeletal:         General: No swelling or tenderness. Normal range of motion. Cervical back: Normal range of motion and neck supple. Skin:     General: Skin is warm and dry. Coloration: Skin is not jaundiced. Findings: No rash. Neurological:      General: No focal deficit present. Mental Status: He is alert and oriented to person, place, and time. Psychiatric:         Mood and Affect: Mood normal. Affect is tearful. Speech: Speech normal.         Behavior: Behavior normal.         Thought Content: Thought content normal.         Cognition and Memory: Cognition and memory normal.         Judgment: Judgment normal.       /74 (Site: Right Upper Arm, Position: Sitting, Cuff Size: Medium Adult)   Pulse 54   Temp 97.9 °F (36.6 °C) (Temporal)   Ht 6' 2\" (1.88 m)   Wt 204 lb (92.5 kg)   SpO2 99%   BMI 26.19 kg/m²     Assessment/Plan:   1. Current moderate episode of major depressive disorder without prior episode (HCC)  -     sertraline (ZOLOFT) 25 MG tablet; Take 1 tablet by mouth daily, Disp-30 tablet, R-3Normal  2. Alcoholic cirrhosis of liver with ascites [facility])  -     External Referral To Gastroenterology  3. Secondary esophageal varices without bleeding ([address]  4. Marginal zone lymphoma ([address]  5. Abdominal aortic aneurysm (AAA) without rupture ([address]  6.  Thrombocytopenia ([address]    Depression-started on Zoloft 25 mg daily, advised to continue taking melatonin as needed for sleep    Alcoholic liver cirrhosis-undergoing large-volume paracentesis every 2 weeks despite taking high-dose Lasix and spironolactone, wants to get a second opinion, referral placed for [facility] clinic. Esophageal varices-on metoprolol 50 mg twice daily    Marginal zone lymphoma-following with oncology    Abdominal aortic aneurysm-s/p repair    Thrombocytopenia-following with oncology      Return in about 1 month (around 2/4/2022) for anxiety and depression. Orders Placed This Encounter   Procedures    External Referral To Gastroenterology     Referral Priority:   Routine     Referral Type:   Consult for Advice and Opinion     Referral Reason:   Specialty Services Required     Referral Location:   [facility]     Requested Specialty:   Gastroenterology     Number of Visits Requested:   1     Orders Placed This Encounter   Medications    sertraline (ZOLOFT) 25 MG tablet     Sig: Take 1 tablet by mouth daily     Dispense:  30 tablet     Refill:  3       Patient given educational materials - see patient instructions. Discussed use, benefit, and side effects of prescribed medications. All patientquestions answered. Pt voiced understanding. Reviewed health maintenance. Instructedto continue current medications, diet and exercise. Patient agreed with treatmentplan. Follow up as directed.      Electronically signed by Leisa Silver MD on 1/4/2022 at 12:23 PM

## 2022-01-07 ENCOUNTER — HOSPITAL ENCOUNTER (OUTPATIENT)
Dept: INTERVENTIONAL RADIOLOGY/VASCULAR | Age: 72
Discharge: HOME | End: 2022-01-09
Payer: MEDICARE

## 2022-01-07 VITALS — SYSTOLIC BLOOD PRESSURE: 148 MMHG | DIASTOLIC BLOOD PRESSURE: 72 MMHG | HEART RATE: 55 BPM

## 2022-01-07 DIAGNOSIS — K70.31 ALCOHOLIC CIRRHOSIS OF LIVER WITH ASCITES (HCC): ICD-10-CM

## 2022-01-07 PROCEDURE — 89051 BODY FLUID CELL COUNT: CPT

## 2022-01-07 PROCEDURE — P9047 ALBUMIN (HUMAN), 25%, 50ML: HCPCS | Performed by: INTERNAL MEDICINE

## 2022-01-07 PROCEDURE — C1729 CATH, DRAINAGE: HCPCS

## 2022-01-07 PROCEDURE — 49083 ABD PARACENTESIS W/IMAGING: CPT

## 2022-01-07 PROCEDURE — 6360000002 HC RX W HCPCS: Performed by: INTERNAL MEDICINE

## 2022-01-07 RX ORDER — ALBUMIN (HUMAN) 12.5 G/50ML
50 SOLUTION INTRAVENOUS ONCE
Status: COMPLETED | OUTPATIENT
Start: 2022-01-07 | End: 2022-01-07

## 2022-01-07 RX ORDER — ALBUMIN (HUMAN) 12.5 G/50ML
12.5 SOLUTION INTRAVENOUS ONCE
Status: COMPLETED | OUTPATIENT
Start: 2022-01-07 | End: 2022-01-07

## 2022-01-07 RX ADMIN — Medication 12.5 G: at 15:00

## 2022-01-07 RX ADMIN — ALBUMIN (HUMAN) 50 G: 0.25 INJECTION, SOLUTION INTRAVENOUS at 14:23

## 2022-01-07 NOTE — PROGRESS NOTES
Paracentesis completed with 9l of clear fluid removed and sent for cell count and diff. Albumin 62 gm given per standing order. Pt tolerated well.

## 2022-01-07 NOTE — BRIEF OP NOTE
Brief Postoperative Note    Reshma Cruz  YOB: 1950  [id number]    Pre-operative Diagnosis: Cirrhosis, Ascites    Post-operative Diagnosis: Same    Procedure:US guided paracentesis     Anesthesia: Local    Surgeons/Assistants: Taty    Estimated Blood Loss: less than 50     Complications: None    Specimens: Was Obtained: 9 liters of wilma fluid    Findings: successful paracentesis    Electronically signed by Vito Crystal MD on 1/7/2022 at 2:18 PM

## 2022-01-10 ENCOUNTER — TELEPHONE (OUTPATIENT)
Dept: GASTROENTEROLOGY | Age: 72
End: 2022-01-10

## 2022-01-10 DIAGNOSIS — K70.31 ALCOHOLIC CIRRHOSIS OF LIVER WITH ASCITES (HCC): Primary | ICD-10-CM

## 2022-01-10 LAB
APPEARANCE FLUID: NORMAL
BASO FLUID: NORMAL %
COLOR FLUID: NORMAL
EOSINOPHIL FLUID: NORMAL %
FLUID DIFF COMMENT: NORMAL
LYMPHOCYTES, BODY FLUID: 62 %
MONOCYTE, FLUID: NORMAL %
NEUTROPHIL, FLUID: 0 %
OTHER CELLS FLUID: NORMAL %
RBC FLUID: <3000 /MM3
SPECIMEN TYPE: NORMAL
WBC FLUID: 144 /MM3

## 2022-01-10 NOTE — TELEPHONE ENCOUNTER
Tere Benito stating Dr Nilsa Abdullahi told the patient that he can take up to 160 mg of Lasix and 400 mg of Aldactone. He is currently on 40 BID lasix /200 daily Aldactone and he is still needing paracentesis every 2 weeks. She would like a call back once increase is given. Writer will speak with Dr Nilsa Abdullahi.

## 2022-01-12 NOTE — TELEPHONE ENCOUNTER
Writer spoke with Dr Nilsa Abdullahi. Pt is to have labs drawn prior to appt on 1/25 and increase on meds can be discussed then. Writer called and informed pt.

## 2022-01-19 ENCOUNTER — HOSPITAL ENCOUNTER (OUTPATIENT)
Dept: INTERVENTIONAL RADIOLOGY/VASCULAR | Age: 72
Discharge: HOME | End: 2022-01-21
Payer: MEDICARE

## 2022-01-19 VITALS
SYSTOLIC BLOOD PRESSURE: 131 MMHG | OXYGEN SATURATION: 97 % | DIASTOLIC BLOOD PRESSURE: 82 MMHG | HEART RATE: 87 BPM | RESPIRATION RATE: 18 BRPM

## 2022-01-19 DIAGNOSIS — K70.31 ALCOHOLIC CIRRHOSIS OF LIVER WITH ASCITES (HCC): ICD-10-CM

## 2022-01-19 PROCEDURE — C1729 CATH, DRAINAGE: HCPCS

## 2022-01-19 PROCEDURE — 49083 ABD PARACENTESIS W/IMAGING: CPT

## 2022-01-19 PROCEDURE — 89051 BODY FLUID CELL COUNT: CPT

## 2022-01-19 PROCEDURE — 6360000002 HC RX W HCPCS: Performed by: INTERNAL MEDICINE

## 2022-01-19 PROCEDURE — P9047 ALBUMIN (HUMAN), 25%, 50ML: HCPCS | Performed by: INTERNAL MEDICINE

## 2022-01-19 RX ORDER — ALBUMIN (HUMAN) 12.5 G/50ML
50 SOLUTION INTRAVENOUS ONCE
Status: COMPLETED | OUTPATIENT
Start: 2022-01-19 | End: 2022-01-19

## 2022-01-19 RX ADMIN — ALBUMIN (HUMAN) 50 G: 0.25 INJECTION, SOLUTION INTRAVENOUS at 14:05

## 2022-01-19 NOTE — PROGRESS NOTES
Patient tolerated paracentesis without distress. 7400ml removed. Dressing to site. Discharge instructions given, no questions at this time. Patient discharged home with family.

## 2022-01-20 LAB
APPEARANCE FLUID: NORMAL
BASO FLUID: NORMAL %
COLOR FLUID: NORMAL
EOSINOPHIL FLUID: NORMAL %
FLUID DIFF COMMENT: NORMAL
LYMPHOCYTES, BODY FLUID: 66 %
MONOCYTE, FLUID: NORMAL %
NEUTROPHIL, FLUID: 0 %
OTHER CELLS FLUID: NORMAL %
RBC FLUID: <3000 /MM3
SPECIMEN TYPE: NORMAL
WBC FLUID: 168 /MM3

## 2022-01-21 NOTE — PROGRESS NOTES
GI CLINIC FOLLOW UP    INTERVAL HISTORY:   No referring provider defined for this encounter. Chief Complaint   Patient presents with    Cirrhosis     Pt is f/u on cirrhosis. He has been getting paracentesis every 2 weeks. He was referred to [facility] for TIPS eval.  He declined appt due  to not wanting procedure. HISTORY OF PRESENT ILLNESS: Ramesh Alvarado is a 66 y.o. male , referred for evaluation of*. alcohol liver cirrhosis,*large flat polyps, elevated LFT, NHL     here for f/u   Patient is here with his friend who takes care of him also  His main issue that he is requiring frequent paracentesis, he needs paracentesis q 3 weeks   Is trying to have a low-salt   He denied any confusion denied any bleeding    On aldactone /lasix 200/80   still getting paracentesis 2-3 weeks 8-10 liters/usualy   Last visit we ordered egd/colon result as below   Kid function is good up to 10/21 no labs after   We have referred him to [facility] transplant service both to consider TIPS and to be familiar with him  And having an appointment with them in January 31, 2022  Him that the patient denied any new issues no sign of bleeding his weight dropped by 19 pounds I am not sure if this is related to overdiuresis were having on him right now with the high-dose diuretics I have him on        Findings:     Retropharyngeal area was grossly normal appearing     Esophagus: abnormal: F2-F3 2 columns of varices were banded        Stomach:    Fundus: Portal hypertension gastropathy gentle biopsies were taken       Body: abnormal: Portal hypertension gastropathy gentle biopsies were taken       Antrum: abnormal: Portal hypertension gastropathy gentle biopsies were taken        Duodenum:     Descending: normal    Bulb: normal     The scope was removed and the patient tolerated the procedure well.      Recommendations/Plan:   1. F/U Biopsies  2. F/U In Office in 3-4 weeks  3.  Discussed with the family     Electronically signed by Kristyn Diallo MD  on 9/17/2021 at 1:29 PM   The prep was fair.       Findings:  Terminal ileum/cecum : Surgical anastomosis with the ileum the mucosa of the ileum is prolapsing back into the cecum and had multiple nodular on it could be just a normal variant because of the edema and portal hypertension but could be also polyps for which I went ahead and biopsied please see the image       Transverse colon: normal     Descending/Sigmoid colon: abnormal: the mucosa in the sigmoid area is edematous  Diverticulosis     Rectum/Anus: examined in normal and retroflexed positions and was abnormal: Hemorrhoids     Withdrawal Time was (minutes): 13         The colon was decompressed and the scope was removed. The patient tolerated the procedure well.      Recommendations/Plan:   1. Lifestyle and dietary modifications as discussed  2. F/U Biopsies  3. F/U In OfficeYes  4. Discussed with the family  5. Repeat colonoscopy [id number]     Electronically signed by Kristyn Diallo MD  on 9/17/2021 at 1:53 PM   -- Diagnosis --     1.  STOMACH, BIOPSY:     GASTRIC OXYNTIC TYPE MUCOSA WITH NO PATHOLOGIC DIAGNOSIS   (SEE COMMENT)     2.  ILEUM, ANASTOMOTIC SITE, BIOPSY:     NO PATHOLOGIC DIAGNOSIS     -- Diagnosis Comment --     In part 1, a normal biopsy does not rule out portal hypertensive   gastropathy.  Please correlate with appropriate clinical and   endoscopic findings. Bianca Niño,   **Electronically Signed Out**         lj/9/21/2021       Past Medical,Family, and Social History reviewed and does contribute to the patient presentingcondition. Patient's PMH/PSH,SH,PSYCH Hx, MEDs, ALLERGIES, and ROS were all reviewed and updated in the appropriate sections.     PAST MEDICAL HISTORY:  Past Medical History:   Diagnosis Date    AAA (abdominal aortic aneurysm) ([facility] [address] 3/23/2015    3.8cm     CAD (coronary artery disease)     Colon polyps     Colon tumor     Congestive heart failure (CHF) ([facility] [address] 01/2021    GERD (gastroesophageal reflux disease)     prilosec as needed    H/O chest tube placement     chest tube x4 different times    History of pneumonia     History of pneumothorax     x4    Hx of cardiac cath 02/19/2021    Hyperlipidemia     Hypertension     Non Hodgkin's lymphoma ([facility] [address]     NHL    Pneumonia 1970's    patient had 3 chest tubes and in hospital for 13 days    Unspecified cerebral artery occlusion with cerebral infarction 10/01/2014    stroke , numbness in the left arm from the elbow down    Wellness examination 02/25/2021    pcp-Pilar [facility]Cwfhy[facility]-ygib[facility]-fx nov 2020    Wellness examination 02/25/2021    Cardiology-Dr [address]ara-[address] [address] feb 2021    Wellness examination 02/25/2021    onc-Dr Binh Crockett 2021       Past Surgical History:   Procedure Laterality Date    APPENDECTOMY  4/21/15    CHOLECYSTECTOMY, OPEN  04/21/15    COLONOSCOPY      COLONOSCOPY  07/10/2020    COLONOSCOPY POLYPECTOMY HOT BIOPSY (N/A )    COLONOSCOPY N/A 7/10/2020    COLONOSCOPY POLYPECTOMY HOT BIOPSY performed by Ingris Gaxiola MD at [facility] 9/17/2021    COLONOSCOPY WITH BIOPSY performed by Ingris Gaxiola MD at [place] [address] N/A 3/3/2021    CABG CORONARY ARTERY BYPASS X3, ON PUMP; SWAN MARLENY, MIRANDA PER ANESTHESIA performed by Loi Washburn MD at [address] [place] [address]  2019    stent to left leg with tubular graft to left leg artery    RIGHT COLECTOMY  04/21/15    TRANSESOPHAGEAL ECHOCARDIOGRAM  10/3/14    UPPER GASTROINTESTINAL ENDOSCOPY N/A 9/17/2021    EGD BAND LIGATION AND BIOPSY performed by Ingris Gaxiola MD at [address] EXTRACTION         CURRENT MEDICATIONS:    Current Outpatient Medications:     pantoprazole (PROTONIX) 40 MG tablet, Take 1 tablet by mouth every morning (before breakfast), Disp: 30 tablet, Rfl: 5    lactulose (CHRONULAC) 10 GM/15ML solution, Take 15 mLs by mouth 2 times daily, Disp: 900 mL, Rfl: 5    sertraline (ZOLOFT) 25 MG tablet, Take 1 tablet by mouth daily, Disp: 30 tablet, Rfl: 3    spironolactone (ALDACTONE) 100 MG tablet, Take 2 tablets by mouth daily, Disp: 60 tablet, Rfl: 3    furosemide (LASIX) 40 MG tablet, Take 1 tablet by mouth 2 times daily, Disp: 60 tablet, Rfl: 3    melatonin (RA MELATONIN) 3 MG TABS tablet, Take 1 tablet by mouth daily (Patient taking differently: Take 3 mg by mouth nightly as needed ), Disp: 90 tablet, Rfl: 1    atorvastatin (LIPITOR) 20 MG tablet, Take 1 tablet by mouth nightly, Disp: 90 tablet, Rfl: 0    metoprolol (LOPRESSOR) 100 MG tablet, Take 1 tablet by mouth 2 times daily (Patient taking differently: Take 50 mg by mouth 2 times daily ), Disp: 120 tablet, Rfl: 1    aspirin 81 MG tablet, Take 81 mg by mouth daily, Disp: , Rfl:     omeprazole (PRILOSEC) 10 MG delayed release capsule, Take 1 capsule by mouth daily as needed (GERD), Disp: 90 capsule, Rfl: 3    ALLERGIES:   Allergies   Allergen Reactions    Iv Dye [Iodides] Hives    Iodinated Diagnostic Agents Hives     Patient developed hives even with steroid prep. FAMILY HISTORY:       Problem Relation Age of Onset    Heart Disease Mother     Stroke Mother     Alcohol Abuse Father     Cancer Father          SOCIAL HISTORY:   Social History     Socioeconomic History    Marital status:       Spouse name: Not on file    Number of children: Not on file    Years of education: Not on file    Highest education level: Not on file   Occupational History    Not on file   Tobacco Use    Smoking status: Former Smoker     Packs/day: 1.00     Years: 30.00     Pack years: 30.00     Types: Cigarettes     Quit date: 2013     Years since quittin.4    Smokeless tobacco: Never Used   Vaping Use    Vaping Use: Never used   Substance and Sexual Activity    Alcohol use: Not Currently     Alcohol/week: 14.0 standard drinks     Types: 14 Cans of beer per week Comment: 1-3 beers/day    Drug use: No    Sexual activity: Not Currently   Other Topics Concern    Not on file   Social History Narrative    Not on file     Social Determinants of Health     Financial Resource Strain: Low Risk     Difficulty of Paying Living Expenses: Not hard at all   Food Insecurity: No Food Insecurity    Worried About Running Out of Food in the Last Year: Never true    [place] of Food in the Last Year: Never true   Transportation Needs: No Transportation Needs    Lack of Transportation (Medical): No    Lack of Transportation (Non-Medical): No   Physical Activity:     Days of Exercise per Week: Not on file    Minutes of Exercise per Session: Not on file   Stress:     Feeling of Stress : Not on file   Social Connections:     Frequency of Communication with Friends and Family: Not on file    Frequency of Social Gatherings with Friends and Family: Not on file    Attends [employer] Services: Not on file    Active Member of [address] [place] [address] or Organizations: Not on file    Attends Club or Organization Meetings: Not on file    Marital Status: Not on file   Intimate Partner Violence:     Fear of Current or Ex-Partner: Not on file    Emotionally Abused: Not on file    Physically Abused: Not on file    Sexually Abused: Not on file   Housing Stability:     Unable to Pay for Housing in the Last Year: Not on file    Number of [place] in the Last Year: Not on file    Unstable Housing in the Last Year: Not on file       REVIEW OF SYSTEMS: A 12-point review of systemswas obtained and pertinent positives and negatives were enumerated above in the history of present illness. All other reviewed systems / symptoms were negative. Review of Systems   Constitutional: Positive for appetite change, fatigue and unexpected weight change. HENT: Negative for sore throat, trouble swallowing and voice change. Eyes: Positive for visual disturbance (glasses).    Respiratory: Positive for shortness of breath. Negative for cough, choking and wheezing. Cardiovascular: Negative for chest pain, palpitations and leg swelling. Gastrointestinal: Positive for abdominal distention, abdominal pain and nausea. Negative for anal bleeding, blood in stool, constipation, diarrhea, rectal pain and vomiting. Genitourinary: Positive for testicular pain. Negative for difficulty urinating. Musculoskeletal: Positive for myalgias. Negative for back pain and joint swelling. Allergic/Immunologic: Negative for environmental allergies and food allergies. Neurological: Positive for dizziness. Negative for tremors, weakness, light-headedness, numbness (left arm) and headaches. Hematological: Bruises/bleeds easily. Psychiatric/Behavioral: Positive for sleep disturbance. The patient is not nervous/anxious.             LABORATORY DATA: Reviewed  Lab Results   Component Value Date    WBC 27.7 (H) 11/30/2021    HGB 13.0 11/30/2021    HCT 40.1 (L) 11/30/2021    .6 11/30/2021    PLT 78 (L) 12/21/2021     (L) 10/20/2021    K 4.3 10/20/2021    CL 98 10/20/2021    CO2 24 10/20/2021    BUN 19 10/20/2021    CREATININE 1.01 10/20/2021    LABALBU 3.3 (L) 10/20/2021    BILITOT 1.31 (H) 10/20/2021    ALKPHOS 193 (H) 10/20/2021    AST 38 10/20/2021    ALT 20 10/20/2021    INR 1.3 12/21/2021         Lab Results   Component Value Date    RBC 3.91 (L) 11/30/2021    HGB 13.0 11/30/2021    .6 11/30/2021    MCH 33.2 11/30/2021    MCHC 32.4 11/30/2021    RDW 17.0 (H) 11/30/2021    MPV 8.7 11/30/2021    BASOPCT 0 11/30/2021    LYMPHSABS 18.00 (H) 11/30/2021    MONOSABS 5.82 (H) 11/30/2021    NEUTROABS 3.60 11/30/2021    EOSABS 0.28 11/30/2021    BASOSABS 0.00 11/30/2021         DIAGNOSTIC TESTING:     IR US GUIDED PARACENTESIS    Result Date: 1/19/2022  PROCEDURE: IR US GUIDED PARACENTESIS W IMAGING 1/19/2022 HISTORY: ORDERING SYSTEM PROVIDED HISTORY: Alcoholic cirrhosis of liver with ascites [facility]) TECHNOLOGIST PROVIDED HISTORY: ascites Please give 2 units of platelets same day as needed for treatment if less than 50,000 with bleeding or less than 20,00 with out bleeding. TECHNIQUE: Ultrasound was utilized CONTRAST: None SEDATION: None FLUOROSCOPY DOSE AND TYPE OR TIME AND EXPOSURES: None DESCRIPTION OF PROCEDURE: Informed consent was obtained after a detailed explanation of the procedure including risks, benefits, and alternatives. Universal protocol was observed. Preprocedure ultrasound shows a dominant fluid pocket in the right lowerquadrant. Using ultrasound guidance (image attached to the medical record), the fluid pocket was accessed with a 5 Western Riya Yueh needle with aspiration of non turbid yellow fluid. Vacuum bottle paracentesis was performed and approximately 7.4 L was removed. A sample was not requested. The patient tolerated the procedure well and left the department in good condition. Dr. Rin Major was present. The patient received IV albumin replacement. FINDINGS: 7.4 L drained. Ultrasound-guided therapeutic paracentesis     IR US GUIDED PARACENTESIS    Result Date: 1/7/2022  EXAMINATION: IR US GUIDED PARACENTESIS W IMAGING HISTORY / PRE-OPERATIVE DIAGNOSIS: ORDERING SYSTEM PROVIDED HISTORY: Alcoholic cirrhosis of liver with ascites ([facility] [address] TECHNOLOGIST PROVIDED HISTORY: ascites Please give 2 units of platelets same day as needed for treatment if less than 50,000 with bleeding or less than 20,00 with out bleeding. : Ryan Posey ANESTHESIA: Local TECHNIQUE: After obtaining informed consent, the patient was placed in the supine position in bed. The right lateral flank area was cleansed and draped in the usual sterile fashion. 1% Lidocaine was used for local anesthesia. Next, a 6 French Safe-T-Centesis catheter was introduced into the ascites fluid collection. As much fluid was removed as possible by vacuum container bottles. A total of 9000 cc of cloudy light wilma colored fluid was removed.  A total of 9000 cc of the cloudy light wilma colored fluid was sent to the lab for evaluation. COMPARISON: None. FINDINGS: Technically successful ultrasound guided large volume paracentesis ESTIMATED BLOOD LOSS: Less than 50 SPECIMENS: A total of 9000 cc of cloudy light wilma colored fluid was obtained. Technically successful US guided large volume paracentesis with removal of 9000 cc of fluid as described above. The patient received 50 grams of Albumin IV during and post procedure. COMPLICATIONS: The patient tolerated the procedure well without any immediate complications. PHYSICAL EXAMINATION: Vital signs reviewed per the nursing documentation. /83   Pulse 99   Temp 97 °F (36.1 °C)   Wt 178 lb (80.7 kg)   BMI 22.85 kg/m²   Body mass index is 22.85 kg/m². Physical Exam  Vitals and nursing note reviewed. Constitutional:       General: He is not in acute distress. Appearance: He is well-developed. He is not diaphoretic. HENT:      Head: Normocephalic and atraumatic. Eyes:      General: No scleral icterus. Pupils: Pupils are equal, round, and reactive to light. Neck:      Thyroid: No thyromegaly. Vascular: No JVD. Trachea: No tracheal deviation. Cardiovascular:      Rate and Rhythm: Normal rate and regular rhythm. Heart sounds: Normal heart sounds. No murmur heard. Pulmonary:      Effort: Pulmonary effort is normal. No respiratory distress. Breath sounds: Normal breath sounds. No wheezing. Abdominal:      General: Bowel sounds are normal. There is no distension. Palpations: Abdomen is soft. There is no mass. Tenderness: There is no abdominal tenderness. There is no guarding or rebound. Musculoskeletal:         General: No tenderness. Normal range of motion. Cervical back: Normal range of motion and neck supple. Skin:     General: Skin is warm. Coloration: Skin is not pale. Findings: No erythema or rash.       Comments: He is not diaphoretic   Neurological:      Mental Status: He is alert and oriented to person, place, and time. Deep Tendon Reflexes: Reflexes are normal and symmetric. Psychiatric:         Behavior: Behavior normal.         Thought Content: Thought content normal.         Judgment: Judgment normal.           IMPRESSION: Mr. Desi Seo is a 66 y.o. male with      Diagnosis Orders   1. Alcoholic cirrhosis of liver with ascites (HCC)  Iron and TIBC    Vitamin B12    Folate    CBC    CBC Auto Differential    Comp Metabolic w Bili Profile   2. Adenomatous polyps     3. Hx of cholecystectomy     4. S/P CABG x 3     5. Abdominal aortic aneurysm (AAA) without rupture ([facility] [address]     Will keep the appointment with [facility] 1/31/2022, for TIPS consideration  And transplant consideration  We will continue with the paracentesis every 2 to 3 weeks as needed  We will continue with spironolactone 200 mg  Lasix 80 mg  We will check his electrolytes and kidney function and see if we can increase further  Educated about low-salt diet again  He did have esophageal varices but unguinal hold off on banding him again not to overwhelm him with appointments  And because it was just 1:  He is already on beta-blocker  He does have 2-3 bowel movements on his own I told him if he does not take lactulose on top of that  We will treat we can get Xifaxan for him    We will study his anemia further  And we will keep him on Protonix instead of his omeprazole 10 which he takes over-the-counter  To see if that will help his nausea  Told him to have more bowel movements and not get constipated        Diet/life style/natural hx /complication of the dx were all explained in details   Past medical, past surgical, social history, psychiatric history, medications or allergies, all reviewed and  updated    Thank you for allowing me to participate in the care of Mr. Desi Seo. For any further questions please do not hesitate to contact me.     I have reviewed and agree with the ROS entered by the MA/RN. Note is dictated utilizing voice recognition software. Unfortunately this leads to occasional typographical errors. Please contact our office if you have any questions.       Rafael Cosby MD  [facility]  O: #[phone]

## 2022-01-25 ENCOUNTER — OFFICE VISIT (OUTPATIENT)
Dept: GASTROENTEROLOGY | Age: 72
End: 2022-01-25
Payer: MEDICARE

## 2022-01-25 VITALS
SYSTOLIC BLOOD PRESSURE: 136 MMHG | DIASTOLIC BLOOD PRESSURE: 83 MMHG | HEART RATE: 99 BPM | TEMPERATURE: 97 F | WEIGHT: 178 LBS | BODY MASS INDEX: 22.85 KG/M2

## 2022-01-25 DIAGNOSIS — Z90.49 HX OF CHOLECYSTECTOMY: ICD-10-CM

## 2022-01-25 DIAGNOSIS — Z95.1 S/P CABG X 3: ICD-10-CM

## 2022-01-25 DIAGNOSIS — I71.40 ABDOMINAL AORTIC ANEURYSM (AAA) WITHOUT RUPTURE: ICD-10-CM

## 2022-01-25 DIAGNOSIS — D36.9 ADENOMATOUS POLYPS: ICD-10-CM

## 2022-01-25 DIAGNOSIS — K70.31 ALCOHOLIC CIRRHOSIS OF LIVER WITH ASCITES (HCC): Primary | ICD-10-CM

## 2022-01-25 PROCEDURE — 99214 OFFICE O/P EST MOD 30 MIN: CPT | Performed by: INTERNAL MEDICINE

## 2022-01-25 RX ORDER — PANTOPRAZOLE SODIUM 40 MG/1
40 TABLET, DELAYED RELEASE ORAL
Qty: 30 TABLET | Refills: 5 | Status: SHIPPED | OUTPATIENT
Start: 2022-01-25

## 2022-01-25 RX ORDER — LACTULOSE 10 G/15ML
10 SOLUTION ORAL 2 TIMES DAILY
Qty: 900 ML | Refills: 5 | Status: SHIPPED | OUTPATIENT
Start: 2022-01-25 | End: 2022-02-07 | Stop reason: ALTCHOICE

## 2022-01-25 ASSESSMENT — ENCOUNTER SYMPTOMS
SORE THROAT: 0
ANAL BLEEDING: 0
COUGH: 0
CHOKING: 0
BLOOD IN STOOL: 0
SHORTNESS OF BREATH: 1
VOICE CHANGE: 0
DIARRHEA: 0
BACK PAIN: 0
TROUBLE SWALLOWING: 0
CONSTIPATION: 0
ABDOMINAL DISTENTION: 1
NAUSEA: 1
RECTAL PAIN: 0
VOMITING: 0
ABDOMINAL PAIN: 1
WHEEZING: 0

## 2022-02-01 RX ORDER — SODIUM CHLORIDE 9 MG/ML
INJECTION, SOLUTION INTRAVENOUS CONTINUOUS
Status: CANCELLED | OUTPATIENT
Start: 2022-02-01

## 2022-02-02 ENCOUNTER — HOSPITAL ENCOUNTER (OUTPATIENT)
Dept: INTERVENTIONAL RADIOLOGY/VASCULAR | Age: 72
Discharge: HOME | End: 2022-02-04
Payer: MEDICARE

## 2022-02-02 VITALS
SYSTOLIC BLOOD PRESSURE: 117 MMHG | HEART RATE: 85 BPM | RESPIRATION RATE: 16 BRPM | DIASTOLIC BLOOD PRESSURE: 70 MMHG | OXYGEN SATURATION: 97 %

## 2022-02-02 DIAGNOSIS — K70.31 ALCOHOLIC CIRRHOSIS OF LIVER WITH ASCITES (HCC): ICD-10-CM

## 2022-02-02 PROCEDURE — 89051 BODY FLUID CELL COUNT: CPT

## 2022-02-02 PROCEDURE — 49083 ABD PARACENTESIS W/IMAGING: CPT

## 2022-02-02 PROCEDURE — P9047 ALBUMIN (HUMAN), 25%, 50ML: HCPCS | Performed by: INTERNAL MEDICINE

## 2022-02-02 PROCEDURE — 6360000002 HC RX W HCPCS: Performed by: INTERNAL MEDICINE

## 2022-02-02 PROCEDURE — 2709999900 IR US GUIDED PARACENTESIS

## 2022-02-02 RX ORDER — ALBUMIN (HUMAN) 12.5 G/50ML
50 SOLUTION INTRAVENOUS ONCE
Status: COMPLETED | OUTPATIENT
Start: 2022-02-02 | End: 2022-02-02

## 2022-02-02 RX ORDER — ALBUMIN (HUMAN) 12.5 G/50ML
12.5 SOLUTION INTRAVENOUS ONCE
Status: COMPLETED | OUTPATIENT
Start: 2022-02-02 | End: 2022-02-02

## 2022-02-02 RX ORDER — ACETAMINOPHEN 325 MG/1
650 TABLET ORAL EVERY 4 HOURS PRN
Status: DISCONTINUED | OUTPATIENT
Start: 2022-02-02 | End: 2022-02-05 | Stop reason: HOSPADM

## 2022-02-02 RX ADMIN — ALBUMIN (HUMAN) 50 G: 0.25 INJECTION, SOLUTION INTRAVENOUS at 13:49

## 2022-02-02 RX ADMIN — ALBUMIN (HUMAN) 12.5 G: 0.25 INJECTION, SOLUTION INTRAVENOUS at 14:05

## 2022-02-02 NOTE — PROGRESS NOTES
Patient tolerated paracentesis without distress. 9100ml removed. Dressing to site. Discharge instructions given, no questions at this time. Patient discharged home via private auto.

## 2022-02-02 NOTE — BRIEF OP NOTE
Brief Postoperative Note for Paracentesis    Mehrdad Cruz  YOB: 1950  [id number]    Pre-operative Diagnosis: Ascites      Post-operative Diagnosis: Same    Procedure: Ultrasound guided Paracentesis     Anesthesia: 1% Lidocaine     Surgeons/Assistants: Mordecai Boast, MD    Complications: none    Specimens: were not obtained    Ultrasound guided paracentesis performed. 9100 ml clear yellow fluid obtained from RLQ. Dressing applied. Vital signs were reviewed and were stable after the procedure. Albumin 62.5 gms administered IV. Discharged to home.       Electronically signed by Mordecai Boast, MD on 2/2/2022 at 2:11 PM

## 2022-02-03 LAB
APPEARANCE FLUID: NORMAL
BASO FLUID: NORMAL %
COLOR FLUID: NORMAL
EOSINOPHIL FLUID: NORMAL %
FLUID DIFF COMMENT: NORMAL
LYMPHOCYTES, BODY FLUID: 67 %
MONOCYTE, FLUID: NORMAL %
NEUTROPHIL, FLUID: 0 %
OTHER CELLS FLUID: NORMAL %
RBC FLUID: <3000 /MM3
SPECIMEN TYPE: NORMAL
WBC FLUID: 93 /MM3

## 2022-02-07 ENCOUNTER — OFFICE VISIT (OUTPATIENT)
Dept: FAMILY MEDICINE CLINIC | Age: 72
End: 2022-02-07
Payer: MEDICARE

## 2022-02-07 VITALS
HEART RATE: 84 BPM | BODY MASS INDEX: 23.1 KG/M2 | TEMPERATURE: 97.3 F | HEIGHT: 74 IN | DIASTOLIC BLOOD PRESSURE: 63 MMHG | WEIGHT: 180 LBS | SYSTOLIC BLOOD PRESSURE: 101 MMHG

## 2022-02-07 DIAGNOSIS — Z23 IMMUNIZATION DUE: ICD-10-CM

## 2022-02-07 DIAGNOSIS — R41.3 MEMORY CHANGES: ICD-10-CM

## 2022-02-07 DIAGNOSIS — I25.10 CAD IN NATIVE ARTERY: ICD-10-CM

## 2022-02-07 DIAGNOSIS — K70.31 ALCOHOLIC CIRRHOSIS OF LIVER WITH ASCITES (HCC): Primary | ICD-10-CM

## 2022-02-07 DIAGNOSIS — F32.1 CURRENT MODERATE EPISODE OF MAJOR DEPRESSIVE DISORDER WITHOUT PRIOR EPISODE (HCC): ICD-10-CM

## 2022-02-07 PROCEDURE — G0010 ADMIN HEPATITIS B VACCINE: HCPCS | Performed by: STUDENT IN AN ORGANIZED HEALTH CARE EDUCATION/TRAINING PROGRAM

## 2022-02-07 PROCEDURE — 90746 HEPB VACCINE 3 DOSE ADULT IM: CPT | Performed by: STUDENT IN AN ORGANIZED HEALTH CARE EDUCATION/TRAINING PROGRAM

## 2022-02-07 PROCEDURE — 90472 IMMUNIZATION ADMIN EACH ADD: CPT | Performed by: STUDENT IN AN ORGANIZED HEALTH CARE EDUCATION/TRAINING PROGRAM

## 2022-02-07 PROCEDURE — 90734 MENACWYD/MENACWYCRM VACC IM: CPT | Performed by: STUDENT IN AN ORGANIZED HEALTH CARE EDUCATION/TRAINING PROGRAM

## 2022-02-07 PROCEDURE — 90632 HEPA VACCINE ADULT IM: CPT | Performed by: STUDENT IN AN ORGANIZED HEALTH CARE EDUCATION/TRAINING PROGRAM

## 2022-02-07 PROCEDURE — 90471 IMMUNIZATION ADMIN: CPT | Performed by: STUDENT IN AN ORGANIZED HEALTH CARE EDUCATION/TRAINING PROGRAM

## 2022-02-07 PROCEDURE — 99214 OFFICE O/P EST MOD 30 MIN: CPT | Performed by: STUDENT IN AN ORGANIZED HEALTH CARE EDUCATION/TRAINING PROGRAM

## 2022-02-07 RX ORDER — ATORVASTATIN CALCIUM 20 MG/1
20 TABLET, FILM COATED ORAL NIGHTLY
Qty: 90 TABLET | Refills: 1 | Status: SHIPPED | OUTPATIENT
Start: 2022-02-07

## 2022-02-07 RX ORDER — LACTULOSE 10 G/15ML
15 SOLUTION ORAL; RECTAL 3 TIMES DAILY
COMMUNITY

## 2022-02-07 RX ORDER — FUROSEMIDE 80 MG
TABLET ORAL
COMMUNITY
Start: 2022-01-31 | End: 2022-03-01 | Stop reason: DRUGHIGH

## 2022-02-07 ASSESSMENT — ENCOUNTER SYMPTOMS
COUGH: 0
SORE THROAT: 0
DIARRHEA: 0
EYE DISCHARGE: 0
VOMITING: 0
ABDOMINAL DISTENTION: 1
NAUSEA: 0
WHEEZING: 0
CHEST TIGHTNESS: 0
SHORTNESS OF BREATH: 0
ABDOMINAL PAIN: 0
CONSTIPATION: 0

## 2022-02-07 NOTE — PROGRESS NOTES
[address] PRIMARY CARE  [address] [place] [address] [address] [place] [address]  Dept: [phone]  Dept Fax: [phone]    Franki Mcarthur is a 66 y.o. male who is a Established patient, who presents today for his medical conditions/complaints as noted below:  Chief Complaint   Patient presents with    Anxiety    Depression    Hypertension    Other     was seen at [address] [place] [address] on 1/31     Altered Mental Status         HPI:     He is here today to follow-up on his liver cirrhosis and discuss sleep issues. He had a follow-up with [facility] gastro[facility]ology and was advised to take Lasix 80 mg daily along with spironolactone 200 mg daily. He was also advised to increase his lactulose dose to titrate up to 3-4 bowel movements a day. He was previously on lactulose 10 mg twice daily. Says that he is slightly constipated and is not having regular bowel movements. He also complains of issues with memory and easily forgetting things. He follows with neurology for his history of stroke and plans to call them up to schedule appointment to discuss his memory issues. He has also been having trouble sleeping and says that he was taking Zoloft and melatonin at bedtime but did not see any benefit. He also complains of bruising on his left buttock for past few weeks. Denies any history of injury.        Lactulose 15 ml twice daily      Hemoglobin A1C (%)   Date Value   04/20/2021 5.2   02/19/2021 5.9             ( goal A1Cis < 7)   No results found for: LABMICR  LDL Cholesterol (mg/dL)   Date Value   04/20/2021 34   02/19/2020 39   03/27/2017 55       (goal LDL is <100)   AST (U/L)   Date Value   10/20/2021 38     ALT (U/L)   Date Value   10/20/2021 20     BUN (mg/dL)   Date Value   10/20/2021 19     BP Readings from Last 3 Encounters:   02/07/22 101/63   02/02/22 117/70   01/25/22 136/83          (goal 120/80)    Past Medical History:   Diagnosis Date    AAA (abdominal aortic aneurysm) ([facility] 3/23/2015    3.8cm     CAD (coronary artery disease)     Colon polyps     Colon tumor     Congestive heart failure (CHF) ([facility] 01/2021    GERD (gastroesophageal reflux disease)     prilosec as needed    H/O chest tube placement     chest tube x4 different times    History of pneumonia     History of pneumothorax     x4    Hx of cardiac cath 02/19/2021    Hyperlipidemia     Hypertension     Non Hodgkin's lymphoma ([facility]     NHL    Pneumonia 1970's    patient had 3 chest tubes and in hospital for 13 days    Unspecified cerebral artery occlusion with cerebral infarction 10/01/2014    stroke , numbness in the left arm from the elbow down    Wellness examination 02/25/2021    [facility] nov 2020    Wellness examination 02/25/2021    Cardiology-Dr [facility] feb 2021    Wellness examination 02/25/2021    onc-Dr Sita Martinez 2021      Past Surgical History:   Procedure Laterality Date    APPENDECTOMY  4/21/15    CHOLECYSTECTOMY, OPEN  04/21/15    COLONOSCOPY      COLONOSCOPY  07/10/2020    COLONOSCOPY POLYPECTOMY HOT BIOPSY (N/A )    COLONOSCOPY N/A 7/10/2020    COLONOSCOPY POLYPECTOMY HOT BIOPSY performed by Kike Larson MD at [address] [place] [address] N/A 9/17/2021    COLONOSCOPY WITH BIOPSY performed by Kike Larson MD at [place] [address] N/A 3/3/2021    CABG CORONARY ARTERY BYPASS X3, ON PUMP; SWAN MARLENY, MIRANDA PER ANESTHESIA performed by Fang Hutchinson MD at [address] [place] [address]  2019    stent to left leg with tubular graft to left leg artery    RIGHT COLECTOMY  04/21/15    TRANSESOPHAGEAL ECHOCARDIOGRAM  10/3/14    UPPER GASTROINTESTINAL ENDOSCOPY N/A 9/17/2021    EGD BAND LIGATION AND BIOPSY performed by Kike Larson MD at [address] EXTRACTION         Family History   Problem Relation Age of Onset    Heart Disease Mother     Stroke Mother     Alcohol Abuse Father    [facility] Cancer Father        Social History     Tobacco Use    Smoking status: Former Smoker     Packs/day: 1.00     Years: 30.00     Pack years: 30.00     Types: Cigarettes     Quit date: 2013     Years since quittin.4    Smokeless tobacco: Never Used   Substance Use Topics    Alcohol use: Not Currently     Alcohol/week: 14.0 standard drinks     Types: 14 Cans of beer per week     Comment: 1-3 beers/day      Current Outpatient Medications   Medication Sig Dispense Refill    furosemide (LASIX) 80 MG tablet       atorvastatin (LIPITOR) 20 MG tablet Take 1 tablet by mouth nightly 90 tablet 1    lactulose encephalopathy 10 GM/15ML SOLN solution Take 15 g by mouth 3 times daily      pantoprazole (PROTONIX) 40 MG tablet Take 1 tablet by mouth every morning (before breakfast) 30 tablet 5    sertraline (ZOLOFT) 25 MG tablet Take 1 tablet by mouth daily 30 tablet 3    spironolactone (ALDACTONE) 100 MG tablet Take 2 tablets by mouth daily 60 tablet 3    melatonin (RA MELATONIN) 3 MG TABS tablet Take 1 tablet by mouth daily (Patient taking differently: Take 3 mg by mouth nightly as needed ) 90 tablet 1    metoprolol (LOPRESSOR) 100 MG tablet Take 1 tablet by mouth 2 times daily (Patient taking differently: Take 50 mg by mouth 2 times daily ) 120 tablet 1    aspirin 81 MG tablet Take 81 mg by mouth daily      lactulose (CHRONULAC) 10 GM/15ML solution Take 15 mLs by mouth 2 times daily (Patient not taking: Reported on 2022) 900 mL 5     No current facility-administered medications for this visit. Allergies   Allergen Reactions    Iv Dye [Iodides] Hives    Iodinated Diagnostic Agents Hives     Patient developed hives even with steroid prep.        Health Maintenance   Topic Date Due    Hib vaccine (1 of 1 - Risk 1-dose series) 2023 (Originally 1951)    Meningococcal B vaccine (1 of 4 - Increased Risk Bexsero 2-dose series) 2022 (Originally 1960)    Low dose CT lung screening 03/02/2022    Hepatitis B vaccine (2 of 3 - Risk 3-dose series) 03/07/2022    Meningococcal (ACWY) vaccine (2 - Risk 2-dose series) 04/04/2022    Lipid screen  04/20/2022    Creatinine monitoring  07/28/2022    Hepatitis A vaccine (2 of 2 - Risk 2-dose series) 08/07/2022    Depression Monitoring  09/28/2022    Annual Wellness Visit (AWV)  09/29/2022    Potassium monitoring  10/20/2022    Colon cancer screen colonoscopy  09/17/2024    DTaP/Tdap/Td vaccine (2 - Td or Tdap) 07/11/2026    Flu vaccine  Completed    Shingles Vaccine  Completed    Pneumococcal 65+ yrs at Risk Vaccine  Completed    COVID-19 Vaccine  Completed    Hepatitis C screen  Completed       Subjective:     Review of Systems   Constitutional: Positive for fatigue. Negative for appetite change and fever. HENT: Negative for congestion, ear pain, hearing loss and sore throat. Eyes: Negative for discharge and visual disturbance. Respiratory: Negative for cough, chest tightness, shortness of breath and wheezing. Cardiovascular: Negative for chest pain, palpitations and leg swelling. Gastrointestinal: Positive for abdominal distention. Negative for abdominal pain, constipation, diarrhea, nausea and vomiting. Genitourinary: Negative for flank pain, frequency, hematuria and urgency. Musculoskeletal: Negative for arthralgias, gait problem, joint swelling and myalgias. Skin: Positive for rash. Neurological: Negative for dizziness, weakness, numbness and headaches. Psychiatric/Behavioral: Positive for dysphoric mood and sleep disturbance. The patient is nervous/anxious. Objective:     Physical Exam  Vitals reviewed. Constitutional:       Appearance: Normal appearance. He is normal weight. HENT:      Head: Normocephalic and atraumatic. Nose: Nose normal.      Mouth/Throat:      Mouth: Mucous membranes are moist.      Pharynx: Oropharynx is clear. Eyes:      Extraocular Movements: Extraocular movements intact. Conjunctiva/sclera: Conjunctivae normal.      Pupils: Pupils are equal, round, and reactive to light. Cardiovascular:      Rate and Rhythm: Normal rate and regular rhythm. Heart sounds: Normal heart sounds. No murmur heard. No gallop. Pulmonary:      Effort: Pulmonary effort is normal. No respiratory distress. Breath sounds: Normal breath sounds. No stridor. No wheezing. Abdominal:      General: Bowel sounds are normal. There is distension. Palpations: Abdomen is soft. Tenderness: There is no abdominal tenderness. There is no guarding or rebound. Musculoskeletal:         General: No swelling or tenderness. Normal range of motion. Cervical back: Normal range of motion and neck supple. Skin:     General: Skin is warm and dry. Coloration: Skin is not jaundiced. Findings: Bruising present. No rash. Comments: Large area of ecchymosis on left buttock   Neurological:      General: No focal deficit present. Mental Status: He is alert and oriented to person, place, and time. Psychiatric:         Mood and Affect: Mood normal.         Behavior: Behavior normal.         Thought Content: Thought content normal.         Judgment: Judgment normal.       /63 (Site: Right Upper Arm, Position: Sitting, Cuff Size: Medium Adult)   Pulse 84   Temp 97.3 °F (36.3 °C) (Temporal)   Ht 6' 2\" (1.88 m)   Wt 180 lb (81.6 kg)   BMI 23.11 kg/m²     Assessment/Plan:   1. Alcoholic cirrhosis of liver with ascites ([facility]  2. Current moderate episode of major depressive disorder without prior episode ([facility]  3. CAD in native artery  -     atorvastatin (LIPITOR) 20 MG tablet; Take 1 tablet by mouth nightly, Disp-90 tablet, R-1Normal  4. Memory changes  5. Immunization due  -     Hep B Vaccine Adult (ENGERIX-B)  -     Hep A Vaccine Adult (HAVRIX)  -     Meningococcal MCV4O (age 1m-47y) IM (Lesly Corrales)    Alcoholic liver cirrhosis with ascites-recently seen at [facility].   Gets paracentesis every 2 weeks recently had 9 L of fluid removed. Not a candidate for TIPS due to comorbidities. Lasix was increased to 80 mg daily and spironolactone continued at 200 mg daily. Ecchymosis-likely secondary to thrombocytopenia from liver failure    Anxiety and depression-advised to start taking Zoloft in the morning and continue taking melatonin at bedtime for sleep. CAD-history of CABG 3/2021, following with cardiology. Immunizations-received hepatitis B, hepatitis A and meningitis vaccine today    Return in about 1 month (around 3/7/2022) for anxiety and depression. Orders Placed This Encounter   Procedures    Hep B Vaccine Adult (ENGERIX-B)    Hep A Vaccine Adult (HAVRIX)    Meningococcal MCV4O (age 1m-47y) IM (MENVEO)     Orders Placed This Encounter   Medications    atorvastatin (LIPITOR) 20 MG tablet     Sig: Take 1 tablet by mouth nightly     Dispense:  90 tablet     Refill:  1       Patient given educational materials - see patient instructions. Discussed use, benefit, and side effects of prescribed medications. All patientquestions answered. Pt voiced understanding. Reviewed health maintenance. Instructedto continue current medications, diet and exercise. Patient agreed with treatmentplan. Follow up as directed.      Electronically signed by Brandy Caldwell MD on 2/7/2022 at 5:56 PM

## 2022-02-10 RX ORDER — SPIRONOLACTONE 100 MG/1
TABLET, FILM COATED ORAL
Qty: 60 TABLET | Refills: 3 | Status: SHIPPED
Start: 2022-02-10 | End: 2022-03-01 | Stop reason: DRUGHIGH

## 2022-02-18 ENCOUNTER — HOSPITAL ENCOUNTER (OUTPATIENT)
Dept: INTERVENTIONAL RADIOLOGY/VASCULAR | Age: 72
Discharge: HOME | End: 2022-02-20
Payer: MEDICARE

## 2022-02-18 VITALS
HEART RATE: 85 BPM | HEIGHT: 74 IN | SYSTOLIC BLOOD PRESSURE: 143 MMHG | DIASTOLIC BLOOD PRESSURE: 80 MMHG | WEIGHT: 191.4 LBS | RESPIRATION RATE: 20 BRPM | BODY MASS INDEX: 24.56 KG/M2 | OXYGEN SATURATION: 99 % | TEMPERATURE: 97.1 F

## 2022-02-18 DIAGNOSIS — K70.31 ALCOHOLIC CIRRHOSIS OF LIVER WITH ASCITES (HCC): ICD-10-CM

## 2022-02-18 LAB
INR BLD: 1.2
PLATELET # BLD: 99 K/UL (ref 138–453)
PROTHROMBIN TIME: 15.2 SEC (ref 11.5–14.2)

## 2022-02-18 PROCEDURE — 85049 AUTOMATED PLATELET COUNT: CPT

## 2022-02-18 PROCEDURE — 36415 COLL VENOUS BLD VENIPUNCTURE: CPT

## 2022-02-18 PROCEDURE — P9047 ALBUMIN (HUMAN), 25%, 50ML: HCPCS | Performed by: RADIOLOGY

## 2022-02-18 PROCEDURE — C1729 CATH, DRAINAGE: HCPCS

## 2022-02-18 PROCEDURE — 85610 PROTHROMBIN TIME: CPT

## 2022-02-18 PROCEDURE — 49083 ABD PARACENTESIS W/IMAGING: CPT

## 2022-02-18 PROCEDURE — 89051 BODY FLUID CELL COUNT: CPT

## 2022-02-18 PROCEDURE — 6360000002 HC RX W HCPCS: Performed by: RADIOLOGY

## 2022-02-18 RX ORDER — SODIUM CHLORIDE 9 MG/ML
INJECTION, SOLUTION INTRAVENOUS CONTINUOUS
Status: DISCONTINUED | OUTPATIENT
Start: 2022-02-18 | End: 2022-02-21 | Stop reason: HOSPADM

## 2022-02-18 RX ORDER — ALBUMIN (HUMAN) 12.5 G/50ML
50 SOLUTION INTRAVENOUS ONCE
Status: COMPLETED | OUTPATIENT
Start: 2022-02-18 | End: 2022-02-18

## 2022-02-18 RX ADMIN — ALBUMIN (HUMAN) 50 G: 0.25 INJECTION, SOLUTION INTRAVENOUS at 14:29

## 2022-02-18 ASSESSMENT — PAIN - FUNCTIONAL ASSESSMENT: PAIN_FUNCTIONAL_ASSESSMENT: 0-10

## 2022-02-18 NOTE — BRIEF OP NOTE
Brief Postoperative Note    Erik Cruz  YOB: 1950  [id number]    Pre-operative Diagnosis: Recurrent ascites; abdominal discomfort    Post-operative Diagnosis: Same    Procedure: US guided paracentesis     Anesthesia: Local    Surgeons/Assistants: Casey    Estimated Blood Loss: less than 50     Complications: None    Specimens: Was Obtained: wilma fluid    Electronically signed by Ricardo Cardenas MD on 2/18/2022 at 2:10 PM

## 2022-02-18 NOTE — H&P
Interval H&P Note    Pt Name: Ani Loza  MRN: [id number]  YOB: 1950  Date of evaluation: 2/18/2022      [x] I have reviewed in [facility] the gastroenterology progress note by Dr. Kay Ravi dated 1/25/2022 attached below which meets the criteria for an Interval History and Physical note. [x] I have examined  Anni Moss Links  There are no changes to the patient who is scheduled for a IR ultrasound guided paracentesis with imaging by Dr. Becca Inman for alcoholic cirrhosis of liver with ascites. The patient denies new health changes, fever, chills, wheezing, chest pain, open sores or wounds. History of coronary artery disease, CHF, hypertension, CABG x3. Patient has chronic shortness of breath and cough. Denies hx diabetes. Last ASA 81mg 2/17/2022. Vital signs: /76   Pulse 95   Temp 97.1 °F (36.2 °C)   Resp 16   Ht 6' 2\" (1.88 m)   Wt 191 lb 6.4 oz (86.8 kg)   SpO2 99%   BMI 24.57 kg/m²     Allergies: Iv dye [iodides] and Iodinated diagnostic agents    Medications:    Prior to Admission medications    Medication Sig Start Date End Date Taking?  Authorizing Provider   spironolactone (ALDACTONE) 100 MG tablet TAKE TWO TABLETS BY MOUTH DAILY 2/10/22   Enid Adames MD   furosemide (LASIX) 80 MG tablet  1/31/22   Historical Provider, MD   atorvastatin (LIPITOR) 20 MG tablet Take 1 tablet by mouth nightly 2/7/22   Iain Aly MD   lactulose encephalopathy 10 GM/15ML SOLN solution Take 15 g by mouth 3 times daily    Historical Provider, MD   pantoprazole (PROTONIX) 40 MG tablet Take 1 tablet by mouth every morning (before breakfast) 1/25/22   Enid Adames MD   sertraline (ZOLOFT) 25 MG tablet Take 1 tablet by mouth daily 1/4/22   Iain Aly MD   melatonin (RA MELATONIN) 3 MG TABS tablet Take 1 tablet by mouth daily  Patient taking differently: Take 3 mg by mouth nightly as needed  9/28/21   Iain Aly MD   metoprolol (LOPRESSOR) 100 MG tablet Take 1 tablet by mouth 2 times daily  Patient taking differently: Take 50 mg by mouth 2 times daily  4/23/21   Danya Wright MD   aspirin 81 MG tablet Take 81 mg by mouth daily    Historical Provider, MD         This is a 66 y.o. male who is pleasant, cooperative, ill appearing, jaundice, alert and oriented x3, in no acute distress. Heart: Heart sounds are normal.  HR 95 regular rate, grade II/VI systolic murmur and rhythm without gallop or rub. Lungs: Normal respiratory effort with equal expansion, good air exchange, unlabored and clear to auscultation without wheezes or rales bilaterally   Abdomen: soft, nontender, distended, firm, + fluid with bowel sounds active. Integument: Scattered bruises bilateral upper extremities. Dry flaky skin bilateral upper extremities. Labs:  No results for input(s): HGB, HCT, WBC, MCV, PLT, NA, K, CL, CO2, BUN, CREATININE, GLUCOSE, INR, PROTIME, APTT, AST, ALT, LABALBU, HCG in the last 720 hours. No results for input(s): COVID19 in the last 720 hours. ELISEO Rogers CNP  Electronically signed 2/18/2022 at 1:09 Daphnie Starr MD   Physician   Specialty:  Gastroenterology   Progress Notes      Signed   Encounter Date:  1/25/2022         Related encounter: Office Visit from 1/25/2022 in [address] [facility]          Show:Clear all  [x]Manual[x]Template[x]Copied    Added by:  Jagdish Genao MD      [][facility] for details            GI CLINIC FOLLOW UP     INTERVAL HISTORY:   No referring provider defined for this encounter. Chief Complaint   Patient presents with    Cirrhosis       Pt is f/u on cirrhosis. He has been getting paracentesis every 2 weeks. He was referred to [facility] for TIPS eval.  He declined appt due  to not wanting procedure. HISTORY OF PRESENT ILLNESS: Maxim Frost is a 66 y.o. male , referred for evaluation of*. alcohol liver cirrhosis,*large flat polyps, the mucosa in the sigmoid area is edematous  Diverticulosis     Rectum/Anus: examined in normal and retroflexed positions and was abnormal: Hemorrhoids     Withdrawal Time was (minutes): 13           The colon was decompressed and the scope was removed. The patient tolerated the procedure well. Recommendations/Plan:   1. Lifestyle and dietary modifications as discussed  2. F/U Biopsies  3. F/U In OfficeYes  4. Discussed with the family  5. Repeat colonoscopy [id number]     Electronically signed by Kristyn Diallo MD  on 9/17/2021 at 1:53 PM   -- Diagnosis --     1. STOMACH, BIOPSY:     GASTRIC OXYNTIC TYPE MUCOSA WITH NO PATHOLOGIC DIAGNOSIS   (SEE COMMENT)     2. ILEUM, ANASTOMOTIC SITE, BIOPSY:     NO PATHOLOGIC DIAGNOSIS     -- Diagnosis Comment --     In part 1, a normal biopsy does not rule out portal hypertensive   gastropathy. Please correlate with appropriate clinical and   endoscopic findings. Nicola Niño,   **Electronically Signed Out**         [facility]/9/21/2021         Past Medical,Family, and Social History reviewed and does contribute to the patient presentingcondition. Patient's PMH/PSH,SH,PSYCH Hx, MEDs, ALLERGIES, and ROS were all reviewed and updated in the appropriate sections.      PAST MEDICAL HISTORY:  Past Medical History        Past Medical History:   Diagnosis Date    AAA (abdominal aortic aneurysm) ([facility] [address] 3/23/2015     3.8cm     CAD (coronary artery disease)      Colon polyps      Colon tumor      Congestive heart failure (CHF) ([address] 01/2021    GERD (gastroesophageal reflux disease)       prilosec as needed    H/O chest tube placement       chest tube x4 different times    History of pneumonia      History of pneumothorax       x4    Hx of cardiac cath 02/19/2021    Hyperlipidemia      Hypertension      Non Hodgkin's lymphoma ([facility] [address]       NHL    Pneumonia 1970's     patient had 3 chest tubes and in hospital for 13 days    Unspecified cerebral artery occlusion with cerebral infarction 10/01/2014     stroke , numbness in the left arm from the elbow down    Wellness examination 02/25/2021     pcp-[facility] nov 2020    Wellness examination 02/25/2021     [facility] [address]    Wellness examination 02/25/2021     onc-Dr Farhad Moss 2021            Past Surgical History         Past Surgical History:   Procedure Laterality Date    APPENDECTOMY   4/21/15    CHOLECYSTECTOMY, OPEN   04/21/15    COLONOSCOPY        COLONOSCOPY   07/10/2020     COLONOSCOPY POLYPECTOMY HOT BIOPSY (N/A )    COLONOSCOPY N/A 7/10/2020     COLONOSCOPY POLYPECTOMY HOT BIOPSY performed by Macarena Naranjo MD at [facility] 9/17/2021     COLONOSCOPY WITH BIOPSY performed by Macarena Naranjo MD at [place] [address] N/A 3/3/2021     CABG CORONARY ARTERY BYPASS X3, ON PUMP; SWAN MARLENY, MIRANDA PER ANESTHESIA performed by Sebastián Andrade MD at [address]   2019     stent to left leg with tubular graft to left leg artery    RIGHT COLECTOMY   04/21/15    TRANSESOPHAGEAL ECHOCARDIOGRAM   10/3/14    UPPER GASTROINTESTINAL ENDOSCOPY N/A 9/17/2021     EGD BAND LIGATION AND BIOPSY performed by Macarena Naranjo MD at [address] [place] [address] Ave:    Current Medication      Current Outpatient Medications:     pantoprazole (PROTONIX) 40 MG tablet, Take 1 tablet by mouth every morning (before breakfast), Disp: 30 tablet, Rfl: 5    lactulose (CHRONULAC) 10 GM/15ML solution, Take 15 mLs by mouth 2 times daily, Disp: 900 mL, Rfl: 5    sertraline (ZOLOFT) 25 MG tablet, Take 1 tablet by mouth daily, Disp: 30 tablet, Rfl: 3    spironolactone (ALDACTONE) 100 MG tablet, Take 2 tablets by mouth daily, Disp: 60 tablet, Rfl: 3    furosemide (LASIX) 40 MG tablet, Take 1 tablet by mouth 2 times daily, Disp: 60 tablet, Rfl: 3    melatonin (RA MELATONIN) 3 MG TABS tablet, Take 1 tablet by mouth daily (Patient taking differently: Take 3 mg by mouth nightly as needed ), Disp: 90 tablet, Rfl: 1    atorvastatin (LIPITOR) 20 MG tablet, Take 1 tablet by mouth nightly, Disp: 90 tablet, Rfl: 0    metoprolol (LOPRESSOR) 100 MG tablet, Take 1 tablet by mouth 2 times daily (Patient taking differently: Take 50 mg by mouth 2 times daily ), Disp: 120 tablet, Rfl: 1    aspirin 81 MG tablet, Take 81 mg by mouth daily, Disp: , Rfl:     omeprazole (PRILOSEC) 10 MG delayed release capsule, Take 1 capsule by mouth daily as needed (GERD), Disp: 90 capsule, Rfl: 3        ALLERGIES:         Allergies   Allergen Reactions    Iv Dye [Iodides] Hives    Iodinated Diagnostic Agents Hives       Patient developed hives even with steroid prep. FAMILY HISTORY:   Family History             Problem Relation Age of Onset    Heart Disease Mother      Stroke Mother      Alcohol Abuse Father      Cancer Father                 SOCIAL HISTORY:   Social History               Socioeconomic History    Marital status:         Spouse name: Not on file    Number of children: Not on file    Years of education: Not on file    Highest education level: Not on file   Occupational History    Not on file   Tobacco Use    Smoking status: Former Smoker       Packs/day: 1.00       Years: 30.00       Pack years: 30.00       Types: Cigarettes       Quit date: 2013       Years since quittin.4    Smokeless tobacco: Never Used   Vaping Use    Vaping Use: Never used   Substance and Sexual Activity    Alcohol use: Not Currently       Alcohol/week: 14.0 standard drinks       Types: 14 Cans of beer per week       Comment: 1-3 beers/day    Drug use: No    Sexual activity: Not Currently   Other Topics Concern    Not on file   Social History Narrative    Not on file      Social Determinants of Health          Financial Resource Strain: Low Risk     Difficulty of Paying Living Expenses: Not rectal pain and vomiting. Genitourinary: Positive for testicular pain. Negative for difficulty urinating. Musculoskeletal: Positive for myalgias. Negative for back pain and joint swelling. Allergic/Immunologic: Negative for environmental allergies and food allergies. Neurological: Positive for dizziness. Negative for tremors, weakness, light-headedness, numbness (left arm) and headaches. Hematological: Bruises/bleeds easily. Psychiatric/Behavioral: Positive for sleep disturbance. The patient is not nervous/anxious. LABORATORY DATA: Reviewed        Lab Results   Component Value Date     WBC 27.7 (H) 11/30/2021     HGB 13.0 11/30/2021     HCT 40.1 (L) 11/30/2021     .6 11/30/2021     PLT 78 (L) 12/21/2021      (L) 10/20/2021     K 4.3 10/20/2021     CL 98 10/20/2021     CO2 24 10/20/2021     BUN 19 10/20/2021     CREATININE 1.01 10/20/2021     LABALBU 3.3 (L) 10/20/2021     BILITOT 1.31 (H) 10/20/2021     ALKPHOS 193 (H) 10/20/2021     AST 38 10/20/2021     ALT 20 10/20/2021     INR 1.3 12/21/2021                  Lab Results   Component Value Date     RBC 3.91 (L) 11/30/2021     HGB 13.0 11/30/2021     .6 11/30/2021     MCH 33.2 11/30/2021     MCHC 32.4 11/30/2021     RDW 17.0 (H) 11/30/2021     MPV 8.7 11/30/2021     BASOPCT 0 11/30/2021     LYMPHSABS 18.00 (H) 11/30/2021     MONOSABS 5.82 (H) 11/30/2021     NEUTROABS 3.60 11/30/2021     EOSABS 0.28 11/30/2021     BASOSABS 0.00 11/30/2021            DIAGNOSTIC TESTING:      IR US GUIDED PARACENTESIS     Result Date: 1/19/2022  PROCEDURE: IR US GUIDED PARACENTESIS W IMAGING 1/19/2022 HISTORY: ORDERING SYSTEM PROVIDED HISTORY: Alcoholic cirrhosis of liver with ascites ([facility] [address] TECHNOLOGIST PROVIDED HISTORY: ascites Please give 2 units of platelets same day as needed for treatment if less than 50,000 with bleeding or less than 20,00 with out bleeding.  TECHNIQUE: Ultrasound was utilized CONTRAST: None SEDATION: None FLUOROSCOPY DOSE AND TYPE OR TIME AND EXPOSURES: None DESCRIPTION OF PROCEDURE: Informed consent was obtained after a detailed explanation of the procedure including risks, benefits, and alternatives. Universal protocol was observed. Preprocedure ultrasound shows a dominant fluid pocket in the right lowerquadrant. Using ultrasound guidance (image attached to the medical record), the fluid pocket was accessed with a 5 Western Riya Yueh needle with aspiration of non turbid yellow fluid. Vacuum bottle paracentesis was performed and approximately 7.4 L was removed. A sample was not requested. The patient tolerated the procedure well and left the department in good condition. Dr. Becca Inman was present. The patient received IV albumin replacement. FINDINGS: 7.4 L drained. Ultrasound-guided therapeutic paracentesis      IR US GUIDED PARACENTESIS     Result Date: 1/7/2022  EXAMINATION: IR US GUIDED PARACENTESIS W IMAGING HISTORY / PRE-OPERATIVE DIAGNOSIS: ORDERING SYSTEM PROVIDED HISTORY: Alcoholic cirrhosis of liver with ascites ([address] TECHNOLOGIST PROVIDED HISTORY: ascites Please give 2 units of platelets same day as needed for treatment if less than 50,000 with bleeding or less than 20,00 with out bleeding. : Ryan Posey ANESTHESIA: Local TECHNIQUE: After obtaining informed consent, the patient was placed in the supine position in bed. The right lateral flank area was cleansed and draped in the usual sterile fashion. 1% Lidocaine was used for local anesthesia. Next, a 6 French Safe-T-Centesis catheter was introduced into the ascites fluid collection. As much fluid was removed as possible by vacuum container bottles. A total of 9000 cc of cloudy light wilma colored fluid was removed. A total of 9000 cc of the cloudy light wilma colored fluid was sent to the lab for evaluation. COMPARISON: None.  FINDINGS: Technically successful ultrasound guided large volume paracentesis ESTIMATED BLOOD LOSS: Less than 50 normal.         Thought Content: Thought content normal.         Judgment: Judgment normal.               IMPRESSION: Mr. James Kwan is a 66 y.o. male with        Diagnosis Orders   1. Alcoholic cirrhosis of liver with ascites (HCC)  Iron and TIBC     Vitamin B12     Folate     CBC     CBC Auto Differential     Comp Metabolic w Bili Profile   2. Adenomatous polyps      3. Hx of cholecystectomy      4. S/P CABG x 3      5. Abdominal aortic aneurysm (AAA) without rupture ([facility] [address]      Will keep the appointment with [facility] 1/31/2022, for TIPS consideration  And transplant consideration  We will continue with the paracentesis every 2 to 3 weeks as needed  We will continue with spironolactone 200 mg  Lasix 80 mg  We will check his electrolytes and kidney function and see if we can increase further  Educated about low-salt diet again  He did have esophageal varices but unguinal hold off on banding him again not to overwhelm him with appointments  And because it was just 1:  He is already on beta-blocker  He does have 2-3 bowel movements on his own I told him if he does not take lactulose on top of that  We will treat we can get Xifaxan for him     We will study his anemia further  And we will keep him on Protonix instead of his omeprazole 10 which he takes over-the-counter  To see if that will help his nausea  Told him to have more bowel movements and not get constipated           Diet/life style/natural hx /complication of the dx were all explained in details   Past medical, past surgical, social history, psychiatric history, medications or allergies, all reviewed and  updated     Thank you for allowing me to participate in the care of Mr. James Kwan. For any further questions please do not hesitate to contact me. I have reviewed and agree with the ROS entered by the MA/RN. Note is dictated utilizing voice recognition software. Unfortunately this leads to occasional typographical errors.  Please contact our office if you have any questions.         Susi Maya MD  [facility]  O: #[phone]                      Revision History

## 2022-02-20 LAB
APPEARANCE FLUID: NORMAL
BASO FLUID: NORMAL %
COLOR FLUID: NORMAL
EOSINOPHIL FLUID: NORMAL %
FLUID DIFF COMMENT: NORMAL
LYMPHOCYTES, BODY FLUID: 44 %
MONOCYTE, FLUID: NORMAL %
NEUTROPHIL, FLUID: 2 %
OTHER CELLS FLUID: NORMAL %
RBC FLUID: <3000 /MM3
SPECIMEN TYPE: NORMAL
WBC FLUID: 99 /MM3

## 2022-02-28 ENCOUNTER — HOSPITAL ENCOUNTER (OUTPATIENT)
Dept: INTERVENTIONAL RADIOLOGY/VASCULAR | Age: 72
Discharge: HOME | End: 2022-03-02
Payer: MEDICARE

## 2022-02-28 VITALS
OXYGEN SATURATION: 100 % | RESPIRATION RATE: 16 BRPM | TEMPERATURE: 97.7 F | DIASTOLIC BLOOD PRESSURE: 72 MMHG | HEART RATE: 84 BPM | SYSTOLIC BLOOD PRESSURE: 108 MMHG

## 2022-02-28 DIAGNOSIS — R18.8 OTHER ASCITES: ICD-10-CM

## 2022-02-28 PROCEDURE — 7100000010 HC PHASE II RECOVERY - FIRST 15 MIN

## 2022-02-28 PROCEDURE — 7100000011 HC PHASE II RECOVERY - ADDTL 15 MIN

## 2022-02-28 PROCEDURE — P9047 ALBUMIN (HUMAN), 25%, 50ML: HCPCS | Performed by: RADIOLOGY

## 2022-02-28 PROCEDURE — 6360000002 HC RX W HCPCS: Performed by: RADIOLOGY

## 2022-02-28 PROCEDURE — 49083 ABD PARACENTESIS W/IMAGING: CPT

## 2022-02-28 PROCEDURE — 2709999900 IR US GUIDED PARACENTESIS

## 2022-02-28 RX ORDER — ACETAMINOPHEN 325 MG/1
650 TABLET ORAL EVERY 4 HOURS PRN
Status: DISCONTINUED | OUTPATIENT
Start: 2022-02-28 | End: 2022-03-03 | Stop reason: HOSPADM

## 2022-02-28 RX ORDER — ACETAMINOPHEN 325 MG/1
650 TABLET ORAL EVERY 4 HOURS PRN
Status: DISCONTINUED | OUTPATIENT
Start: 2022-02-28 | End: 2022-02-28 | Stop reason: SDUPTHER

## 2022-02-28 RX ORDER — ALBUMIN (HUMAN) 12.5 G/50ML
50 SOLUTION INTRAVENOUS ONCE
Status: COMPLETED | OUTPATIENT
Start: 2022-02-28 | End: 2022-02-28

## 2022-02-28 RX ADMIN — ALBUMIN (HUMAN) 50 G: 0.25 INJECTION, SOLUTION INTRAVENOUS at 11:31

## 2022-02-28 NOTE — FLOWSHEET NOTE
Pt tolerated procedure without distress. 5.9 liters of yellow ascitic fluid removed  Dressing applied to procedure site. pt transported to pacu to receive albumin.

## 2022-02-28 NOTE — BRIEF OP NOTE
Brief Postoperative Note for Paracentesis    Georges Cruz  YOB: 1950  [id number]    Pre-operative Diagnosis: Ascites      Post-operative Diagnosis: Same    Procedure: Ultrasound guided Paracentesis     Anesthesia: 1% Lidocaine     Surgeons/Assistants: Maryjane Cueva MD    Complications: none    Specimens: were not obtained    Ultrasound guided paracentesis performed. 5500 ml clear yellow fluid obtained from RLQ. Dressing applied. Vital signs were reviewed and were stable after the procedure. Albumin 50 gms IV administered post procedure. Discharged to home.       Electronically signed by Maryjane Cueva MD on 2/28/2022 at 2:27 PM

## 2022-03-01 RX ORDER — FUROSEMIDE 40 MG/1
40 TABLET ORAL 3 TIMES DAILY
Qty: 90 TABLET | Refills: 1 | Status: SHIPPED | OUTPATIENT
Start: 2022-03-01

## 2022-03-01 RX ORDER — SPIRONOLACTONE 100 MG/1
100 TABLET, FILM COATED ORAL 3 TIMES DAILY
Qty: 90 TABLET | Refills: 1 | Status: SHIPPED | OUTPATIENT
Start: 2022-03-01

## 2022-03-01 NOTE — TELEPHONE ENCOUNTER
Per Dr Kay Ravi, increase Lasix 40mg and Aldactone 100 mg to TID then have CMP repeated in 1 week. Writer placed orders and called and informed pt. He verbalizes understanding and also states that he is having Hospice come in because he is tired of fighting. Emotional support is given. Patient states he is thankful to writer.

## 2022-03-07 ENCOUNTER — TELEPHONE (OUTPATIENT)
Dept: FAMILY MEDICINE CLINIC | Age: 72
End: 2022-03-07

## 2022-03-07 NOTE — TELEPHONE ENCOUNTER
----- Message from Darryn Kwan sent at 3/5/2022 12:03 PM EST -----  Subject: Message to Provider    QUESTIONS  Information for Provider? Pt called to cancel his appt due to the fact he   is under hospice care, would like to let Dr Josr Mohan know   ---------------------------------------------------------------------------  --------------  [address]  What is the best way for the office to contact you? OK to leave message on   voicemail  Preferred Call Back Phone Number? [phone]  ---------------------------------------------------------------------------  --------------  SCRIPT ANSWERS  Relationship to Patient?  Self

## 2022-03-07 NOTE — TELEPHONE ENCOUNTER
SONIAI   Pt called to cancel his appt due to the fact he   is under hospice care, would like to let Dr Hermes Thurman know

## 2022-03-14 NOTE — TELEPHONE ENCOUNTER
Writer called to reminding pt about getting labs done. HE stated he will get them done on Friday. He also stated he did get Hospice involved and he is receiving care from them.

## 2022-03-17 ENCOUNTER — PREP FOR PROCEDURE (OUTPATIENT)
Dept: GENERAL RADIOLOGY | Age: 72
End: 2022-03-17

## 2022-03-17 RX ORDER — SODIUM CHLORIDE 9 MG/ML
INJECTION, SOLUTION INTRAVENOUS CONTINUOUS
Status: CANCELLED | OUTPATIENT
Start: 2022-03-17

## 2022-03-18 ENCOUNTER — HOSPITAL ENCOUNTER (OUTPATIENT)
Dept: INTERVENTIONAL RADIOLOGY/VASCULAR | Age: 72
Discharge: HOME | End: 2022-03-20
Payer: MEDICARE

## 2022-03-18 VITALS
HEART RATE: 81 BPM | RESPIRATION RATE: 15 BRPM | HEIGHT: 74 IN | BODY MASS INDEX: 24.51 KG/M2 | OXYGEN SATURATION: 98 % | DIASTOLIC BLOOD PRESSURE: 67 MMHG | SYSTOLIC BLOOD PRESSURE: 93 MMHG | WEIGHT: 191 LBS | TEMPERATURE: 98.2 F

## 2022-03-18 DIAGNOSIS — R18.8 OTHER ASCITES: ICD-10-CM

## 2022-03-18 LAB
INR BLD: 1.4
PLATELET # BLD: 84 K/UL (ref 138–453)
PROTHROMBIN TIME: 17.1 SEC (ref 11.5–14.2)

## 2022-03-18 PROCEDURE — 6360000002 HC RX W HCPCS: Performed by: RADIOLOGY

## 2022-03-18 PROCEDURE — 2709999900 IR GUIDED TUNNELED INTRAPERITONEAL CATH W OR WO CONTRAST PERC

## 2022-03-18 PROCEDURE — 85049 AUTOMATED PLATELET COUNT: CPT

## 2022-03-18 PROCEDURE — 85610 PROTHROMBIN TIME: CPT

## 2022-03-18 PROCEDURE — 2580000003 HC RX 258: Performed by: RADIOLOGY

## 2022-03-18 PROCEDURE — 49418 INSERT TUN IP CATH PERC: CPT

## 2022-03-18 PROCEDURE — 7100000010 HC PHASE II RECOVERY - FIRST 15 MIN

## 2022-03-18 PROCEDURE — 7100000011 HC PHASE II RECOVERY - ADDTL 15 MIN

## 2022-03-18 RX ORDER — SODIUM CHLORIDE 9 MG/ML
INJECTION, SOLUTION INTRAVENOUS CONTINUOUS
Status: DISCONTINUED | OUTPATIENT
Start: 2022-03-18 | End: 2022-03-18 | Stop reason: SDUPTHER

## 2022-03-18 RX ORDER — ACETAMINOPHEN 325 MG/1
650 TABLET ORAL EVERY 4 HOURS PRN
Status: DISCONTINUED | OUTPATIENT
Start: 2022-03-18 | End: 2022-03-21 | Stop reason: HOSPADM

## 2022-03-18 RX ORDER — ONDANSETRON 4 MG/1
4 TABLET, FILM COATED ORAL EVERY 8 HOURS PRN
COMMUNITY

## 2022-03-18 RX ORDER — SODIUM CHLORIDE 9 MG/ML
INJECTION, SOLUTION INTRAVENOUS CONTINUOUS
Status: DISCONTINUED | OUTPATIENT
Start: 2022-03-18 | End: 2022-03-21 | Stop reason: HOSPADM

## 2022-03-18 RX ORDER — MIDAZOLAM HYDROCHLORIDE 1 MG/ML
INJECTION INTRAMUSCULAR; INTRAVENOUS
Status: COMPLETED | OUTPATIENT
Start: 2022-03-18 | End: 2022-03-18

## 2022-03-18 RX ADMIN — SODIUM CHLORIDE: 9 INJECTION, SOLUTION INTRAVENOUS at 09:32

## 2022-03-18 RX ADMIN — CEFAZOLIN SODIUM 1000 MG: 1 INJECTION, POWDER, FOR SOLUTION INTRAMUSCULAR; INTRAVENOUS at 10:57

## 2022-03-18 RX ADMIN — MIDAZOLAM 0.5 MG: 1 INJECTION INTRAMUSCULAR; INTRAVENOUS at 11:08

## 2022-03-18 ASSESSMENT — PAIN - FUNCTIONAL ASSESSMENT: PAIN_FUNCTIONAL_ASSESSMENT: 0-10

## 2022-03-18 ASSESSMENT — PAIN SCALES - GENERAL
PAINLEVEL_OUTOF10: 0
PAINLEVEL_OUTOF10: 0

## 2022-03-18 ASSESSMENT — PAIN DESCRIPTION - DESCRIPTORS: DESCRIPTORS: DISCOMFORT;CONSTANT;ACHING

## 2022-03-18 NOTE — H&P
History and Physical Service   [facility] [place] - INPATIENT    HISTORY AND PHYSICAL EXAMINATION            Date of Evaluation: 3/18/2022  Patient name:  Nimco Chahal  MRN:   [id number]  YOB: 1950  PCP:    Ernesto An MD    History Obtained From:     Patient, medical records    History of Present Illness: This is Nimco Chahal a 66 y.o. male with multiple comorbities followed by many specialist who arrived today for a tunnel catheter placement in IR by Dr. Mj Montez for ascites. Patient follows with Gastroenterologist, Dr Taina Toney for alcohol liver cirrhosis. He has routine paracentesis done with the last on 2/28/22 for 5500 clear yellow fluid. Patient is having a tunnel catheter placed today and arrived for his procedure. He fell twice in past 2 weeks \"struck his head\" and has a bruise on his forehead above his right eye. Denies he loss consciousness Stated he is \"dizzy a lot\" and usually has nausea with vomiting at times. \" I can't eat much w/o feeling full\". Hx CAD, AAA, CHF, HTN, CABG x3 (3/2021). Patient's follows with [facility]. He feels winded when walking especially if he has increased fluid in his abdomen and presently denies fever, chills, cough, palpitations or chest pain.   Last ASA 81mg 5-6 days ago    Past Medical History:     Past Medical History:   Diagnosis Date    AAA (abdominal aortic aneurysm) ([facility] [address] 3/23/2015    3.8cm     CAD (coronary artery disease)     Colon polyps     Colon tumor     Congestive heart failure (CHF) ([address] 01/2021    GERD (gastroesophageal reflux disease)     prilosec as needed    H/O chest tube placement     chest tube x4 different times    History of pneumonia     History of pneumothorax     x4    Hx of cardiac cath 02/19/2021    Hyperlipidemia     Hypertension     Non Hodgkin's lymphoma ([facility] [address]     NHL    Pneumonia 1970's    patient had 3 chest tubes and in hospital for 13 days    Unspecified cerebral artery occlusion MD   sertraline (ZOLOFT) 25 MG tablet Take 1 tablet by mouth daily 1/4/22   Iain Aly MD   melatonin (RA MELATONIN) 3 MG TABS tablet Take 1 tablet by mouth daily  Patient taking differently: Take 3 mg by mouth nightly as needed  9/28/21   Iain Aly MD   metoprolol (LOPRESSOR) 100 MG tablet Take 1 tablet by mouth 2 times daily  Patient taking differently: Take 50 mg by mouth 2 times daily  4/23/21   Iain Aly MD   aspirin 81 MG tablet Take 81 mg by mouth daily    Historical Provider, MD        Allergies: Iv dye [iodides] and Iodinated diagnostic agents    Social History:     Tobacco:    reports that he quit smoking about 8 years ago. His smoking use included cigarettes. He has a 30.00 pack-year smoking history. He has never used smokeless tobacco.  Alcohol:      reports previous alcohol use of about 14.0 standard drinks of alcohol per week. Drug Use:  reports no history of drug use. Family History:     Family History   Problem Relation Age of Onset    Heart Disease Mother     Stroke Mother     Alcohol Abuse Father     Cancer Father        Review of Systems:   Pertinent findings in the HPI above. Patient \"I don't feel like answering any questions\" Did respond to these with positives listed  fatique, + Neuro: dizziness, lightheadedness fell twice past few weeks Hit my head Denies LOC , +GI: nausea, vomiting \"I can't eat much at all\". Physical Exam:   BP (!) 92/59   Pulse 98   Temp 96.6 °F (35.9 °C) (Temporal)   Resp 20   Ht 6' 2\" (1.88 m)   Wt 191 lb (86.6 kg)   SpO2 95%   BMI 24.52 kg/m²     General Appearance:  alert, ill appearing and not feeling well with nausea and vomiting  Mental status: oriented to person, place, and time  Head: resolving ecchymosis on right forehead  normocephalic, atraumatic.   Eye: no icterus, redness, pupils equal and reactive, extraocular eye movements intact, conjunctiva clear  Ear: normal external ear, no discharge, hearing intact  Nose:  no drainage noted  Mouth: mucous membranes moist  Neck: supple, no carotid bruits, thyroid not palpable  Lungs: mildly diminished breath sounds clear to ausculation, no wheezing, rales or rhonchi, normal effort  Cardiovascular: HR 98 normal rate, regular rhythm, II/VI systolic murmur, gallop, rub. Abdomen: distended abdomen with fluid with tenderness lower quadrants nontender, nondistended, normal bowel sounds  Neurologic: There are no new focal motor or sensory deficits, normal muscle tone and bulk, no abnormal sensation, normal speech, cranial nerves II through XII grossly intact  Skin: bruising noted on upper extremities No gross lesions, rashes, bruising or bleeding on exposed skin area  Extremities:  peripheral pulses palpable, no pedal edema or calf pain with palpation  Psych: normal affect     Investigations:      Laboratory Testing:  No results found for this or any previous visit (from the past 24 hour(s)). Recent Labs     02/18/22  1354   INR 1.2   PROTIME 15.2*       No results for input(s): COVID19 in the last 720 hours. Imaging/Diagnostics:    IR US GUIDED PARACENTESIS    Result Date: 2/28/2022  PROCEDURE: PARACENTESIS WITHOUT IMAGE GUIDANCE US ABDOMEN LIMITED 2/28/2022 HISTORY: ORDERING SYSTEM PROVIDED HISTORY: Other ascites TECHNIQUE: Informed consent was obtained after a detailed explanation of the procedure including risks, benefits, and alternatives. Universal protocol was followed. A limited ultrasound of the abdomen was performed. The right abdomen was prepped and draped in sterile fashion and local anesthesia was achieved with lidocaine. A 5 [place] needle sheath was advanced into ascites and paracentesis was performed. The patient tolerated the procedure well. Albumin 50 g IV was administered post procedure. FINDINGS: Limited ultrasound of the abdomen demonstrates ascites. A total of 5.5 L was removed. Fluid was straw-colored and non turbid. Successful therapeutic paracentesis.      [facility] GUIDED PARACENTESIS    Result Date: 2/18/2022  PROCEDURE: PARACENTESIS WITHOUT IMAGE GUIDANCE US ABDOMEN LIMITED 2/18/2022 HISTORY: ORDERING SYSTEM PROVIDED HISTORY: Alcoholic cirrhosis of liver with ascites ([facility] [address] TECHNOLOGIST PROVIDED HISTORY: Please give 2 units of platelets same day as needed for treatment if less than 50,000 with bleeding or less than 20,00 with out bleeding. Please give albumin 8gm/liter removed, concentration 25%. May round to the next 25 if needed and no maximum. After first paracentesis, send fluid for cell count and differential only. ascites TECHNIQUE: Informed consent was obtained after a detailed explanation of the procedure including risks, benefits, and alternatives. Universal protocol was followed. Preprocedure ultrasound shows a dominant fluid pocket in the right lowerquadrant. Using ultrasound guidance (image attached to the medical record), the fluid pocket was accessed with a 5 [place] Riya Yueh needle with aspiration of non turbid wilma fluid. Vacuum bottle paracentesis was performed and approximately 12.1 L was removed. Post procedural ultrasound demonstrated no residual fluid. A sample was sent for laboratory analysis. the patient tolerated the procedure well and left the department in good condition. Dr. Tong Boyd was present. The patient received IV albumin replacement. FINDINGS: Limited ultrasound of the abdomen demonstrates ascites. A total of 12.1 L was removed. Successful ultrasound-guided therapeutic paracentesis. Diagnosis:      1. Ascites      Plans:     1.  Tunnel catheter placement       ELISEO Noonan CNP  3/18/2022  8:57 AM

## 2022-03-18 NOTE — BRIEF OP NOTE
Brief Postoperative Note    Chriss Cruz  YOB: 1950  [id number]    Pre-operative Diagnosis: REcurrent ascites; Hospice    Post-operative Diagnosis: Same    Procedure:  Tunneled peritoneal drain placement    Anesthesia: Moderate Sedation    Surgeons/Assistants: Casey    Estimated Blood Loss: less than 50     Complications: None    Specimens: Was Not Obtained    Electronically signed by Ulysses Acosta, MD on 3/18/2022 at 11:33 AM

## 2022-03-18 NOTE — PRE SEDATION
Sedation Pre-Procedure Note    Patient Name: Adriana Mendez   YOB: 1950  Room/Bed: Room/bed info not found  Medical Record Number: [id number]  Date: 3/18/2022   Time: 10:55 AM       Indication:  Hospice, recurrent ascites    Consent: I have discussed with the patient and/or the patient representative the indication, alternatives, and the possible risks and/or complications of the planned procedure and the anesthesia methods. The patient and/or patient representative appear to understand and agree to proceed. Vital Signs:   Vitals:    03/18/22 0854   BP: (!) 92/59   Pulse: 98   Resp: 20   Temp: 96.6 °F (35.9 °C)   SpO2: 95%       Past Medical History:   has a past medical history of AAA (abdominal aortic aneurysm) ([address] CAD (coronary artery disease), Colon polyps, Colon tumor, Congestive heart failure (CHF) ([address] GERD (gastroesophageal reflux disease), H/O chest tube placement, History of pneumonia, History of pneumothorax, Hx of cardiac cath, Hyperlipidemia, Hypertension, Non Hodgkin's lymphoma ([address] Pneumonia, Unspecified cerebral artery occlusion with cerebral infarction, Wellness examination, Wellness examination, and Wellness examination. Past Surgical History:   has a past surgical history that includes transesophageal echocardiogram (10/3/14); Colonoscopy; Vasectomy (1986); Cholecystectomy, open (04/21/15); right colectomy (04/21/15); Appendectomy (4/21/15); Colonoscopy (07/10/2020); Colonoscopy (N/A, 7/10/2020); [place] tooth extraction; other surgical history (2019); Coronary artery bypass graft (N/A, 3/3/2021); Colonoscopy (N/A, 9/17/2021); and Upper gastrointestinal endoscopy (N/A, 9/17/2021). Medications:   Scheduled Meds:    ceFAZolin  1,000 mg IntraVENous On Call to OR     Continuous Infusions:    sodium chloride 20 mL/hr at 03/18/22 0932     PRN Meds:   Home Meds:   Prior to Admission medications    Medication Sig Start Date End Date Taking?  Authorizing Provider   ondansetron (ZOFRAN) 4 MG tablet Take 4 mg by mouth every 8 hours as needed for Nausea or Vomiting   Yes Historical Provider, MD   spironolactone (ALDACTONE) 100 MG tablet Take 1 tablet by mouth 3 times daily 3/1/22   Laura Christina MD   furosemide (LASIX) 40 MG tablet Take 1 tablet by mouth 3 times daily 3/1/22   Laura Christina MD   atorvastatin (LIPITOR) 20 MG tablet Take 1 tablet by mouth nightly 2/7/22   Brandy Caldwell MD   lactulose encephalopathy 10 GM/15ML SOLN solution Take 15 g by mouth 3 times daily    Historical Provider, MD   pantoprazole (PROTONIX) 40 MG tablet Take 1 tablet by mouth every morning (before breakfast) 1/25/22   Enid Adames MD   sertraline (ZOLOFT) 25 MG tablet Take 1 tablet by mouth daily 1/4/22   Brandy Caldwell MD   melatonin (RA MELATONIN) 3 MG TABS tablet Take 1 tablet by mouth daily  Patient taking differently: Take 3 mg by mouth nightly as needed  9/28/21   Brandy Caldwell MD   metoprolol (LOPRESSOR) 100 MG tablet Take 1 tablet by mouth 2 times daily  Patient taking differently: Take 50 mg by mouth 2 times daily  4/23/21   Brandy Caldwell MD   aspirin 81 MG tablet Take 81 mg by mouth daily    Historical Provider, MD     Coumadin Use Last 7 Days:  no  Antiplatelet drug therapy use last 7 days: no  Other anticoagulant use last 7 days: no  Additional Medication Information:  See [facility]      Pre-Sedation Documentation and Exam:   I have reviewed the patient's history and review of systems.     Mallampati Airway Assessment:  Mallampati Class II - (soft palate, fauces & uvula are visible)    Prior History of Anesthesia Complications:   none    ASA Classification:  Class 3 - A patient with severe systemic disease that limits activity but is not incapacitating    Sedation/ Anesthesia Plan:   intravenous sedation    Medications Planned:   midazolam (Versed) intravenously and fentanyl intravenously    Patient is an appropriate candidate for plan of sedation: yes    Electronically signed by Eben Ely MD

## 2022-03-18 NOTE — POST SEDATION
Sedation Post Procedure Note    Patient Name: Makenna Lala   YOB: 1950  Room/Bed: Room/bed info not found  Medical Record Number: [id number]  Date: 3/18/2022   Time: 11:32 AM         Physicians/Assistants: Denise Mancia MD, MD    Procedure Performed:   Tunneled peritoneal drain placement    Post-Sedation Vital Signs:  Vitals:    03/18/22 1130   BP: 97/69   Pulse: 79   Resp: 15   Temp:    SpO2: 91%      Vital signs were reviewed and were stable after the procedure (see flow sheet for vitals)            Complications: none    Electronically signed by Denise Mancia MD on 3/18/2022 at 11:32 AM

## 2022-03-21 ENCOUNTER — TELEPHONE (OUTPATIENT)
Dept: ONCOLOGY | Age: 72
End: 2022-03-21

## 2022-03-21 NOTE — TELEPHONE ENCOUNTER
CALLED PT TO MOVE APPT ON 03/29/22 OUT A COUPLE WEEKS PER DR HALLMAN. PT STATES HE IS WITH HOSPICE AND DOES NOT WANT OUR OFFICE CALLING ANYMORE. PT REQUESTED APPOINTMENT TO BE CANCELLED. WRITER CANCELLED APPOINTMENT.

## 2022-03-28 ENCOUNTER — TELEPHONE (OUTPATIENT)
Dept: FAMILY MEDICINE CLINIC | Age: 72
End: 2022-03-28

## 2022-03-28 NOTE — TELEPHONE ENCOUNTER
Kika Jimenez from [facility] [address] [place] [address] called to inform pcp that patient passed away on 3/26/2022

## 2022-06-03 NOTE — H&P
Interval H&P Note    Pt Name: Rajan Glynn  MRN: [id number]  YOB: 1950  Date of evaluation: 10/4/2021      [x] I have reviewed in [facility] the Gastroenterology Progress Note by Dr Bonnie Rogers dated 9/14/21 attached below for an Interval History and Physical note. [x] I have examined  Amanda Looney Links  There are no changes to the patient who is scheduled for a US guided paracentesis in IR by Dr Michelle Zhang for alcoholic cirrhosis of liver with ascites. The patient denies new health changes, fever, chills, wheezing, cough, increased SOB, chest pain, open sores or wounds. Hx CAD, AAA, CHF, HTN, CABG x3 (3/2021). Patient is unable to take Plavix due to lymphoma. Patient's follows with [facility]. Denies chest pain, shortness of breath, dizziness, syncope. Patient last evaluated by cardiology \"2-3 months ago. \" Per ERIKA Flower CNP notes of 9/17/21 for GI procedures. Vital signs: /61   Pulse 50   Temp 96.4 °F (35.8 °C) (Temporal)   Resp 16   SpO2 95%     Allergies: Iv dye [iodides] and Iodinated diagnostic agents    Medications:    Prior to Admission medications    Medication Sig Start Date End Date Taking?  Authorizing Provider   melatonin (RA MELATONIN) 3 MG TABS tablet Take 1 tablet by mouth daily 9/28/21   Salty Roldan MD   spironolactone (ALDACTONE) 100 MG tablet Take 1 tablet by mouth daily 9/14/21   Enid Adames MD   furosemide (LASIX) 40 MG tablet Take 1 tablet by mouth daily 9/14/21   Enid Adames MD   spironolactone (ALDACTONE) 25 MG tablet Take 2 tablets by mouth daily  Patient not taking: Reported on 9/28/2021 8/13/21   Enid Adames MD   atorvastatin (LIPITOR) 20 MG tablet Take 1 tablet by mouth nightly 4/23/21   Salty Roldan MD   metoprolol (LOPRESSOR) 100 MG tablet Take 1 tablet by mouth 2 times daily  Patient taking differently: Take 50 mg by mouth 2 times daily  4/23/21   Salty Roldan MD   aspirin 81 MG tablet Take 81 mg by mouth daily    Historical Provider, MD Spoke with patient, went over results and instructions. Patient will call in a week with blood glucose logs    omeprazole (PRILOSEC) 10 MG delayed release capsule Take 1 capsule by mouth daily as needed (GERD) 6/9/17   Zachary Hendrix MD         This is a 66 y.o. male who is pleasant, cooperative, alert and oriented x3, in no acute distress    Physical Exam:  Lungs: Bilateral equal air entry, scattered crackles lower lobes with mildly diminished breath sounds, unlabored w/o use of accessory muscles, no wheezing, normal effort walked to preop area w/o distress   Cardiovascular: HR 50 asymptomatic bradycardic rate and regular rhythm, no murmur, gallop, rub. Abdomen: distended and mildly firm with +fluid  nontender, normal bowel sounds. Extremities: mild left ankle edema  No calf tenderness   Labs:  Recent Labs     09/22/21  1002   *   K 4.3      CO2 23   BUN 17   CREATININE 0.85   GLUCOSE 100*   AST 31   ALT 17   LABALBU 3.2*       No results for input(s): COVID19 in the last 720 hours. ELISEO Rodgers - CNP   Electronically signed 10/4/2021 at 9:06 AM      ]  Laverne Baker MD   Physician   Specialty:  Gastroenterology   Progress Notes      Signed   Encounter Date:  9/14/2021         Related encounter: Office Visit from 9/14/2021 in [facility]:   No referring provider defined for this encounter. Chief Complaint   Patient presents with    Cirrhosis       Patient is f/u on cirrhosis. He has been getting paracentesis done every 2 weeks. He states they are removing about 9 L per procedure.   He would like to know if he is a canidate               HISTORY OF PRESENT ILLNESS: Paresh White is a 66 y.o. male , referred for evaluation of alcohol liver cirrhosis,*large flat polyps, elevated LFT, NHL   Here for f/u   Patient is here with his friend who takes care of him also  His main issue that he is requiring frequent paracentesis, he needs paracentesis q 3 weeks   Is trying to have a low-salt diet but is taking salt substitute  He denied any confusion denied any bleeding  Also had flat polyp schedule multiple times  admitted follow-up and try to, and he said he could not take the prep the last time because of the ascites  Also order an EGD to look if he has varices  , He did not do that in the  non compliant    seen in 2015 with no f/u, even that we were concerned about a flat polyp on him and we needed to repeat his colonoscopy in 1 year, but he did not follow-up, and he came back in 2020  Seen in 2020, we will scheduled him for colonoscopy and labs and MRI  Did do    seen in 8/2021 :reordered the labs and colon    still did not do colonoscopy ( could not drink the prep)  On Aldactone/Lasix 50/20   Staying sober wants to do better  Wants to see transplant service  Review of system otherwise negative     Fluid negative for malignancy         Past Medical,Family, and Social History reviewed and does contribute to the patient presentingcondition. Patient's PMH/PSH,SH,PSYCH Hx, MEDs, ALLERGIES, and ROS were all reviewed and updated in the appropriate sections.      PAST MEDICAL HISTORY:  Past Medical History        Past Medical History:   Diagnosis Date    AAA (abdominal aortic aneurysm) ([address] 3/23/2015     3.8cm     CAD (coronary artery disease)      Colon polyps      Colon tumor      Congestive heart failure (CHF) ([address] 01/2021    GERD (gastroesophageal reflux disease)       prilosec as needed    H/O chest tube placement       chest tube x4 different times    History of pneumonia      History of pneumothorax       x4    Hx of cardiac cath 02/19/2021    Hyperlipidemia      Hypertension      Non Hodgkin's lymphoma ([address]       NHL    Pneumonia 1970's     patient had 3 chest tubes and in hospital for 13 days    Unspecified cerebral artery occlusion with cerebral infarction 10/01/2014     stroke , numbness in the left arm from the elbow down    Wellness examination 02/25/2021     pcp-[facility] nov 2020    Wellness examination 02/25/2021     [facility] [address]    Wellness examination 02/25/2021     onc-Dr Anastasiia Sifuentes 2021            Past Surgical History         Past Surgical History:   Procedure Laterality Date    APPENDECTOMY   4/21/15    CHOLECYSTECTOMY, OPEN   04/21/15    COLONOSCOPY        COLONOSCOPY   07/10/2020     COLONOSCOPY POLYPECTOMY HOT BIOPSY (N/A )    COLONOSCOPY N/A 7/10/2020     COLONOSCOPY POLYPECTOMY HOT BIOPSY performed by Laverne Baker MD at [place] [address] N/A 3/3/2021     CABG CORONARY ARTERY BYPASS X3, ON PUMP; SWAN MARLENY, MIRANDA PER ANESTHESIA performed by Lee Swain MD at [address] [place] [address]     stent to left leg with tubular graft to left leg artery    RIGHT COLECTOMY   04/21/15    TRANSESOPHAGEAL ECHOCARDIOGRAM   10/3/14    VASECTOMY   1986    WISDOM TOOTH EXTRACTION                CURRENT MEDICATIONS:    Current Medication      Current Outpatient Medications:     spironolactone (ALDACTONE) 100 MG tablet, Take 1 tablet by mouth daily, Disp: 30 tablet, Rfl: 3    furosemide (LASIX) 40 MG tablet, Take 1 tablet by mouth daily, Disp: 60 tablet, Rfl: 3    spironolactone (ALDACTONE) 25 MG tablet, Take 2 tablets by mouth daily (Patient taking differently: Take 25 mg by mouth 2 times daily ), Disp: 60 tablet, Rfl: 3    furosemide (LASIX) 20 MG tablet, Take 20 mg by mouth daily , Disp: , Rfl:     atorvastatin (LIPITOR) 20 MG tablet, Take 1 tablet by mouth nightly, Disp: 90 tablet, Rfl: 0    metoprolol (LOPRESSOR) 100 MG tablet, Take 1 tablet by mouth 2 times daily (Patient taking differently: Take 50 mg by mouth 2 times daily ), Disp: 120 tablet, Rfl: 1    aspirin 81 MG tablet, Take 81 mg by mouth daily, Disp: , Rfl:     omeprazole (PRILOSEC) 10 MG delayed release capsule, Take 1 capsule by mouth daily as needed (GERD), Disp: 90 04/20/2021     ALT 27 04/20/2021     INR 1.3 08/04/2021                  Lab Results   Component Value Date     RBC 4.46 08/23/2021     HGB 13.8 08/23/2021     .4 08/23/2021     MCH 30.9 08/23/2021     MCHC 30.8 08/23/2021     RDW 16.7 (H) 08/23/2021     MPV 9.9 08/23/2021     BASOPCT 0 08/23/2021     LYMPHSABS 11.46 (H) 08/23/2021     MONOSABS 2.67 (H) 08/23/2021     NEUTROABS 4.78 08/23/2021     EOSABS 0.19 08/23/2021     BASOSABS 0.00 08/23/2021            DIAGNOSTIC TESTING:      IR US GUIDED PARACENTESIS     Result Date: 9/13/2021  PROCEDURE: ULTRASOUND GUIDED PARACENTESIS 9/13/2021 HISTORY: ORDERING SYSTEM PROVIDED HISTORY: Alcoholic cirrhosis of liver with ascites [facility]) TECHNOLOGIST PROVIDED HISTORY: Ascites TECHNIQUE: Informed consent was obtained after a detailed explanation of the procedure including risks, benefits, and alternatives. Universal protocol was followed. The right abdomen was prepped and draped in sterile fashion and local anesthesia was achieved with lidocaine. 5 Western Riya Yueh needle sheath was advanced under ultrasound guidance into ascites and paracentesis was performed. The patient tolerated the procedure well. FINDINGS: A total of 9.1 L straw-colored fluid was removed. Successful ultrasound guided paracentesis. IR US GUIDED PARACENTESIS     Result Date: 8/23/2021  PROCEDURE: ULTRASOUND GUIDED PARACENTESIS 8/23/2021 HISTORY: ORDERING SYSTEM PROVIDED HISTORY: Alcoholic cirrhosis of liver with ascites [facility]) TECHNOLOGIST PROVIDED HISTORY: Ascites TECHNIQUE: Informed consent was obtained after a detailed explanation of the procedure including risks, benefits, and alternatives. Universal protocol was followed. All elements of maximal sterile barrier technique were used. Preprocedure ultrasound shows a dominant fluid pocket in the right lowerquadrant.   Using ultrasound guidance (image attached to the medical record), the fluid pocket was accessed with a 5 Western Riya Yueh needle with aspiration of inch clear yellow fluid. Vacuum bottle paracentesis was performed and approximately 8.3 L was removed. A sample was not requested. The patient received IV albumin replacement. .  The patient tolerated the procedure well and left the department in good condition. Dr. Corrine Santos was present. FINDINGS: A total of 8.3 L was removed. Successful ultrasound guided therapeutic paracentesis. PHYSICAL EXAMINATION: Vital signs reviewed per the nursing documentation. /77   Pulse 64   Temp 97.7 °F (36.5 °C)   Wt 197 lb (89.4 kg)   BMI 25.29 kg/m²   Body mass index is 25.29 kg/m². Physical Exam  Vitals and nursing note reviewed. Constitutional:       General: He is not in acute distress. Appearance: He is well-developed. He is not diaphoretic. HENT:      Head: Normocephalic and atraumatic. Eyes:      General: No scleral icterus. Pupils: Pupils are equal, round, and reactive to light. Neck:      Thyroid: No thyromegaly. Vascular: No JVD. Trachea: No tracheal deviation. Cardiovascular:      Rate and Rhythm: Normal rate and regular rhythm. Heart sounds: Normal heart sounds. No murmur heard. Pulmonary:      Effort: Pulmonary effort is normal. No respiratory distress. Breath sounds: Normal breath sounds. No wheezing. Abdominal:      General: Bowel sounds are normal. There is no distension. Palpations: Abdomen is soft. There is no mass. Tenderness: There is no abdominal tenderness. There is no guarding or rebound. Comments: ascites   Musculoskeletal:         General: No tenderness. Normal range of motion. Cervical back: Normal range of motion and neck supple. Skin:     General: Skin is warm. Coloration: Skin is not pale. Findings: No erythema or rash. Comments: He is not diaphoretic   Neurological:      Mental Status: He is alert and oriented to person, place, and time.       Deep Tendon Reflexes: Reflexes are normal and symmetric. Psychiatric:         Behavior: Behavior normal.         Thought Content: Thought content normal.         Judgment: Judgment normal.               IMPRESSION: Mr. Michelle Montalvo is a 66 y.o. male with        Diagnosis Orders   1. Alcoholic cirrhosis of liver with ascites (HCC)  Comp Metabolic w Bili Profile     EGD   2. Adenomatous polyps  COLONOSCOPY W/ OR W/O BIOPSY   3. Hx of cholecystectomy      4. Abdominal aortic aneurysm (AAA) without rupture ([address]      5. S/P CABG x 3      Patient with recurrent ascites related to alcohol liver disease has been sober for 2 months  We will increase his diuretic right now to 100/40 we will repeat his 1 week after that kidney function and electrolytes  Long discussion with him about compliance with the scopes will proceed with a colonoscopy to follow on the flat polyp he had which been way overdue right now  And to make sure he does not have any esophageal varices  We will refer him to the transplant service at the [email or web address]        Diet/life style/natural hx /complication of the dx were all explained in details   Past medical, past surgical, social history, psychiatric history, medications or allergies, all reviewed and  updated     Thank you for allowing me to participate in the care of Mr. Michelle Montalvo. For any further questions please do not hesitate to contact me. I have reviewed and agree with the ROS entered by the MA/RN. Note is dictated utilizing voice recognition software. Unfortunately this leads to occasional typographical errors. Please contact our office if you have any questions.         Compa Apodaca MD  [facility]  O: #[phone]                  Revision History
